# Patient Record
Sex: FEMALE | Race: WHITE | NOT HISPANIC OR LATINO | Employment: OTHER | URBAN - METROPOLITAN AREA
[De-identification: names, ages, dates, MRNs, and addresses within clinical notes are randomized per-mention and may not be internally consistent; named-entity substitution may affect disease eponyms.]

---

## 2017-05-16 ENCOUNTER — RADIANT APPOINTMENT (OUTPATIENT)
Dept: GENERAL RADIOLOGY | Facility: CLINIC | Age: 62
End: 2017-05-16
Attending: NURSE PRACTITIONER
Payer: COMMERCIAL

## 2017-05-16 ENCOUNTER — OFFICE VISIT (OUTPATIENT)
Dept: FAMILY MEDICINE | Facility: CLINIC | Age: 62
End: 2017-05-16
Payer: COMMERCIAL

## 2017-05-16 VITALS
HEART RATE: 101 BPM | SYSTOLIC BLOOD PRESSURE: 101 MMHG | TEMPERATURE: 96.7 F | OXYGEN SATURATION: 87 % | WEIGHT: 103 LBS | DIASTOLIC BLOOD PRESSURE: 70 MMHG | HEIGHT: 64 IN | BODY MASS INDEX: 17.58 KG/M2

## 2017-05-16 DIAGNOSIS — J44.1 COPD EXACERBATION (H): Primary | ICD-10-CM

## 2017-05-16 DIAGNOSIS — Z12.31 ENCOUNTER FOR SCREENING MAMMOGRAM FOR BREAST CANCER: ICD-10-CM

## 2017-05-16 DIAGNOSIS — J44.1 COPD EXACERBATION (H): ICD-10-CM

## 2017-05-16 DIAGNOSIS — J43.9 PULMONARY EMPHYSEMA, UNSPECIFIED EMPHYSEMA TYPE (H): ICD-10-CM

## 2017-05-16 PROCEDURE — 99204 OFFICE O/P NEW MOD 45 MIN: CPT | Performed by: NURSE PRACTITIONER

## 2017-05-16 PROCEDURE — 71020 XR CHEST 2 VW: CPT

## 2017-05-16 PROCEDURE — 94640 AIRWAY INHALATION TREATMENT: CPT | Mod: 25 | Performed by: NURSE PRACTITIONER

## 2017-05-16 RX ORDER — ALBUTEROL SULFATE 90 UG/1
2 AEROSOL, METERED RESPIRATORY (INHALATION) EVERY 6 HOURS PRN
Qty: 1 INHALER | Refills: 0 | Status: CANCELLED | OUTPATIENT
Start: 2017-05-16

## 2017-05-16 NOTE — NURSING NOTE
"Chief Complaint   Patient presents with     Chronic Cough       Initial /70 (BP Location: Left arm, Cuff Size: Adult Regular)  Pulse 98  Temp 96.7  F (35.9  C) (Tympanic)  Ht 5' 4\" (1.626 m)  Wt 103 lb (46.7 kg)  SpO2 96%  BMI 17.68 kg/m2 Estimated body mass index is 17.68 kg/(m^2) as calculated from the following:    Height as of this encounter: 5' 4\" (1.626 m).    Weight as of this encounter: 103 lb (46.7 kg).  Medication Reconciliation: complete     Meryl Tee MA    "

## 2017-05-16 NOTE — PATIENT INSTRUCTIONS
Diagnosing COPD  Past medical history, physical exam, and diagnostic tests are all used to diagnose COPD.  Medical history  Your health care provider learns about your medical history by asking questions. Topics include:    History of present illness. Tell your health care provider about your symptoms and how long you have had them.    Past medical and surgical history. Share other health problems and surgery you have had.    Family history. Report serious health problems in close family members, especially any lung problems.    Social and environmental history. The most important factor in COPD is whether you smoke or have smoked in the past. Exposure to second hand smoke, harmful chemicals, or air pollution should also be noted.    Functional assessment. Report whether breathing interferes with your daily activities.  Physical exam    Your provide will examine you. He or she will check your heart and lungs with a stethoscope. The focus will be on your airways including your nose and throat.   Diagnostic tests    Pulmonary function tests measure the flow of air into and out of your lungs, and how much air your lungs can hold. The most common pulmonary function test is spirometry. This measures how fast and how much air you can blow out (exhale). Spirometry results are important in the diagnosis of COPD.    Pulse oximetry shows how much oxygen is in your blood (oxygen saturation). This may be done at rest, as well as during and after exercise.    Arterial blood gas tests measure levels of oxygen and carbon dioxide in your blood.    Chest X-rays show the size and shape of your lungs. They can also show certain problems in the lungs.    CT (computed tomography) scans provide detailed images of the lungs.    1655-5421 [employer]. [address] [place] [address], [place] [address]. All rights reserved. This information is not intended as a substitute for professional medical care. Always follow your healthcare  professional's instructions.        COPD Flare    You have had a flare-up of your COPD.  COPD, or chronic obstructive pulmonary disease, is a common lung disease. It causes your airways to become irritated and narrower. This makes it harder for you to breathe. Emphysema and chronic bronchitis are both types of COPD. This is a chronic condition, which means you always have it. Sometimes it gets worse. When this happens, it is called a flare-up.  Symptoms of COPD  People with COPD may have symptoms most of the time. In a flare-up, your symptoms get worse. These symptoms may mean you are having a flare-up:    Shortness of breath, shallow or rapid breathing, or wheezing that gets worse    Lung infection    Cough that gets worse    More mucus, thicker mucus or mucus of a different color    Tiredness, decreased energy, or trouble doing your usual activities    Fever    Chest tightness    Your symptoms don t get better even when you use your usual medicines, inhalers, and nebulizer    Trouble talking    You feel confused  Causes of flare-ups  Unfortunately, a flare-up can happen even though you did everything right, and you followed your doctor s instructions. Some causes of flare-ups are:    Smoking or secondhand smoke    Colds, the flu, or respiratory infections    Air pollution    Sudden change in the weather    Dust, irritating chemicals, or strong fumes    Not taking your medicines as prescribed  Home care  Here are some things you can do at home to treat a flare-up:    Try not to panic. This makes it harder to breathe, and keeps you from doing the right things.    Don t smoke or be around others who are smoking.    Try to drink more fluids than usual during a flare-up, unless your doctor has told you not to because of heart and kidney problems. More fluids can help loosen the mucus.    Use your inhalers and nebulizer, if you have one, as you have been told to.    If you were given antibiotics, take them until they  are used up or your doctor tells you to stop. It s important to finish the antibiotics, even though you feel better. This will make sure the infection has cleared.    If you were given prednisone or another steroid, finish it even if you feel better.  Preventing a flare-up  Even though flare-ups happen, the best way to treat one is to prevent it before it starts. Here are some pointers:    Don t smoke or be around others who are smoking.    Take your medicines as you have been told.    Talk with your doctor about getting a flu shot every year. Also find out if you need a pneumonia shot.    If there is a weather advisory warning to stay indoors, try to stay inside when possible.    Try to eat healthy and get plenty of sleep.    Try to avoid things that usually set you off, like dust, chemical fumes, hairsprays, or strong perfumes.  Follow-up care  Follow up with your healthcare provider.  If a culture was done, you will be told if your treatment needs to be changed. You can call as directed for the results.  If X-rays were done, and a radiologist had not seen them while you were there, they will be reviewed. You will be told if there is a change in the reading, especially if it affects your treatment.  Call 911  Call 911 if any of these occur:    You have trouble breathing    You feel confused or it s difficult to wake you up    You faint or lose consciousness    You have a rapid heart rate    You have new pain in your chest, arm, shoulder, neck or upper back  When to seek medical advice  Call your healthcare provider right away if any of these occur:    Wheezing or shortness of breath gets worse    You need to use your inhalers more often than usual without relief    Fever of 100.4 F (38 C) or higher, or as directed    Coughing up lots of dark-colored or bloody sputum (mucus)    Chest pain with each breath    You do not start to get better within 24 hours    Swelling or your ankles gets worse    Dizziness or  weakness       8821-5803 [employer]. [address] [place] [address], [place] [address]. All rights reserved. This information is not intended as a substitute for professional medical care. Always follow your healthcare professional's instructions.        Discharge Instructions: COPD  You have been diagnosed with chronic obstructive pulmonary disease (COPD). This is a name given to a group of diseases that limit the flow of air in and out of your lungs. This makes it harder to breathe. With COPD, you are also more likely to get lung infections. COPD includes chronic bronchitis and emphysema. COPD is most often caused by heavy, long-term cigarette smoking.  Home care  Quit smoking    If you smoke, quit. It is the best thing you can do for your COPD and your overall health.    Join a stop-smoking program. There are even telephone, text message, and Internet programs to help you quit.    Ask your health care provider about medications or other methods to help you quit.    Ask family members to quit smoking as well.    Don't allow people to smoke in your home, in your car, or when they are around you.  Protect yourself from infection    Wash your hands often. Do your best to keep your hands away from your face. Most germs are spread from your hands to your mouth.    Get a flu shot every year. Also ask your provider about pneumonia vaccines.    Avoid crowds. It's especially important to do this in the winter when more people have colds and flu.    To stay healthy, get enough sleep, exercise regularly, and eat a balanced diet. You should:    Get about 8 hours of sleep every night.    Try to exercise for at least 30 minutes on most days.    Have healthy foods including fruits and vegetables, 100% whole grains, lean meats and fish, and low-fat dairy products. Try to stay away from foods high in fats and sugar.  Take your medications  Take your medications exactly as directed. Don't skip doses.  Manage your  stress  Stress can make COPD worse. Use this stress management technique:    Find a quiet place and sit or lie in a comfortable position.    Close your eyes and perform breathing exercises for several minutes. Ask your provider about the best way to breathe.  Pulmonary rehabilitation    Pulmonary rehab can help you feel better. These programs include exercise, breathing techniques, information about COPD, counseling, and help for smokers.    Ask your provider or your local hospital about programs in your area.  When to call your health care provider  Call your provider immediately if you have any of the following:    Shortness of breath, wheezing, or coughing    Increased mucus    Yellow, green, bloody, or smelly mucus    Fever or chills    Tightness in your chest that does not go away with rest or medication    An irregular heartbeat or a feeling that your heart is beating very fast    Swollen ankles     0660-1194 [employer]. [address] [place] [address]. All rights reserved. This information is not intended as a substitute for professional medical care. Always follow your healthcare professional's instructions.        Medications for Chronic Obstructive Pulmonary Disease  Some medications for COPD help control or prevent symptoms. They are called maintenance medications. Take these medications every day or as instructed by your health care provider. Some are rescue medications. Take these only when you have symptoms like increased shortness of breath or chest tightness. Take this medication sheet with you to your next office visit and ask your provider or nurse to help you complete it.  Bronchodilators  What they do: Relax the muscles around airways. This allows you to breathe more easily.     Short-acting beta-2 agonists. These start working shortly after you use them. They are rescue medications.  My medications: __________________________________________  When to  take: __________________________________________     Long-acting beta-2 agonists. These work more slowly than the fast-acting type. But the effects last longer. They are maintenance medications.  My medications: __________________________________________  When to take: __________________________________________     Anticholinergics. Rescue: These may be used along with a short-acting beta-2 agonist to help keep airways open.  My medications: __________________________________________  When to take: __________________________________________     Anticholinergics. Maintenance: There are long-acting anticholinergenics.  My medications: __________________________________________  When to take: __________________________________________     Methylxanthines. These are maintenance medications and have long-lasting effects. They may be useful if symptoms occur during sleep.  My medications: __________________________________________  When to take: __________________________________________     Corticosteroids  What they do: Reduce inflammation, swelling, and mucus production. This allows you to breathe more easily.     Inhaled corticosteroids. These medications are taken with an inhaler or nebulizer. They are maintenance medications.  My medications: __________________________________________  When to take: __________________________________________     Oral corticosteroids. These medications are taken by mouth. They may be used when symptoms worsen.  My medications: __________________________________________  When to take: __________________________________________     Phosphodiesterase Type 4 (PDE4) Inhibitors  What they do: Reduce the risk of flare-ups in patients with severe COPD.  My medications: __________________________________________  When to take: __________________________________________     Combination Medications  What they do: Combine the effects of different types of medications. For example, a combination  medication may relax the muscles around the airways and lessen the swelling or inflammation in the airway.  My medications: __________________________________________  When to take: __________________________________________     Other Medications  Other medication for COPD.  My medication: __________________________________________  What it does: __________________________________________  When to take: __________________________________________  Herbal products and supplements  There are products for COPD that are available without a subscription. For example, herbs, extracts, or supplements. Be sure to talk with your health care provider before taking any of these products. They can interact with medications you re taking.     8679-8579 [employer]. [address] [place] [address]. All rights reserved. This information is not intended as a substitute for professional medical care. Always follow your healthcare professional's instructions.        What is COPD?  COPD stands for chronic obstructive pulmonary disease. It means the airways in your lungs are blocked (obstructed). Because of this, it is hard to breathe. You may have trouble with daily activities because of shortness of breath. Over time the shortness of breath usually worsens making it more and more difficult to take care of yourself and take part in activities. Chronic bronchitis and emphysema are two common types of COPD.  What happens in chronic bronchitis?    The cells in the airways make more mucus than normal. The mucus builds up, narrowing the airways. This means less air travels into and out of the lungs. The lining of the airways may also become inflamed (swollen) and causes the airways to narrow even more.        What happens in emphysema?    The small airways are damaged and lose their stretchiness. The airways collapse when you exhale, causing air to get trapped in the air sacs. This means that less oxygen enters the  blood vessels and less oxygen is delivered to all of the cells of your body. This makes it hard to breathe.     Damage to cilia    Cilia are small hairs that line and protect the airways. Smoking damages the cilia. Damaged cilia can t sweep mucus and particles away. Some of the cilia are destroyed. This damage worsens COPD.  How did I get COPD?  Most people get COPD from smoking. Cigarette smoke damages lungs, which can develop into COPD over many years.  How COPD affects you  COPD makes you work harder to breathe. Air may get trapped in the lungs, which prevents your lungs from filling completely when you inhale (breathe in). It s harder to take deep breaths. Over time, your lungs may become enlarged. This makes it more difficult for the lungs to expand fully in the chest. These problems cause you to have shortness of breath (also called dyspnea). Wheezing (hoarse, whistling breathing), chronic cough, and fatigue (feeling tired and worn out) are also common.    1632-4192 [employer]. [address] [place] [address], [place] [address]. All rights reserved. This information is not intended as a substitute for professional medical care. Always follow your healthcare professional's instructions.        Discharge Instructions: Coughing Techniques     Airway clearance techniques help to remove mucus from the airways. Clearing the airways helps to improve breathing and lessen the chance of infection. One method is controlled coughing. Be sure to also use any medications before or after clearing your airways, as instructed by your health care provider. For example, some people use inhaled bronchodilators before clearing their airways.   Note: Be sure to keep a box of tissues beside you. Wash your hands when you are done.   Controlled coughing    Sit on a chair with both feet on the floor.    Take a slow, deep breath through your nose and hold for 2 counts.    Lean forward slightly.    Cough twice -- 2 short  coughs.    Relax for a few seconds.  Repeat the steps as needed.   The  corrales  technique    Sit on a chair with both feet on the floor.    Take a slow, deep breath through your nose and hold for 2 counts.    To exhale, open your mouth and make a  corrales  sound in your throat. (This is the same way you might breathe to clean a pair of eyeglasses.)    Corrales 2 to 3 times as you exhale.    Relax for a few seconds.  Repeat the steps as needed.         Follow-up care  Make a follow-up appointment as directed by our staff.  When to seek medical care  Call your doctor immediately if you have any of the following:    Shortness of breath, wheezing, or coughing    Increased mucus    Yellow, green, bloody, or smelly mucus    Fever or chills    Tightness in your chest that does not go away with rest or medication    An irregular heartbeat     2712-4837 [employer]. [address] [place] [address], [place] [address]. All rights reserved. This information is not intended as a substitute for professional medical care. Always follow your healthcare professional's instructions.

## 2017-05-16 NOTE — MR AVS SNAPSHOT
After Visit Summary   5/16/2017    Tammie Zeng    MRN: [id number]           Patient Information     Date Of Birth          1955        Visit Information        Provider Department      5/16/2017 11:00 AM Teresita Shook APRN [facility]        Today's Diagnoses     COPD exacerbation (H)    -  1    Pulmonary emphysema, unspecified emphysema type (H)          Care Instructions      Diagnosing COPD  Past medical history, physical exam, and diagnostic tests are all used to diagnose COPD.  Medical history  Your health care provider learns about your medical history by asking questions. Topics include:    History of present illness. Tell your health care provider about your symptoms and how long you have had them.    Past medical and surgical history. Share other health problems and surgery you have had.    Family history. Report serious health problems in close family members, especially any lung problems.    Social and environmental history. The most important factor in COPD is whether you smoke or have smoked in the past. Exposure to second hand smoke, harmful chemicals, or air pollution should also be noted.    Functional assessment. Report whether breathing interferes with your daily activities.  Physical exam    Your provide will examine you. He or she will check your heart and lungs with a stethoscope. The focus will be on your airways including your nose and throat.   Diagnostic tests    Pulmonary function tests measure the flow of air into and out of your lungs, and how much air your lungs can hold. The most common pulmonary function test is spirometry. This measures how fast and how much air you can blow out (exhale). Spirometry results are important in the diagnosis of COPD.    Pulse oximetry shows how much oxygen is in your blood (oxygen saturation). This may be done at rest, as well as during and after exercise.    Arterial blood gas tests measure levels of oxygen  and carbon dioxide in your blood.    Chest X-rays show the size and shape of your lungs. They can also show certain problems in the lungs.    CT (computed tomography) scans provide detailed images of the lungs.    0052-1258 [employer]. [address] [place] [address], [place] [address]. All rights reserved. This information is not intended as a substitute for professional medical care. Always follow your healthcare professional's instructions.        COPD Flare    You have had a flare-up of your COPD.  COPD, or chronic obstructive pulmonary disease, is a common lung disease. It causes your airways to become irritated and narrower. This makes it harder for you to breathe. Emphysema and chronic bronchitis are both types of COPD. This is a chronic condition, which means you always have it. Sometimes it gets worse. When this happens, it is called a flare-up.  Symptoms of COPD  People with COPD may have symptoms most of the time. In a flare-up, your symptoms get worse. These symptoms may mean you are having a flare-up:    Shortness of breath, shallow or rapid breathing, or wheezing that gets worse    Lung infection    Cough that gets worse    More mucus, thicker mucus or mucus of a different color    Tiredness, decreased energy, or trouble doing your usual activities    Fever    Chest tightness    Your symptoms don t get better even when you use your usual medicines, inhalers, and nebulizer    Trouble talking    You feel confused  Causes of flare-ups  Unfortunately, a flare-up can happen even though you did everything right, and you followed your doctor s instructions. Some causes of flare-ups are:    Smoking or secondhand smoke    Colds, the flu, or respiratory infections    Air pollution    Sudden change in the weather    Dust, irritating chemicals, or strong fumes    Not taking your medicines as prescribed  Home care  Here are some things you can do at home to treat a flare-up:    Try not to panic. This makes it  harder to breathe, and keeps you from doing the right things.    Don t smoke or be around others who are smoking.    Try to drink more fluids than usual during a flare-up, unless your doctor has told you not to because of heart and kidney problems. More fluids can help loosen the mucus.    Use your inhalers and nebulizer, if you have one, as you have been told to.    If you were given antibiotics, take them until they are used up or your doctor tells you to stop. It s important to finish the antibiotics, even though you feel better. This will make sure the infection has cleared.    If you were given prednisone or another steroid, finish it even if you feel better.  Preventing a flare-up  Even though flare-ups happen, the best way to treat one is to prevent it before it starts. Here are some pointers:    Don t smoke or be around others who are smoking.    Take your medicines as you have been told.    Talk with your doctor about getting a flu shot every year. Also find out if you need a pneumonia shot.    If there is a weather advisory warning to stay indoors, try to stay inside when possible.    Try to eat healthy and get plenty of sleep.    Try to avoid things that usually set you off, like dust, chemical fumes, hairsprays, or strong perfumes.  Follow-up care  Follow up with your healthcare provider.  If a culture was done, you will be told if your treatment needs to be changed. You can call as directed for the results.  If X-rays were done, and a radiologist had not seen them while you were there, they will be reviewed. You will be told if there is a change in the reading, especially if it affects your treatment.  Call 911  Call 911 if any of these occur:    You have trouble breathing    You feel confused or it s difficult to wake you up    You faint or lose consciousness    You have a rapid heart rate    You have new pain in your chest, arm, shoulder, neck or upper back  When to seek medical advice  Call your  healthcare provider right away if any of these occur:    Wheezing or shortness of breath gets worse    You need to use your inhalers more often than usual without relief    Fever of 100.4 F (38 C) or higher, or as directed    Coughing up lots of dark-colored or bloody sputum (mucus)    Chest pain with each breath    You do not start to get better within 24 hours    Swelling or your ankles gets worse    Dizziness or weakness       3593-1235 [employer]. [address] [place] [address], [place] [address]. All rights reserved. This information is not intended as a substitute for professional medical care. Always follow your healthcare professional's instructions.        Discharge Instructions: COPD  You have been diagnosed with chronic obstructive pulmonary disease (COPD). This is a name given to a group of diseases that limit the flow of air in and out of your lungs. This makes it harder to breathe. With COPD, you are also more likely to get lung infections. COPD includes chronic bronchitis and emphysema. COPD is most often caused by heavy, long-term cigarette smoking.  Home care  Quit smoking    If you smoke, quit. It is the best thing you can do for your COPD and your overall health.    Join a stop-smoking program. There are even telephone, text message, and Internet programs to help you quit.    Ask your health care provider about medications or other methods to help you quit.    Ask family members to quit smoking as well.    Don't allow people to smoke in your home, in your car, or when they are around you.  Protect yourself from infection    Wash your hands often. Do your best to keep your hands away from your face. Most germs are spread from your hands to your mouth.    Get a flu shot every year. Also ask your provider about pneumonia vaccines.    Avoid crowds. It's especially important to do this in the winter when more people have colds and flu.    To stay healthy, get enough sleep, exercise regularly,  and eat a balanced diet. You should:    Get about 8 hours of sleep every night.    Try to exercise for at least 30 minutes on most days.    Have healthy foods including fruits and vegetables, 100% whole grains, lean meats and fish, and low-fat dairy products. Try to stay away from foods high in fats and sugar.  Take your medications  Take your medications exactly as directed. Don't skip doses.  Manage your stress  Stress can make COPD worse. Use this stress management technique:    Find a quiet place and sit or lie in a comfortable position.    Close your eyes and perform breathing exercises for several minutes. Ask your provider about the best way to breathe.  Pulmonary rehabilitation    Pulmonary rehab can help you feel better. These programs include exercise, breathing techniques, information about COPD, counseling, and help for smokers.    Ask your provider or your local hospital about programs in your area.  When to call your health care provider  Call your provider immediately if you have any of the following:    Shortness of breath, wheezing, or coughing    Increased mucus    Yellow, green, bloody, or smelly mucus    Fever or chills    Tightness in your chest that does not go away with rest or medication    An irregular heartbeat or a feeling that your heart is beating very fast    Swollen ankles     1113-5322 [employer]. [address] [place] [address], [place] [address]. All rights reserved. This information is not intended as a substitute for professional medical care. Always follow your healthcare professional's instructions.        Medications for Chronic Obstructive Pulmonary Disease  Some medications for COPD help control or prevent symptoms. They are called maintenance medications. Take these medications every day or as instructed by your health care provider. Some are rescue medications. Take these only when you have symptoms like increased shortness of breath or chest tightness. Take this  medication sheet with you to your next office visit and ask your provider or nurse to help you complete it.  Bronchodilators  What they do: Relax the muscles around airways. This allows you to breathe more easily.     Short-acting beta-2 agonists. These start working shortly after you use them. They are rescue medications.  My medications: __________________________________________  When to take: __________________________________________     Long-acting beta-2 agonists. These work more slowly than the fast-acting type. But the effects last longer. They are maintenance medications.  My medications: __________________________________________  When to take: __________________________________________     Anticholinergics. Rescue: These may be used along with a short-acting beta-2 agonist to help keep airways open.  My medications: __________________________________________  When to take: __________________________________________     Anticholinergics. Maintenance: There are long-acting anticholinergenics.  My medications: __________________________________________  When to take: __________________________________________     Methylxanthines. These are maintenance medications and have long-lasting effects. They may be useful if symptoms occur during sleep.  My medications: __________________________________________  When to take: __________________________________________     Corticosteroids  What they do: Reduce inflammation, swelling, and mucus production. This allows you to breathe more easily.     Inhaled corticosteroids. These medications are taken with an inhaler or nebulizer. They are maintenance medications.  My medications: __________________________________________  When to take: __________________________________________     Oral corticosteroids. These medications are taken by mouth. They may be used when symptoms worsen.  My medications: __________________________________________  When to  take: __________________________________________     Phosphodiesterase Type 4 (PDE4) Inhibitors  What they do: Reduce the risk of flare-ups in patients with severe COPD.  My medications: __________________________________________  When to take: __________________________________________     Combination Medications  What they do: Combine the effects of different types of medications. For example, a combination medication may relax the muscles around the airways and lessen the swelling or inflammation in the airway.  My medications: __________________________________________  When to take: __________________________________________     Other Medications  Other medication for COPD.  My medication: __________________________________________  What it does: __________________________________________  When to take: __________________________________________  Herbal products and supplements  There are products for COPD that are available without a subscription. For example, herbs, extracts, or supplements. Be sure to talk with your health care provider before taking any of these products. They can interact with medications you re taking.     9965-5906 [employer]. [address] [place] [address], [place] [address]. All rights reserved. This information is not intended as a substitute for professional medical care. Always follow your healthcare professional's instructions.        What is COPD?  COPD stands for chronic obstructive pulmonary disease. It means the airways in your lungs are blocked (obstructed). Because of this, it is hard to breathe. You may have trouble with daily activities because of shortness of breath. Over time the shortness of breath usually worsens making it more and more difficult to take care of yourself and take part in activities. Chronic bronchitis and emphysema are two common types of COPD.  What happens in chronic bronchitis?    The cells in the airways make more mucus than normal. The mucus  builds up, narrowing the airways. This means less air travels into and out of the lungs. The lining of the airways may also become inflamed (swollen) and causes the airways to narrow even more.        What happens in emphysema?    The small airways are damaged and lose their stretchiness. The airways collapse when you exhale, causing air to get trapped in the air sacs. This means that less oxygen enters the blood vessels and less oxygen is delivered to all of the cells of your body. This makes it hard to breathe.     Damage to cilia    Cilia are small hairs that line and protect the airways. Smoking damages the cilia. Damaged cilia can t sweep mucus and particles away. Some of the cilia are destroyed. This damage worsens COPD.  How did I get COPD?  Most people get COPD from smoking. Cigarette smoke damages lungs, which can develop into COPD over many years.  How COPD affects you  COPD makes you work harder to breathe. Air may get trapped in the lungs, which prevents your lungs from filling completely when you inhale (breathe in). It s harder to take deep breaths. Over time, your lungs may become enlarged. This makes it more difficult for the lungs to expand fully in the chest. These problems cause you to have shortness of breath (also called dyspnea). Wheezing (hoarse, whistling breathing), chronic cough, and fatigue (feeling tired and worn out) are also common.    8770-0899 [employer]. [address] [place] [address], [place] [address]. All rights reserved. This information is not intended as a substitute for professional medical care. Always follow your healthcare professional's instructions.        Discharge Instructions: Coughing Techniques     Airway clearance techniques help to remove mucus from the airways. Clearing the airways helps to improve breathing and lessen the chance of infection. One method is controlled coughing. Be sure to also use any medications before or after clearing your airways, as  instructed by your health care provider. For example, some people use inhaled bronchodilators before clearing their airways.   Note: Be sure to keep a box of tissues beside you. Wash your hands when you are done.   Controlled coughing    Sit on a chair with both feet on the floor.    Take a slow, deep breath through your nose and hold for 2 counts.    Lean forward slightly.    Cough twice -- 2 short coughs.    Relax for a few seconds.  Repeat the steps as needed.   The  corrales  technique    Sit on a chair with both feet on the floor.    Take a slow, deep breath through your nose and hold for 2 counts.    To exhale, open your mouth and make a  corrales  sound in your throat. (This is the same way you might breathe to clean a pair of eyeglasses.)    Corrales 2 to 3 times as you exhale.    Relax for a few seconds.  Repeat the steps as needed.         Follow-up care  Make a follow-up appointment as directed by our staff.  When to seek medical care  Call your doctor immediately if you have any of the following:    Shortness of breath, wheezing, or coughing    Increased mucus    Yellow, green, bloody, or smelly mucus    Fever or chills    Tightness in your chest that does not go away with rest or medication    An irregular heartbeat     7663-1112 [employer]. [address] [place] [address], [place] [address]. All rights reserved. This information is not intended as a substitute for professional medical care. Always follow your healthcare professional's instructions.              Follow-ups after your visit        Additional Services     PULMONARY MEDICINE REFERRAL       Your provider has referred you to: [facility]: [facility]  Katty [phone]   [email or web address]    Please be aware that coverage of these services is subject to the terms and limitations of your health insurance plan.  Call member services at your health plan with any benefit or coverage questions.      Please bring the following with you to your  "appointment:    (1) Any X-Rays, CTs or MRIs which have been performed.  Contact the facility where they were done to arrange for  prior to your scheduled appointment.    (2) List of current medications   (3) This referral request   (4) Any documents/labs given to you for this referral                  Future tests that were ordered for you today     Open Future Orders        Priority Expected Expires Ordered    *MA Screening Digital Bilateral Routine  2018            Who to contact     If you have questions or need follow up information about today's clinic visit or your schedule please contact [facility] directly at [phone].  Normal or non-critical lab and imaging results will be communicated to you by MyChart, letter or phone within 4 business days after the clinic has received the results. If you do not hear from us within 7 days, please contact the clinic through MyChart or phone. If you have a critical or abnormal lab result, we will notify you by phone as soon as possible.  Submit refill requests through [employer] or call your pharmacy and they will forward the refill request to us. Please allow 3 business days for your refill to be completed.          Additional Information About Your Visit        [employer] Information     [employer] lets you send messages to your doctor, view your test results, renew your prescriptions, schedule appointments and more. To sign up, go to [email or web address] . Click on \"Log in\" on the left side of the screen, which will take you to the Welcome page. Then click on \"Sign up Now\" on the right side of the page.     You will be asked to enter the access code listed below, as well as some personal information. Please follow the directions to create your username and password.     Your access code is: [email or web address]  Expires: 2017 12:34 PM     Your access code will  in 90 days. If you need help or a new code, please call your [place] " "clinic or [phone].        Care EveryWhere ID     This is your Care EveryWhere ID. This could be used by other organizations to access your [facility] medical records  [id number]        Your Vitals Were     Pulse Temperature Height Pulse Oximetry BMI (Body Mass Index)       101 96.7  F (35.9  C) (Tympanic) 5' 4\" (1.626 m) 87% 17.68 kg/m2        Blood Pressure from Last 3 Encounters:   05/16/17 101/70   10/19/16 118/74   01/06/11 116/70    Weight from Last 3 Encounters:   05/16/17 103 lb (46.7 kg)   10/19/16 106 lb (48.1 kg)   01/06/11 125 lb (56.7 kg)              We Performed the Following     INHALATION/NEBULIZER TREATMENT, INITIAL     PULMONARY MEDICINE REFERRAL          Today's Medication Changes          These changes are accurate as of: 5/16/17 12:34 PM.  If you have any questions, ask your nurse or doctor.               Start taking these medicines.        Dose/Directions    Ipratropium-Albuterol  MCG/ACT inhaler   Commonly known as:  COMBIVENT RESPIMAT   Used for:  COPD exacerbation (H)   Started by:  Teresita Shook APRN CNP        Dose:  1 puff   Inhale 1 puff into the lungs 4 times daily Not to exceed 6 doses per day.   Quantity:  1 Inhaler   Refills:  1         Stop taking these medicines if you haven't already. Please contact your care team if you have questions.     azithromycin 250 MG tablet   Commonly known as:  ZITHROMAX   Stopped by:  Teresita Shook APRN CNP           methylPREDNISolone 4 MG tablet   Commonly known as:  MEDROL DOSEPAK   Stopped by:  Teresita Shook APRN CNP                Where to get your medicines      These medications were sent to [facility] [address] [place] [address] & [address], [place] [address]     Phone:  [phone]     Ipratropium-Albuterol  MCG/ACT inhaler                Primary Care Provider Office Phone # Fax #    Angely Rivas [phone]       [facility]" [facility] [address] [facility]  [place] [address]        Thank you!     Thank you for choosing [facility]  for your care. Our goal is always to provide you with excellent care. Hearing back from our patients is one way we can continue to improve our services. Please take a few minutes to complete the written survey that you may receive in the mail after your visit with us. Thank you!             Your Updated Medication List - Protect others around you: Learn how to safely use, store and throw away your medicines at www.disposemymeds.org.          This list is accurate as of: 5/16/17 12:34 PM.  Always use your most recent med list.                   Brand Name Dispense Instructions for use    albuterol 108 (90 BASE) MCG/ACT Inhaler    PROAIR HFA/PROVENTIL HFA/VENTOLIN HFA    1 Inhaler    Inhale 2 puffs into the lungs every 6 hours as needed for shortness of breath / dyspnea or wheezing       CHAYA ASPIRIN 325 MG tablet   Generic drug:  aspirin      Take 1-2 tablets by mouth daily as needed.       Ipratropium-Albuterol  MCG/ACT inhaler    COMBIVENT RESPIMAT    1 Inhaler    Inhale 1 puff into the lungs 4 times daily Not to exceed 6 doses per day.

## 2017-05-16 NOTE — NURSING NOTE
The following nebulizer treatment was given:     MEDICATION: Duoneb  : [employer]  LOT #: [id number]  EXPIRATION DATE:  01/31/19  NDC # 3093-5433-16    Meryl Tee MA

## 2017-05-16 NOTE — PROGRESS NOTES
SUBJECTIVE:                                                    Tammie Zeng is a 61 year old female who presents to clinic today for the following health issues:      RESPIRATORY SYMPTOMS      Duration: ongoing for a long time ; great difficulty breathing, SOB going from the car to the building.  preveious cxray in 10/2016 demonstrates hyperinflated lungs. She is a long term smoker stating she smokes 1/2 PPD.      Description  rhinorrhea, cough and wheezing    Severity: the runny nose is mild, the SOB is severe    Accompanying signs and symptoms:     History (predisposing factors):  tobacco abuse    Precipitating or alleviating factors: None    Therapies tried and outcome:  aspirin     Reports also right axillary pain extending into right lateral breast.  Can not recall last mammogram     Problem list and histories reviewed & adjusted, as indicated.  Additional history: as documented    Patient Active Problem List   Diagnosis     TOBACCO ABUSE-CONTINUOUS     Generalized anxiety disorder     CARDIOVASCULAR SCREENING; LDL GOAL LESS THAN 130     Moderate major depression (H)     Past Surgical History:   Procedure Laterality Date     CHOLECYSTECTOMY, LAPOROSCOPIC  1980's    done at [facility]     TUBAL LIGATION  1998       Social History   Substance Use Topics     Smoking status: Current Every Day Smoker     Packs/day: 1.00     Years: 25.00     Types: Cigarettes     Smokeless tobacco: Never Used      Comment: states is cutting back - smokes 1/2 ppd (01/03/11)     Alcohol use 0.0 oz/week     0 Standard drinks or equivalent per week      Comment: 3 mixed drinks per week     Family History   Problem Relation Age of Onset     Asthma Daughter      DIABETES Paternal Grandmother      CANCER Maternal Aunt      skin     Allergies Daughter      Depression Mother      Eye Disorder Mother      cataracts and glacoma     GASTROINTESTINAL DISEASE Maternal Grandmother      gallbladder     GASTROINTESTINAL DISEASE Daughter   "    ulcerative cholitis     OSTEOPOROSIS Mother      Respiratory Mother      asthmatic bronchitis         Current Outpatient Prescriptions   Medication Sig Dispense Refill     Ipratropium-Albuterol (COMBIVENT RESPIMAT)  MCG/ACT inhaler Inhale 1 puff into the lungs 4 times daily Not to exceed 6 doses per day. 1 Inhaler 1     aspirin (CHAYA ASPIRIN) 325 MG tablet Take 1-2 tablets by mouth daily as needed.       albuterol (PROAIR HFA, PROVENTIL HFA, VENTOLIN HFA) 108 (90 BASE) MCG/ACT inhaler Inhale 2 puffs into the lungs every 6 hours as needed for shortness of breath / dyspnea or wheezing (Patient not taking: Reported on 5/16/2017) 1 Inhaler 0     Allergies   Allergen Reactions     Penicillins      convulsions       Reviewed and updated as needed this visit by clinical staff       Reviewed and updated as needed this visit by Provider         ROS:  Constitutional, HEENT, cardiovascular, pulmonary, gi and gu systems are negative, except as otherwise noted.    OBJECTIVE:                                                    /70 (BP Location: Left arm, Cuff Size: Adult Regular)  Pulse 101  Temp 96.7  F (35.9  C) (Tympanic)  Ht 5' 4\" (1.626 m)  Wt 103 lb (46.7 kg)  SpO2 (!) 87%  BMI 17.68 kg/m2  Body mass index is 17.68 kg/(m^2). persistent weight loss  GENERAL: healthy, alert and dyspneic with non productive wet sounding cough   EYES: Eyes grossly normal to inspection, PERRL and conjunctivae and sclerae normal  HENT: ear canals and TM's normal, nose and mouth without ulcers or lesions  NECK: no adenopathy, no asymmetry, masses, or scars and thyroid normal to palpation  RESP: lungs clear to auscultation but diminished breath sounds- given duoneb in clinic with little improvement;  desats to 87% with ambulation   CV: tachy regular rate and rhythm, normal S1 S2, no S3 or S4, no murmur, click or rub, no peripheral edema   MS: experiences pulling sensation in the right axilla and lateral breast with external " posterior stretching of her right arm  Diagnostic Test Results:  Chest xray hyperinflated lungs; no acute process     ASSESSMENT/PLAN:                                                        ICD-10-CM    1. COPD exacerbation (H) J44.1 *MA Screening Digital Bilateral     XR Chest 2 Views     Ipratropium-Albuterol (COMBIVENT RESPIMAT)  MCG/ACT inhaler     INHALATION/NEBULIZER TREATMENT, INITIAL   2. Pulmonary emphysema, unspecified emphysema type (H) J43.9 PULMONARY MEDICINE REFERRAL     CANCELED: Pulmonary General Adult IP Consult   she is referred to pulmonology  Will follow up with Dr Newby to establish  I have encouraged her to begin pulmonary rehab and a smoking cessation program which will hopefully be presented by the pulmonology office; if not by her PCP when she establishes.  She will schedule a mammogram     Patient Instructions       Diagnosing COPD  Past medical history, physical exam, and diagnostic tests are all used to diagnose COPD.  Medical history  Your health care provider learns about your medical history by asking questions. Topics include:    History of present illness. Tell your health care provider about your symptoms and how long you have had them.    Past medical and surgical history. Share other health problems and surgery you have had.    Family history. Report serious health problems in close family members, especially any lung problems.    Social and environmental history. The most important factor in COPD is whether you smoke or have smoked in the past. Exposure to second hand smoke, harmful chemicals, or air pollution should also be noted.    Functional assessment. Report whether breathing interferes with your daily activities.  Physical exam    Your provide will examine you. He or she will check your heart and lungs with a stethoscope. The focus will be on your airways including your nose and throat.   Diagnostic tests    Pulmonary function tests measure the flow of air into and  out of your lungs, and how much air your lungs can hold. The most common pulmonary function test is spirometry. This measures how fast and how much air you can blow out (exhale). Spirometry results are important in the diagnosis of COPD.    Pulse oximetry shows how much oxygen is in your blood (oxygen saturation). This may be done at rest, as well as during and after exercise.    Arterial blood gas tests measure levels of oxygen and carbon dioxide in your blood.    Chest X-rays show the size and shape of your lungs. They can also show certain problems in the lungs.    CT (computed tomography) scans provide detailed images of the lungs.    2562-4443 [employer]. [address] [place] [address]. All rights reserved. This information is not intended as a substitute for professional medical care. Always follow your healthcare professional's instructions.        COPD Flare    You have had a flare-up of your COPD.  COPD, or chronic obstructive pulmonary disease, is a common lung disease. It causes your airways to become irritated and narrower. This makes it harder for you to breathe. Emphysema and chronic bronchitis are both types of COPD. This is a chronic condition, which means you always have it. Sometimes it gets worse. When this happens, it is called a flare-up.  Symptoms of COPD  People with COPD may have symptoms most of the time. In a flare-up, your symptoms get worse. These symptoms may mean you are having a flare-up:    Shortness of breath, shallow or rapid breathing, or wheezing that gets worse    Lung infection    Cough that gets worse    More mucus, thicker mucus or mucus of a different color    Tiredness, decreased energy, or trouble doing your usual activities    Fever    Chest tightness    Your symptoms don t get better even when you use your usual medicines, inhalers, and nebulizer    Trouble talking    You feel confused  Causes of flare-ups  Unfortunately, a flare-up can happen  even though you did everything right, and you followed your doctor s instructions. Some causes of flare-ups are:    Smoking or secondhand smoke    Colds, the flu, or respiratory infections    Air pollution    Sudden change in the weather    Dust, irritating chemicals, or strong fumes    Not taking your medicines as prescribed  Home care  Here are some things you can do at home to treat a flare-up:    Try not to panic. This makes it harder to breathe, and keeps you from doing the right things.    Don t smoke or be around others who are smoking.    Try to drink more fluids than usual during a flare-up, unless your doctor has told you not to because of heart and kidney problems. More fluids can help loosen the mucus.    Use your inhalers and nebulizer, if you have one, as you have been told to.    If you were given antibiotics, take them until they are used up or your doctor tells you to stop. It s important to finish the antibiotics, even though you feel better. This will make sure the infection has cleared.    If you were given prednisone or another steroid, finish it even if you feel better.  Preventing a flare-up  Even though flare-ups happen, the best way to treat one is to prevent it before it starts. Here are some pointers:    Don t smoke or be around others who are smoking.    Take your medicines as you have been told.    Talk with your doctor about getting a flu shot every year. Also find out if you need a pneumonia shot.    If there is a weather advisory warning to stay indoors, try to stay inside when possible.    Try to eat healthy and get plenty of sleep.    Try to avoid things that usually set you off, like dust, chemical fumes, hairsprays, or strong perfumes.  Follow-up care  Follow up with your healthcare provider.  If a culture was done, you will be told if your treatment needs to be changed. You can call as directed for the results.  If X-rays were done, and a radiologist had not seen them while you  were there, they will be reviewed. You will be told if there is a change in the reading, especially if it affects your treatment.  Call 911  Call 911 if any of these occur:    You have trouble breathing    You feel confused or it s difficult to wake you up    You faint or lose consciousness    You have a rapid heart rate    You have new pain in your chest, arm, shoulder, neck or upper back  When to seek medical advice  Call your healthcare provider right away if any of these occur:    Wheezing or shortness of breath gets worse    You need to use your inhalers more often than usual without relief    Fever of 100.4 F (38 C) or higher, or as directed    Coughing up lots of dark-colored or bloody sputum (mucus)    Chest pain with each breath    You do not start to get better within 24 hours    Swelling or your ankles gets worse    Dizziness or weakness       0311-0431 [employer]. [address] [place], [place] [id number]. All rights reserved. This information is not intended as a substitute for professional medical care. Always follow your healthcare professional's instructions.        Discharge Instructions: COPD  You have been diagnosed with chronic obstructive pulmonary disease (COPD). This is a name given to a group of diseases that limit the flow of air in and out of your lungs. This makes it harder to breathe. With COPD, you are also more likely to get lung infections. COPD includes chronic bronchitis and emphysema. COPD is most often caused by heavy, long-term cigarette smoking.  Home care  Quit smoking    If you smoke, quit. It is the best thing you can do for your COPD and your overall health.    Join a stop-smoking program. There are even telephone, text message, and Internet programs to help you quit.    Ask your health care provider about medications or other methods to help you quit.    Ask family members to quit smoking as well.    Don't allow people to smoke in your home, in your car, or  when they are around you.  Protect yourself from infection    Wash your hands often. Do your best to keep your hands away from your face. Most germs are spread from your hands to your mouth.    Get a flu shot every year. Also ask your provider about pneumonia vaccines.    Avoid crowds. It's especially important to do this in the winter when more people have colds and flu.    To stay healthy, get enough sleep, exercise regularly, and eat a balanced diet. You should:    Get about 8 hours of sleep every night.    Try to exercise for at least 30 minutes on most days.    Have healthy foods including fruits and vegetables, 100% whole grains, lean meats and fish, and low-fat dairy products. Try to stay away from foods high in fats and sugar.  Take your medications  Take your medications exactly as directed. Don't skip doses.  Manage your stress  Stress can make COPD worse. Use this stress management technique:    Find a quiet place and sit or lie in a comfortable position.    Close your eyes and perform breathing exercises for several minutes. Ask your provider about the best way to breathe.  Pulmonary rehabilitation    Pulmonary rehab can help you feel better. These programs include exercise, breathing techniques, information about COPD, counseling, and help for smokers.    Ask your provider or your local hospital about programs in your area.  When to call your health care provider  Call your provider immediately if you have any of the following:    Shortness of breath, wheezing, or coughing    Increased mucus    Yellow, green, bloody, or smelly mucus    Fever or chills    Tightness in your chest that does not go away with rest or medication    An irregular heartbeat or a feeling that your heart is beating very fast    Swollen ankles     4036-4134 [employer]. [address] [place] [address], [place] [address]. All rights reserved. This information is not intended as a substitute for professional medical care. Always  follow your healthcare professional's instructions.              8997-2088 The [employer]. [address] [place] [address], [place] [address]. All rights reserved. This information is not intended as a substitute for professional medical care. Always follow your healthcare professional's instructions.        What is COPD?  COPD stands for chronic obstructive pulmonary disease. It means the airways in your lungs are blocked (obstructed). Because of this, it is hard to breathe. You may have trouble with daily activities because of shortness of breath. Over time the shortness of breath usually worsens making it more and more difficult to take care of yourself and take part in activities. Chronic bronchitis and emphysema are two common types of COPD.  What happens in chronic bronchitis?    The cells in the airways make more mucus than normal. The mucus builds up, narrowing the airways. This means less air travels into and out of the lungs. The lining of the airways may also become inflamed (swollen) and causes the airways to narrow even more.        What happens in emphysema?    The small airways are damaged and lose their stretchiness. The airways collapse when you exhale, causing air to get trapped in the air sacs. This means that less oxygen enters the blood vessels and less oxygen is delivered to all of the cells of your body. This makes it hard to breathe.     Damage to cilia    Cilia are small hairs that line and protect the airways. Smoking damages the cilia. Damaged cilia can t sweep mucus and particles away. Some of the cilia are destroyed. This damage worsens COPD.  How did I get COPD?  Most people get COPD from smoking. Cigarette smoke damages lungs, which can develop into COPD over many years.  How COPD affects you  COPD makes you work harder to breathe. Air may get trapped in the lungs, which prevents your lungs from filling completely when you inhale (breathe in). It s harder to take deep breaths. Over  time, your lungs may become enlarged. This makes it more difficult for the lungs to expand fully in the chest. These problems cause you to have shortness of breath (also called dyspnea). Wheezing (hoarse, whistling breathing), chronic cough, and fatigue (feeling tired and worn out) are also common.    0303-8788 [employer]. [address] [place] [address], [place] [address]. All rights reserved. This information is not intended as a substitute for professional medical care. Always follow your healthcare professional's instructions.        Discharge Instructions: Coughing Techniques     Airway clearance techniques help to remove mucus from the airways. Clearing the airways helps to improve breathing and lessen the chance of infection. One method is controlled coughing. Be sure to also use any medications before or after clearing your airways, as instructed by your health care provider. For example, some people use inhaled bronchodilators before clearing their airways.   Note: Be sure to keep a box of tissues beside you. Wash your hands when you are done.   Controlled coughing    Sit on a chair with both feet on the floor.    Take a slow, deep breath through your nose and hold for 2 counts.    Lean forward slightly.    Cough twice -- 2 short coughs.    Relax for a few seconds.  Repeat the steps as needed.   The  corrales  technique    Sit on a chair with both feet on the floor.    Take a slow, deep breath through your nose and hold for 2 counts.    To exhale, open your mouth and make a  corrales  sound in your throat. (This is the same way you might breathe to clean a pair of eyeglasses.)    Corrales 2 to 3 times as you exhale.    Relax for a few seconds.  Repeat the steps as needed.         Follow-up care  Make a follow-up appointment as directed by our staff.  When to seek medical care  Call your doctor immediately if you have any of the following:    Shortness of breath, wheezing, or coughing    Increased mucus    Yellow,  green, bloody, or smelly mucus    Fever or chills    Tightness in your chest that does not go away with rest or medication    An irregular heartbeat     7979-8382 The [employer]. [address] [place] [address], [place] [address]. All rights reserved. This information is not intended as a substitute for professional medical care. Always follow your healthcare professional's instructions.    Ms Zeng will establishwith Dr. Newby as PCP         DULCE Avilez [facility]

## 2017-05-16 NOTE — LETTER
[facility]  [address] [facility]  [address]  [place]  [address]  Tel: [phone]    May 16, 2017    Tammie OTILIA Karri  [address]  [facility] [address]        Dear Ms. Zeng,    COPD is chronic obstructive pulmonary disease, which is another name for emphysema.    If you have any further questions or problems, please contact our office.      Sincerely,    Teresita Shook NP/[place]    Results for orders placed or performed in visit on 05/16/17   XR Chest 2 Views    Narrative    XR CHEST 2 VW   5/16/2017 12:15 PM     HISTORY: Chronic obstructive pulmonary disease with (acute)  exacerbation    COMPARISON: 10/19/2016      Impression    IMPRESSION: COPD changes. No definite acute disease.    CHANELLE BORJA MD           Enclosure: Lab Results

## 2017-06-06 ENCOUNTER — HOSPITAL ENCOUNTER (OUTPATIENT)
Dept: MAMMOGRAPHY | Facility: CLINIC | Age: 62
Discharge: HOME | End: 2017-06-06
Attending: NURSE PRACTITIONER | Admitting: NURSE PRACTITIONER
Payer: COMMERCIAL

## 2017-06-06 DIAGNOSIS — Z12.31 ENCOUNTER FOR SCREENING MAMMOGRAM FOR BREAST CANCER: ICD-10-CM

## 2017-06-06 PROCEDURE — G0202 SCR MAMMO BI INCL CAD: HCPCS

## 2017-06-13 ENCOUNTER — OFFICE VISIT (OUTPATIENT)
Dept: FAMILY MEDICINE | Facility: CLINIC | Age: 62
End: 2017-06-13
Payer: COMMERCIAL

## 2017-06-13 VITALS
DIASTOLIC BLOOD PRESSURE: 68 MMHG | OXYGEN SATURATION: 95 % | HEIGHT: 64 IN | TEMPERATURE: 97.6 F | BODY MASS INDEX: 17.87 KG/M2 | HEART RATE: 82 BPM | SYSTOLIC BLOOD PRESSURE: 102 MMHG | WEIGHT: 104.7 LBS

## 2017-06-13 DIAGNOSIS — J44.9 CHRONIC OBSTRUCTIVE PULMONARY DISEASE, UNSPECIFIED COPD TYPE (H): Primary | ICD-10-CM

## 2017-06-13 DIAGNOSIS — G89.29 CHRONIC RIGHT SHOULDER PAIN: ICD-10-CM

## 2017-06-13 DIAGNOSIS — Z72.0 TOBACCO ABUSE: ICD-10-CM

## 2017-06-13 DIAGNOSIS — M25.511 CHRONIC RIGHT SHOULDER PAIN: ICD-10-CM

## 2017-06-13 PROCEDURE — 99214 OFFICE O/P EST MOD 30 MIN: CPT | Performed by: INTERNAL MEDICINE

## 2017-06-13 RX ORDER — ALBUTEROL SULFATE 90 UG/1
2 AEROSOL, METERED RESPIRATORY (INHALATION) EVERY 6 HOURS PRN
Qty: 1 INHALER | Refills: 1 | Status: SHIPPED | OUTPATIENT
Start: 2017-06-13 | End: 2018-11-20

## 2017-06-13 RX ORDER — VARENICLINE TARTRATE 1 MG/1
1 TABLET, FILM COATED ORAL 2 TIMES DAILY
Qty: 60 TABLET | Refills: 1 | Status: SHIPPED | OUTPATIENT
Start: 2017-06-13 | End: 2017-08-12

## 2017-06-13 ASSESSMENT — ENCOUNTER SYMPTOMS
SENSORY CHANGE: 0
NECK PAIN: 0
FOCAL WEAKNESS: 0
COUGH: 1
DEPRESSION: 0
WHEEZING: 1
SHORTNESS OF BREATH: 1

## 2017-06-13 NOTE — PATIENT INSTRUCTIONS
Take Chantix as directed.  Call doctor if you develop any side effects from the medication/s.    Complete 3 months of Chantix even if you quit smoking earlier.    Avoid any lifting, pushing, pulling with right arm until right shoulder pain heals.  If condition persists, consider physical therapy.    Follow up in 6 weeks.

## 2017-06-13 NOTE — NURSING NOTE
"Chief Complaint   Patient presents with     Establish Care     Shoulder Pain     COPD       Initial /68 (BP Location: Left arm, Patient Position: Chair, Cuff Size: Adult Small)  Pulse 82  Temp 97.6  F (36.4  C) (Oral)  Ht 5' 4\" (1.626 m)  Wt 104 lb 11.2 oz (47.5 kg)  SpO2 95%  Breastfeeding? No  BMI 17.97 kg/m2 Estimated body mass index is 17.97 kg/(m^2) as calculated from the following:    Height as of this encounter: 5' 4\" (1.626 m).    Weight as of this encounter: 104 lb 11.2 oz (47.5 kg).  Medication Reconciliation: complete.  Magnolia Wilson CMA    "

## 2017-06-13 NOTE — MR AVS SNAPSHOT
"              After Visit Summary   6/13/2017    Tammei Zeng    MRN: [id number]           Patient Information     Date Of Birth          1955        Visit Information        Provider Department      6/13/2017 9:00 AM Geo Newby MD [facility]        Today's Diagnoses     Chronic obstructive pulmonary disease, unspecified COPD type (H)    -  1      Care Instructions    Take Chantix as directed.  Call doctor if you develop any side effects from the medication/s.    Complete 3 months of Chantix even if you quit smoking earlier.    Avoid any lifting, pushing, pulling with right arm until right shoulder pain heals.  If condition persists, consider physical therapy.    Follow up in 6 weeks.          Follow-ups after your visit        Who to contact     If you have questions or need follow up information about today's clinic visit or your schedule please contact [facility] directly at [phone].  Normal or non-critical lab and imaging results will be communicated to you by MyChart, letter or phone within 4 business days after the clinic has received the results. If you do not hear from us within 7 days, please contact the clinic through [employer] or phone. If you have a critical or abnormal lab result, we will notify you by phone as soon as possible.  Submit refill requests through [employer] or call your pharmacy and they will forward the refill request to us. Please allow 3 business days for your refill to be completed.          Additional Information About Your Visit        MyChart Information     [employer] lets you send messages to your doctor, view your test results, renew your prescriptions, schedule appointments and more. To sign up, go to [email or web address] . Click on \"Log in\" on the left side of the screen, which will take you to the Welcome page. Then click on \"Sign up Now\" on the right side of the page.     You will be asked to enter the access code " "listed below, as well as some personal information. Please follow the directions to create your username and password.     Your access code is: [email or web address]  Expires: 2017 12:34 PM     Your access code will  in 90 days. If you need help or a new code, please call your [facility] or [phone].        Care EveryWhere ID     This is your Care EveryWhere ID. This could be used by other organizations to access your [facility] medical records  [id number]        Your Vitals Were     Pulse Temperature Height Pulse Oximetry Breastfeeding? BMI (Body Mass Index)    82 97.6  F (36.4  C) (Oral) 5' 4\" (1.626 m) 95% No 17.97 kg/m2       Blood Pressure from Last 3 Encounters:   17 102/68   17 101/70   10/19/16 118/74    Weight from Last 3 Encounters:   17 104 lb 11.2 oz (47.5 kg)   17 103 lb (46.7 kg)   10/19/16 106 lb (48.1 kg)              Today, you had the following     No orders found for display         Today's Medication Changes          These changes are accurate as of: 17  9:34 AM.  If you have any questions, ask your nurse or doctor.               Start taking these medicines.        Dose/Directions    * varenicline 0.5 MG X 11 & 1 MG X 42 tablet   Commonly known as:  CHANTIX STARTING MONTH DIVYA   Used for:  Chronic obstructive pulmonary disease, unspecified COPD type (H)   Started by:  Geo Newby MD        Take 0.5 mg tab daily for 3 days, then 0.5 mg tab twice daily for 4 days, then 1 mg twice daily.   Quantity:  53 tablet   Refills:  0       * varenicline 1 MG tablet   Commonly known as:  CHANTIX CONTINUING MONTH DIVYA   Used for:  Chronic obstructive pulmonary disease, unspecified COPD type (H)   Started by:  Geo Newby MD        Dose:  1 mg   Take 1 tablet (1 mg) by mouth 2 times daily   Quantity:  60 tablet   Refills:  1       * Notice:  This list has 2 medication(s) that are the same as other medications prescribed for you. " Read the directions carefully, and ask your doctor or other care provider to review them with you.         Where to get your medicines      These medications were sent to [facility] [id number] [address] & [address], [place] [address]     Phone:  [phone]     albuterol 108 (90 BASE) MCG/ACT Inhaler    varenicline 0.5 MG X 11 & 1 MG X 42 tablet    varenicline 1 MG tablet                Primary Care Provider Office Phone # Fax #    Angely Rivas [phone]       [facility] [address] [facility] [address]        Thank you!     Thank you for choosing [facility]  for your care. Our goal is always to provide you with excellent care. Hearing back from our patients is one way we can continue to improve our services. Please take a few minutes to complete the written survey that you may receive in the mail after your visit with us. Thank you!             Your Updated Medication List - Protect others around you: Learn how to safely use, store and throw away your medicines at www.disposemymeds.org.          This list is accurate as of: 6/13/17  9:34 AM.  Always use your most recent med list.                   Brand Name Dispense Instructions for use    albuterol 108 (90 BASE) MCG/ACT Inhaler    PROAIR HFA/PROVENTIL HFA/VENTOLIN HFA    1 Inhaler    Inhale 2 puffs into the lungs every 6 hours as needed for shortness of breath / dyspnea or wheezing       CHAYA ASPIRIN 325 MG tablet   Generic drug:  aspirin      Take 1-2 tablets by mouth daily as needed.       Ipratropium-Albuterol  MCG/ACT inhaler    COMBIVENT RESPIMAT    1 Inhaler    Inhale 1 puff into the lungs 4 times daily Not to exceed 6 doses per day.       * varenicline 0.5 MG X 11 & 1 MG X 42 tablet    CHANTIX STARTING MONTH DIVYA    53 tablet    Take 0.5 mg tab daily for 3 days, then 0.5 mg tab twice daily for 4 days, then 1 mg twice daily.       *  varenicline 1 MG tablet    CHANTIX CONTINUING MONTH DIVYA    60 tablet    Take 1 tablet (1 mg) by mouth 2 times daily       * Notice:  This list has 2 medication(s) that are the same as other medications prescribed for you. Read the directions carefully, and ask your doctor or other care provider to review them with you.

## 2017-06-13 NOTE — PROGRESS NOTES
"HPI Comments:   Patient was previously seen at our clinic on 5/16/2017 due to COPD exacerbation. Chest x-ray showed COPD changes but no other acute findings. Patient was prescribed with ipratropium/albuterol inhaler which helps but it is cost-prohibitive at $300 out-of-pocket cost per inhaler-- she is asking for an alternative inhaler. She has a visit with the pulmonologist next month.      She continues to smoke cigarettes despite her COPD diagnosis.  Smokes a pack a day.    Patient also complains of right shoulder pain radiating down the right upper extremity for the past 2 months.  No neck pain. No focal weakness/numbness. Does not recall any traumatic incident that could've led to the pain.      Past Medical History:   Diagnosis Date     Chronic obstructive pulmonary disease, unspecified COPD type (H) 6/13/2017     Depressive disorder, not elsewhere classified May 2006    following sudden death of her mother     Generalized anxiety disorder      Herpes simplex without mention of complication     MOUTH     Nephrolithiasis      TOBACCO ABUSE-CONTINUOUS        Review of Systems   Respiratory: Positive for cough (occasional), shortness of breath and wheezing.    Cardiovascular: Negative for chest pain.   Musculoskeletal: Positive for joint pain (see HPI). Negative for neck pain.   Neurological: Negative for sensory change and focal weakness.   Psychiatric/Behavioral: Negative for depression.       /68 (BP Location: Left arm, Patient Position: Chair, Cuff Size: Adult Small)  Pulse 82  Temp 97.6  F (36.4  C) (Oral)  Ht 5' 4\" (1.626 m)  Wt 104 lb 11.2 oz (47.5 kg)  SpO2 95%  Breastfeeding? No  BMI 17.97 kg/m2      Physical Exam   Constitutional: She is oriented to person, place, and time. No distress.   Neck: Normal range of motion. Neck supple.   Pulmonary/Chest: Effort normal. No respiratory distress.   Musculoskeletal: Normal range of motion. She exhibits tenderness (right shoulder at superior aspect of " the trapezius).   Neurological: She is alert and oriented to person, place, and time. GCS score is 15.   Intact motor and sensory function of the right upper extremity   Psychiatric: Mood and affect normal.   Vitals reviewed.        ICD-10-CM    1. Chronic obstructive pulmonary disease, unspecified COPD type (H) J44.9  has follow-up with pulmonologist    2. Chronic right shoulder pain M25.511  see patient instructions below    G89.29    3. Tobacco abuse Z72.0 varenicline (CHANTIX STARTING MONTH PAK) 0.5 MG X 11 & 1 MG X 42 tablet     varenicline (CHANTIX CONTINUING MONTH PAK) 1 MG tablet     albuterol (PROAIR HFA/PROVENTIL HFA/VENTOLIN HFA) 108 (90 BASE) MCG/ACT Inhaler         Patient Instructions   Take Chantix as directed.  Call doctor if you develop any side effects from the medication/s.    Complete 3 months of Chantix even if you quit smoking earlier.    Avoid any lifting, pushing, pulling with right arm until right shoulder pain heals.  If condition persists, consider physical therapy.    Follow up in 6 weeks.

## 2017-07-01 ENCOUNTER — HEALTH MAINTENANCE LETTER (OUTPATIENT)
Age: 62
End: 2017-07-01

## 2017-07-17 ENCOUNTER — HOSPITAL ENCOUNTER (OUTPATIENT)
Dept: CT IMAGING | Facility: CLINIC | Age: 62
Discharge: HOME | End: 2017-07-17
Attending: INTERNAL MEDICINE | Admitting: INTERNAL MEDICINE
Payer: COMMERCIAL

## 2017-07-17 DIAGNOSIS — R06.00 DYSPNEA: ICD-10-CM

## 2017-07-17 PROCEDURE — 71250 CT THORAX DX C-: CPT

## 2017-08-30 ENCOUNTER — TRANSFERRED RECORDS (OUTPATIENT)
Dept: HEALTH INFORMATION MANAGEMENT | Facility: CLINIC | Age: 62
End: 2017-08-30

## 2017-12-20 ENCOUNTER — TRANSFERRED RECORDS (OUTPATIENT)
Dept: HEALTH INFORMATION MANAGEMENT | Facility: CLINIC | Age: 62
End: 2017-12-20

## 2018-11-19 ENCOUNTER — TELEPHONE (OUTPATIENT)
Dept: FAMILY MEDICINE | Facility: CLINIC | Age: 63
End: 2018-11-19

## 2018-11-19 NOTE — TELEPHONE ENCOUNTER
Pt reports sore throat, Mild SOB with coughing, phlegm  Would like OV.  Scheduled with Davidson tomorrow.    Chris MARSH RN

## 2018-11-20 ENCOUNTER — OFFICE VISIT (OUTPATIENT)
Dept: FAMILY MEDICINE | Facility: CLINIC | Age: 63
End: 2018-11-20
Payer: COMMERCIAL

## 2018-11-20 VITALS
SYSTOLIC BLOOD PRESSURE: 132 MMHG | DIASTOLIC BLOOD PRESSURE: 69 MMHG | WEIGHT: 108 LBS | HEIGHT: 64 IN | HEART RATE: 112 BPM | TEMPERATURE: 98.9 F | BODY MASS INDEX: 18.44 KG/M2 | OXYGEN SATURATION: 95 %

## 2018-11-20 DIAGNOSIS — H61.22 IMPACTED CERUMEN OF LEFT EAR: ICD-10-CM

## 2018-11-20 DIAGNOSIS — Z72.0 TOBACCO ABUSE: ICD-10-CM

## 2018-11-20 DIAGNOSIS — J06.9 VIRAL URI: ICD-10-CM

## 2018-11-20 DIAGNOSIS — R05.9 COUGH: Primary | ICD-10-CM

## 2018-11-20 PROCEDURE — 99213 OFFICE O/P EST LOW 20 MIN: CPT | Mod: 25 | Performed by: NURSE PRACTITIONER

## 2018-11-20 PROCEDURE — 69209 REMOVE IMPACTED EAR WAX UNI: CPT | Mod: LT | Performed by: NURSE PRACTITIONER

## 2018-11-20 RX ORDER — ALBUTEROL SULFATE 90 UG/1
2 AEROSOL, METERED RESPIRATORY (INHALATION) EVERY 4 HOURS PRN
Qty: 1 INHALER | Refills: 1 | Status: SHIPPED | OUTPATIENT
Start: 2018-11-20 | End: 2023-01-19

## 2018-11-20 RX ORDER — DOXYCYCLINE 100 MG/1
100 CAPSULE ORAL 2 TIMES DAILY
Qty: 14 CAPSULE | Refills: 0 | Status: SHIPPED | OUTPATIENT
Start: 2018-11-20 | End: 2018-11-27

## 2018-11-20 NOTE — PROGRESS NOTES
HPI    SUBJECTIVE:   Tammie Zeng is a 63 year old female who presents to clinic today for the following health issues:      RESPIRATORY SYMPTOMS      Duration: 5 days    Description  nasal congestion, rhinorrhea, cough, chills, headache, fatigue/malaise and out of breathe    Severity: moderate    Accompanying signs and symptoms: None    History (predisposing factors):  none    Precipitating or alleviating factors: SMoker    Therapies tried and outcome:  rest and fluids Mucinex, dayquil, nyquil      7 days ago felt a taste in the back of the throat  6 days ago started coughing  Now has a bad runny nose that is clear   Cough is productive. Usually green   No fevers   No myalgia     Past Medical History:   Diagnosis Date     Chronic obstructive pulmonary disease, unspecified COPD type (H) 6/13/2017     Depressive disorder, not elsewhere classified May 2006    following sudden death of her mother     Generalized anxiety disorder      Herpes simplex without mention of complication     MOUTH     Nephrolithiasis      TOBACCO ABUSE-CONTINUOUS      Family History   Problem Relation Age of Onset     Asthma Daughter      Diabetes Paternal Grandmother      Cancer Maternal Aunt      skin     Allergies Daughter      Depression Mother      Eye Disorder Mother      cataracts and glacoma     GASTROINTESTINAL DISEASE Maternal Grandmother      gallbladder     GASTROINTESTINAL DISEASE Daughter      ulcerative cholitis     Osteoporosis Mother      Respiratory Mother      asthmatic bronchitis     Past Surgical History:   Procedure Laterality Date     CHOLECYSTECTOMY, LAPOROSCOPIC  1980's    done at [facility]     TUBAL LIGATION  1998     Social History   Substance Use Topics     Smoking status: Current Every Day Smoker     Packs/day: 1.00     Years: 25.00     Types: Cigarettes     Smokeless tobacco: Never Used      Comment: states is cutting back - smokes 1/2 ppd (01/03/11)     Alcohol use 0.0 oz/week     0 Standard drinks or  "equivalent per week      Comment: 3 mixed drinks per week     Current Outpatient Prescriptions   Medication Sig Dispense Refill     albuterol (PROAIR HFA/PROVENTIL HFA/VENTOLIN HFA) 108 (90 BASE) MCG/ACT Inhaler Inhale 2 puffs into the lungs every 6 hours as needed for shortness of breath / dyspnea or wheezing 1 Inhaler 1     aspirin (CHAYA ASPIRIN) 325 MG tablet Take 1-2 tablets by mouth daily as needed.       Ipratropium-Albuterol (COMBIVENT RESPIMAT)  MCG/ACT inhaler Inhale 1 puff into the lungs 4 times daily Not to exceed 6 doses per day. 1 Inhaler 1     Allergies   Allergen Reactions     Penicillins      convulsions       Reviewed and updated as needed this visit by clinical staff and provider       ROS  Detailed as above     /69 (BP Location: Right arm, Patient Position: Chair, Cuff Size: Adult Regular)  Pulse 112  Temp 98.9  F (37.2  C) (Tympanic)  Ht 5' 4\" (1.626 m)  Wt 108 lb (49 kg)  SpO2 95%  BMI 18.54 kg/m2      Physical Exam   Constitutional: She is well-developed, well-nourished, and in no distress.   HENT:   Head: Normocephalic.   Right Ear: Tympanic membrane, external ear and ear canal normal.   Left Ear: External ear normal.   Mouth/Throat: Oropharynx is clear and moist. No oropharyngeal exudate.   Left cerumen impaction   Eyes: Conjunctivae are normal.   Neck: Normal range of motion.   Cardiovascular: Normal rate, regular rhythm and normal heart sounds.    No murmur heard.  Pulmonary/Chest: Effort normal and breath sounds normal. No respiratory distress.   Moist cough   Lymphadenopathy:     She has no cervical adenopathy.   Neurological: She is alert.   Skin: Skin is warm and dry.   Psychiatric: Mood and affect normal.   Vitals reviewed.      Assessment and Plan:       ICD-10-CM    1. Cough R05 doxycycline monohydrate (MONDOXYNE NL) 100 MG capsule   2. Viral URI J06.9    3. Tobacco abuse Z72.0 albuterol (PROAIR HFA/PROVENTIL HFA/VENTOLIN HFA) 108 (90 Base) MCG/ACT inhaler   4. " Impacted cerumen of left ear H61.22 HC REMOVAL IMPACTED CERUMEN IRRIGATION/LVG UNILAT       Suspect viral etiology. Recommend Mucinex DM, albuterol, saline nasal spray. Sent with antibiotic to start if symptoms worsen. Recommend she stop smoking but she has no desire to quit. F/u with worsened symptoms     CMA completed cerumen removal by lavage      DULCE Kang, CNP  [facility]

## 2018-11-20 NOTE — NURSING NOTE
"Chief Complaint   Patient presents with     Cough        Initial /69 (BP Location: Right arm, Patient Position: Chair, Cuff Size: Adult Regular)  Pulse 112  Temp 98.9  F (37.2  C) (Tympanic)  Ht 5' 4\" (1.626 m)  Wt 108 lb (49 kg)  SpO2 95%  BMI 18.54 kg/m2 Estimated body mass index is 18.54 kg/(m^2) as calculated from the following:    Height as of this encounter: 5' 4\" (1.626 m).    Weight as of this encounter: 108 lb (49 kg)..    BP completed using cuff size: regular  MEDICATIONS REVIEWED  SOCIAL AND FAMILY HX REVIEWED  Kia Jluian CMA  "

## 2018-11-20 NOTE — NURSING NOTE
Left ear washed plain warm water using elephant ear.  Occlusion cleared.  Small red area noted.  No complaints from pt.   Nini DE LOS SANTOS MA

## 2018-11-20 NOTE — PATIENT INSTRUCTIONS
Only start the antibiotic if you get a fever, or you get a lot worse. Wait until at least next Monday before starting.     Take Mucinex DM. Follow the instructions on the box. guaifenesin     Use the inhaler every 4 hours     Drink lots of fluids     Try a saline nasal spray every 4 hours as desired     Cut back on smoking

## 2018-11-20 NOTE — MR AVS SNAPSHOT
"              After Visit Summary   11/20/2018    Tammie Zeng    MRN: [id number]           Patient Information     Date Of Birth          1955        Visit Information        Provider Department      11/20/2018 8:30 AM Davidson Weinberg APRN CNP [facility]        Today's Diagnoses     Cough    -  1    Tobacco abuse        Viral URI          Care Instructions    Only start the antibiotic if you get a fever, or you get a lot worse. Wait until at least next Monday before starting.     Take Mucinex DM. Follow the instructions on the box.     Use the inhaler every 4 hours     Drink lots of fluids     Try a saline nasal spray every 4 hours as desired     Cut back on smoking             Follow-ups after your visit        Who to contact     If you have questions or need follow up information about today's clinic visit or your schedule please contact [facility] directly at [phone].  Normal or non-critical lab and imaging results will be communicated to you by MyChart, letter or phone within 4 business days after the clinic has received the results. If you do not hear from us within 7 days, please contact the clinic through MyChart or phone. If you have a critical or abnormal lab result, we will notify you by phone as soon as possible.  Submit refill requests through [employer] or call your pharmacy and they will forward the refill request to us. Please allow 3 business days for your refill to be completed.          Additional Information About Your Visit        Care EveryWhere ID     This is your Care EveryWhere ID. This could be used by other organizations to access your [facility] medical records  [id number]        Your Vitals Were     Pulse Temperature Height Pulse Oximetry BMI (Body Mass Index)       112 98.9  F (37.2  C) (Tympanic) 5' 4\" (1.626 m) 95% 18.54 kg/m2        Blood Pressure from Last 3 Encounters:   11/20/18 132/69   06/13/17 102/68   05/16/17 101/70    Weight from " Last 3 Encounters:   11/20/18 108 lb (49 kg)   06/13/17 104 lb 11.2 oz (47.5 kg)   05/16/17 103 lb (46.7 kg)              Today, you had the following     No orders found for display         Today's Medication Changes          These changes are accurate as of 11/20/18  8:38 AM.  If you have any questions, ask your nurse or doctor.               Start taking these medicines.        Dose/Directions    doxycycline monohydrate 100 MG capsule   Commonly known as:  MONDOXYNE NL   Used for:  Cough   Started by:  Davidson Weinberg APRN CNP        Dose:  100 mg   Take 1 capsule (100 mg) by mouth 2 times daily for 7 days   Quantity:  14 capsule   Refills:  0         These medicines have changed or have updated prescriptions.        Dose/Directions    albuterol 108 (90 Base) MCG/ACT inhaler   Commonly known as:  PROAIR HFA/PROVENTIL HFA/VENTOLIN HFA   This may have changed:  when to take this   Used for:  Tobacco abuse   Changed by:  Davidson Weinberg APRN CNP        Dose:  2 puff   Inhale 2 puffs into the lungs every 4 hours as needed for shortness of breath / dyspnea or wheezing   Quantity:  1 Inhaler   Refills:  1            Where to get your medicines      These medications were sent to [facility] [address] [place] [address] & [address], [place] [address]     Phone:  [phone]     albuterol 108 (90 Base) MCG/ACT inhaler         Some of these will need a paper prescription and others can be bought over the counter.  Ask your nurse if you have questions.     Bring a paper prescription for each of these medications     doxycycline monohydrate 100 MG capsule                Primary Care Provider Office Phone # Fax #    Angely Rivas [phone]       [facility] [address] [place] [address]        Equal Access to Services     HALIMA SUTHERLAND AH: nikita Patel, austin chavarria  kristin leighpiper menon'aan ah. So [facility] [phone].    ATENCIÓN: Si habla marina, tiene a gates disposición servicios gratuitos de asistencia lingüística. Angel al [phone].    We comply with applicable federal civil rights laws and [place] laws. We do not discriminate on the basis of race, color, national origin, age, disability, sex, sexual orientation, or gender identity.            Thank you!     Thank you for choosing [facility]  for your care. Our goal is always to provide you with excellent care. Hearing back from our patients is one way we can continue to improve our services. Please take a few minutes to complete the written survey that you may receive in the mail after your visit with us. Thank you!             Your Updated Medication List - Protect others around you: Learn how to safely use, store and throw away your medicines at www.disposemymeds.org.          This list is accurate as of 11/20/18  8:38 AM.  Always use your most recent med list.                   Brand Name Dispense Instructions for use Diagnosis    albuterol 108 (90 Base) MCG/ACT inhaler    PROAIR HFA/PROVENTIL HFA/VENTOLIN HFA    1 Inhaler    Inhale 2 puffs into the lungs every 4 hours as needed for shortness of breath / dyspnea or wheezing    Tobacco abuse       CHAYA ASPIRIN 325 MG tablet   Generic drug:  aspirin      Take 1-2 tablets by mouth daily as needed.        doxycycline monohydrate 100 MG capsule    MONDOXYNE NL    14 capsule    Take 1 capsule (100 mg) by mouth 2 times daily for 7 days    Cough       Ipratropium-Albuterol  MCG/ACT inhaler    COMBIVENT RESPIMAT    1 Inhaler    Inhale 1 puff into the lungs 4 times daily Not to exceed 6 doses per day.    COPD exacerbation (H)

## 2021-02-10 ENCOUNTER — TRANSFERRED RECORDS (OUTPATIENT)
Dept: HEALTH INFORMATION MANAGEMENT | Facility: CLINIC | Age: 66
End: 2021-02-10

## 2021-06-08 ENCOUNTER — TRANSFERRED RECORDS (OUTPATIENT)
Dept: HEALTH INFORMATION MANAGEMENT | Facility: CLINIC | Age: 66
End: 2021-06-08

## 2021-06-14 ENCOUNTER — ANCILLARY PROCEDURE (OUTPATIENT)
Dept: GENERAL RADIOLOGY | Facility: CLINIC | Age: 66
End: 2021-06-14
Attending: FAMILY MEDICINE
Payer: MEDICARE

## 2021-06-14 ENCOUNTER — OFFICE VISIT (OUTPATIENT)
Dept: URGENT CARE | Facility: URGENT CARE | Age: 66
End: 2021-06-14
Payer: MEDICARE

## 2021-06-14 VITALS
DIASTOLIC BLOOD PRESSURE: 82 MMHG | OXYGEN SATURATION: 97 % | TEMPERATURE: 97.6 F | SYSTOLIC BLOOD PRESSURE: 124 MMHG | RESPIRATION RATE: 16 BRPM | HEART RATE: 87 BPM

## 2021-06-14 DIAGNOSIS — S69.91XA WRIST INJURY, RIGHT, INITIAL ENCOUNTER: Primary | ICD-10-CM

## 2021-06-14 PROCEDURE — 99213 OFFICE O/P EST LOW 20 MIN: CPT | Performed by: FAMILY MEDICINE

## 2021-06-14 PROCEDURE — 73110 X-RAY EXAM OF WRIST: CPT | Mod: RT | Performed by: RADIOLOGY

## 2021-06-14 NOTE — PROGRESS NOTES
SUBJECTIVE:  Chief Complaint   Patient presents with     Wrist Injury     Pt states she hurt her R wrist a month ago and it is not getting any better    .nelson presents with a chief complaint of right wrist.  The injury occurred 1 month ago.   The injury happened while while walking.   How: fall  immediate pain  The patient complained of moderate pain and has had decreased ROM.    Pain exacerbated by movement    He treated it initially with no therapy.   This is the first time this type of injury has occurred to this patient.     Past Medical History:   Diagnosis Date     Chronic obstructive pulmonary disease, unspecified COPD type (H) 6/13/2017     Depressive disorder, not elsewhere classified May 2006    following sudden death of her mother     Generalized anxiety disorder      Herpes simplex without mention of complication     MOUTH     Nephrolithiasis      TOBACCO ABUSE-CONTINUOUS      Allergies   Allergen Reactions     Pcn [Penicillins]      convulsions     Social History     Tobacco Use     Smoking status: Current Every Day Smoker     Packs/day: 1.00     Years: 25.00     Pack years: 25.00     Types: Cigarettes     Smokeless tobacco: Never Used     Tobacco comment: states is cutting back - smokes 1/2 ppd (01/03/11)   Substance Use Topics     Alcohol use: Yes     Alcohol/week: 0.0 standard drinks     Comment: 3 mixed drinks per week       ROSINTEGUMENTARY/SKIN: NEGATIVE for open wound/bleeding and NEGATIVE for bruising    EXAM: /82   Pulse 87   Temp 97.6  F (36.4  C) (Tympanic)   Resp 16   SpO2 97%   Breastfeeding No Gen: healthy,alert,no distress  Extremity: wrist has pain with palpation and rom.   There is not compromise to the distal circulation.  Pulses are +2 and CRT is brisk.GENERAL APPEARANCE: healthy, alert and no distress  EXTREMITIES: peripheral pulses normal  SKIN: no suspicious lesions or rashes  NEURO: Normal strength and tone, sensory exam grossly normal, mentation intact and speech  normal    Xray without acute findings, no fx  read by Lane Carballo D.O.      ICD-10-CM    1. Wrist injury, right, initial encounter  S69.91XA XR Wrist Right G/E 3 Views     Wrist/Arm/Hand Supplies Order for DME - ONLY FOR DME     Orthopedic & Spine  Referral     RICE

## 2021-06-17 ENCOUNTER — OFFICE VISIT (OUTPATIENT)
Dept: ORTHOPEDICS | Facility: CLINIC | Age: 66
End: 2021-06-17
Attending: FAMILY MEDICINE
Payer: MEDICARE

## 2021-06-17 VITALS
HEIGHT: 63 IN | BODY MASS INDEX: 19.91 KG/M2 | SYSTOLIC BLOOD PRESSURE: 110 MMHG | DIASTOLIC BLOOD PRESSURE: 70 MMHG | WEIGHT: 112.4 LBS

## 2021-06-17 DIAGNOSIS — S52.531A CLOSED COLLES' FRACTURE OF RIGHT RADIUS, INITIAL ENCOUNTER: Primary | ICD-10-CM

## 2021-06-17 PROCEDURE — 99203 OFFICE O/P NEW LOW 30 MIN: CPT | Performed by: ORTHOPAEDIC SURGERY

## 2021-06-17 ASSESSMENT — MIFFLIN-ST. JEOR: SCORE: 1028.73

## 2021-06-17 NOTE — PATIENT INSTRUCTIONS
Continue the Futuro splint for another 2 to 3 weeks  Avoid lifting heavy objects  Gentle range of motion throughout the day  Follow-up as needed

## 2021-06-17 NOTE — PROGRESS NOTES
HISTORY OF PRESENT ILLNESS:    Tammie Zeng is a 65 year old female who is seen in consultation at the request of Dr. Carballo for right wrist pain after fall down the stairs.  Injury occurred on 4/27/21, nearly 7 month ago. Patient is rgiht hand dominant    Present symptoms: Right wrist pain. Pain located on the ulnar aspect of the wrist. Increased pain with use of extremity. She reports pain with twisting and torquing at the wrist. She reports swelling and redness to the wrist.  Patient notes discomfort with wrist braces at times.  She reports numbness of the little, ring and long finger. She reports that paresthesias have been on-going since in the injury in April.  Patient reports previous mild case of carpal tunnel, but no intevention was needed.  Current pain level: 4/10, Worst pain level: 8/10   Treatments tried to this point: splint, icing, Aspirn  Orthopedic PMH: none     Past Medical History:   Diagnosis Date     Chronic obstructive pulmonary disease, unspecified COPD type (H) 6/13/2017     Depressive disorder, not elsewhere classified May 2006    following sudden death of her mother     Generalized anxiety disorder      Herpes simplex without mention of complication     MOUTH     Nephrolithiasis      TOBACCO ABUSE-CONTINUOUS        Past Surgical History:   Procedure Laterality Date     CHOLECYSTECTOMY, LAPOROSCOPIC  1980's    done at [facility]     TUBAL LIGATION  1998       Family History   Problem Relation Age of Onset     Asthma Daughter      Diabetes Paternal Grandmother      Cancer Maternal Aunt         skin     Allergies Daughter      Depression Mother      Eye Disorder Mother         cataracts and glacoma     Gastrointestinal Disease Maternal Grandmother         gallbladder     Gastrointestinal Disease Daughter         ulcerative cholitis     Osteoporosis Mother      Respiratory Mother         asthmatic bronchitis       Social History     Socioeconomic History     Marital status: Single      Spouse name: Not on file     Number of children: Not on file     Years of education: Not on file     Highest education level: Not on file   Occupational History     Not on file   Social Needs     Financial resource strain: Not on file     Food insecurity     Worry: Not on file     Inability: Not on file     Transportation needs     Medical: Not on file     Non-medical: Not on file   Tobacco Use     Smoking status: Current Every Day Smoker     Packs/day: 1.00     Years: 25.00     Pack years: 25.00     Types: Cigarettes     Smokeless tobacco: Never Used     Tobacco comment: \[facility]\"" is cutting back - smokes 1/2 ppd (11)   Substance and Sexual Activity     Alcohol use: Yes     Alcohol/week: 0.0 standard drinks     Comment: 3 mixed drinks per week     Drug use: No     Sexual activity: Yes     Partners: Male     Birth control/protection: Surgical     Comment: tubal - S.O. - Jos   Lifestyle     Physical activity     Days per week: Not on file     Minutes per session: Not on file     Stress: Not on file   Relationships     Social connections     Talks on phone: Not on file     Gets together: Not on file     Attends Anglican service: Not on file     Active member of club or organization: Not on file     Attends meetings of clubs or organizations: Not on file     Relationship status: Not on file     Intimate partner violence     Fear of current or ex partner: Not on file     Emotionally abused: Not on file     Physically abused: Not on file     Forced sexual activity: Not on file   Other Topics Concern      Service No     Blood Transfusions No     Caffeine Concern No     Occupational Exposure No     Hobby Hazards No     Sleep Concern Yes     Comment: secondary to grief - mom  suddenly at the end of April     Stress Concern Yes     Weight Concern Yes     Special Diet Yes     Comment: trys to eat healthy     Back Care No     Exercise No     Bike Helmet No     Seat Belt Yes     Self-Exams No   Social History  "Narrative     Not on file       Current Outpatient Medications   Medication Sig Dispense Refill     albuterol (PROAIR HFA/PROVENTIL HFA/VENTOLIN HFA) 108 (90 Base) MCG/ACT inhaler Inhale 2 puffs into the lungs every 4 hours as needed for shortness of breath / dyspnea or wheezing 1 Inhaler 1     aspirin (CHAYA ASPIRIN) 325 MG tablet Take 1-2 tablets by mouth daily as needed.       Ipratropium-Albuterol (COMBIVENT RESPIMAT)  MCG/ACT inhaler Inhale 1 puff into the lungs 4 times daily Not to exceed 6 doses per day. 1 Inhaler 1       Allergies   Allergen Reactions     Pcn [Penicillins]      convulsions       REVIEW OF SYSTEMS:  CONSTITUTIONAL:  NEGATIVE for fever, chills, change in weight  INTEGUMENTARY/SKIN:  NEGATIVE for worrisome rashes, moles or lesions  EYES:  NEGATIVE for vision changes or irritation  ENT/MOUTH:  NEGATIVE for ear, mouth and throat problems  RESP: SOB, COPD  BREAST:  NEGATIVE for masses, tenderness or discharge  CV:  NEGATIVE for chest pain, palpitations or peripheral edema  GI:  NEGATIVE for nausea, abdominal pain, heartburn, or change in bowel habits  :  Negative   MUSCULOSKELETAL:  See HPI above  NEURO:  NEGATIVE for weakness, dizziness or paresthesias  ENDOCRINE:  NEGATIVE for temperature intolerance, skin/hair changes  HEME/ALLERGY/IMMUNE:  NEGATIVE for bleeding problems  PSYCHIATRIC:  Anxiety      PHYSICAL EXAM:  /70 (BP Location: Right arm, Patient Position: Chair)   Ht 1.608 m (5' 3.3\")   Wt 51 kg (112 lb 6.4 oz)   BMI 19.72 kg/m    Body mass index is 19.72 kg/m .   GENERAL APPEARANCE: healthy, alert and no distress   HEENT: No apparent thyroid megaly. Clear sclera with normal ocular movement  RESPIRATORY: No labored breathing  SKIN: no suspicious lesions or rashes  NEURO: Normal strength and tone, mentation intact and speech normal  VASCULAR: Good pulses, and capillary refill   LYMPH: no lymphadenopathy   PSYCH:  mentation appears normal and affect " normal/bright    MUSCULOSKELETAL:  Not in acute distress  Normal gait  Minimally enlarged right wrist compared to left otherwise symmetrical upper extremities  Full range of motion of the elbows  Full range of motion of the wrist in all directions with a slight pain related to pronation in particular  No tenderness at the distal radius fracture site  Mild tenderness along the extensor carpi ulnaris  Flexor carpi ulnaris is not tender, right  No noticeable swelling along the ulnar-sided tendons noted  Finger range of motion is full  Circulation is intact  Sensation is intact   strength is grossly intact     ASSESSMENT:  Distal radius fracture, approximately 7 weeks old  Mild extensor carpi ulnaris tendinitis  COPD    PLAN:  The x-ray images of June 14, 2021 were visualized.  Findings were thoroughly explained.  In essence, the fracture of distal radius with a combination has healed in a very reasonable position.  There does appear to be an intra-articular component noted on the lateral view but the step-off at the intra-articular fracture site is only slight to mild.  Her pain that she is complaining of appears to be not coming from the fracture itself but from the wrist tendinitis.    She has a Futuro type of wrist splint that she found to be helpful.  Continuation of the splint for another 2 to 3 weeks would be reasonable.  Avoid heavy lifting  Follow-up as needed.    Imaging Interpretation:     Recent Results (from the past 744 hour(s))   XR Wrist Right G/E 3 Views    Narrative    RIGHT WRIST THREE OR MORE VIEWS   6/14/2021 10:58 AM     HISTORY:  Wrist injury, right, initial encounter.      Impression    IMPRESSION: Transverse fracture of the distal radius with some healing  response. Minimal displacement.     MD Miguel HART MD  Department of Orthopedic Surgery        Disclaimer: This note consists of symbols derived from keyboarding, dictation and/or voice recognition software. As a result,  there may be errors in the script that have gone undetected. Please consider this when interpreting information found in this chart.

## 2021-06-17 NOTE — LETTER
6/17/2021         RE: Tammie Zeng  [address]  [facility] [address]        Dear Colleague,    Thank you for referring your patient, Tammie Zeng, to the [facility] [place]. Please see a copy of my visit note below.    HISTORY OF PRESENT ILLNESS:    Tammie Zeng is a 65 year old female who is seen in consultation at the request of Dr. Carballo for right wrist pain after fall down the stairs.  Injury occurred on 4/27/21, nearly 7 month ago. Patient is rgiht hand dominant    Present symptoms: Right wrist pain. Pain located on the ulnar aspect of the wrist. Increased pain with use of extremity. She reports pain with twisting and torquing at the wrist. She reports swelling and redness to the wrist.  Patient notes discomfort with wrist braces at times.  She reports numbness of the little, ring and long finger. She reports that paresthesias have been on-going since in the injury in April.  Patient reports previous mild case of carpal tunnel, but no intevention was needed.  Current pain level: 4/10, Worst pain level: 8/10   Treatments tried to this point: splint, icing, Aspirn  Orthopedic PMH: none     Past Medical History:   Diagnosis Date     Chronic obstructive pulmonary disease, unspecified COPD type (H) 6/13/2017     Depressive disorder, not elsewhere classified May 2006    following sudden death of her mother     Generalized anxiety disorder      Herpes simplex without mention of complication     MOUTH     Nephrolithiasis      TOBACCO ABUSE-CONTINUOUS        Past Surgical History:   Procedure Laterality Date     CHOLECYSTECTOMY, LAPOROSCOPIC  1980's    done at [facility]     TUBAL LIGATION  1998       Family History   Problem Relation Age of Onset     Asthma Daughter      Diabetes Paternal Grandmother      Cancer Maternal Aunt         skin     Allergies Daughter      Depression Mother      Eye Disorder Mother         cataracts and glacoma     Gastrointestinal  Disease Maternal Grandmother         gallbladder     Gastrointestinal Disease Daughter         ulcerative cholitis     Osteoporosis Mother      Respiratory Mother         asthmatic bronchitis       Social History     Socioeconomic History     Marital status: Single     Spouse name: Not on file     Number of children: Not on file     Years of education: Not on file     Highest education level: Not on file   Occupational History     Not on file   Social Needs     Financial resource strain: Not on file     Food insecurity     Worry: Not on file     Inability: Not on file     Transportation needs     Medical: Not on file     Non-medical: Not on file   Tobacco Use     Smoking status: Current Every Day Smoker     Packs/day: 1.00     Years: 25.00     Pack years: 25.00     Types: Cigarettes     Smokeless tobacco: Never Used     Tobacco comment: states is cutting back - smokes 1/2 ppd (01/03/11)   Substance and Sexual Activity     Alcohol use: Yes     Alcohol/week: 0.0 standard drinks     Comment: 3 mixed drinks per week     Drug use: No     Sexual activity: Yes     Partners: Male     Birth control/protection: Surgical     Comment: tubal - S.O. - Jos   Lifestyle     Physical activity     Days per week: Not on file     Minutes per session: Not on file     Stress: Not on file   Relationships     Social connections     Talks on phone: Not on file     Gets together: Not on file     Attends Shinto service: Not on file     Active member of club or organization: Not on file     Attends meetings of clubs or organizations: Not on file     Relationship status: Not on file     Intimate partner violence     Fear of current or ex partner: Not on file     Emotionally abused: Not on file     Physically abused: Not on file     Forced sexual activity: Not on file   Other Topics Concern      Service No     Blood Transfusions No     Caffeine Concern No     Occupational Exposure No     Hobby Hazards No     Sleep Concern Yes      "Comment: secondary to grief - mom  suddenly at the end of April     Stress Concern Yes     Weight Concern Yes     Special Diet Yes     Comment: trys to eat healthy     Back Care No     Exercise No     Bike Helmet No     Seat Belt Yes     Self-Exams No   Social History Narrative     Not on file       Current Outpatient Medications   Medication Sig Dispense Refill     albuterol (PROAIR HFA/PROVENTIL HFA/VENTOLIN HFA) 108 (90 Base) MCG/ACT inhaler Inhale 2 puffs into the lungs every 4 hours as needed for shortness of breath / dyspnea or wheezing 1 Inhaler 1     aspirin (CHAYA ASPIRIN) 325 MG tablet Take 1-2 tablets by mouth daily as needed.       Ipratropium-Albuterol (COMBIVENT RESPIMAT)  MCG/ACT inhaler Inhale 1 puff into the lungs 4 times daily Not to exceed 6 doses per day. 1 Inhaler 1       Allergies   Allergen Reactions     Pcn [Penicillins]      convulsions       REVIEW OF SYSTEMS:  CONSTITUTIONAL:  NEGATIVE for fever, chills, change in weight  INTEGUMENTARY/SKIN:  NEGATIVE for worrisome rashes, moles or lesions  EYES:  NEGATIVE for vision changes or irritation  ENT/MOUTH:  NEGATIVE for ear, mouth and throat problems  RESP: SOB, COPD  BREAST:  NEGATIVE for masses, tenderness or discharge  CV:  NEGATIVE for chest pain, palpitations or peripheral edema  GI:  NEGATIVE for nausea, abdominal pain, heartburn, or change in bowel habits  :  Negative   MUSCULOSKELETAL:  See HPI above  NEURO:  NEGATIVE for weakness, dizziness or paresthesias  ENDOCRINE:  NEGATIVE for temperature intolerance, skin/hair changes  HEME/ALLERGY/IMMUNE:  NEGATIVE for bleeding problems  PSYCHIATRIC:  Anxiety      PHYSICAL EXAM:  /70 (BP Location: Right arm, Patient Position: Chair)   Ht 1.608 m (5' 3.3\")   Wt 51 kg (112 lb 6.4 oz)   BMI 19.72 kg/m    Body mass index is 19.72 kg/m .   GENERAL APPEARANCE: healthy, alert and no distress   HEENT: No apparent thyroid megaly. Clear sclera with normal ocular movement  RESPIRATORY: " No labored breathing  SKIN: no suspicious lesions or rashes  NEURO: Normal strength and tone, mentation intact and speech normal  VASCULAR: Good pulses, and capillary refill   LYMPH: no lymphadenopathy   PSYCH:  mentation appears normal and affect normal/bright    MUSCULOSKELETAL:  Not in acute distress  Normal gait  Minimally enlarged right wrist compared to left otherwise symmetrical upper extremities  Full range of motion of the elbows  Full range of motion of the wrist in all directions with a slight pain related to pronation in particular  No tenderness at the distal radius fracture site  Mild tenderness along the extensor carpi ulnaris  Flexor carpi ulnaris is not tender, right  No noticeable swelling along the ulnar-sided tendons noted  Finger range of motion is full  Circulation is intact  Sensation is intact   strength is grossly intact     ASSESSMENT:  Distal radius fracture, approximately 7 weeks old  Mild extensor carpi ulnaris tendinitis  COPD    PLAN:  The x-ray images of June 14, 2021 were visualized.  Findings were thoroughly explained.  In essence, the fracture of distal radius with a combination has healed in a very reasonable position.  There does appear to be an intra-articular component noted on the lateral view but the step-off at the intra-articular fracture site is only slight to mild.  Her pain that she is complaining of appears to be not coming from the fracture itself but from the wrist tendinitis.    She has a Futuro type of wrist splint that she found to be helpful.  Continuation of the splint for another 2 to 3 weeks would be reasonable.  Avoid heavy lifting  Follow-up as needed.    Imaging Interpretation:     Recent Results (from the past 744 hour(s))   XR Wrist Right G/E 3 Views    Narrative    RIGHT WRIST THREE OR MORE VIEWS   6/14/2021 10:58 AM     HISTORY:  Wrist injury, right, initial encounter.      Impression    IMPRESSION: Transverse fracture of the distal radius with some  healing  response. Minimal displacement.     MD Miguel HART MD  Department of Orthopedic Surgery        Disclaimer: This note consists of symbols derived from keyboarding, dictation and/or voice recognition software. As a result, there may be errors in the script that have gone undetected. Please consider this when interpreting information found in this chart.        Again, thank you for allowing me to participate in the care of your patient.        Sincerely,        Miguel Montano MD

## 2022-04-28 ENCOUNTER — TELEPHONE (OUTPATIENT)
Dept: FAMILY MEDICINE | Facility: CLINIC | Age: 67
End: 2022-04-28

## 2022-04-28 ENCOUNTER — OFFICE VISIT (OUTPATIENT)
Dept: FAMILY MEDICINE | Facility: CLINIC | Age: 67
End: 2022-04-28
Payer: MEDICARE

## 2022-04-28 VITALS
TEMPERATURE: 97 F | OXYGEN SATURATION: 96 % | BODY MASS INDEX: 19.12 KG/M2 | HEIGHT: 64 IN | SYSTOLIC BLOOD PRESSURE: 132 MMHG | DIASTOLIC BLOOD PRESSURE: 88 MMHG | RESPIRATION RATE: 16 BRPM | WEIGHT: 112 LBS | HEART RATE: 78 BPM

## 2022-04-28 DIAGNOSIS — Z23 HIGH PRIORITY FOR 2019-NCOV VACCINE: Primary | ICD-10-CM

## 2022-04-28 DIAGNOSIS — R05.9 COUGH: ICD-10-CM

## 2022-04-28 DIAGNOSIS — J44.9 CHRONIC OBSTRUCTIVE PULMONARY DISEASE, UNSPECIFIED COPD TYPE (H): ICD-10-CM

## 2022-04-28 LAB — SARS-COV-2 RNA RESP QL NAA+PROBE: NEGATIVE

## 2022-04-28 PROCEDURE — 99213 OFFICE O/P EST LOW 20 MIN: CPT | Mod: CS | Performed by: INTERNAL MEDICINE

## 2022-04-28 PROCEDURE — U0003 INFECTIOUS AGENT DETECTION BY NUCLEIC ACID (DNA OR RNA); SEVERE ACUTE RESPIRATORY SYNDROME CORONAVIRUS 2 (SARS-COV-2) (CORONAVIRUS DISEASE [COVID-19]), AMPLIFIED PROBE TECHNIQUE, MAKING USE OF HIGH THROUGHPUT TECHNOLOGIES AS DESCRIBED BY CMS-2020-01-R: HCPCS | Performed by: INTERNAL MEDICINE

## 2022-04-28 PROCEDURE — U0005 INFEC AGEN DETEC AMPLI PROBE: HCPCS | Performed by: INTERNAL MEDICINE

## 2022-04-28 PROCEDURE — 0064A COVID-19,PF,MODERNA (18+ YRS BOOSTER .25ML): CPT | Performed by: INTERNAL MEDICINE

## 2022-04-28 PROCEDURE — 91306 COVID-19,PF,MODERNA (18+ YRS BOOSTER .25ML): CPT | Performed by: INTERNAL MEDICINE

## 2022-04-28 RX ORDER — ALBUTEROL SULFATE 90 UG/1
2 AEROSOL, METERED RESPIRATORY (INHALATION) EVERY 4 HOURS PRN
Qty: 18 G | Refills: 1 | Status: SHIPPED | OUTPATIENT
Start: 2022-04-28 | End: 2022-05-27

## 2022-04-28 RX ORDER — PREDNISONE 20 MG/1
20 TABLET ORAL DAILY
Qty: 5 TABLET | Refills: 0 | Status: SHIPPED | OUTPATIENT
Start: 2022-04-28 | End: 2023-01-19

## 2022-04-28 NOTE — PATIENT INSTRUCTIONS
Continue to use the trilogy.  You can use albuterol as needed if you get shortness of breath.  Take the prednisone once daily for 5 days.  You should be significantly better over the next 3 days and if not or you get worse call us.    You need to quit smoking

## 2022-04-28 NOTE — NURSING NOTE
Prior to immunization administration, verified patients identity using patient s name and date of birth. Please see Immunization Activity for additional information.     Screening Questionnaire for Adult Immunization    Are you sick today?   No   Do you have allergies to medications, food, a vaccine component or latex?   No   Have you ever had a serious reaction after receiving a vaccination?   No   Do you have a long-term health problem with heart, lung, kidney, or metabolic disease (e.g., diabetes), asthma, a blood disorder, no spleen, complement component deficiency, a cochlear implant, or a spinal fluid leak?  Are you on long-term aspirin therapy?   No   Do you have cancer, leukemia, HIV/AIDS, or any other immune system problem?   No   Do you have a parent, brother, or sister with an immune system problem?   No   In the past 3 months, have you taken medications that affect  your immune system, such as prednisone, other steroids, or anticancer drugs; drugs for the treatment of rheumatoid arthritis, Crohn s disease, or psoriasis; or have you had radiation treatments?   No   Have you had a seizure, or a brain or other nervous system problem?   No   During the past year, have you received a transfusion of blood or blood    products, or been given immune (gamma) globulin or antiviral drug?   No   For women: Are you pregnant or is there a chance you could become       pregnant during the next month?   No   Have you received any vaccinations in the past 4 weeks?   No     Immunization questionnaire answers were all negative.        Per orders of Dr. Shabazz, injection of mODERNA given by Lissette Dietrich CMA. Patient instructed to remain in clinic for 15 minutes afterwards, and to report any adverse reaction to me immediately.       Screening performed by Lissette Dietrich CMA on 4/28/2022 at 11:20 AM.

## 2022-05-26 DIAGNOSIS — J44.9 CHRONIC OBSTRUCTIVE PULMONARY DISEASE, UNSPECIFIED COPD TYPE (H): ICD-10-CM

## 2022-05-27 RX ORDER — ALBUTEROL SULFATE 90 UG/1
AEROSOL, METERED RESPIRATORY (INHALATION)
Qty: 18 G | Refills: 1 | Status: SHIPPED | OUTPATIENT
Start: 2022-05-27 | End: 2022-06-28

## 2022-05-27 NOTE — TELEPHONE ENCOUNTER
Prescription approved per [facility] Refill Protocol.  Geetha WILSON, Triage RN  [facility]

## 2022-07-29 DIAGNOSIS — J44.9 CHRONIC OBSTRUCTIVE PULMONARY DISEASE, UNSPECIFIED COPD TYPE (H): ICD-10-CM

## 2022-08-01 RX ORDER — ALBUTEROL SULFATE 90 UG/1
AEROSOL, METERED RESPIRATORY (INHALATION)
Qty: 18 G | Refills: 1 | Status: SHIPPED | OUTPATIENT
Start: 2022-08-01 | End: 2022-08-30

## 2022-08-01 NOTE — TELEPHONE ENCOUNTER
Prescription approved per [facility] Refill Protocol.  Last OV: 4/28/22-Return in about 3 months (around 7/28/2022) for Routine preventive, Follow up with someone, in person, with me      Next OV: none- routing to  to assist with scheduling.     Argenis Maddox RN  [facility]

## 2022-08-28 DIAGNOSIS — J44.9 CHRONIC OBSTRUCTIVE PULMONARY DISEASE, UNSPECIFIED COPD TYPE (H): ICD-10-CM

## 2022-08-30 RX ORDER — ALBUTEROL SULFATE 90 UG/1
AEROSOL, METERED RESPIRATORY (INHALATION)
Qty: 18 G | Refills: 1 | Status: SHIPPED | OUTPATIENT
Start: 2022-08-30 | End: 2022-10-07

## 2022-08-30 NOTE — TELEPHONE ENCOUNTER
Has appointment scheduled with PCP.1/19/23.    Refilled per [facility] protocol.  iNcole Xie RN

## 2022-09-07 DIAGNOSIS — J44.9 CHRONIC OBSTRUCTIVE PULMONARY DISEASE, UNSPECIFIED COPD TYPE (H): ICD-10-CM

## 2022-09-07 NOTE — TELEPHONE ENCOUNTER
Routing refill request to provider for review/approval because:  Drug not on the [facility] refill protocol   Carisa KANG RN

## 2022-10-05 DIAGNOSIS — J44.9 CHRONIC OBSTRUCTIVE PULMONARY DISEASE, UNSPECIFIED COPD TYPE (H): ICD-10-CM

## 2022-10-07 RX ORDER — ALBUTEROL SULFATE 90 UG/1
AEROSOL, METERED RESPIRATORY (INHALATION)
Qty: 18 G | Refills: 1 | Status: SHIPPED | OUTPATIENT
Start: 2022-10-07 | End: 2022-11-10

## 2022-10-07 NOTE — TELEPHONE ENCOUNTER
Prescription approved per [facility] Refill Protocol.  Janet Lauren RN  [facility] Triage Nurse

## 2022-10-08 DIAGNOSIS — J44.9 CHRONIC OBSTRUCTIVE PULMONARY DISEASE, UNSPECIFIED COPD TYPE (H): ICD-10-CM

## 2022-10-10 NOTE — TELEPHONE ENCOUNTER
Routing refill request to provider for review/approval because:  Drug not on the [facility] refill protocol   Ashley GALLEGOS RN  [facility]

## 2022-11-09 DIAGNOSIS — J44.9 CHRONIC OBSTRUCTIVE PULMONARY DISEASE, UNSPECIFIED COPD TYPE (H): ICD-10-CM

## 2022-11-10 RX ORDER — ALBUTEROL SULFATE 90 UG/1
AEROSOL, METERED RESPIRATORY (INHALATION)
Qty: 18 G | Refills: 0 | Status: SHIPPED | OUTPATIENT
Start: 2022-11-10 | End: 2022-11-29

## 2022-11-10 NOTE — TELEPHONE ENCOUNTER
Prescription approved per [facility] Refill Protocol.      Future Appointments 11/10/2022 - 5/9/2023      Date Visit Type Length Department Provider     1/19/2023 11:30 AM WELCOME TO MEDICARE 30 min  FAMILY PRAC/Mikel Posey MD    Location Instructions:     [facility] is in [address] of the [facility] at [address] This is just south of [facility] and the [facility] exit off of [address] Free parking is available; access the lot by turning east from [facility] onto [address]. Through the main entrance, the clinic is directly to the left.

## 2022-11-26 DIAGNOSIS — J44.9 CHRONIC OBSTRUCTIVE PULMONARY DISEASE, UNSPECIFIED COPD TYPE (H): ICD-10-CM

## 2022-11-29 RX ORDER — ALBUTEROL SULFATE 90 UG/1
AEROSOL, METERED RESPIRATORY (INHALATION)
Qty: 18 G | Refills: 0 | Status: SHIPPED | OUTPATIENT
Start: 2022-11-29 | End: 2022-12-14

## 2022-12-14 DIAGNOSIS — J44.9 CHRONIC OBSTRUCTIVE PULMONARY DISEASE, UNSPECIFIED COPD TYPE (H): ICD-10-CM

## 2022-12-14 RX ORDER — ALBUTEROL SULFATE 90 UG/1
AEROSOL, METERED RESPIRATORY (INHALATION)
Qty: 18 G | Refills: 0 | Status: SHIPPED | OUTPATIENT
Start: 2022-12-14 | End: 2023-01-02

## 2023-01-01 DIAGNOSIS — J44.9 CHRONIC OBSTRUCTIVE PULMONARY DISEASE, UNSPECIFIED COPD TYPE (H): ICD-10-CM

## 2023-01-02 RX ORDER — ALBUTEROL SULFATE 90 UG/1
AEROSOL, METERED RESPIRATORY (INHALATION)
Qty: 18 G | Refills: 0 | Status: SHIPPED | OUTPATIENT
Start: 2023-01-02 | End: 2023-01-19

## 2023-01-02 NOTE — TELEPHONE ENCOUNTER
Prescription approved per [facility] Refill Protocol.  Mare Sabillon RN  [facility]

## 2023-01-19 ENCOUNTER — OFFICE VISIT (OUTPATIENT)
Dept: FAMILY MEDICINE | Facility: CLINIC | Age: 68
End: 2023-01-19
Payer: COMMERCIAL

## 2023-01-19 VITALS
BODY MASS INDEX: 19.46 KG/M2 | TEMPERATURE: 97.7 F | DIASTOLIC BLOOD PRESSURE: 85 MMHG | SYSTOLIC BLOOD PRESSURE: 132 MMHG | HEIGHT: 64 IN | OXYGEN SATURATION: 96 % | HEART RATE: 99 BPM | WEIGHT: 114 LBS | RESPIRATION RATE: 16 BRPM

## 2023-01-19 DIAGNOSIS — Z00.00 MEDICARE ANNUAL WELLNESS VISIT, SUBSEQUENT: ICD-10-CM

## 2023-01-19 DIAGNOSIS — F41.1 GENERALIZED ANXIETY DISORDER: ICD-10-CM

## 2023-01-19 DIAGNOSIS — J44.9 CHRONIC OBSTRUCTIVE PULMONARY DISEASE, UNSPECIFIED COPD TYPE (H): ICD-10-CM

## 2023-01-19 DIAGNOSIS — R25.1 TREMOR: ICD-10-CM

## 2023-01-19 DIAGNOSIS — Z12.11 SPECIAL SCREENING FOR MALIGNANT NEOPLASMS, COLON: ICD-10-CM

## 2023-01-19 DIAGNOSIS — Z23 NEED FOR VACCINATION: Primary | ICD-10-CM

## 2023-01-19 LAB
ALBUMIN SERPL BCG-MCNC: 4.4 G/DL (ref 3.5–5.2)
ALP SERPL-CCNC: 86 U/L (ref 35–104)
ALT SERPL W P-5'-P-CCNC: 26 U/L (ref 10–35)
ANION GAP SERPL CALCULATED.3IONS-SCNC: 19 MMOL/L (ref 7–15)
AST SERPL W P-5'-P-CCNC: 43 U/L (ref 10–35)
BILIRUB SERPL-MCNC: 0.2 MG/DL
BUN SERPL-MCNC: 10 MG/DL (ref 8–23)
CALCIUM SERPL-MCNC: 9.8 MG/DL (ref 8.8–10.2)
CHLORIDE SERPL-SCNC: 101 MMOL/L (ref 98–107)
CHOLEST SERPL-MCNC: 226 MG/DL
CREAT SERPL-MCNC: 0.72 MG/DL (ref 0.51–0.95)
DEPRECATED HCO3 PLAS-SCNC: 18 MMOL/L (ref 22–29)
ERYTHROCYTE [DISTWIDTH] IN BLOOD BY AUTOMATED COUNT: 12.8 % (ref 10–15)
GFR SERPL CREATININE-BSD FRML MDRD: >90 ML/MIN/1.73M2
GLUCOSE SERPL-MCNC: 110 MG/DL (ref 70–99)
HCT VFR BLD AUTO: 47.2 % (ref 35–47)
HDLC SERPL-MCNC: 110 MG/DL
HGB BLD-MCNC: 15.3 G/DL (ref 11.7–15.7)
LDLC SERPL CALC-MCNC: 104 MG/DL
MCH RBC QN AUTO: 32.1 PG (ref 26.5–33)
MCHC RBC AUTO-ENTMCNC: 32.4 G/DL (ref 31.5–36.5)
MCV RBC AUTO: 99 FL (ref 78–100)
NONHDLC SERPL-MCNC: 116 MG/DL
PLATELET # BLD AUTO: 211 10E3/UL (ref 150–450)
POTASSIUM SERPL-SCNC: 4.5 MMOL/L (ref 3.4–5.3)
PROT SERPL-MCNC: 7.3 G/DL (ref 6.4–8.3)
RBC # BLD AUTO: 4.77 10E6/UL (ref 3.8–5.2)
SODIUM SERPL-SCNC: 138 MMOL/L (ref 136–145)
TRIGL SERPL-MCNC: 61 MG/DL
TSH SERPL DL<=0.005 MIU/L-ACNC: 2.59 UIU/ML (ref 0.3–4.2)
WBC # BLD AUTO: 6.4 10E3/UL (ref 4–11)

## 2023-01-19 PROCEDURE — G0009 ADMIN PNEUMOCOCCAL VACCINE: HCPCS | Performed by: INTERNAL MEDICINE

## 2023-01-19 PROCEDURE — G0439 PPPS, SUBSEQ VISIT: HCPCS | Performed by: INTERNAL MEDICINE

## 2023-01-19 PROCEDURE — 85027 COMPLETE CBC AUTOMATED: CPT | Performed by: INTERNAL MEDICINE

## 2023-01-19 PROCEDURE — 80061 LIPID PANEL: CPT | Performed by: INTERNAL MEDICINE

## 2023-01-19 PROCEDURE — 90677 PCV20 VACCINE IM: CPT | Performed by: INTERNAL MEDICINE

## 2023-01-19 PROCEDURE — 80053 COMPREHEN METABOLIC PANEL: CPT | Performed by: INTERNAL MEDICINE

## 2023-01-19 PROCEDURE — 84443 ASSAY THYROID STIM HORMONE: CPT | Performed by: INTERNAL MEDICINE

## 2023-01-19 PROCEDURE — 36415 COLL VENOUS BLD VENIPUNCTURE: CPT | Performed by: INTERNAL MEDICINE

## 2023-01-19 PROCEDURE — 99213 OFFICE O/P EST LOW 20 MIN: CPT | Mod: 25 | Performed by: INTERNAL MEDICINE

## 2023-01-19 RX ORDER — ALBUTEROL SULFATE 90 UG/1
2 AEROSOL, METERED RESPIRATORY (INHALATION) EVERY 4 HOURS PRN
Qty: 18 G | Refills: 3 | Status: ON HOLD | OUTPATIENT
Start: 2023-01-19 | End: 2023-07-28

## 2023-01-19 ASSESSMENT — PAIN SCALES - GENERAL: PAINLEVEL: NO PAIN (0)

## 2023-01-19 ASSESSMENT — ACTIVITIES OF DAILY LIVING (ADL): CURRENT_FUNCTION: NO ASSISTANCE NEEDED

## 2023-01-19 NOTE — PATIENT INSTRUCTIONS
I would recommend getting the new shingles shot called shingrix, but I would do it at your pharmacy as they can check with the insurance company to see if it is paid for.    I would go to the [facility] to get your mammogram.    I will have the colon test mailed to you.    Mikel Navarro M.D.

## 2023-01-19 NOTE — PROGRESS NOTES
SUBJECTIVE:   Tammie is a 67 year old who presents for Preventive Visit.    Overall she is doing well but she still smokes and we discussed the need to stop.  Her COPD has been stable.  She uses Trelegy daily and albuterol approximately 10 times a week.  She does note a tremor of her left upper extremity for months.  No other tremors.  She otherwise feels fine.    She does not exercise regularly.  She is due for a colon exam, mammogram, and bone density test.  Her anxiety is controlled.               Past Medical History:      Past Medical History:   Diagnosis Date     Chronic obstructive pulmonary disease, unspecified COPD type (H) 06/13/2017    has seen [facility], fev1 under 1.0     Depressive disorder, not elsewhere classified 05/2006    following sudden death of her mother     Generalized anxiety disorder      Herpes simplex without mention of complication     MOUTH     Nephrolithiasis      TOBACCO ABUSE-CONTINUOUS              Past Surgical History:      Past Surgical History:   Procedure Laterality Date     CHOLECYSTECTOMY, LAPOROSCOPIC  1980's    done at [facility]     TUBAL LIGATION  1998             Social History:     Social History     Socioeconomic History     Marital status: Single     Spouse name: Not on file     Number of children: 1     Years of education: Not on file     Highest education level: Not on file   Occupational History     Occupation: retired, machinest for [employer]   Tobacco Use     Smoking status: Every Day     Packs/day: 0.50     Years: 25.00     Pack years: 12.50     Types: Cigarettes     Smokeless tobacco: Never     Tobacco comments:     states is cutting back - smokes 1/2 ppd (01/03/11)   Substance and Sexual Activity     Alcohol use: Yes     Alcohol/week: 0.0 standard drinks     Comment: 3 mixed drinks per week     Drug use: No     Sexual activity: Yes     Partners: Male     Birth control/protection: Surgical     Comment: tubal - S.ORoberto - Jos   Other Topics Concern      " Service No     Blood Transfusions No     Caffeine Concern No     Occupational Exposure No     Hobby Hazards No     Sleep Concern Yes     Comment: secondary to grief - mom  suddenly at the end of April     Stress Concern Yes     Weight Concern Yes     Special Diet Yes     Comment: trys to eat healthy     Back Care No     Exercise No     Bike Helmet No     Seat Belt Yes     Self-Exams No   Social History Narrative     Not on file     Social Determinants of Health     Financial Resource Strain: Not on file   Food Insecurity: Not on file   Transportation Needs: Not on file   Physical Activity: Not on file   Stress: Not on file   Social Connections: Not on file   Intimate Partner Violence: Not on file   Housing Stability: Not on file             Family History:   reviewed         Allergies:     Allergies   Allergen Reactions     Pcn [Penicillins]      convulsions             Medications:     Current Outpatient Medications   Medication Sig Dispense Refill     albuterol (PROAIR HFA/PROVENTIL HFA/VENTOLIN HFA) 108 (90 Base) MCG/ACT inhaler Inhale 2 puffs into the lungs every 4 hours as needed for wheezing or shortness of breath 18 g 3     Fluticasone-Umeclidin-Vilant (TRELEGY ELLIPTA) 100-62.5-25 MCG/ACT oral inhaler Inhale 1 puff into the lungs daily 3 each 3     aspirin (CHAYA ASPIRIN) 325 MG tablet Take 1-2 tablets by mouth daily as needed.                 Review of Systems:   The 10 point Review of Systems is negative other than noted in the HPI           Physical Exam:   Blood pressure 132/85, pulse 99, temperature 97.7  F (36.5  C), temperature source Temporal, resp. rate 16, height 1.613 m (5' 3.5\"), weight 51.7 kg (114 lb), SpO2 96 %, not currently breastfeeding.    Exam:  Constitutional: healthy appearing, alert and in no distress  Heent: Normocephalic. Head without obvious masses or lesions. PERRLDC, EOMI. Mouth exam within normal limits: tongue, mucous membranes, posterior pharynx all normal, no " "lesions or abnormalities seen.  Tm's and canals within normal limits bilaterally. Neck supple, no nuchal rigidity or masses. No supraclavicular, or cervical adenopathy. Thyroid symmetric, no masses.  Cardiovascular: Regular rate and rhythm, no murmer, rub or gallops.  JVP not elevated, no edema.  Carotids within normal limits bilaterally, no bruits.  Respiratory: Normal respiratory effort.  Lungs clear, decreased flow, no wheezing or crackles.  Breasts: Normal bilaterally.  No masses or lesions.  Nipples within normal limits.  No axillary lesions or nodes.  My M.A. Was present during this part of the examination.  Gastrointestinal: Normal active bowel sounds.   Soft, not tender, no masses, guarding or rebound.  No hepatosplenomegaly.   Musculoskeletal: extremities normal, no gross deformities noted.  Skin: no suspicious lesions or rashes   Neurologic: Mental status within normal limits.  Speech fluent.  No gross motor abnormalities and gait intact.  She does have a tremor of the left upper extremity with using the left arm.  Psychiatric: mentation appears normal and affect normal.         Data:   Labs sent        Assessment:   1. Normal complete physical exam  2. Copd, stable  3. Smoker discussed with patient need to quit  4. Anxiety, controlled  5. Probable essential tremor  6. hcm         Plan:   cologuard  Mammogram  dexa  prevnar 20  Letter with labs  Consider neuro eval  Discontinue cig      Mikel Navarro M.D.              Are you in the first 12 months of your Medicare coverage?      Healthy Habits:    In general, how would you rate your overall health?  Good    Frequency of exercise:  None    Duration of exercise:  Less than 15 minutes    Do you usually eat at least 4 servings of fruit and vegetables a day, include whole grains    & fiber and avoid regularly eating high fat or \"junk\" foods?  No    Taking medications regularly:  Yes    Barriers to taking medications:  None    Medication side effects:  None    " Ability to successfully perform activities of daily living:  No assistance needed    Home Safety:  No safety concerns identified    Hearing Impairment:  No hearing concerns    In the past 6 months, have you been bothered by leaking of urine?  No    In general, how would you rate your overall mental or emotional health?  Very good      PHQ-2 Total Score:    Additional concerns today:  No      Have you ever done Advance Care Planning? (For example, a Health Directive, POLST, or a discussion with a medical provider or your loved ones about your wishes): No, advance care planning information given to patient to review.  Patient plans to discuss their wishes with loved ones or provider.         Fall risk       Cognitive Screening   1) Repeat 3 items (Leader, Season, Table)    2) Clock draw: NORMAL  3) 3 item recall: Recalls 3 objects  Results: 3 items recalled: COGNITIVE IMPAIRMENT LESS LIKELY    Mini-CogTM Copyright S Rashi. Licensed by the author for use in [facility]; reprinted with permission ([email or web address]). All rights reserved.      Do you have sleep apnea, excessive snoring or daytime drowsiness?: no    Reviewed and updated as needed this visit by clinical staff                  Reviewed and updated as needed this visit by Provider                 Social History     Tobacco Use     Smoking status: Every Day     Packs/day: 0.50     Years: 25.00     Pack years: 12.50     Types: Cigarettes     Smokeless tobacco: Never     Tobacco comments:     states is cutting back - smokes 1/2 ppd (01/03/11)   Substance Use Topics     Alcohol use: Yes     Alcohol/week: 0.0 standard drinks     Comment: 3 mixed drinks per week         No flowsheet data found.            Current providers sharing in care for this patient include:   Patient Care Team:  Mikel Navarro MD as PCP - General (Internal Medicine)  Mikel Navarro MD as Assigned PCP    The following health maintenance items are reviewed in Epic and  "correct as of today:  Health Maintenance   Topic Date Due     DEXA  Never done     ANNUAL REVIEW OF HM ORDERS  Never done     ADVANCE CARE PLANNING  Never done     COPD ACTION PLAN  Never done     Pneumococcal Vaccine: 65+ Years (1 - PCV) Never done     HEPATITIS C SCREENING  Never done     ZOSTER IMMUNIZATION (1 of 2) Never done     LIPID  05/08/2011     COLORECTAL CANCER SCREENING  01/19/2012     NICOTINE/TOBACCO CESSATION COUNSELING Q 1 YR  10/19/2017     LUNG CANCER SCREENING  07/17/2018     MAMMO SCREENING  06/06/2019     MEDICARE ANNUAL WELLNESS VISIT  11/01/2020     PHQ-2 (once per calendar year)  01/01/2023     FALL RISK ASSESSMENT  04/28/2023     DTAP/TDAP/TD IMMUNIZATION (3 - Td or Tdap) 10/29/2031     SPIROMETRY  Completed     INFLUENZA VACCINE  Completed     COVID-19 Vaccine  Completed     IPV IMMUNIZATION  Aged Out     MENINGITIS IMMUNIZATION  Aged Out         Any new diagnosis of family breast, ovarian, or bowel cancer?     FHS-7: No flowsheet data found.      Pertinent mammograms are reviewed under the imaging tab.    Review of Systems      OBJECTIVE:   There were no vitals taken for this visit. Estimated body mass index is 19.53 kg/m  as calculated from the following:    Height as of 4/28/22: 1.613 m (5' 3.5\").    Weight as of 4/28/22: 50.8 kg (112 lb).  Physical Exam          ASSESSMENT / PLAN:             COUNSELING:  Reviewed preventive health counseling, as reflected in patient instructions       Regular exercise       Healthy diet/nutrition        She reports that she has been smoking cigarettes. She has a 12.50 pack-year smoking history. She has never used smokeless tobacco.  Nicotine/Tobacco Cessation Plan:   Information offered: Patient not interested at this time      Appropriate preventive services were discussed with this patient, including applicable screening as appropriate for cardiovascular disease, diabetes, osteopenia/osteoporosis, and glaucoma.  As appropriate for age/gender, " discussed screening for colorectal cancer, prostate cancer, breast cancer, and cervical cancer. Checklist reviewing preventive services available has been given to the patient.    Reviewed patients plan of care and provided an AVS. The Basic Care Plan (routine screening as documented in Health Maintenance) for Tammie meets the Care Plan requirement. This Care Plan has been established and reviewed with the Patient.          Mikel Navarro MD  [facility]    Identified Health Risks:

## 2023-01-19 NOTE — LETTER
February 8, 2023      Tammie Zeng  [address]  [place] [address]        Dear ,    We are writing to inform you of your test results.    Your cologuard test is negative, good news.       Resulted Orders   CBC with platelets   Result Value Ref Range    WBC Count 6.4 4.0 - 11.0 10e3/uL    RBC Count 4.77 3.80 - 5.20 10e6/uL    Hemoglobin 15.3 11.7 - 15.7 g/dL    Hematocrit 47.2 (H) 35.0 - 47.0 %    MCV 99 78 - 100 fL    MCH 32.1 26.5 - 33.0 pg    MCHC 32.4 31.5 - 36.5 g/dL    RDW 12.8 10.0 - 15.0 %    Platelet Count 211 150 - 450 10e3/uL   Comprehensive metabolic panel   Result Value Ref Range    Sodium 138 136 - 145 mmol/L    Potassium 4.5 3.4 - 5.3 mmol/L    Chloride 101 98 - 107 mmol/L    Carbon Dioxide (CO2) 18 (L) 22 - 29 mmol/L    Anion Gap 19 (H) 7 - 15 mmol/L    Urea Nitrogen 10.0 8.0 - 23.0 mg/dL    Creatinine 0.72 0.51 - 0.95 mg/dL    Calcium 9.8 8.8 - 10.2 mg/dL    Glucose 110 (H) 70 - 99 mg/dL    Alkaline Phosphatase 86 35 - 104 U/L    AST 43 (H) 10 - 35 U/L    ALT 26 10 - 35 U/L    Protein Total 7.3 6.4 - 8.3 g/dL    Albumin 4.4 3.5 - 5.2 g/dL    Bilirubin Total 0.2 <=1.2 mg/dL    GFR Estimate >90 >60 mL/min/1.73m2      Comment:      Effective December 21, 2021 eGFRcr in adults is calculated using the 2021 CKD-EPI creatinine equation which includes age and gender (Elaina et al., NEJM, DOI: 10.1056/XTKBrp2820966)   Lipid panel reflex to direct LDL Fasting   Result Value Ref Range    Cholesterol 226 (H) <200 mg/dL    Triglycerides 61 <150 mg/dL    Direct Measure  >=50 mg/dL    LDL Cholesterol Calculated 104 (H) <=100 mg/dL    Non HDL Cholesterol 116 <130 mg/dL    Narrative    Cholesterol  Desirable:  <200 mg/dL    Triglycerides  Normal:  Less than 150 mg/dL  Borderline High:  150-199 mg/dL  High:  200-499 mg/dL  Very High:  Greater than or equal to 500 mg/dL    Direct Measure HDL  Female:  Greater than or equal to 50 mg/dL   Male:  Greater than or equal to 40 mg/dL    LDL  Cholesterol  Desirable:  <100mg/dL  Above Desirable:  100-129 mg/dL   Borderline High:  130-159 mg/dL   High:  160-189 mg/dL   Very High:  >= 190 mg/dL    Non HDL Cholesterol  Desirable:  130 mg/dL  Above Desirable:  130-159 mg/dL  Borderline High:  160-189 mg/dL  High:  190-219 mg/dL  Very High:  Greater than or equal to 220 mg/dL   TSH with free T4 reflex   Result Value Ref Range    TSH 2.59 0.30 - 4.20 uIU/mL   COLOGUARD(EXACT SCIENCES)   Result Value Ref Range    COLOGUARD-ABSTRACT Negative Negative      Comment:        NEGATIVE TEST RESULT. A negative Cologuard result indicates a low likelihood that a colorectal cancer (CRC) or advanced adenoma (adenomatous polyps with more advanced pre-malignant features)  is present. The chance that a person with a negative Cologuard test has a colorectal cancer is less than 1 in 1500 (negative predictive value >99.9%) or has an  advanced adenoma is less than  5.3% (negative predictive value 94.7%). These data are based on a prospective cross-sectional study of 10,000 individuals at average risk for colorectal cancer who were screened with both Cologuard and colonoscopy. (Joy PETERSON. et al, N Engl J Med 2014;370(14):4839-9883) The normal value (reference range) for this assay is negative.    COLOGUARD RE-SCREENING RECOMMENDATION: Periodic colorectal cancer screening is an important part of preventive healthcare for asymptomatic individuals at average risk for colorectal cancer.  Following a negative Cologuard result, the American Cancer Society and U.S.  Multi-Society Task Force screening guidelines recommend a Cologuard re-screening interval of 3 years.   References: American Cancer Society Guideline for Colorectal Cancer Screening: https://www.cancer.org/cancer/colon-rectal-cancer/xqakouiiz-jvcleiobl-vsykici/acs-recommendations.html.; Lance JANE, Irlanda KRUGER, Jorge SMITH, Colorectal Cancer Screening: Recommendations for Physicians and Patients from the U.S. Multi-Society Task  Force on Colorectal Cancer Screening , Am J Gastroenterology 2017; 112:9488-7390.    TEST DESCRIPTION: Composite algorithmic analysis of stool DNA-biomarkers with hemoglobin immunoassay.   Quantitative values of individual biomarkers are not reportable and are not associated with individual biomarker result reference ranges. Cologuard is intended for colorectal cancer screening of adults of either sex, 45 years or older, who are at average-risk for colorectal cancer (CRC). Cologuard has been approved for use by the U.S. FDA. The performance of Cologuard was  established in a cross sectional study of average-risk adults aged 50-84. Cologuard performance in patients ages 45 to 49 years was estimated by sub-group analysis of near-age groups. Colonoscopies performed for a positive result may find as the most clinically significant lesion: colorectal cancer [4.0%], advanced adenoma (including sessile serrated polyps greater than or equal to 1cm diameter) [20%] or non- advanced adenoma [31%]; or no colorectal neoplasia [45%]. These estimates are derived from a prospective cross-sectional screening study of 10,000 individuals at average risk for colorectal cancer who were screened with both Cologuard and colonoscopy. (Joy Hull al, N Engl J Med 2014;370(14):4136-5943.) Cologuard may produce a false negative or false positive result (no colorectal cancer or precancerous polyp present at colonoscopy follow up). A negative Cologuard test result does not guarantee the absence of CRC or advanced adenoma (pre-cancer). The current Cologuard  screening interval is every 3 years. (American Cancer Society and U.S. Multi-Society Task Force). Cologuard performance data in a 10,000 patient pivotal study using colonoscopy as the reference method can be accessed at the following location: [email or web address].[email or web address]/results. Additional description of the Cologuard test process, warnings and precautions can be found at [email or web address].          If you have any questions or concerns, please call the clinic at the number listed above.       Sincerely,      Mikel Navarro MD

## 2023-01-19 NOTE — LETTER
[facility]  [address] [place] [address]. [facility]  [address]  [place]  [address]  Tel: [phone]    January 20, 2023    Tammie Zeng  [address] [place] [address]        Dear Ms. Zeng,    It was nice seeing you for your physical.  You should be able to view your test results.     Your CBC or complete blood count is normal with no signs of anemia or blood disorders.     Your chemistry panel shows a slightly elevated blood sugar.  I do not know if you were fasting but either way its not terribly high.  Please try to exercise and eat a healthy diet for this.  Your blood salts, kidney tests, and proteins are normal.       The carbon dioxide and anion gap I suspect are lab variance and not a problem.  One of your liver tests is just slightly high.  Again I am not worried about this but we will keep an eye on it.     Your total cholesterol is 226.  Your good cholesterol or a HDL is fabulous at 110 and your LDL or bad cholesterol is just fine at 104.  Overall these tests look super.     I am happy to bring you this overall excellent report.  To be safe I would ask that we repeat a lab draw in about 6 weeks regarding the abnormal ones.  I am not worried but lets be prudent.  You do not need to fast or see me for this.  Please just call the office to schedule it.     Mikel Navarro M.D./[facility]           Enclosure: Lab Results  Results for orders placed or performed in visit on 01/19/23   CBC with platelets     Status: Abnormal   Result Value Ref Range    WBC Count 6.4 4.0 - 11.0 10e3/uL    RBC Count 4.77 3.80 - 5.20 10e6/uL    Hemoglobin 15.3 11.7 - 15.7 g/dL    Hematocrit 47.2 (H) 35.0 - 47.0 %    MCV 99 78 - 100 fL    MCH 32.1 26.5 - 33.0 pg    MCHC 32.4 31.5 - 36.5 g/dL    RDW 12.8 10.0 - 15.0 %    Platelet Count 211 150 - 450 10e3/uL   Comprehensive metabolic panel     Status: Abnormal   Result Value Ref Range    Sodium 138 136 - 145 mmol/L    Potassium 4.5 3.4 - 5.3 mmol/L    Chloride 101 98 - 107  mmol/L    Carbon Dioxide (CO2) 18 (L) 22 - 29 mmol/L    Anion Gap 19 (H) 7 - 15 mmol/L    Urea Nitrogen 10.0 8.0 - 23.0 mg/dL    Creatinine 0.72 0.51 - 0.95 mg/dL    Calcium 9.8 8.8 - 10.2 mg/dL    Glucose 110 (H) 70 - 99 mg/dL    Alkaline Phosphatase 86 35 - 104 U/L    AST 43 (H) 10 - 35 U/L    ALT 26 10 - 35 U/L    Protein Total 7.3 6.4 - 8.3 g/dL    Albumin 4.4 3.5 - 5.2 g/dL    Bilirubin Total 0.2 <=1.2 mg/dL    GFR Estimate >90 >60 mL/min/1.73m2   Lipid panel reflex to direct LDL Fasting     Status: Abnormal   Result Value Ref Range    Cholesterol 226 (H) <200 mg/dL    Triglycerides 61 <150 mg/dL    Direct Measure  >=50 mg/dL    LDL Cholesterol Calculated 104 (H) <=100 mg/dL    Non HDL Cholesterol 116 <130 mg/dL    Narrative    Cholesterol  Desirable:  <200 mg/dL    Triglycerides  Normal:  Less than 150 mg/dL  Borderline High:  150-199 mg/dL  High:  200-499 mg/dL  Very High:  Greater than or equal to 500 mg/dL    Direct Measure HDL  Female:  Greater than or equal to 50 mg/dL   Male:  Greater than or equal to 40 mg/dL    LDL Cholesterol  Desirable:  <100mg/dL  Above Desirable:  100-129 mg/dL   Borderline High:  130-159 mg/dL   High:  160-189 mg/dL   Very High:  >= 190 mg/dL    Non HDL Cholesterol  Desirable:  130 mg/dL  Above Desirable:  130-159 mg/dL  Borderline High:  160-189 mg/dL  High:  190-219 mg/dL  Very High:  Greater than or equal to 220 mg/dL   TSH with free T4 reflex     Status: Normal   Result Value Ref Range    TSH 2.59 0.30 - 4.20 uIU/mL

## 2023-01-20 NOTE — RESULT ENCOUNTER NOTE
It was nice seeing you for your physical.  You should be able to view your test results.    Your CBC or complete blood count is normal with no signs of anemia or blood disorders.    Your chemistry panel shows a slightly elevated blood sugar.  I do not know if you were fasting but either way its not terribly high.  Please try to exercise and eat a healthy diet for this.  Your blood salts, kidney tests, and proteins are normal.      The carbon dioxide and anion gap I suspect are lab variance and not a problem.  One of your liver tests is just slightly high.  Again I am not worried about this but we will keep an eye on it.    Your total cholesterol is 226.  Your good cholesterol or a HDL is fabulous at 110 and your LDL or bad cholesterol is just fine at 104.  Overall these tests look super.    I am happy to bring you this overall excellent report.  To be safe I would ask that we repeat a lab draw in about 6 weeks regarding the abnormal ones.  I am not worried but lets be prudent.  You do not need to fast or see me for this.  Please just call the office to schedule it.    Mikel Navarro M.D.

## 2023-01-23 DIAGNOSIS — J44.9 CHRONIC OBSTRUCTIVE PULMONARY DISEASE, UNSPECIFIED COPD TYPE (H): ICD-10-CM

## 2023-01-23 RX ORDER — ALBUTEROL SULFATE 90 UG/1
AEROSOL, METERED RESPIRATORY (INHALATION)
Qty: 18 G | Refills: 3 | OUTPATIENT
Start: 2023-01-23

## 2023-02-07 LAB — NONINV COLON CA DNA+OCC BLD SCRN STL QL: NEGATIVE

## 2023-03-15 ENCOUNTER — LAB (OUTPATIENT)
Dept: LAB | Facility: CLINIC | Age: 68
End: 2023-03-15
Payer: COMMERCIAL

## 2023-03-15 DIAGNOSIS — R74.01 HIGH SERUM ASPARTATE AMINOTRANSFERASE (AST) LEVEL: ICD-10-CM

## 2023-03-15 DIAGNOSIS — E87.29 HIGH ANION GAP METABOLIC ACIDOSIS: ICD-10-CM

## 2023-03-15 LAB
ALBUMIN SERPL BCG-MCNC: 4.4 G/DL (ref 3.5–5.2)
ALP SERPL-CCNC: 110 U/L (ref 35–104)
ALT SERPL W P-5'-P-CCNC: 38 U/L (ref 10–35)
ANION GAP SERPL CALCULATED.3IONS-SCNC: 17 MMOL/L (ref 7–15)
AST SERPL W P-5'-P-CCNC: 68 U/L (ref 10–35)
BILIRUB SERPL-MCNC: 0.3 MG/DL
BUN SERPL-MCNC: 7.7 MG/DL (ref 8–23)
CALCIUM SERPL-MCNC: 9.9 MG/DL (ref 8.8–10.2)
CHLORIDE SERPL-SCNC: 99 MMOL/L (ref 98–107)
CREAT SERPL-MCNC: 0.59 MG/DL (ref 0.51–0.95)
DEPRECATED HCO3 PLAS-SCNC: 24 MMOL/L (ref 22–29)
GFR SERPL CREATININE-BSD FRML MDRD: >90 ML/MIN/1.73M2
GLUCOSE SERPL-MCNC: 109 MG/DL (ref 70–99)
POTASSIUM SERPL-SCNC: 3.9 MMOL/L (ref 3.4–5.3)
PROT SERPL-MCNC: 7.6 G/DL (ref 6.4–8.3)
SODIUM SERPL-SCNC: 140 MMOL/L (ref 136–145)

## 2023-03-15 PROCEDURE — 80053 COMPREHEN METABOLIC PANEL: CPT

## 2023-03-15 PROCEDURE — 36415 COLL VENOUS BLD VENIPUNCTURE: CPT

## 2023-03-15 NOTE — LETTER
March 20, 2023      Tammie Zeng  [address]  [facility] [address]        Dear ,    We are writing to inform you of your test results.    I am sorry for the delay in getting back to you.  As you can see a couple of the labs are still slightly abnormal.  I am not worried about the anion gap or the low urea nitrogen.  The sugar remains just barely above normal.     The liver tests are also slightly high.  Again I am not concerned but I do think it would be prudent to evaluate this further and I would like to do an ultrasound of your liver just to be safe.  If that is okay I will go ahead and order it and get back to you with the results once I have them.     Please let me know if you have questions.    Resulted Orders   Comprehensive metabolic panel   Result Value Ref Range    Sodium 140 136 - 145 mmol/L    Potassium 3.9 3.4 - 5.3 mmol/L    Chloride 99 98 - 107 mmol/L    Carbon Dioxide (CO2) 24 22 - 29 mmol/L    Anion Gap 17 (H) 7 - 15 mmol/L    Urea Nitrogen 7.7 (L) 8.0 - 23.0 mg/dL    Creatinine 0.59 0.51 - 0.95 mg/dL    Calcium 9.9 8.8 - 10.2 mg/dL    Glucose 109 (H) 70 - 99 mg/dL    Alkaline Phosphatase 110 (H) 35 - 104 U/L    AST 68 (H) 10 - 35 U/L    ALT 38 (H) 10 - 35 U/L    Protein Total 7.6 6.4 - 8.3 g/dL    Albumin 4.4 3.5 - 5.2 g/dL    Bilirubin Total 0.3 <=1.2 mg/dL    GFR Estimate >90 >60 mL/min/1.73m2      Comment:      eGFR calculated using 2021 CKD-EPI equation.       If you have any questions or concerns, please call the clinic at the number listed above.       Sincerely,      Mikel Navarro MD

## 2023-03-20 ENCOUNTER — TELEPHONE (OUTPATIENT)
Dept: FAMILY MEDICINE | Facility: CLINIC | Age: 68
End: 2023-03-20
Payer: COMMERCIAL

## 2023-03-20 DIAGNOSIS — R79.89 ELEVATED LFTS: Primary | ICD-10-CM

## 2023-03-20 NOTE — RESULT ENCOUNTER NOTE
I am sorry for the delay in getting back to you.  As you can see a couple of the labs are still slightly abnormal.  I am not worried about the anion gap or the low urea nitrogen.  The sugar remains just barely above normal.    The liver tests are also slightly high.  Again I am not concerned but I do think it would be prudent to evaluate this further and I would like to do an ultrasound of your liver just to be safe.  If that is okay I will go ahead and order it and get back to you with the results once I have them.    Please let me know if you have questions.

## 2023-03-20 NOTE — TELEPHONE ENCOUNTER
Please call the patient.  Please see my result note from today.  Please let me know if it is okay to order the liver ultrasound.    Thank you.    Mikel Navarro M.D.

## 2023-03-21 NOTE — TELEPHONE ENCOUNTER
TO PCP:     Pt called back - reviewed labs below     Pt was not fasting for labs - states she had a mocha cappuccino prior to lab draw so sugar may be high from that     Patient is okay with doing US, asking for call back with scheduling number after order is placed. Ok to leave a detailed message if no answer    Geetha WILSON, Triage RN  [facility]

## 2023-03-21 NOTE — TELEPHONE ENCOUNTER
Patient Contact    Attempt # 1    Was call answered?  No.  Left message on voicemail with information to call back.    Geetha WILSON, Triage RN  [facility]

## 2023-04-03 ENCOUNTER — HOSPITAL ENCOUNTER (OUTPATIENT)
Dept: MAMMOGRAPHY | Facility: CLINIC | Age: 68
Discharge: HOME | End: 2023-04-03
Attending: INTERNAL MEDICINE
Payer: COMMERCIAL

## 2023-04-03 ENCOUNTER — ANCILLARY PROCEDURE (OUTPATIENT)
Dept: ULTRASOUND IMAGING | Facility: CLINIC | Age: 68
End: 2023-04-03
Attending: INTERNAL MEDICINE
Payer: COMMERCIAL

## 2023-04-03 DIAGNOSIS — R79.89 ELEVATED LFTS: ICD-10-CM

## 2023-04-03 DIAGNOSIS — Z12.31 VISIT FOR SCREENING MAMMOGRAM: ICD-10-CM

## 2023-04-03 PROCEDURE — 76705 ECHO EXAM OF ABDOMEN: CPT

## 2023-04-03 PROCEDURE — 77067 SCR MAMMO BI INCL CAD: CPT

## 2023-04-07 ENCOUNTER — TELEPHONE (OUTPATIENT)
Dept: NURSING | Facility: CLINIC | Age: 68
End: 2023-04-07
Payer: COMMERCIAL

## 2023-07-24 ENCOUNTER — HOSPITAL ENCOUNTER (INPATIENT)
Facility: CLINIC | Age: 68
LOS: 4 days | Discharge: HOME | DRG: 190 | End: 2023-07-28
Attending: EMERGENCY MEDICINE | Admitting: INTERNAL MEDICINE
Payer: COMMERCIAL

## 2023-07-24 ENCOUNTER — APPOINTMENT (OUTPATIENT)
Dept: GENERAL RADIOLOGY | Facility: CLINIC | Age: 68
DRG: 190 | End: 2023-07-24
Attending: EMERGENCY MEDICINE
Payer: COMMERCIAL

## 2023-07-24 DIAGNOSIS — J44.1 COPD EXACERBATION (H): ICD-10-CM

## 2023-07-24 DIAGNOSIS — Z72.0 TOBACCO ABUSE: Primary | ICD-10-CM

## 2023-07-24 DIAGNOSIS — J96.01 ACUTE RESPIRATORY FAILURE WITH HYPOXIA (H): ICD-10-CM

## 2023-07-24 DIAGNOSIS — J44.9 CHRONIC OBSTRUCTIVE PULMONARY DISEASE, UNSPECIFIED COPD TYPE (H): ICD-10-CM

## 2023-07-24 LAB
ALBUMIN SERPL BCG-MCNC: 4.1 G/DL (ref 3.5–5.2)
ALP SERPL-CCNC: 100 U/L (ref 35–104)
ALT SERPL W P-5'-P-CCNC: 14 U/L (ref 0–50)
ANION GAP SERPL CALCULATED.3IONS-SCNC: 15 MMOL/L (ref 7–15)
AST SERPL W P-5'-P-CCNC: 22 U/L (ref 0–45)
ATRIAL RATE - MUSE: 101 BPM
BASE EXCESS BLDV CALC-SCNC: 1.3 MMOL/L (ref -7.7–1.9)
BILIRUB SERPL-MCNC: 0.4 MG/DL
BUN SERPL-MCNC: 7.1 MG/DL (ref 8–23)
CALCIUM SERPL-MCNC: 10.1 MG/DL (ref 8.8–10.2)
CHLORIDE SERPL-SCNC: 95 MMOL/L (ref 98–107)
CREAT SERPL-MCNC: 0.54 MG/DL (ref 0.51–0.95)
D DIMER PPP FEU-MCNC: 0.5 UG/ML FEU (ref 0–0.5)
DEPRECATED HCO3 PLAS-SCNC: 26 MMOL/L (ref 22–29)
DIASTOLIC BLOOD PRESSURE - MUSE: NORMAL MMHG
ERYTHROCYTE [DISTWIDTH] IN BLOOD BY AUTOMATED COUNT: 14 % (ref 10–15)
GFR SERPL CREATININE-BSD FRML MDRD: >90 ML/MIN/1.73M2
GLUCOSE SERPL-MCNC: 123 MG/DL (ref 70–99)
HCO3 BLDV-SCNC: 28 MMOL/L (ref 21–28)
HCO3 BLDV-SCNC: 28 MMOL/L (ref 21–28)
HCT VFR BLD AUTO: 46.3 % (ref 35–47)
HGB BLD-MCNC: 15.2 G/DL (ref 11.7–15.7)
HOLD SPECIMEN: NORMAL
HOLD SPECIMEN: NORMAL
INTERPRETATION ECG - MUSE: NORMAL
LACTATE BLD-SCNC: 1.6 MMOL/L
MCH RBC QN AUTO: 31.2 PG (ref 26.5–33)
MCHC RBC AUTO-ENTMCNC: 32.8 G/DL (ref 31.5–36.5)
MCV RBC AUTO: 95 FL (ref 78–100)
NT-PROBNP SERPL-MCNC: 237 PG/ML (ref 0–900)
O2/TOTAL GAS SETTING VFR VENT: 1 %
OXYHGB MFR BLDV: 83 % (ref 70–75)
P AXIS - MUSE: 84 DEGREES
PCO2 BLDV: 46 MM HG (ref 40–50)
PCO2 BLDV: 49 MM HG (ref 40–50)
PH BLDV: 7.36 [PH] (ref 7.32–7.43)
PH BLDV: 7.39 [PH] (ref 7.32–7.43)
PLATELET # BLD AUTO: 377 10E3/UL (ref 150–450)
PO2 BLDV: 33 MM HG (ref 25–47)
PO2 BLDV: 55 MM HG (ref 25–47)
POTASSIUM SERPL-SCNC: 4.2 MMOL/L (ref 3.4–5.3)
PR INTERVAL - MUSE: 132 MS
PROT SERPL-MCNC: 7.9 G/DL (ref 6.4–8.3)
QRS DURATION - MUSE: 80 MS
QT - MUSE: 342 MS
QTC - MUSE: 443 MS
R AXIS - MUSE: 66 DEGREES
RBC # BLD AUTO: 4.87 10E6/UL (ref 3.8–5.2)
SAO2 % BLDV: 63 % (ref 94–100)
SARS-COV-2 RNA RESP QL NAA+PROBE: NEGATIVE
SODIUM SERPL-SCNC: 136 MMOL/L (ref 136–145)
SYSTOLIC BLOOD PRESSURE - MUSE: NORMAL MMHG
T AXIS - MUSE: 79 DEGREES
TROPONIN T SERPL HS-MCNC: 10 NG/L
VENTRICULAR RATE- MUSE: 101 BPM
WBC # BLD AUTO: 10.8 10E3/UL (ref 4–11)

## 2023-07-24 PROCEDURE — 99222 1ST HOSP IP/OBS MODERATE 55: CPT | Performed by: PHYSICIAN ASSISTANT

## 2023-07-24 PROCEDURE — 83880 ASSAY OF NATRIURETIC PEPTIDE: CPT | Performed by: EMERGENCY MEDICINE

## 2023-07-24 PROCEDURE — 87635 SARS-COV-2 COVID-19 AMP PRB: CPT | Performed by: EMERGENCY MEDICINE

## 2023-07-24 PROCEDURE — 80053 COMPREHEN METABOLIC PANEL: CPT | Performed by: EMERGENCY MEDICINE

## 2023-07-24 PROCEDURE — 85379 FIBRIN DEGRADATION QUANT: CPT | Performed by: EMERGENCY MEDICINE

## 2023-07-24 PROCEDURE — 250N000013 HC RX MED GY IP 250 OP 250 PS 637: Performed by: PHYSICIAN ASSISTANT

## 2023-07-24 PROCEDURE — C9803 HOPD COVID-19 SPEC COLLECT: HCPCS

## 2023-07-24 PROCEDURE — 82435 ASSAY OF BLOOD CHLORIDE: CPT | Performed by: EMERGENCY MEDICINE

## 2023-07-24 PROCEDURE — 94640 AIRWAY INHALATION TREATMENT: CPT

## 2023-07-24 PROCEDURE — 71046 X-RAY EXAM CHEST 2 VIEWS: CPT

## 2023-07-24 PROCEDURE — 250N000009 HC RX 250: Performed by: EMERGENCY MEDICINE

## 2023-07-24 PROCEDURE — 36415 COLL VENOUS BLD VENIPUNCTURE: CPT | Performed by: EMERGENCY MEDICINE

## 2023-07-24 PROCEDURE — 250N000013 HC RX MED GY IP 250 OP 250 PS 637: Performed by: INTERNAL MEDICINE

## 2023-07-24 PROCEDURE — 96374 THER/PROPH/DIAG INJ IV PUSH: CPT

## 2023-07-24 PROCEDURE — 250N000009 HC RX 250: Performed by: PHYSICIAN ASSISTANT

## 2023-07-24 PROCEDURE — 250N000011 HC RX IP 250 OP 636: Mod: JZ | Performed by: EMERGENCY MEDICINE

## 2023-07-24 PROCEDURE — 82805 BLOOD GASES W/O2 SATURATION: CPT | Performed by: EMERGENCY MEDICINE

## 2023-07-24 PROCEDURE — 99285 EMERGENCY DEPT VISIT HI MDM: CPT | Mod: 25

## 2023-07-24 PROCEDURE — 85027 COMPLETE CBC AUTOMATED: CPT | Performed by: EMERGENCY MEDICINE

## 2023-07-24 PROCEDURE — 250N000013 HC RX MED GY IP 250 OP 250 PS 637: Performed by: EMERGENCY MEDICINE

## 2023-07-24 PROCEDURE — 83605 ASSAY OF LACTIC ACID: CPT

## 2023-07-24 PROCEDURE — 85007 BL SMEAR W/DIFF WBC COUNT: CPT | Performed by: EMERGENCY MEDICINE

## 2023-07-24 PROCEDURE — 93005 ELECTROCARDIOGRAM TRACING: CPT

## 2023-07-24 PROCEDURE — 120N000001 HC R&B MED SURG/OB

## 2023-07-24 PROCEDURE — 82803 BLOOD GASES ANY COMBINATION: CPT

## 2023-07-24 PROCEDURE — 84484 ASSAY OF TROPONIN QUANT: CPT | Performed by: EMERGENCY MEDICINE

## 2023-07-24 RX ORDER — AMOXICILLIN 250 MG
1 CAPSULE ORAL 2 TIMES DAILY PRN
Status: DISCONTINUED | OUTPATIENT
Start: 2023-07-24 | End: 2023-07-28 | Stop reason: HOSPADM

## 2023-07-24 RX ORDER — GUAIFENESIN 600 MG/1
600 TABLET, EXTENDED RELEASE ORAL 2 TIMES DAILY
Status: DISCONTINUED | OUTPATIENT
Start: 2023-07-24 | End: 2023-07-24

## 2023-07-24 RX ORDER — ACETAMINOPHEN 325 MG/1
650 TABLET ORAL EVERY 6 HOURS PRN
Status: DISCONTINUED | OUTPATIENT
Start: 2023-07-24 | End: 2023-07-28 | Stop reason: HOSPADM

## 2023-07-24 RX ORDER — METHYLPREDNISOLONE SODIUM SUCCINATE 40 MG/ML
40 INJECTION, POWDER, LYOPHILIZED, FOR SOLUTION INTRAMUSCULAR; INTRAVENOUS EVERY 12 HOURS
Status: DISCONTINUED | OUTPATIENT
Start: 2023-07-25 | End: 2023-07-28 | Stop reason: HOSPADM

## 2023-07-24 RX ORDER — IPRATROPIUM BROMIDE AND ALBUTEROL SULFATE 2.5; .5 MG/3ML; MG/3ML
3 SOLUTION RESPIRATORY (INHALATION) ONCE
Status: COMPLETED | OUTPATIENT
Start: 2023-07-24 | End: 2023-07-24

## 2023-07-24 RX ORDER — ONDANSETRON 2 MG/ML
4 INJECTION INTRAMUSCULAR; INTRAVENOUS EVERY 6 HOURS PRN
Status: DISCONTINUED | OUTPATIENT
Start: 2023-07-24 | End: 2023-07-28 | Stop reason: HOSPADM

## 2023-07-24 RX ORDER — ALBUTEROL SULFATE 0.83 MG/ML
2.5 SOLUTION RESPIRATORY (INHALATION)
Status: DISCONTINUED | OUTPATIENT
Start: 2023-07-24 | End: 2023-07-28 | Stop reason: HOSPADM

## 2023-07-24 RX ORDER — METHYLPREDNISOLONE SODIUM SUCCINATE 125 MG/2ML
125 INJECTION, POWDER, LYOPHILIZED, FOR SOLUTION INTRAMUSCULAR; INTRAVENOUS ONCE
Status: COMPLETED | OUTPATIENT
Start: 2023-07-24 | End: 2023-07-24

## 2023-07-24 RX ORDER — ACETAMINOPHEN 325 MG/1
650 TABLET ORAL ONCE
Status: COMPLETED | OUTPATIENT
Start: 2023-07-24 | End: 2023-07-24

## 2023-07-24 RX ORDER — DOXYCYCLINE 100 MG/1
100 CAPSULE ORAL EVERY 12 HOURS SCHEDULED
Status: DISCONTINUED | OUTPATIENT
Start: 2023-07-24 | End: 2023-07-28 | Stop reason: HOSPADM

## 2023-07-24 RX ORDER — ACETAMINOPHEN 650 MG/1
650 SUPPOSITORY RECTAL EVERY 6 HOURS PRN
Status: DISCONTINUED | OUTPATIENT
Start: 2023-07-24 | End: 2023-07-28 | Stop reason: HOSPADM

## 2023-07-24 RX ORDER — NICOTINE 21 MG/24HR
1 PATCH, TRANSDERMAL 24 HOURS TRANSDERMAL DAILY PRN
Status: DISCONTINUED | OUTPATIENT
Start: 2023-07-24 | End: 2023-07-28 | Stop reason: HOSPADM

## 2023-07-24 RX ORDER — DOXYCYCLINE 100 MG/1
100 CAPSULE ORAL ONCE
Status: COMPLETED | OUTPATIENT
Start: 2023-07-24 | End: 2023-07-24

## 2023-07-24 RX ORDER — IPRATROPIUM BROMIDE AND ALBUTEROL SULFATE 2.5; .5 MG/3ML; MG/3ML
3 SOLUTION RESPIRATORY (INHALATION)
Status: DISCONTINUED | OUTPATIENT
Start: 2023-07-24 | End: 2023-07-28 | Stop reason: HOSPADM

## 2023-07-24 RX ORDER — AMOXICILLIN 250 MG
2 CAPSULE ORAL 2 TIMES DAILY PRN
Status: DISCONTINUED | OUTPATIENT
Start: 2023-07-24 | End: 2023-07-28 | Stop reason: HOSPADM

## 2023-07-24 RX ORDER — ALBUTEROL SULFATE 90 UG/1
2 AEROSOL, METERED RESPIRATORY (INHALATION) EVERY 6 HOURS PRN
Status: DISCONTINUED | OUTPATIENT
Start: 2023-07-24 | End: 2023-07-25

## 2023-07-24 RX ORDER — GUAIFENESIN 600 MG/1
600 TABLET, EXTENDED RELEASE ORAL 2 TIMES DAILY
Status: DISCONTINUED | OUTPATIENT
Start: 2023-07-24 | End: 2023-07-28 | Stop reason: HOSPADM

## 2023-07-24 RX ORDER — LIDOCAINE 40 MG/G
CREAM TOPICAL
Status: DISCONTINUED | OUTPATIENT
Start: 2023-07-24 | End: 2023-07-28 | Stop reason: HOSPADM

## 2023-07-24 RX ORDER — ONDANSETRON 4 MG/1
4 TABLET, ORALLY DISINTEGRATING ORAL EVERY 6 HOURS PRN
Status: DISCONTINUED | OUTPATIENT
Start: 2023-07-24 | End: 2023-07-28 | Stop reason: HOSPADM

## 2023-07-24 RX ADMIN — GUAIFENESIN 600 MG: 600 TABLET, EXTENDED RELEASE ORAL at 18:30

## 2023-07-24 RX ADMIN — IPRATROPIUM BROMIDE AND ALBUTEROL SULFATE 3 ML: .5; 3 SOLUTION RESPIRATORY (INHALATION) at 14:45

## 2023-07-24 RX ADMIN — DOXYCYCLINE HYCLATE 100 MG: 100 CAPSULE ORAL at 14:56

## 2023-07-24 RX ADMIN — METHYLPREDNISOLONE SODIUM SUCCINATE 125 MG: 125 INJECTION, POWDER, FOR SOLUTION INTRAMUSCULAR; INTRAVENOUS at 14:44

## 2023-07-24 RX ADMIN — ALBUTEROL SULFATE 2.5 MG: 2.5 SOLUTION RESPIRATORY (INHALATION) at 20:52

## 2023-07-24 RX ADMIN — DOXYCYCLINE HYCLATE 100 MG: 100 CAPSULE ORAL at 22:03

## 2023-07-24 RX ADMIN — NICOTINE 1 PATCH: 14 PATCH, EXTENDED RELEASE TRANSDERMAL at 21:04

## 2023-07-24 RX ADMIN — IPRATROPIUM BROMIDE AND ALBUTEROL SULFATE 3 ML: .5; 3 SOLUTION RESPIRATORY (INHALATION) at 12:27

## 2023-07-24 RX ADMIN — ALBUTEROL SULFATE 2.5 MG: 2.5 SOLUTION RESPIRATORY (INHALATION) at 18:30

## 2023-07-24 RX ADMIN — ACETAMINOPHEN 650 MG: 325 TABLET, FILM COATED ORAL at 14:56

## 2023-07-24 ASSESSMENT — ACTIVITIES OF DAILY LIVING (ADL)
ADLS_ACUITY_SCORE: 35
DIFFICULTY_EATING/SWALLOWING: NO
TRANSFERRING: 0-->INDEPENDENT
WALKING_OR_CLIMBING_STAIRS: OTHER (SEE COMMENTS)
WEAR_GLASSES_OR_BLIND: YES
CHANGE_IN_FUNCTIONAL_STATUS_SINCE_ONSET_OF_CURRENT_ILLNESS/INJURY: NO
DRESSING/BATHING_DIFFICULTY: NO
ADLS_ACUITY_SCORE: 35
ADLS_ACUITY_SCORE: 20
DOING_ERRANDS_INDEPENDENTLY_DIFFICULTY: NO
TRANSFERRING: 0-->INDEPENDENT
FALL_HISTORY_WITHIN_LAST_SIX_MONTHS: NO
ADLS_ACUITY_SCORE: 35
ADLS_ACUITY_SCORE: 20
CONCENTRATING,_REMEMBERING_OR_MAKING_DECISIONS_DIFFICULTY: NO
TOILETING_ISSUES: NO
DIFFICULTY_COMMUNICATING: NO
WALKING_OR_CLIMBING_STAIRS_DIFFICULTY: YES
ADLS_ACUITY_SCORE: 20

## 2023-07-24 NOTE — ED TRIAGE NOTES
Patient comes in with complaints of SOB. States Wednesday came down with nasal and chest congestion and a cough. States having a hard time breathing.    Patient has a history of COPD. Saturday had cold sweats, collapsed to the ground. Was incontinent of stool at that time.

## 2023-07-24 NOTE — H&P
[facility]    History and Physical - Hospitalist Service       Date of Admission:  7/24/2023    Assessment & Plan      Tammie Zeng is a 67 year old female with a past medical history significant for COPD, hx tobacco use, hepatic steatosis, MDD/RODNEY, admitted on 7/24/2023 after presenting with shortness of breath.     Acute hypoxic respiratory failure secondary to COPD exacerbation  Tobacco use   Pt presents with 5 days increasing dyspnea with productive cough. Hx COPD followed by [facility] with ongoing tobacco use. Denies fevers.   WBC 10.8, LA 1.6, d dimer 0.5, trop 10. COVID negative.   CXR shows hyperinflation and upper lobe predominant oligemia compatible with emphysema without focal airspace disease.   Hypoxic to 83% on RA in the ED, improved with 2L supplemental oxygen. HR mildly tachy 90-100s.   PTA: Trelegy daily, prn albuterol  Received doxycycline, duoneb x2, 125mg methylprednisolone in the ED  --admit to inpatient  --wean oxygen as able  --continue doxycycline   --continue methylprednisolone 40mg bid for tomorrow, then re assess  --duonebs q4 while awake, albuterol prn.   --flutter valve, IS. Will ask [facility] to follow  --continuous pulse oximetry   --tobacco cessation strongly encouraged, nicotine patch ordered  --ok with BiPAP if needed    Alcohol use  Reports daily alcohol consumption 3 drinks, vodka, daily. Last drink 7/21. Denies hx withdrawal   --monitor clinically     MDD/RODNEY  Not currently on medications     Hepatic steatosis   LFT this admit WNL  --follow up outpatient   --continue to work on lifestyle changes      Diet:  regular diet   DVT Prophylaxis: Pneumatic Compression Devices  Royal Catheter: Not present  Lines: None     Cardiac Monitoring: None  Code Status:   FULL CODE- initially stated DNI/DNR but after discussion wants to talk this over with her daughter first     Clinically Significant Risk Factors Present on Admission                # Drug Induced Platelet  Defect: home medication list includes an antiplatelet medication                 Disposition Plan    2+ midnights      The patient's care was discussed with Dr. Abdi who agrees with the above plan     June Hurtado PA-C  Hospitalist Service  [facility]  Securely message with Lisa (more info)  Text page via [facility] Paging/Directory     ______________________________________________________________________    Chief Complaint   Dyspnea, cough     History is obtained from the patient    History of Present Illness   Tammie Zeng is a 67 year old female with a past medical history significant for COPD, hx tobacco use, hepatic steatosis, MDD/RODNEY, admitted on 7/24/2023 after presenting with shortness of breath.  Patient reports that approximately week prior to admission she developed URI symptoms with nasal congestion, fatigue, cough.  Throughout the week she has become increasingly short of breath and wheezy and cannot tolerate much activity at all at home.  Tried her albuterol inhalers and mucinex without much improvement.  She reports over the weekend she nearly collapsed.  She has been outside occasionally with the poor air quality and wonders if this is part of the problem.  She also continues to smoke. She denies any chest pain, leg swelling, fevers, myalgias, chills though has felt like she has broken out in a sweat when she is extremely short of breath.  She is presently evaluated in her room in the emergency department.  Thus far she has received IV steroids as well as 2 nebulizer treatments and reports minimal improvement.  She continues to feel quite short of breath.      Past Medical History    Past Medical History:   Diagnosis Date    Chronic obstructive pulmonary disease, unspecified COPD type (H) 06/13/2017    has seen [facility], fev1 under 1.0    Depressive disorder, not elsewhere classified 05/2006    following sudden death of her mother    Elevated LFTs 2023     hepatic steatosis 4/23     Generalized anxiety disorder     Hepatic steatosis 04/2023    on us done for elev lfts    Herpes simplex without mention of complication     MOUTH    Nephrolithiasis     TOBACCO ABUSE-CONTINUOUS        Past Surgical History   Past Surgical History:   Procedure Laterality Date    CHOLECYSTECTOMY, LAPOROSCOPIC  1980's    done at [facility]    TUBAL LIGATION  1998       Prior to Admission Medications   Prior to Admission Medications   Prescriptions Last Dose Informant Patient Reported? Taking?   Fluticasone-Umeclidin-Vilant (TRELEGY ELLIPTA) 100-62.5-25 MCG/ACT oral inhaler   No No   Sig: Inhale 1 puff into the lungs daily   albuterol (PROAIR HFA/PROVENTIL HFA/VENTOLIN HFA) 108 (90 Base) MCG/ACT inhaler   No No   Sig: Inhale 2 puffs into the lungs every 4 hours as needed for wheezing or shortness of breath   aspirin (CHAYA ASPIRIN) 325 MG tablet   Yes No   Sig: Take 1-2 tablets by mouth daily as needed.      Facility-Administered Medications: None        Social History   I have reviewed this patient's social history and updated it with pertinent information if needed.  Social History     Tobacco Use    Smoking status: Every Day     Packs/day: 0.50     Years: 25.00     Pack years: 12.50     Types: Cigarettes    Smokeless tobacco: Never    Tobacco comments:     states is cutting back - smokes 1/2 ppd (01/03/11)   Substance Use Topics    Alcohol use: Yes     Alcohol/week: 0.0 standard drinks of alcohol     Comment: 3 mixed drinks per week    Drug use: No     3 drinks vodka per night     Physical Exam   Temp: 97.6  F (36.4  C) Temp src: Temporal BP: (!) 140/87 Pulse: 99   Resp: 24 SpO2: 90 % O2 Device: None (Room air) Oxygen Delivery: 2 LPM  Vitals:    07/24/23 1137   Weight: 47.6 kg (105 lb)     Vital Signs with Ranges  Temp:  [97.6  F (36.4  C)] 97.6  F (36.4  C)  Pulse:  [] 99  Resp:  [24] 24  BP: (123-140)/(77-99) 140/87  SpO2:  [83 %-100 %] 90 %  No intake/output data  recorded.    Constitutional: Alert and oriented, sitting up in bed. Appears comfortable though obvious conversational dyspnea   ENT:  moist mucous membranes  Eyes:  sclera anicteric, EOMI  Respiratory: Lungs with decreased breath sounds throughout, occasional expiratory wheeze. Mild accessory muscle use. Conversational dyspnea. Occasional congested cough   Cardiovascular: Regular rhythm with mild tachycardia   GI: active bowel sounds, abdomen soft, non-tender  Extremities:  no LE edema   Neuro:  speech is clear. Face symmetric. Follows commands       Medical Decision Making             Data     I have personally reviewed the following data over the past 24 hrs:    10.8  \   15.2   / 377     136 95 (L) 7.1 (L) /  123 (H)   4.2 26 0.54 \     ALT: 14 AST: 22 AP: 100 TBILI: 0.4   ALB: 4.1 TOT PROTEIN: 7.9 LIPASE: N/A     Trop: 10 BNP: 237     Procal: N/A CRP: N/A Lactic Acid: 1.6       INR:  N/A PTT:  N/A   D-dimer:  0.50 Fibrinogen:  N/A       Imaging results reviewed over the past 24 hrs:   Recent Results (from the past 24 hour(s))   Chest XR,  PA & LAT    Narrative    XR CHEST 2 VIEWS   7/24/2023 12:39 PM     HISTORY: Shortness of breath    COMPARISON: Chest CT 7/17/2017, radiograph 5/16/2017      Impression    IMPRESSION: Stable cardiomediastinal silhouette. Hyperinflation and  upper lobe predominant oligemia, compatible with emphysema. No focal  airspace consolidation, pleural effusion or pneumothorax. No acute  bony abnormality.    CHARLETTE VALLADARES MD         SYSTEM ID:  [id number]

## 2023-07-24 NOTE — ED NOTES
"Tele-PIT/Intake Evaluation      Video-Visit Details    Type of service:  Video Visit    Video Start Time (time video started): 11:40 AM  Video End Time (time video stopped): 11:43 AM   Originating Location (pt. Location):  [facility]  Distant Location (provider location):  same  Mode of Communication:  Video Conference via [employer]  Patient verbally consented to [employer] televisit.    History:  Pt reports CC dyspnea/shallow breathing. Reports hx of COPD. Reports shakiness. Reports inhalers aren't helping. Denies fevers/chills. Reports dry cough. Denies leg pain or swelling except for after she eats seafood.     Exam:    Patient Vitals for the past 24 hrs:   BP Temp Temp src Pulse Resp SpO2 Height Weight   07/24/23 1137 123/77 97.6  F (36.4  C) Temporal 101 24 93 % 1.6 m (5' 3\") 47.6 kg (105 lb)       Head:  The scalp, face, and head appear normal  CV:  Mild tachycardia  Resp:  Mild respiratory distress with mild tachypnea.  No audible wheezing through video.   Musc:  Normal muscular tone  Neuro:  Speech is normal and fluent. Face is symmetric. Moving all extremities well.             Appropriate interventions for symptom management were initiated if applicable.  Appropriate diagnostic tests were initiated if indicated.    Important information for subsequent clinician:  Imaging dependent on dimer.       I briefly evaluated the patient and developed an initial plan of care. I discussed this plan and explained that this brief interaction does not constitute a full evaluation. Patient/family understands that they should wait to be fully evaluated and discuss any test results with another clinician prior to leaving the hospital.       David Snowden MD  07/24/23 1144    "

## 2023-07-24 NOTE — PROGRESS NOTES
RECEIVING UNIT ED HANDOFF REVIEW    ED Nurse Handoff Report was reviewed by: Jadyn Pizarro RN on July 24, 2023 at 5:28 PM

## 2023-07-24 NOTE — PHARMACY-ADMISSION MEDICATION HISTORY
Pharmacy Intern Admission Medication History    Admission medication history is complete. The information provided in this note is only as accurate as the sources available at the time of the update.    Medication reconciliation/reorder completed by provider prior to medication history? Yes    Information Source(s): Patient and CareEverywhere/SureScripts via in-person    Pertinent Information: None    Changes made to PTA medication list:  Added: None  Deleted: None  Changed: None    Medication Affordability:  Not including over the counter (OTC) medications, was there a time in the past 3 months when you did not take your medications as prescribed because of cost?: Yes - patient states that her insurance is going to stop covering her albuterol inhaler and is currently on her last inhaler.     Allergies reviewed with patient and updates made in EHR: yes    Medication History Completed By: Luna Marrero 7/24/2023 4:08 PM    Prior to Admission medications    Medication Sig Last Dose Taking? Auth Provider Long Term End Date   albuterol (PROAIR HFA/PROVENTIL HFA/VENTOLIN HFA) 108 (90 Base) MCG/ACT inhaler Inhale 2 puffs into the lungs every 4 hours as needed for wheezing or shortness of breath 7/23/2023 at AM Yes Mikel Navarro MD Yes    aspirin (CHAYA ASPIRIN) 325 MG tablet Take 1-2 tablets by mouth daily as needed. 7/24/2023 at AM Yes Reported, Patient     Fluticasone-Umeclidin-Vilant (TRELEGY ELLIPTA) 100-62.5-25 MCG/ACT oral inhaler Inhale 1 puff into the lungs daily 7/24/2023 at AM Yes Mikel Navarro MD

## 2023-07-24 NOTE — ED NOTES
Pt arrives per w/c to triage with family member. Reports shortness of breath, alert and oriented, speaking in full sentences.

## 2023-07-24 NOTE — ED NOTES
Pt ambulated from bed to door (approx 15 ft), O2 saturations dropped to 87% with increased work of breathing.

## 2023-07-24 NOTE — ED NOTES
[facility]  ED Nurse Handoff Report    ED Chief complaint: Shortness of Breath      ED Diagnosis:   Final diagnoses:   None       Code Status: Full Code    Allergies:   Allergies   Allergen Reactions     Pcn [Penicillins]      convulsions       Patient Story: pt with hx of copd, no O2 at home, increase sob with cough for the past week. Neg covid. Alert and oriented. sats 83 on RA with any exertion. 87-88 RA at rest. On 2l/nc, sats mid 90s. Very sob with any exertion. Loose productive cough.   Focused Assessment:  See chart    Treatments and/or interventions provided: see chart  Patient's response to treatments and/or interventions: see chart    To be done/followed up on inpatient unit:  see chart    Does this patient have any cognitive concerns?: none    Activity level - Baseline/Home:  Independent  Activity Level - Current:   Stand with Assist    Patient's Preferred language: English   Needed?: No    Isolation: None  Infection: Not Applicable  Patient tested for COVID 19 prior to admission: YES  Bariatric?: No    Vital Signs:   Vitals:    07/24/23 1342 07/24/23 1357 07/24/23 1418 07/24/23 1440   BP:    (!) 140/87   Pulse:    99   Resp:       Temp:       TempSrc:       SpO2: 97% 96% (!) 83% 90%   Weight:       Height:           Cardiac Rhythm:     Was the PSS-3 completed:   Yes  What interventions are required if any?               Family Comments: daughter here  OBS brochure/video discussed/provided to patient/family: N/A              Name of person given brochure if not patient: na              Relationship to patient: na    For the majority of the shift this patient's behavior was Green.   Behavioral interventions performed were none.    ED NURSE PHONE NUMBER: [phone]

## 2023-07-24 NOTE — ED PROVIDER NOTES
History     Chief Complaint:  Shortness of Breath       HPI   Tammie Zeng is a 67 year old female who presents with shortness of breath.  Patient notes that symptoms began 5 days ago.  She has had increased productive cough with green thick sputum production.  Denies history of fever.  History of COPD and notes compliance with previously prescribed medications.  Continues to smoke half pack cigarettes per day.  Endorses significant increased exercise intolerance but denies chest pain, abdominal pain, or lower extremity edema.  She has been taking 650 mg aspirin daily for pain.      Independent Historian:   None - Patient Only    Review of External Notes:   N/A    Medications:    albuterol (PROAIR HFA/PROVENTIL HFA/VENTOLIN HFA) 108 (90 Base) MCG/ACT inhaler  aspirin (CHAYA ASPIRIN) 325 MG tablet  Fluticasone-Umeclidin-Vilant (TRELEGY ELLIPTA) 100-62.5-25 MCG/ACT oral inhaler        Past Medical History:    Past Medical History:   Diagnosis Date     Chronic obstructive pulmonary disease, unspecified COPD type (H) 06/13/2017     Depressive disorder, not elsewhere classified 05/2006     Elevated LFTs 2023     Generalized anxiety disorder      Hepatic steatosis 04/2023     Herpes simplex without mention of complication      Nephrolithiasis      TOBACCO ABUSE-CONTINUOUS        Past Surgical History:    Past Surgical History:   Procedure Laterality Date     CHOLECYSTECTOMY, LAPOROSCOPIC  1980's    done at [facility]     TUBAL LIGATION  1998        Physical Exam     Patient Vitals for the past 24 hrs:   BP Temp Temp src Pulse Resp SpO2 Height Weight   07/24/23 1440 (!) 140/87 -- -- 99 -- 90 % -- --   07/24/23 1418 -- -- -- -- -- (!) 83 % -- --   07/24/23 1357 -- -- -- -- -- 96 % -- --   07/24/23 1342 -- -- -- -- -- 97 % -- --   07/24/23 1331 -- -- -- 73 -- (!) 89 % -- --   07/24/23 1327 (!) 134/99 -- -- -- -- 91 % -- --   07/24/23 1312 -- -- -- -- -- 91 % -- --   07/24/23 1257 -- -- -- -- -- 94 % -- --  "  07/24/23 1254 -- -- -- -- -- 94 % -- --   07/24/23 1244 -- -- -- -- -- 95 % -- --   07/24/23 1242 -- -- -- -- -- 95 % -- --   07/24/23 1234 -- -- -- -- -- 100 % -- --   07/24/23 1137 123/77 97.6  F (36.4  C) Temporal 101 24 93 % 1.6 m (5' 3\") 47.6 kg (105 lb)        Physical Exam  General: Alert and cooperative with exam. Patient in mild to moderate distress. Normal mentation.  Thin/frail appearance  Head:  Scalp is NC/AT  Eyes:  No scleral icterus, PERRL  ENT:  The external nose and ears are normal. The oropharynx is normal and without erythema; mucus membranes are somewhat dry. Uvula midline, no evidence of deep space infection.  Neck:  Normal range of motion without rigidity.  CV:  Regular rate and rhythm  Resp:  Diminished breath sounds bilaterally with diffuse wheezing.  Pursed lip breathing.    Mildly labored breathing with some accessory muscle use  GI:  Abdomen is soft, no distension, no tenderness. No peritoneal signs  MS:  No lower extremity edema   Skin:  Warm and dry, No rash or lesions noted.  Neuro: Oriented x 3. No gross motor deficits.        Emergency Department Course   ECG  ECG results from 07/24/23   EKG 12 lead     Value    Systolic Blood Pressure     Diastolic Blood Pressure     Ventricular Rate 101    Atrial Rate 101    NE Interval 132    QRS Duration 80        QTc 443    P Axis 84    R AXIS 66    T Axis 79    Interpretation ECG      Sinus tachycardia  Right atrial enlargement  Anterior infarct , age undetermined  Abnormal ECG         Imaging:  Chest XR,  PA & LAT   Final Result   IMPRESSION: Stable cardiomediastinal silhouette. Hyperinflation and   upper lobe predominant oligemia, compatible with emphysema. No focal   airspace consolidation, pleural effusion or pneumothorax. No acute   bony abnormality.      CHARLETTE VALLADARES MD            SYSTEM ID:  [id number]         Report per radiology    Laboratory:  Labs Ordered and Resulted from Time of ED Arrival to Time of ED Departure "   COMPREHENSIVE METABOLIC PANEL - Abnormal       Result Value    Sodium 136      Potassium 4.2      Chloride 95 (*)     Carbon Dioxide (CO2) 26      Anion Gap 15      Urea Nitrogen 7.1 (*)     Creatinine 0.54      Calcium 10.1      Glucose 123 (*)     Alkaline Phosphatase 100      AST 22      ALT 14      Protein Total 7.9      Albumin 4.1      Bilirubin Total 0.4      GFR Estimate >90     ISTAT GASES LACTATE VENOUS POCT - Abnormal    Lactic Acid POCT 1.6      Bicarbonate Venous POCT 28      O2 Sat, Venous POCT 63 (*)     pCO2 Venous POCT 46      pH Venous POCT 7.39      pO2 Venous POCT 33     DIFFERENTIAL - Abnormal    % Neutrophils 53      % Lymphocytes 32      % Monocytes 12      % Eosinophils 0      % Basophils 2      % Promyelocytes 1      NRBCs per 100 WBC 1 (*)     Absolute Neutrophils 5.7      Absolute Lymphocytes 3.5      Absolute Monocytes 1.3      Absolute Eosinophils 0.0      Absolute Basophils 0.2      Absolute Promyelocytes 0.1 (*)     Absolute NRBCs 0.1 (*)     RBC Morphology Confirmed RBC Indices      Platelet Assessment        Value: Automated Count Confirmed. Platelet morphology is normal.    Pathologist Review Comments       D DIMER QUANTITATIVE - Normal    D-Dimer Quantitative 0.50     TROPONIN T, HIGH SENSITIVITY - Normal    Troponin T, High Sensitivity 10     NT PROBNP INPATIENT - Normal    N terminal Pro BNP Inpatient 237     CBC WITH PLATELETS AND DIFFERENTIAL - Normal    WBC Count 10.8      RBC Count 4.87      Hemoglobin 15.2      Hematocrit 46.3      MCV 95      MCH 31.2      MCHC 32.8      RDW 14.0      Platelet Count 377     COVID-19 VIRUS (CORONAVIRUS) BY PCR - Normal    SARS CoV2 PCR Negative     BLOOD GAS VENOUS WITH OXYHEMOGLOBIN          Emergency Department Course & Assessments:    Interventions:  Medications   ipratropium - albuterol 0.5 mg/2.5 mg/3 mL (DUONEB) neb solution 3 mL (3 mLs Nebulization $Given 7/24/23 0994)   ipratropium - albuterol 0.5 mg/2.5 mg/3 mL (DUONEB) neb  solution 3 mL (3 mLs Nebulization $Given 7/24/23 144)   methylPREDNISolone sodium succinate (solu-MEDROL) injection 125 mg (125 mg Intravenous $Given 7/24/23 1444)   doxycycline hyclate (VIBRAMYCIN) capsule 100 mg (100 mg Oral $Given 7/24/23 1456)   acetaminophen (TYLENOL) tablet 650 mg (650 mg Oral $Given 7/24/23 1456)        Independent Interpretation (X-rays, CTs, rhythm strip):  I reviewed the patient's chest x-ray, no evidence of infiltrate, effusion, or pneumothorax    Consultations/Discussion of Management or Tests:  I discussed the patient's care with Dr. Abdi of the hospitalist service who accepted for admission       Social Determinants of Health affecting care:   None    Disposition:  The patient was admitted to the hospital under the care of Dr. Abdi.     Impression & Plan      Medical Decision Making:  Tammie Zeng is a 67 year old female who presents for evaluation of shortness of breath and wheezing.  Signs and symptoms are consistent with COPD exacerbation.  A broad differential was considered including foreign body, reactive airway disease, pneumothorax, cardiac equivalent/ACS, viral induced wheezing, allergic phenomena, pneumonia, etc.  Patient feels somewhat improved after interventions here in ED but continues to have impairment in respiratory function including hypoxia with minimal exertion.  Given this, I will hospitalize for further intervention.  Chest x-ray without definitive evidence of pneumonia however given significant productive cough, elected to place patient on doxycycline for potential atypical pulmonary infection.  Patient continues to smoke; recommended smoking cessation.  EKG without evidence of acute ischemia and troponin is within normal range; low suspicion for ACS.  D-dimer is not significantly elevated when age-adjusted; PE unlikely.  Patient did drop her oxygen saturations into the mid 80s with minimal exertion; and oxygen saturations normalized with 2 L nasal  cannula oxygen.  Stable at time of admission.        Diagnosis:    ICD-10-CM    1. COPD exacerbation (H)  J44.1       2. Acute respiratory failure with hypoxia (H)  J96.01               Tay Vila,   07/24/23 1520

## 2023-07-25 LAB
BASOPHILS # BLD AUTO: 0.1 10E3/UL (ref 0–0.2)
BASOPHILS # BLD MANUAL: 0.2 10E3/UL (ref 0–0.2)
BASOPHILS NFR BLD AUTO: 1 %
BASOPHILS NFR BLD MANUAL: 2 %
EOSINOPHIL # BLD AUTO: 0 10E3/UL (ref 0–0.7)
EOSINOPHIL # BLD MANUAL: 0 10E3/UL (ref 0–0.7)
EOSINOPHIL NFR BLD AUTO: 0 %
EOSINOPHIL NFR BLD MANUAL: 0 %
ERYTHROCYTE [DISTWIDTH] IN BLOOD BY AUTOMATED COUNT: 13.8 % (ref 10–15)
HCT VFR BLD AUTO: 42.6 % (ref 35–47)
HGB BLD-MCNC: 14.3 G/DL (ref 11.7–15.7)
IMM GRANULOCYTES # BLD: 0.2 10E3/UL
IMM GRANULOCYTES NFR BLD: 2 %
LYMPHOCYTES # BLD AUTO: 1.2 10E3/UL (ref 0.8–5.3)
LYMPHOCYTES # BLD MANUAL: 3.5 10E3/UL (ref 0.8–5.3)
LYMPHOCYTES NFR BLD AUTO: 11 %
LYMPHOCYTES NFR BLD MANUAL: 32 %
MCH RBC QN AUTO: 31.8 PG (ref 26.5–33)
MCHC RBC AUTO-ENTMCNC: 33.6 G/DL (ref 31.5–36.5)
MCV RBC AUTO: 95 FL (ref 78–100)
MONOCYTES # BLD AUTO: 0.4 10E3/UL (ref 0–1.3)
MONOCYTES # BLD MANUAL: 1.3 10E3/UL (ref 0–1.3)
MONOCYTES NFR BLD AUTO: 3 %
MONOCYTES NFR BLD MANUAL: 12 %
NEUTROPHILS # BLD AUTO: 9.5 10E3/UL (ref 1.6–8.3)
NEUTROPHILS # BLD MANUAL: 5.7 10E3/UL (ref 1.6–8.3)
NEUTROPHILS NFR BLD AUTO: 83 %
NEUTROPHILS NFR BLD MANUAL: 53 %
NRBC # BLD AUTO: 0 10E3/UL
NRBC # BLD AUTO: 0.1 10E3/UL
NRBC BLD AUTO-RTO: 0 /100
NRBC BLD MANUAL-RTO: 1 %
PATH REV: ABNORMAL
PLAT MORPH BLD: ABNORMAL
PLATELET # BLD AUTO: 371 10E3/UL (ref 150–450)
PROMYELOCYTES # BLD MANUAL: 0.1 10E3/UL
PROMYELOCYTES NFR BLD MANUAL: 1 %
RBC # BLD AUTO: 4.49 10E6/UL (ref 3.8–5.2)
RBC MORPH BLD: ABNORMAL
WBC # BLD AUTO: 11.4 10E3/UL (ref 4–11)

## 2023-07-25 PROCEDURE — 99232 SBSQ HOSP IP/OBS MODERATE 35: CPT | Performed by: INTERNAL MEDICINE

## 2023-07-25 PROCEDURE — 36415 COLL VENOUS BLD VENIPUNCTURE: CPT | Performed by: PHYSICIAN ASSISTANT

## 2023-07-25 PROCEDURE — 94640 AIRWAY INHALATION TREATMENT: CPT

## 2023-07-25 PROCEDURE — 250N000013 HC RX MED GY IP 250 OP 250 PS 637: Performed by: PHYSICIAN ASSISTANT

## 2023-07-25 PROCEDURE — 120N000001 HC R&B MED SURG/OB

## 2023-07-25 PROCEDURE — 250N000013 HC RX MED GY IP 250 OP 250 PS 637: Performed by: INTERNAL MEDICINE

## 2023-07-25 PROCEDURE — 250N000011 HC RX IP 250 OP 636: Performed by: PHYSICIAN ASSISTANT

## 2023-07-25 PROCEDURE — 85025 COMPLETE CBC W/AUTO DIFF WBC: CPT | Performed by: PHYSICIAN ASSISTANT

## 2023-07-25 PROCEDURE — 250N000009 HC RX 250: Performed by: PHYSICIAN ASSISTANT

## 2023-07-25 PROCEDURE — 94640 AIRWAY INHALATION TREATMENT: CPT | Mod: 76

## 2023-07-25 PROCEDURE — 999N000157 HC STATISTIC RCP TIME EA 10 MIN

## 2023-07-25 RX ORDER — ASPIRIN 325 MG
325-650 TABLET ORAL DAILY PRN
Status: DISCONTINUED | OUTPATIENT
Start: 2023-07-25 | End: 2023-07-28 | Stop reason: HOSPADM

## 2023-07-25 RX ORDER — ALBUTEROL SULFATE 90 UG/1
2 AEROSOL, METERED RESPIRATORY (INHALATION) EVERY 4 HOURS PRN
Status: DISCONTINUED | OUTPATIENT
Start: 2023-07-25 | End: 2023-07-25

## 2023-07-25 RX ORDER — MULTIPLE VITAMINS W/ MINERALS TAB 9MG-400MCG
1 TAB ORAL
Status: DISCONTINUED | OUTPATIENT
Start: 2023-07-25 | End: 2023-07-28 | Stop reason: HOSPADM

## 2023-07-25 RX ORDER — FLUTICASONE FUROATE AND VILANTEROL 100; 25 UG/1; UG/1
1 POWDER RESPIRATORY (INHALATION) DAILY
Status: DISCONTINUED | OUTPATIENT
Start: 2023-07-25 | End: 2023-07-28 | Stop reason: HOSPADM

## 2023-07-25 RX ADMIN — GUAIFENESIN 600 MG: 600 TABLET, EXTENDED RELEASE ORAL at 20:59

## 2023-07-25 RX ADMIN — MULTIPLE VITAMINS W/ MINERALS TAB 1 TABLET: TAB at 14:54

## 2023-07-25 RX ADMIN — ALBUTEROL SULFATE 2.5 MG: 2.5 SOLUTION RESPIRATORY (INHALATION) at 01:27

## 2023-07-25 RX ADMIN — DOXYCYCLINE HYCLATE 100 MG: 100 CAPSULE ORAL at 08:09

## 2023-07-25 RX ADMIN — METHYLPREDNISOLONE SODIUM SUCCINATE 40 MG: 40 INJECTION, POWDER, FOR SOLUTION INTRAMUSCULAR; INTRAVENOUS at 17:47

## 2023-07-25 RX ADMIN — FLUTICASONE FUROATE AND VILANTEROL TRIFENATATE 1 PUFF: 100; 25 POWDER RESPIRATORY (INHALATION) at 14:54

## 2023-07-25 RX ADMIN — METHYLPREDNISOLONE SODIUM SUCCINATE 40 MG: 40 INJECTION, POWDER, FOR SOLUTION INTRAMUSCULAR; INTRAVENOUS at 05:00

## 2023-07-25 RX ADMIN — IPRATROPIUM BROMIDE AND ALBUTEROL SULFATE 3 ML: .5; 3 SOLUTION RESPIRATORY (INHALATION) at 07:04

## 2023-07-25 RX ADMIN — GUAIFENESIN 600 MG: 600 TABLET, EXTENDED RELEASE ORAL at 08:09

## 2023-07-25 RX ADMIN — IPRATROPIUM BROMIDE AND ALBUTEROL SULFATE 3 ML: .5; 3 SOLUTION RESPIRATORY (INHALATION) at 16:33

## 2023-07-25 RX ADMIN — UMECLIDINIUM 1 PUFF: 62.5 AEROSOL, POWDER ORAL at 14:54

## 2023-07-25 RX ADMIN — IPRATROPIUM BROMIDE AND ALBUTEROL SULFATE 3 ML: .5; 3 SOLUTION RESPIRATORY (INHALATION) at 11:06

## 2023-07-25 RX ADMIN — DOXYCYCLINE HYCLATE 100 MG: 100 CAPSULE ORAL at 20:59

## 2023-07-25 RX ADMIN — ALBUTEROL SULFATE 2.5 MG: 2.5 SOLUTION RESPIRATORY (INHALATION) at 23:41

## 2023-07-25 RX ADMIN — IPRATROPIUM BROMIDE AND ALBUTEROL SULFATE 3 ML: .5; 3 SOLUTION RESPIRATORY (INHALATION) at 19:56

## 2023-07-25 ASSESSMENT — ACTIVITIES OF DAILY LIVING (ADL)
ADLS_ACUITY_SCORE: 20
ADLS_ACUITY_SCORE: 22
ADLS_ACUITY_SCORE: 20
ADLS_ACUITY_SCORE: 20
ADLS_ACUITY_SCORE: 22
ADLS_ACUITY_SCORE: 20
ADLS_ACUITY_SCORE: 20
ADLS_ACUITY_SCORE: 22
ADLS_ACUITY_SCORE: 20

## 2023-07-25 NOTE — PLAN OF CARE
Goal Outcome Evaluation:       [id number]  VSS on 1.5L ex tachypnea at times. Aox4. Denies nausea, complains of chest tightness at times during SOB episodes. Nebs given during shift. Encouraged pt to ambulate during shift. SBA in room. Regular diet. PIV SL. Nicotine patch in place. Discharge pending.

## 2023-07-25 NOTE — PROGRESS NOTES
[facility]  Hospitalist Progress Note  Mikel Coffman MD  07/25/2023    Assessment & Plan   Tammie Zeng is a 67 year old female with a past medical history significant for COPD, hx tobacco use, hepatic steatosis, MDD/RODNEY, admitted on 7/24/2023 after presenting with shortness of breath.      Acute hypoxic respiratory failure secondary to COPD exacerbation  Tobacco use   Pt presents with 5 days increasing dyspnea with productive cough. Hx COPD followed by [facility] with ongoing tobacco use. Denies fevers.   WBC 10.8, LA 1.6, d dimer 0.5, trop 10. COVID negative.   CXR shows hyperinflation and upper lobe predominant oligemia compatible with emphysema without focal airspace disease.   Hypoxic to 83% on RA in the ED, improved with 2L supplemental oxygen. HR mildly tachy 90-100s.   PTA: Trelegy daily, prn albuterol  Received doxycycline, duoneb x2, 125mg methylprednisolone in the ED  --some improvement, currently on 2L with mod work of breathing while talking  --wean oxygen as able, may need oxygen at discharge  --continue doxycycline   --continue methylprednisolone 40mg bid for tomorrow, then re assess  --duonebs q4 while awake, albuterol prn.   --flutter valve, IS. Will ask [facility] to follow  --continuous pulse oximetry   --tobacco cessation strongly encouraged, nicotine patch ordered  --ok with BiPAP if needed  -- patient previously followed by [facility] Starr, will consult     Alcohol use  Reports daily alcohol consumption 3 drinks, vodka, daily. Last drink 7/21. Denies hx withdrawal   --monitor clinically      MDD/RODNEY  Not currently on medications      Hepatic steatosis   LFT this admit WNL  --follow up outpatient   --continue to work on lifestyle changes      Diet:  regular diet   DVT Prophylaxis: Pneumatic Compression Devices  Royal Catheter: Not present  Lines: None     Cardiac Monitoring: None  Code Status:   FULL CODE- initially stated DNI/DNR but after discussion wants to talk this over  "with her daughter first         Clinically Significant Risk Factors Present on Admission                # Drug Induced Platelet Defect: home medication list includes an antiplatelet medication   ??  ? ?            Disposition Plan  -- anticipate home in 2 days       Interval History   -- chart reviewed  -- currently on 2L oxygen    -Data reviewed today: I reviewed all new labs and imaging over the last 24 hours. I personally reviewed no images or EKG's today.    Physical Exam    , Blood pressure 130/81, pulse 102, temperature 98  F (36.7  C), resp. rate 20, height 1.6 m (5' 3\"), weight 47.6 kg (105 lb), SpO2 92 %, not currently breastfeeding.  Vitals:    07/24/23 1137 07/24/23 1800   Weight: 47.6 kg (105 lb) 47.6 kg (105 lb)     Vital Signs with Ranges  Temp:  [97.9  F (36.6  C)-98  F (36.7  C)] 98  F (36.7  C)  Pulse:  [] 102  Resp:  [20-23] 20  BP: (129-140)/(81-99) 130/81  SpO2:  [83 %-97 %] 92 %  I/O's Last 24 hours  I/O last 3 completed shifts:  In: 123 [P.O.:120; I.V.:3]  Out: -     Constitutional: Awake, alert, cooperative, no apparent distress, cachexia  Respiratory: diminished  Cardiovascular: Regular rate and rhythm, normal S1 and S2   GI: Normal bowel sounds, soft, non-distended, non-tender  Skin/Integumen: No rashes, no cyanosis, no edema  Other:      Medications   All medications were reviewed.      doxycycline hyclate  100 mg Oral Q12H [facility] (08/20)     guaiFENesin  600 mg Oral BID     ipratropium - albuterol 0.5 mg/2.5 mg/3 mL  3 mL Nebulization Q4H [facility]     methylPREDNISolone  40 mg Intravenous Q12H     nicotine   Transdermal Q8H     sodium chloride (PF)  3 mL Intracatheter Q8H        Data   Recent Labs   Lab 07/25/23  0745 07/24/23  1149   WBC 11.4* 10.8   HGB 14.3 15.2   MCV 95 95    377   NA  --  136   POTASSIUM  --  4.2   CHLORIDE  --  95*   CO2  --  26   BUN  --  7.1*   CR  --  0.54   ANIONGAP  --  15   MOLLY  --  10.1   GLC  --  123*   ALBUMIN  --  4.1   PROTTOTAL  --  7.9   BILITOTAL  " --  0.4   ALKPHOS  --  100   ALT  --  14   AST  --  22       No results found for this or any previous visit (from the past 24 hour(s)).    Mikel Coffman MD  Text Page  (7am to 6pm)

## 2023-07-25 NOTE — PLAN OF CARE
Date&time:07/24/23, 8713-7031    Summary:  admitted with SOB    Primary Diagnosis: A/C respiratory failure,  w/ hypoxia, COPD, tobacco use, alcohol use, hepatic steatosis, MDD/RODNEY.    Orientation: A&OX4    Aggression Stop Light: Green    Mobility: SBA-IND    Pain Management: Denies    Diet: regular    Bowel/Bladder: continent    Abnormal Lab/Assessments: chloride-95, PO2 Venous-55    Drain/Device/Wound: R PIV-SL    Consults:RT    D/C Day/Goals/Place: TBD    Received from ED at 1800, admitted with SOB. A&OX4, continent of B&B, on regular diet with fair appetite. On 2L O2 via NC, maintaining >92% saturation. On scheduled and PRN nebulization, received twice after admission. Tachypnea and tachycardia present. Pt own inhaler kept in the cabinet, informed  pharmacist to verify.  R PIV SL. SBA for transfers. No skin issues noted, 2 RN skin assessment completed. Productive, frequent cough present, on oral antibiotic. Nicotine patch on L arm placed in this shift. CBC+platelets -AM draw.

## 2023-07-25 NOTE — CONSULTS
"CLINICAL NUTRITION SERVICES  -  ASSESSMENT NOTE      Recommendations Ordered by Registered Dietitian (RD):   Eat frequent small meals throughout the day   1 Ensure Enlive/day  Ordered multivitamin   Future/Additional Recommendations: Continue to monitor PO    Malnutrition:   % Weight Loss:  Up to 10% in 6 months (moderate malnutrition)  % Intake:  </= 50% for >/= 5 days (severe malnutrition)  Subcutaneous Fat Loss:  None observed  Muscle Loss:  Temporal region mild depletion, Acromion bone region moderate depletion, Scapular bone region mild depletion, Dorsal hand region moderate depletion, and Anterior thigh region mild depletion  Fluid Retention:  None noted    Malnutrition Diagnosis: Severe malnutrition  In Context of:  Acute and chronic illness or injury        REASON FOR ASSESSMENT  Tammie Zeng is a 67 year old female seen by Registered Dietitian for Admission Nutrition Risk Screen for positive    Nutrition Admission Risk Screen Received -   Have you recently lost weight without trying ? - \"yes\" (2-13 lbs)  Have you been eating poorly due to a decreased appetite ?- \"yes\"    - Past medical history significant for COPD, hx tobacco use, hepatic steatosis, MDD/RODNEY,   - Admitted on 7/24/2023 after presenting with shortness of breath   - Patient reports that approximately week prior to admission she developed URI symptoms with nasal congestion, fatigue, cough. Throughout the week she has become increasingly short of breath and wheezy and cannot tolerate much activity at all at home.     NUTRITION HISTORY  Information obtained from patient  - Has been eating <25% of what usually eats for the past week because she felt too weak to eat (since URI symptoms started)  - Usually buys pre-prepared or frozen meals   - Very small BM on 7/24  - Said had diarrhea after taking Mucinex at home      CURRENT NUTRITION ORDERS  Diet Order:     Regular     Current Intake/Tolerance:  Per medical chart:  - 25% intake, fair appetite " "on 7/24  - Small BM 7/25    Per patient:  - At 1/2 of breakfast sandwich and a small amount of fruit on 7/25 - this is more than she was eating at home for the last week  - Felt full and like there was a pressure on her chest after eating      NUTRITION FOCUSED PHYSICAL ASSESSMENT FOR DIAGNOSING MALNUTRITION)  Yes            Observed:    Muscle wasting (refer to documentation in Malnutrition section)    ANTHROPOMETRICS  Height: 5' 3\"  Weight: 105 lbs 0 oz (47.6 kg)  Body mass index is 18.6 kg/m .  Weight Status:  Normal BMI  IBW: 115 lbs (52.2 kg)  % IBW: 91%  Weight History:   Wt Readings from Last 10 Encounters:   07/24/23 47.6 kg (105 lb)   01/19/23 51.7 kg (114 lb)   04/28/22 50.8 kg (112 lb)   06/17/21 51 kg (112 lb 6.4 oz)   11/20/18 49 kg (108 lb)   06/13/17 47.5 kg (104 lb 11.2 oz)   05/16/17 46.7 kg (103 lb)   10/19/16 48.1 kg (106 lb)   01/06/11 56.7 kg (125 lb)   01/03/11 56.9 kg (125 lb 8 oz)     Weights this admission:  Date/Time Weight   07/24/23 1800 47.6 kg (105 lb)   07/24/23 1137 47.6 kg (105 lb)     LABS  7/24: UN 7.1 (L), glucose 123    MEDICATIONS  Doxycycline hyclate, Mucinex, ipratropium, Solumedrol, sodium chloride, proventil  PRN (not given): Zofran, senna      ASSESSED NUTRITION NEEDS PER APPROVED PRACTICE GUIDELINES:    Dosing Weight 47.6 kg  Estimated Energy Needs: 1428 - 1666 kcals (30-35 Kcal/Kg)  Justification: repletion, COPD  Estimated Protein Needs: 57 - 86 grams protein (1.2-1.8 g pro/Kg)  Justification: Repletion, COPD  Estimated Fluid Needs: 1428 - 1666 kcals  mL (30-35 mL/kg)  Justification: maintenance    MALNUTRITION:  % Weight Loss:  Up to 10% in 6 months (moderate malnutrition)  % Weight Loss:  Weight loss does not meet criteria for malnutrition - 8% in 5 months  % Intake:  </= 50% for >/= 5 days (severe malnutrition)  Subcutaneous Fat Loss:  None observed  Muscle Loss:  Temporal region mild depletion, Acromion bone region moderate depletion, Scapular bone region mild " depletion, Dorsal hand region moderate depletion, and Anterior thigh region mild depletion  Fluid Retention:  None noted    Malnutrition Diagnosis: Severe malnutrition  In Context of:  Acute and chronic illness or injury    NUTRITION DIAGNOSIS:  Malnutrition related to inadequate oral intake secondary to weakness from URI symptoms as evidenced by patient reporting </= 50% intake for >/= 5 days and feeling too weak to eat before hospital admission.     NUTRITION INTERVENTIONS  Recommendations / Nutrition Prescription  Eat frequent small meals throughout the day   1 Ensure Enlive/day  Multivitamin daily    Implementation  Nutrition education: Provided education on eating frequent small meals throughout the day to help with appetite and use of oral nutrition supplement to help with nutrition  Composition of Meals and Snacks  Medical Food Supplement - Ensure Enlive 1x daily  Multivitamin ordered    Nutrition Goals  Consume >75% of meals and supplements TID    MONITORING AND EVALUATION:  Progress towards goals will be monitored and evaluated per protocol and Practice Guidelines

## 2023-07-25 NOTE — PLAN OF CARE
A&Ox4, VSS on 1L O2 while at rest, requiring 2+ liters with activity. Did complain of some tightness in her chest with breathing, Neb treatments helpful. SBA, ambulating in room. Not wanting to walk outside of room. Skin appears intact but refused full skin assessment. Tolerating regular diet. Pulmonology consulted for further management. Discharge pending at this time.

## 2023-07-25 NOTE — PLAN OF CARE
[id number]  A/Ox4. O2 2-4L, 2L @ rest, needing 4L up to BR & while communicating. Tachypnea, SOB, frequent cough. R PIV, SL. Denied pain, nausea. SOB managed with PRN neb proventil. Skin mostly intact, declined full skin assessment, CHUCK perineum / coccyx. Small BM this shift. Reg diet, poor appetite. Discharge pending overall improvement.

## 2023-07-26 ENCOUNTER — APPOINTMENT (OUTPATIENT)
Dept: PHYSICAL THERAPY | Facility: CLINIC | Age: 68
DRG: 190 | End: 2023-07-26
Attending: INTERNAL MEDICINE
Payer: COMMERCIAL

## 2023-07-26 PROCEDURE — 250N000013 HC RX MED GY IP 250 OP 250 PS 637: Performed by: PHYSICIAN ASSISTANT

## 2023-07-26 PROCEDURE — 250N000011 HC RX IP 250 OP 636: Performed by: PHYSICIAN ASSISTANT

## 2023-07-26 PROCEDURE — 250N000013 HC RX MED GY IP 250 OP 250 PS 637: Performed by: INTERNAL MEDICINE

## 2023-07-26 PROCEDURE — 97530 THERAPEUTIC ACTIVITIES: CPT | Mod: GP | Performed by: PHYSICAL THERAPIST

## 2023-07-26 PROCEDURE — 250N000009 HC RX 250: Performed by: PHYSICIAN ASSISTANT

## 2023-07-26 PROCEDURE — 999N000157 HC STATISTIC RCP TIME EA 10 MIN

## 2023-07-26 PROCEDURE — 94640 AIRWAY INHALATION TREATMENT: CPT | Mod: 76

## 2023-07-26 PROCEDURE — 120N000001 HC R&B MED SURG/OB

## 2023-07-26 PROCEDURE — 97116 GAIT TRAINING THERAPY: CPT | Mod: GP | Performed by: PHYSICAL THERAPIST

## 2023-07-26 PROCEDURE — 97161 PT EVAL LOW COMPLEX 20 MIN: CPT | Mod: GP | Performed by: PHYSICAL THERAPIST

## 2023-07-26 PROCEDURE — 99232 SBSQ HOSP IP/OBS MODERATE 35: CPT | Performed by: INTERNAL MEDICINE

## 2023-07-26 PROCEDURE — 94640 AIRWAY INHALATION TREATMENT: CPT

## 2023-07-26 RX ADMIN — DOXYCYCLINE HYCLATE 100 MG: 100 CAPSULE ORAL at 08:43

## 2023-07-26 RX ADMIN — IPRATROPIUM BROMIDE AND ALBUTEROL SULFATE 3 ML: .5; 3 SOLUTION RESPIRATORY (INHALATION) at 19:38

## 2023-07-26 RX ADMIN — METHYLPREDNISOLONE SODIUM SUCCINATE 40 MG: 40 INJECTION, POWDER, FOR SOLUTION INTRAMUSCULAR; INTRAVENOUS at 04:36

## 2023-07-26 RX ADMIN — UMECLIDINIUM 1 PUFF: 62.5 AEROSOL, POWDER ORAL at 08:45

## 2023-07-26 RX ADMIN — ALBUTEROL SULFATE 2.5 MG: 2.5 SOLUTION RESPIRATORY (INHALATION) at 10:46

## 2023-07-26 RX ADMIN — FLUTICASONE FUROATE AND VILANTEROL TRIFENATATE 1 PUFF: 100; 25 POWDER RESPIRATORY (INHALATION) at 08:45

## 2023-07-26 RX ADMIN — IPRATROPIUM BROMIDE AND ALBUTEROL SULFATE 3 ML: .5; 3 SOLUTION RESPIRATORY (INHALATION) at 06:32

## 2023-07-26 RX ADMIN — IPRATROPIUM BROMIDE AND ALBUTEROL SULFATE 3 ML: .5; 3 SOLUTION RESPIRATORY (INHALATION) at 15:09

## 2023-07-26 RX ADMIN — GUAIFENESIN 600 MG: 600 TABLET, EXTENDED RELEASE ORAL at 20:01

## 2023-07-26 RX ADMIN — METHYLPREDNISOLONE SODIUM SUCCINATE 40 MG: 40 INJECTION, POWDER, FOR SOLUTION INTRAMUSCULAR; INTRAVENOUS at 16:18

## 2023-07-26 RX ADMIN — DOXYCYCLINE HYCLATE 100 MG: 100 CAPSULE ORAL at 20:01

## 2023-07-26 RX ADMIN — GUAIFENESIN 600 MG: 600 TABLET, EXTENDED RELEASE ORAL at 08:44

## 2023-07-26 RX ADMIN — MULTIPLE VITAMINS W/ MINERALS TAB 1 TABLET: TAB at 11:55

## 2023-07-26 ASSESSMENT — ACTIVITIES OF DAILY LIVING (ADL)
ADLS_ACUITY_SCORE: 22

## 2023-07-26 NOTE — PROGRESS NOTES
[facility]  Hospitalist Progress Note  Mikel Coffman MD  07/26/2023    Assessment & Plan   Tammie Zeng is a 67 year old female with a past medical history significant for COPD, hx tobacco use, hepatic steatosis, MDD/RODNEY, admitted on 7/24/2023 after presenting with shortness of breath.      Acute hypoxic respiratory failure secondary to COPD exacerbation  Tobacco use   Pt presents with 5 days increasing dyspnea with productive cough. Hx COPD followed by [facility] with ongoing tobacco use. Denies fevers.   WBC 10.8, LA 1.6, d dimer 0.5, trop 10. COVID negative.   CXR shows hyperinflation and upper lobe predominant oligemia compatible with emphysema without focal airspace disease.   Hypoxic to 83% on RA in the ED, improved with 2L supplemental oxygen. HR mildly tachy 90-100s.   PTA: Trelegy daily, prn albuterol  Received doxycycline, duoneb x2, 125mg methylprednisolone in the ED  --some improvement, currently on 2L with mod work of breathing while talking  --wean oxygen as able, may need oxygen at discharge  --continue doxycycline   --continue methylprednisolone 40mg bid for tomorrow, then re assess  --duonebs q4 while awake, albuterol prn.   --flutter valve, IS. Will ask [facility] to follow  --tobacco cessation strongly encouraged, nicotine patch ordered  --ok with BiPAP if needed  -- patient previously followed by [facility] Starr, appreciate consult  -- 6 minute walk test for home oxygen qualification  -- prednisone taper at discharge  -- patient ambulated and felt very weak initially but was then able to ambulate a little better with assistance  -- will consult PT/OT for home vs TCU     Hypokalemia  -- secondary from frequent nebs  -- on K replacement protocol    Alcohol use  Reports daily alcohol consumption 3 drinks, vodka, daily. Last drink 7/21. Denies hx withdrawal   --monitor clinically      MDD/RODNEY  Not currently on medications      Hepatic steatosis   LFT this admit WNL  --follow up  "outpatient   --continue to work on lifestyle changes      Diet:  regular diet   DVT Prophylaxis: Pneumatic Compression Devices  Royal Catheter: Not present  Lines: None     Cardiac Monitoring: None  Code Status:   FULL CODE- initially stated DNI/DNR but after discussion wants to talk this over with her daughter first         Clinically Significant Risk Factors Present on Admission                # Drug Induced Platelet Defect: home medication list includes an antiplatelet medication                  Disposition Plan  -- anticipate home in 1-2 days       Interval History   -- chart reviewed  -- currently down to 1L  -- noted hypokalemia    -Data reviewed today: I reviewed all new labs and imaging over the last 24 hours. I personally reviewed no images or EKG's today.    Physical Exam    , Blood pressure 107/82, pulse 98, temperature 98.3  F (36.8  C), temperature source Oral, resp. rate 20, height 1.6 m (5' 3\"), weight 47.6 kg (105 lb), SpO2 94 %, not currently breastfeeding.  Vitals:    07/24/23 1137 07/24/23 1800   Weight: 47.6 kg (105 lb) 47.6 kg (105 lb)     Vital Signs with Ranges  Temp:  [97.1  F (36.2  C)-98.3  F (36.8  C)] 98.3  F (36.8  C)  Pulse:  [] 98  Resp:  [20] 20  BP: (103-133)/(67-82) 107/82  SpO2:  [93 %-99 %] 94 %  I/O's Last 24 hours  No intake/output data recorded.    Constitutional: Awake, alert, cooperative, no apparent distress, cachexia  Respiratory: diminished  Cardiovascular: Regular rate and rhythm, normal S1 and S2   GI: Normal bowel sounds, soft, non-distended, non-tender  Skin/Integumen: No rashes, no cyanosis, no edema  Other:      Medications   All medications were reviewed.     doxycycline hyclate  100 mg Oral Q12H [facility] (08/20)    fluticasone-vilanterol  1 puff Inhalation Daily    And    umeclidinium  1 puff Inhalation Daily    guaiFENesin  600 mg Oral BID    ipratropium - albuterol 0.5 mg/2.5 mg/3 mL  3 mL Nebulization Q4H [facility]    methylPREDNISolone  40 mg Intravenous Q12H    " multivitamin w/minerals  1 tablet Oral Daily with lunch    nicotine   Transdermal Q8H    sodium chloride (PF)  3 mL Intracatheter Q8H        Data   Recent Labs   Lab 07/25/23  0745 07/24/23  1149   WBC 11.4* 10.8   HGB 14.3 15.2   MCV 95 95    377   NA  --  136   POTASSIUM  --  4.2   CHLORIDE  --  95*   CO2  --  26   BUN  --  7.1*   CR  --  0.54   ANIONGAP  --  15   MOLLY  --  10.1   GLC  --  123*   ALBUMIN  --  4.1   PROTTOTAL  --  7.9   BILITOTAL  --  0.4   ALKPHOS  --  100   ALT  --  14   AST  --  22         No results found for this or any previous visit (from the past 24 hour(s)).    Mikel Coffman MD  Text Page  (7am to 6pm)

## 2023-07-26 NOTE — PLAN OF CARE
Goal Outcome Evaluation:       Date&time:07/26/23, [id number]    Summary: admitted with SOB    Primary Diagnosis: A/C respiratory failure, w/ hypoxia, COPD, tobacco use, alcohol use, hepatic steatosis, MDD/RODNEY.    Orientation: A&OX4    Aggression Stop Light: Green    Mobility: SBA-IND    Pain Management: Denies    Diet: regular    Bowel/Bladder: continent    Abnormal Lab/Assessments:N/A    Drain/Device/Wound: R PIV-SL    Consults: RT, PT/OT    Summary:  Tachycardic at rest intermittently. Tachypnea/dyspneic on exertion 1L NC. Noted wheezes anterior, IS encouraged, congested productive cough noted, bowel sounds faint, hypoactive, last BM 7/26/23 per patient, regular diet poor intake. R PIV SL. PRN Albuterol administered. Skin appears mostly intact, refuses full assessment. Plan tomorrow for 6 min walk to determine need for home oxygen. PT/OT consulted. Discharge home pending.

## 2023-07-26 NOTE — PROVIDER NOTIFICATION
MD Notification    Notified Person: MD    Notified Person Name: Dr. Coffman     Notification Date/Time: 1518 7/26/23    Notification Interaction: [email or web address]    Purpose of Notification: Do you want to do the oxygen assessment today or tomorrow?     Orders Received:    Comments:

## 2023-07-26 NOTE — PLAN OF CARE
Physical Therapy Discharge Summary    Reason for therapy discharge:    All goals and outcomes met, no further needs identified.    Progress towards therapy goal(s). See goals on Care Plan in Epic electronic health record for goal details.  Goals met    Therapy recommendation(s):    Recommend pt continue to ambulate w/ nursing staff throughout remainder of hospital. Pt reports she will purchase a FWW outside of [place].

## 2023-07-26 NOTE — CONSULTS
"PULMONOLOGY CONSULTATION  Date of service: 7/26/2023    [facility]  ____________________________________________________    Primary Care Provider:  Mikel Navarro  Admission Date: 7/24/2023  Hospital Attending Physician:  Ian Abdi MD  ________________________________________    CHIEF COMPLAINT: COPD exacerbation    ASSESSMENT / PLAN      Pulmonary diagnoses:  Abnormal Chest Imaging R91.8  COPD w/exacerb J44.1  Cough R05  Emphysema J43.9  Hypoxemia R09.02  Tobacco Dependence F17.200    ASSESSMENT:  68 yo female current smoker with PMH COPD, EtOH abuse, MDD/anxiety, hepatic steatosis who presents with SOB and found to have COPD exacerbation.    Likely COPD exacerbation with ongoing smoking, viral infection, and air quality as triggers. Would treated with prednisone taper and arrange outpatient follow up. As has been discussed with her in clinic, quitting smoking will provide the greatest benefit in terms of symptoms and reduction of exacerbations.    PLAN:     Smoking cessation discussed    Prednisone taper: 40mg x3 days, 30mg x3 days, 20mg x3 days, 10mg x3 days, 5mg x3 days    Duonebs q4 PRN while in house    Continue Trelegy and albuterol on discharge    Pulmonary rehab on discharge    We will call the patient to arrange follow up with [facility]    Will sign off, please page with questions.    Gato Renner MD  Interventional Pulmonology  [facility]        HISTORY OF PRESENT ILLNESS     68 yo female current smoker with PMH COPD, EtOH abuse, MDD/anxiety, hepatic steatosis who presents with SOB and found to have COPD exacerbation.    Per H&P: \" Patient reports that approximately week prior to admission she developed URI symptoms with nasal congestion, fatigue, cough.  Throughout the week she has become increasingly short of breath and wheezy and cannot tolerate much activity at all at home.  Tried her albuterol inhalers and mucinex without much improvement.  She " "reports over the weekend she nearly collapsed.  She has been outside occasionally with the poor air quality and wonders if this is part of the problem.  She also continues to smoke. She denies any chest pain, leg swelling, fevers, myalgias, chills though has felt like she has broken out in a sweat when she is extremely short of breath.  She is presently evaluated in her room in the emergency department.  Thus far she has received IV steroids as well as 2 nebulizer treatments and reports minimal improvement.  She continues to feel quite short of breath.\"    Today notes she feels better from admission.    HOME MEDICATIONS     Medications Prior to Admission   Medication Sig Dispense Refill Last Dose     albuterol (PROAIR HFA/PROVENTIL HFA/VENTOLIN HFA) 108 (90 Base) MCG/ACT inhaler Inhale 2 puffs into the lungs every 4 hours as needed for wheezing or shortness of breath 18 g 3 7/23/2023 at AM     aspirin (CHAYA ASPIRIN) 325 MG tablet Take 1-2 tablets by mouth daily as needed.   7/24/2023 at AM     Fluticasone-Umeclidin-Vilant (TRELEGY ELLIPTA) 100-62.5-25 MCG/ACT oral inhaler Inhale 1 puff into the lungs daily 3 each 3 7/24/2023 at AM       PAST MEDICAL HISTORY      Past Medical History:   Diagnosis Date     Chronic obstructive pulmonary disease, unspecified COPD type (H) 06/13/2017    has seen [facility], fev1 under 1.0     Depressive disorder, not elsewhere classified 05/2006    following sudden death of her mother     Elevated LFTs 2023    hepatic steatosis 4/23      Generalized anxiety disorder      Hepatic steatosis 04/2023    on us done for elev lfts     Herpes simplex without mention of complication     MOUTH     Nephrolithiasis      TOBACCO ABUSE-CONTINUOUS      Past Surgical History:   Procedure Laterality Date     CHOLECYSTECTOMY, LAPOROSCOPIC  1980's    done at [facility]     TUBAL LIGATION  1998       ALLERGIES     Allergies   Allergen Reactions     Pcn [Penicillins]      convulsions       SOCIAL / " SUBSTANCE HISTORY     Social History     Socioeconomic History     Marital status: Single     Spouse name: Not on file     Number of children: 1     Years of education: Not on file     Highest education level: Not on file   Occupational History     Occupation: retired, machinest for [employer]   Tobacco Use     Smoking status: Every Day     Packs/day: 0.50     Years: 25.00     Pack years: 12.50     Types: Cigarettes     Smokeless tobacco: Never     Tobacco comments:     states is cutting back - smokes 1/2 ppd (11)   Substance and Sexual Activity     Alcohol use: Yes     Alcohol/week: 0.0 standard drinks of alcohol     Comment: 3 mixed drinks per week     Drug use: No     Sexual activity: Yes     Partners: Male     Birth control/protection: Surgical     Comment: tubal - S.O. - Jos   Other Topics Concern      Service No     Blood Transfusions No     Caffeine Concern No     Occupational Exposure No     Hobby Hazards No     Sleep Concern Yes     Comment: secondary to grief - mom  suddenly at the end of April     Stress Concern Yes     Weight Concern Yes     Special Diet Yes     Comment: trys to eat healthy     Back Care No     Exercise No     Bike Helmet No     Seat Belt Yes     Self-Exams No   Social History Narrative     Not on file     Social Determinants of Health     Financial Resource Strain: Not on file   Food Insecurity: Not on file   Transportation Needs: Not on file   Physical Activity: Not on file   Stress: Not on file   Social Connections: Not on file   Intimate Partner Violence: Not on file   Housing Stability: Not on file       FAMILY HISTORY     Family History   Problem Relation Age of Onset     Asthma Daughter      Diabetes Paternal Grandmother      Cancer Maternal Aunt         skin     Allergies Daughter      Depression Mother      Eye Disorder Mother         cataracts and glacoma     Gastrointestinal Disease Maternal Grandmother         gallbladder     Gastrointestinal Disease  "Daughter         ulcerative cholitis     Osteoporosis Mother      Respiratory Mother         asthmatic bronchitis       REVIEW OF SYSTEMS   A comprehensive review of systems was negative except for items noted in HPI/Subjective.    PHYSICAL EXAMINATION   Temp (24hrs), Av.8  F (36.6  C), Min:97.1  F (36.2  C), Max:98.2  F (36.8  C)    Vital signs:  Temp: 97.1  F (36.2  C) Temp src: Oral BP: 103/67 Pulse: 98   Resp: 20 SpO2: 99 % O2 Device: Nasal cannula Oxygen Delivery: 1 LPM Height: 160 cm (5' 3\") Weight: 47.6 kg (105 lb)  Estimated body mass index is 18.6 kg/m  as calculated from the following:    Height as of this encounter: 1.6 m (5' 3\").    Weight as of this encounter: 47.6 kg (105 lb).        No intake/output data recorded.    CONSTITUTIONAL/GENERAL APPEARANCE: Alert. No Apparent Distress.  PSYCHIATRIC: Pleasant and appropriate mood and affect. Oriented x 3.  EARS, NOSE,THROAT,MOUTH: External ears and nose overall normal. Normal oral mucosa.   NECK: Neck appearance normal. No neck masses and the thyroid not enlarged.   RESPIRATORY: Non-labored effort. CTAB  CARDIOVASCULAR: S1, S2, regular rate and rhythm. Extremities with no edema.  ABDOMEN: round, soft, non-tender, non-distended, no palpable masses. No presence of hernia.  LYMPHATIC: no cervical or axillary lymphadenopathy.  SKIN: Skin color was normal. No clubbing or cyanosis. No palpable skin abnormalities.    LABORATORY ASSESSMENT    Arterial Blood Gas  Recent Labs   Lab 23  2020   O2PER 1     CBC  Recent Labs   Lab 23  0745 23  1149   WBC 11.4* 10.8   RBC 4.49 4.87   HGB 14.3 15.2   HCT 42.6 46.3   MCV 95 95   MCH 31.8 31.2   MCHC 33.6 32.8   RDW 13.8 14.0    377     BMP  Recent Labs   Lab 23  1149      POTASSIUM 4.2   CHLORIDE 95*   MOLLY 10.1   CO2 26   BUN 7.1*   CR 0.54   *     INRNo lab results found in last 7 days.   BNPNo lab results found in last 7 days.  VENOUS BLOOD GASES  Recent Labs   Lab " 07/24/23 2020 07/24/23  1227   PHV 7.36 7.39   PCO2V 49 46   PO2V 55* 33   HCO3V 28 28   LUCIO 1.3  --        IMAGING      Results for orders placed or performed during the hospital encounter of 07/24/23   Chest XR,  PA & LAT    Impression    IMPRESSION: Stable cardiomediastinal silhouette. Hyperinflation and  upper lobe predominant oligemia, compatible with emphysema. No focal  airspace consolidation, pleural effusion or pneumothorax. No acute  bony abnormality.    CHARLETTE VALLADARES MD         SYSTEM ID:  [id number]       PFT & OTHER TESTING          No data to display

## 2023-07-26 NOTE — PLAN OF CARE
[id number]  A/Ox4. O2 1.5L NC, then weaned to RA, tolerated well till AM, back to 1L. Tachypnea, SOB, frequent cough, int tachycardia on exertion. R PIV, SL. Denied pain, nausea. SOB managed with PRN neb proventil. Skin mostly intact, declined full skin assessment, CHUCK perineum / coccyx. Reported a few small BM this shift. Reg diet, poor appetite. Discharge pending overall improvement.

## 2023-07-26 NOTE — PROVIDER NOTIFICATION
MD Notification    Notified Person: MD    Notified Person Name: Dr. Coffman     Notification Date/Time: 1516 07/26/23    Notification Interaction: [facility] webpage    Purpose of Notification: I was reading your note, pt is not on K protocols. Would you like us to start K replacement?    Orders Received:    Comments:

## 2023-07-26 NOTE — PROGRESS NOTES
"   07/26/23 1500   Appointment Info   Signing Clinician's Name / Credentials (PT) Vamshi Rao DPT       Present no   Living Environment   People in Home alone   Current Living Arrangements house   Home Accessibility stairs to enter home   Number of Stairs, Main Entrance 4   Stair Railings, Main Entrance railings on both sides of stairs   Transportation Anticipated family or friend will provide   Living Environment Comments Pt lives in a house alone. Stairs to enter. Pt reports her daughter will pick her up upon discharge and provide assist as needed.   Self-Care   Usual Activity Tolerance good   Current Activity Tolerance moderate   Regular Exercise No   Equipment Currently Used at Home none   Fall history within last six months no   Activity/Exercise/Self-Care Comment Pt reports being IND at baseline with all ADLs. Pt ambulates w/o an AD at baseline but has a SEC if needed. Pt drives.   General Information   Onset of Illness/Injury or Date of Surgery 07/26/23   Referring Physician Mikel Coffman MD   Patient/Family Therapy Goals Statement (PT) \"To go home\"   Pertinent History of Current Problem (include personal factors and/or comorbidities that impact the POC) Per Chart: Tammie Zeng is a 67 year old female with a past medical history significant for COPD, hx tobacco use, hepatic steatosis, MDD/RODNEY, admitted on 7/24/2023 after presenting with shortness of breath.   Existing Precautions/Restrictions fall   Weight-Bearing Status - LLE full weight-bearing   Weight-Bearing Status - RLE full weight-bearing   General Observations On 1L O2 via NC, does not use supplemental O2 at baseline.   Cognition   Orientation Status (Cognition) oriented x 4   Pain Assessment   Patient Currently in Pain No   Integumentary/Edema   Integumentary/Edema no deficits were identifed   Posture    Posture Forward head position   Range of Motion (ROM)   Range of Motion ROM is WFL   Strength (Manual Muscle " Testing)   Strength (Manual Muscle Testing) Able to perform R SLR;Able to perform L SLR   Bed Mobility   Comment, (Bed Mobility) Did not assess   Transfers   Comment, (Transfers) Sit>stand w/ FWW and SBA   Gait/Stairs (Locomotion)   [place] Level (Gait) supervision   Assistive Device (Gait) walker, front-wheeled   Distance in Feet 5'   Distance in Feet (Gait) 200'   Balance   Balance Comments Good static sitting balance; pt ambulates using a FWW for added stability and support.   Sensory Examination   Sensory Perception patient reports no sensory changes   Clinical Impression   Criteria for Skilled Therapeutic Intervention Yes, treatment indicated   PT Diagnosis (PT) Impaired gait   Influenced by the following impairments Decreased activity tolerance; decreased balance   Functional limitations due to impairments Impaired functional mobility   Clinical Presentation (PT Evaluation Complexity) Stable/Uncomplicated   Clinical Presentation Rationale Clinical judgement   Clinical Decision Making (Complexity) low complexity   Planned Therapy Interventions (PT) balance training;bed mobility training;gait training;patient/family education;stair training;strengthening;transfer training;progressive activity/exercise   Anticipated Equipment Needs at Discharge (PT) walker, rolling   Risk & Benefits of therapy have been explained evaluation/treatment results reviewed;risks/benefits reviewed;care plan/treatment goals reviewed;current/potential barriers reviewed;participants voiced agreement with care plan;participants included;patient   PT Total Evaluation Time   PT Eval, Low Complexity Minutes (69645) 10   Physical Therapy Goals   PT Frequency One time eval and treatment only   PT Predicted Duration/Target Date for Goal Attainment 07/29/23   PT Goals Bed Mobility;Transfers;Stairs;Gait   PT: Bed Mobility Supervision/stand-by assist;Supine to/from sit;Goal Met   PT: Transfers Supervision/stand-by assist;Sit to/from  stand;Assistive device;Goal Met   PT: Gait Supervision/stand-by assist;Assistive device;150 feet;Goal Met   PT: Stairs Supervision/stand-by assist;4 stairs;Rail on both sides;Goal Met   Interventions   Interventions Quick Adds Gait Training;Therapeutic Activity   Therapeutic Activity   Therapeutic Activities: dynamic activities to improve functional performance Minutes (29268) 8   Symptoms Noted During/After Treatment Shortness of breath   Treatment Detail/Skilled Intervention Greeted pt sitting at EOB, agreed to PT. VSS on 1L O2 via NC, desating to ~91% SpO2 with activity. Pt reporting she did not want to attempt activity on RA. Pt performed sit>stand x 5 w/ FWW and SBA, verbal cues for hand placement. After ambulation, pt returned to bed and performed stand>sit w/ FWW and SBA, verbal cues to descend in a slow, controlled motion. PT educated pt on AD use an walking program while in hopsital, pt in agreement. Pt reporting she will purchase a FWW outside of [place]. Pt ended session sitting at EOB, with all needs met and call light within reach.   Gait Training   Gait Training Minutes (34596) 18   Symptoms Noted During/After Treatment (Gait Training) shortness of breath   Treatment Detail/Skilled Intervention Pt ambulated w/ FWW and SBA. Pt ambulated with adequate gait speed, downward gaze, and decreased step length. Verbal cues for upright gaze and posture, and for pacing. Pt preferred to ambulate with a FWW despite encouragement to trial without AD. Pt required a seated rest break during ambulation due to fatigue. Pt negotiated 10 stairs continuously using railing and SBA. PT provided visual demonstration for safe stair negotiation. Pt negotiated with a step-to pattern and was steady. Pt needed a seated rest break after stairs due to SOB. No LOB noted.   PT Discharge Planning   PT Plan DC   PT Discharge Recommendation (DC Rec) home;home with assist   PT Rationale for DC Rec Pt appears at/near baseline for  functional mobility. Pt demonstrates safe and effective techniques with all transfers, ambulation and stairs w/ FWW. Pt sating >92% SpO2 with all activity on 1L O2 via NC. Anticipate pt will be able to safely return home at discharge with assist from daughter. Recommend pt use a FWW at discharge, pt in agreement and reports she will purchase a FWW outside of [place].   PT Brief overview of current status Sit>stand w/ FWW and SBA; gait w/ FWW and SBA; stairs w/ SBA   Total Session Time   Timed Code Treatment Minutes 26   Total Session Time (sum of timed and untimed services) 36

## 2023-07-27 ENCOUNTER — APPOINTMENT (OUTPATIENT)
Dept: OCCUPATIONAL THERAPY | Facility: CLINIC | Age: 68
DRG: 190 | End: 2023-07-27
Attending: INTERNAL MEDICINE
Payer: COMMERCIAL

## 2023-07-27 PROCEDURE — 250N000013 HC RX MED GY IP 250 OP 250 PS 637: Performed by: PHYSICIAN ASSISTANT

## 2023-07-27 PROCEDURE — 250N000011 HC RX IP 250 OP 636: Performed by: PHYSICIAN ASSISTANT

## 2023-07-27 PROCEDURE — 999N000157 HC STATISTIC RCP TIME EA 10 MIN

## 2023-07-27 PROCEDURE — 999N000033 HC STATISTIC CHRONIC PULMONARY DISEASE SPECIALIST

## 2023-07-27 PROCEDURE — 99406 BEHAV CHNG SMOKING 3-10 MIN: CPT

## 2023-07-27 PROCEDURE — 97165 OT EVAL LOW COMPLEX 30 MIN: CPT | Mod: GO

## 2023-07-27 PROCEDURE — 94640 AIRWAY INHALATION TREATMENT: CPT

## 2023-07-27 PROCEDURE — 250N000009 HC RX 250: Performed by: PHYSICIAN ASSISTANT

## 2023-07-27 PROCEDURE — G0463 HOSPITAL OUTPT CLINIC VISIT: HCPCS | Mod: 25

## 2023-07-27 PROCEDURE — 250N000013 HC RX MED GY IP 250 OP 250 PS 637: Performed by: INTERNAL MEDICINE

## 2023-07-27 PROCEDURE — 99232 SBSQ HOSP IP/OBS MODERATE 35: CPT | Performed by: INTERNAL MEDICINE

## 2023-07-27 PROCEDURE — 999N000032 HC STATISTIC CHRONIC DISEASE SPECIALIST RT CONSULT

## 2023-07-27 PROCEDURE — 94640 AIRWAY INHALATION TREATMENT: CPT | Mod: 76

## 2023-07-27 PROCEDURE — 97535 SELF CARE MNGMENT TRAINING: CPT | Mod: GO

## 2023-07-27 PROCEDURE — 120N000001 HC R&B MED SURG/OB

## 2023-07-27 PROCEDURE — 272N000202 HC AEROBIKA WITH MANOMETER

## 2023-07-27 RX ORDER — POLYETHYLENE GLYCOL 3350 17 G
2 POWDER IN PACKET (EA) ORAL
Status: DISCONTINUED | OUTPATIENT
Start: 2023-07-27 | End: 2023-07-28 | Stop reason: HOSPADM

## 2023-07-27 RX ORDER — ALBUTEROL SULFATE 90 UG/1
2 AEROSOL, METERED RESPIRATORY (INHALATION) EVERY 6 HOURS PRN
Status: DISCONTINUED | OUTPATIENT
Start: 2023-07-27 | End: 2023-07-28 | Stop reason: HOSPADM

## 2023-07-27 RX ADMIN — IPRATROPIUM BROMIDE AND ALBUTEROL SULFATE 3 ML: .5; 3 SOLUTION RESPIRATORY (INHALATION) at 15:24

## 2023-07-27 RX ADMIN — METHYLPREDNISOLONE SODIUM SUCCINATE 40 MG: 40 INJECTION, POWDER, FOR SOLUTION INTRAMUSCULAR; INTRAVENOUS at 18:15

## 2023-07-27 RX ADMIN — GUAIFENESIN 600 MG: 600 TABLET, EXTENDED RELEASE ORAL at 09:12

## 2023-07-27 RX ADMIN — ALBUTEROL SULFATE 2 PUFF: 108 INHALANT RESPIRATORY (INHALATION) at 13:20

## 2023-07-27 RX ADMIN — MULTIPLE VITAMINS W/ MINERALS TAB 1 TABLET: TAB at 12:29

## 2023-07-27 RX ADMIN — NICOTINE POLACRILEX 2 MG: 2 LOZENGE ORAL at 15:56

## 2023-07-27 RX ADMIN — GUAIFENESIN 600 MG: 600 TABLET, EXTENDED RELEASE ORAL at 20:12

## 2023-07-27 RX ADMIN — METHYLPREDNISOLONE SODIUM SUCCINATE 40 MG: 40 INJECTION, POWDER, FOR SOLUTION INTRAMUSCULAR; INTRAVENOUS at 06:17

## 2023-07-27 RX ADMIN — DOXYCYCLINE HYCLATE 100 MG: 100 CAPSULE ORAL at 20:12

## 2023-07-27 RX ADMIN — IPRATROPIUM BROMIDE AND ALBUTEROL SULFATE 3 ML: .5; 3 SOLUTION RESPIRATORY (INHALATION) at 20:00

## 2023-07-27 RX ADMIN — DOXYCYCLINE HYCLATE 100 MG: 100 CAPSULE ORAL at 09:12

## 2023-07-27 RX ADMIN — ALBUTEROL SULFATE 2.5 MG: 2.5 SOLUTION RESPIRATORY (INHALATION) at 07:26

## 2023-07-27 RX ADMIN — FLUTICASONE FUROATE AND VILANTEROL TRIFENATATE 1 PUFF: 100; 25 POWDER RESPIRATORY (INHALATION) at 11:18

## 2023-07-27 ASSESSMENT — ACTIVITIES OF DAILY LIVING (ADL)
ADLS_ACUITY_SCORE: 23
ADLS_ACUITY_SCORE: 22
ADLS_ACUITY_SCORE: 23
ADLS_ACUITY_SCORE: 23
ADLS_ACUITY_SCORE: 22
ADLS_ACUITY_SCORE: 23
ADLS_ACUITY_SCORE: 23
ADLS_ACUITY_SCORE: 22
ADLS_ACUITY_SCORE: 23
PREVIOUS_RESPONSIBILITIES: MEAL PREP;HOUSEKEEPING;LAUNDRY;MEDICATION MANAGEMENT;FINANCES;DRIVING
ADLS_ACUITY_SCORE: 23

## 2023-07-27 NOTE — PLAN OF CARE
Pt A/O x4, VSS on 1L via NC. Frequent productive cough noted, dyspneic on exertion. Expiratory wheezes noted with LS. C/o SOB, PRN albuterol given x1 this AM. Pt SBA in room, refuses bed alarm, encouraged to call when ambulating to bathroom. Continent of B/B, no BM overnight, Bowel sounds hypoactive. R PIV SL. Home O2 test to be completed today. Plan to discharge home with assist.

## 2023-07-27 NOTE — CONSULTS
"RT Chronic Pulmonary Disease Specialist Consult   COPD Initial Assessment      2023    Patient: Tammie Zeng      :  1955                    MRN:[id number]      Reason for Consult:  COPD EDUC and treatment optimization     History of Present Illness: Tammie Zeng is a 67 year old female with a past medical history significant for COPD, hx tobacco use, hepatic steatosis, MDD/RODNEY, admitted on 2023 after presenting with shortness of breath     Assessment: Sitting at the edge of bed. Awake, alert, and oriented. Had just concluded a 02 assessment walk test and shower when visited. Remained on RA, Sp02 92-94%. Not in resp distress. BS=diminished. Exhibits strong, congested cough--productive of thick, yellow phlegm. Provided and instructed on flutter valve. After just 1 set of 10 breaths, patient coughed up a lot more thick sputum. Encouraged her to do 3 sets of 10 breaths, at least QID while symptomatic, BID at baseline.    Also of note, writer assisted bedside RN walk patient around the unit in an effort to see if she desaturated. Sp02 never dropped below 90%. Respirations remained stable--not once in resp distress. Taught patient about \"Pursed-lip breathing,\" which significantly improved her breathing pattern.     Of note, patient states her COPD had become symptomatic sometime in 2019. Since being managed by PCP and [facility] w/ Trelegy. No COPD-related hospitalization prior to this one. Has Albu MDI which she'd been \"stretching to last long time as insurance doesn't cover it.\" Doesn't have a neb compressor or nebulized meds. Doesn't have a portable pulse oximeter. Still smoking roughly 1/2PPD.     Action:         Evaluated patients inspiratory flow using In-Check device:     --Low resistance settinLPM, excessive inspiratory flow for her PTA Albuterol (rescue) inhaler, which requires a slow inhalation of 20-60 LPM for optimal drug deposition with 5-10 second breath hold     --Medium " "resistance settinLPM, sufficient inspiratory flow for her PTA Trelegy Inhaler, which requires a fast and deep inhalation of 30-80LPM with 5-10 second breath hold     --Evaluated patients' coordination and technique with inhaler: Patient did not demonstrate adequate technique with any of her PTA inhalers. Educated patient on proper techniques and  inspiratory flows needed for PTA rescue inhaler (now Ventolin) and Trelegy. Provided and instructed patient on proper use of Aerochamber spacer with inhaler; reiterated that spacer should always be used with her rescue inhaler.       --Provided and instructed on flutter valve. Patient able to generate a minimum pressure of [id number] for at least 2 seconds. Patient able to generate strong, congested cough--productive of thick, yellow phlegm. Educated patient to perform 2 to 3 'corrales coughs'  to clear airway after each set. Shared that consistent use of this device will help open smaller airways, improve mucus clearance, decrease cough frequency, and improve exercise tolerance. Encouraged her to do 3 sets of 10 breaths, at least QID while symptomatic, BID once back to baseline.     Recommendations:    **Continue current IP resp med regimen     **Inpatient pulmonary consult for further recommendations and coordination of care    **Given congested, productive cough--added flutter valve, encouraged 3 sets of 10 breaths, at least QID for now    **14mg nicotine patch     **2mg/4mg Nicotine Lozenges for cravings and urges    **Home Oxygen Assessment Testing 24-48 hours prior to discharge to determine O2 needs at rest and with exertion/activity. If the patient requires oxygen at rest, the walk test must be performed using the new baseline O2 to determine if patient needs more oxygen with activity.   In the event patient qualifies for oxygen, at rest or with activity, please use the following verbiage in oxygen order: \"Please provide portable oxygen concentrator AND please test for " oxygen conserving device using ____LPM to maintain sats between ____)    At discharge:     **Resume PTA Trelegy; please add DuoNeb Q6PRN and Ventolin MDI Q6PRN at discharge. Per test claim with discharge pharmacy, her insurance only covers Ventolin HFA--not her PTA Albu HFA.     **Patient needs nebulizer compressor at discharge with prescription for tubing, cups and mask. Per insurance requirements, Physician documentation in F2F notes needs to include dx diagnosis and need for nebulizer and medication frequency.    **Referral to follow-up with OP pulm ([facility])    **Continue flutter valve everyday for lung hygiene--recommend 3 sets of 10 breaths at least QID while symptomatic; BID once back to baseline.     **Referral for outpatient pulmonary Rehab    **Referral for OP sleep consult to assess for sleep disordered breathing, TEMO, nocturnal hypoxia, and hypoventilation    Smoking Hx:  AVG .5PPD  over 49 years; still smoking. Contemplating on quitting, just now quite ready to quit. Contemplating on cutting back. Now using 14mg nicotine patch; not cravings cigs for now. Open to having patches at home but not quite sure if she'll use it.         Pulmonologist/Last office visit: Followed [facility] in the past; thinks the last office visit was years ago     Most recent  PFT/interpretation on: 6/8/2021--FEV1 42%, FVC 66%, FEV1/FVC Ratio 42%--interpreted as severe obstruction     Sleep Studies:  None    Home Oxygen Use:   None       Pulmonary Rehab History:   None     Home respiratory medications include:      **Trelegy Ellipta Daily  **Albu MDI PRN    Will continue to follow and support patient as needed. Will follow up with phone call 48 hours after discharge.     I spent 90 minutes with the patient.    Alex Gibson, RRT, CTTS  Chronic Pulmonary Disease Specialist  Cardiopulmonary Services   Office: [phone]  Pager: [phone]

## 2023-07-27 NOTE — PROGRESS NOTES
Date&time:07/27/23, 8356-1545    Summary: admitted with SOB    Primary Diagnosis: A/C respiratory failure, w/ hypoxia, COPD, tobacco use, alcohol use, hepatic steatosis, MDD/RODNEY.    Orientation: A&OX4    Aggression Stop Light: Green    Mobility: SBA-IND    Pain Management: Denies    Diet: regular    Bowel/Bladder: continent    Abnormal Lab/Assessments:N/A    Drain/Device/Wound: R PIV-SL    Consults: RT, PT/OT    0421-0515  Tachycardic at rest intermittent. Oxygen stopped , VSS RA. Completed 6min walk test @ 92% SPO2 on RA. Noted wheezes RUL/RML anterior, IS encouraged, congested productive cough noted, bowel sounds faint, hypoactive, last BM 7/27/23 per patient, regular diet poor intake. R PIV SL. PRN Albuterol administered. Skin appears mostly intact, refuses full assessment. Plan for tapering off solumedrol tomorrow/prednisone at discharge. PT/OT consulted. Potential discharge home Saturday 7/29/23.

## 2023-07-27 NOTE — PROGRESS NOTES
Patient has been assessed for Home Oxygen needs. Oxygen readings:    *Pulse oximetry (SpO2) = 92% on room air at rest while awake.    *SpO2 improved to 94% on 1liters/minute at rest.    *SpO2 = 92% on room air during activity/with exercise.    *SpO2 improved to 94% on 1liters/minute during activity/with exercise.

## 2023-07-27 NOTE — CONSULTS
Patient has Medicare Advantage through [facility].    Inhalers    Albuterol HFA: Not covered   Proair HFA: Not covered   Ventolin HFA: $15 per inhaler   Xopenex HFA: $18 per inhaler   Generic levalbuterol : Not covered     Nebulizer solutions    Albuterol (25 vials) $4    Ipratropium/albuterol (30 vials): $2    Xopenex: Not covered   Generic levalbuterol 0.63mg (25 vials): $6    Generic levalbuterol 1.25mg (25 vials): $7      Mariangel Droado  Pharmacy Technician/Liaison, Discharge Pharmacy   [phone] (voice or text)  [email or web address]

## 2023-07-27 NOTE — PROGRESS NOTES
07/27/23 0805   Appointment Info   Signing Clinician's Name / Credentials (OT) Vaibhav Jackson OTR/L   Living Environment   People in Home alone   Current Living Arrangements house   Home Accessibility stairs to enter home   Number of Stairs, Main Entrance 4   Stair Railings, Main Entrance railings on both sides of stairs   Transportation Anticipated family or friend will provide   Living Environment Comments Pt reports living in a house alone. Full flight to basement for laundry. Bed/bath on main floor. Tub shower.   Self-Care   Usual Activity Tolerance good   Current Activity Tolerance moderate   Equipment Currently Used at Home none   Fall history within last six months no   Activity/Exercise/Self-Care Comment Pt reports being IND w/ all ADL's at baseline prior to admission.   Instrumental Activities of Daily Living (IADL)   Previous Responsibilities meal prep;housekeeping;laundry;medication management;finances;driving   IADL Comments Pt reports being IND w/ all IADL's at baseline prior to admission. Pt still drives.   General Information   Onset of Illness/Injury or Date of Surgery 07/24/23   Referring Physician Mikel Coffman MD   Patient/Family Therapy Goal Statement (OT) Return to home when stronger.   Additional Occupational Profile Info/Pertinent History of Current Problem Tammie OTILIA Zeng is a 67 year old female with a past medical history significant for COPD, hx tobacco use, hepatic steatosis, MDD/RODNEY, admitted on 7/24/2023 after presenting with shortness of breath.   Existing Precautions/Restrictions fall   Cognitive Status Examination   Orientation Status orientation to person, place and time   Visual Perception   Visual Impairment/Limitations corrective lenses for reading   Sensory   Sensory Quick Adds sensation intact   Pain Assessment   Patient Currently in Pain No   Range of Motion Comprehensive   General Range of Motion no range of motion deficits identified   Strength Comprehensive (MMT)    General Manual Muscle Testing (MMT) Assessment no strength deficits identified   Bed Mobility   Bed Mobility supine-sit;sit-supine   Supine-Sit [place] (Bed Mobility) supervision   Sit-Supine [place] (Bed Mobility) supervision   Transfers   Transfers sit-stand transfer;toilet transfer   Sit-Stand Transfer   Assistive Device (Sit-Stand Transfers) walker, front-wheeled   Sit/Stand Transfer Comments SBA   Toilet Transfer   Type (Toilet Transfer) stand-sit;sit-stand   Assistive Device (Toilet Transfer) walker, front-wheeled   Toilet Transfer Comments SBA   Activities of Daily Living   BADL Assessment/Intervention lower body dressing;grooming;toileting   Lower Body Dressing Assessment/Training   Position (Lower Body Dressing) edge of bed sitting   [place] Level (Lower Body Dressing) modified independence   Grooming Assessment/Training   Position (Grooming) sink side   [place] Level (Grooming) modified independence   Toileting   Position (Toileting) unsupported sitting   [place] Level (Toileting) supervision   Clinical Impression   Criteria for Skilled Therapeutic Interventions Met (OT) Yes, treatment indicated   OT Diagnosis Decreased ADL independence and tolerance   Influenced by the following impairments Decreased ADL independence   OT Problem List-Impairments impacting ADL problems related to;activity tolerance impaired   Assessment of Occupational Performance 1-3 Performance Deficits   Identified Performance Deficits home mgmt,   Planned Therapy Interventions (OT) ADL retraining;IADL retraining;home program guidelines;progressive activity/exercise;risk factor education   Clinical Decision Making Complexity (OT) low complexity   Risk & Benefits of therapy have been explained evaluation/treatment results reviewed;care plan/treatment goals reviewed;risks/benefits reviewed;current/potential barriers reviewed;patient   OT Total Evaluation Time   OT Eval, Low Complexity Minutes (70522) 10   OT  Goals   Therapy Frequency (OT) One time eval and treatment   OT Predicted Duration/Target Date for Goal Attainment 07/27/23   OT Goals Hygiene/Grooming;Lower Body Dressing;Toilet Transfer/Toileting   OT: Hygiene/Grooming modified independent;while standing;Goal Met   OT: Lower Body Dressing Modified independent;including set-up/clothing retrieval;Goal Met   OT: Toilet Transfer/Toileting Supervision/stand-by assist;toilet transfer;cleaning and garment management;Goal Met   Self-Care/Home Management   Self-Care/Home Mgmt/ADL, Compensatory, Meal Prep Minutes (36992) 24   Symptoms Noted During/After Treatment (Meal Preparation/Planning Training) fatigue   Treatment Detail/Skilled Intervention Pt supine in bed upon arrival and agreeable for OT  evaluation. Pt on 2 L of O2 via NC; SpO2 at 94% prior to activity. Supervision sup > sit EOB w/ head of bed elevated. Pt able to scoot self towards edge of bed. Mod IND to don B slippers using cross leg method. Pt completed sit > stand transfer w/ SBA and no AD. Pt ambulated into bathroom w/ SBA and assist for line management. SBA to complete toilet transfer w/ use of grab bar on R side. Following completion, pt stood sink for approx. 10 minutes to complete g/h tasks w/ Mod IND for safety. Pt returned to room and seated EOB; SpO2 at 93% following activity. Pt returned to supine w/ supervision. Provided pt w/ energy conservation handouts for ADL's and house chores with education. Pt appreciative of education. Pt had no additional questions or concerns at this time. Pt remained supine in bed at end of therapy session w/ all needs met.   OT Discharge Planning   OT Plan Discharge OT.   OT Discharge Recommendation (DC Rec) home;home with assist   OT Rationale for DC Rec Pt appears to be functioning near baseline but remains limited to decreased activity tolerance. Pt lives alone but will have assist from daughter upon discharge. Pt requiring increased O2 at this time but remained >90%  following activity. Anticipate pt will be safe to d/c home w/ assist from daughter as needed/   OT Brief overview of current status Supervision bed mobility, Mod IND LB dressing and G/h, Energy conservation handout education   Total Session Time   Timed Code Treatment Minutes 24   Total Session Time (sum of timed and untimed services) 34

## 2023-07-27 NOTE — PROGRESS NOTES
"[facility]  Hospitalist Progress Note  Mikel Coffman MD  07/27/2023    Assessment & Plan   Tammie Zeng is a 67 year old female with a past medical history significant for COPD, hx tobacco use, hepatic steatosis, MDD/RODNEY, admitted on 7/24/2023 after presenting with shortness of breath.      Acute hypoxic respiratory failure secondary to COPD exacerbation  Tobacco use   Pt presents with 5 days increasing dyspnea with productive cough. Hx COPD followed by [facility] with ongoing tobacco use. Denies fevers.   WBC 10.8, LA 1.6, d dimer 0.5, trop 10. COVID negative.   CXR shows hyperinflation and upper lobe predominant oligemia compatible with emphysema without focal airspace disease.   Hypoxic to 83% on RA in the ED, improved with 2L supplemental oxygen. HR mildly tachy 90-100s.   PTA: Trelegy daily, prn albuterol  Received doxycycline, duoneb x2, 125mg methylprednisolone in the ED  --some improvement, currently on 2L with mod work of breathing while talking  --weaned off oxygen    --continue doxycycline   --continue methylprednisolone 40mg bid and start taper tomorrow  --duonebs q4 while awake, albuterol prn.   --flutter valve, IS. Will ask [facility] to follow  --tobacco cessation strongly encouraged, nicotine patch ordered  --ok with BiPAP if needed  -- patient previously followed by RITA Starr, appreciate consult  -- 6 minute walk test for home oxygen qualification showed oxygen at 92% on RA  -- prednisone taper at discharge  -- patient much improved, showered  -- will consult PT/OT and recommending home with assist     Hypokalemia  -- secondary from frequent nebs  -- on K replacement protocol    Alcohol use  Reports daily alcohol consumption 3 drinks, vodka, daily. Last drink 7/21. Denies hx withdrawal   --monitor clinically      MDD/RODNEY  Not currently on medications      Malnutrition  - Severe Protein-Calorie Malnutrition     Signs/Symptoms:  \"Recommendations Ordered by Registered Dietitian " "(RD):   Eat frequent small meals throughout the day   1 Ensure Enlive/day  Ordered multivitamin  Future/Additional Recommendations: Continue to monitor PO   Malnutrition:   % Weight Loss:  Up to 10% in 6 months (moderate malnutrition)  % Intake:  </= 50% for >/= 5 days (severe malnutrition)  Subcutaneous Fat Loss:  None observed  Muscle Loss:  Temporal region mild depletion, Acromion bone region moderate depletion, Scapular bone region mild depletion, Dorsal hand region moderate depletion, and Anterior thigh region mild depletion  Fluid Retention:  None noted  Malnutrition Diagnosis: Severe malnutrition  In Context of:  Acute and chronic illness or injury\" (Iván, Nutrition consult, 7/25/23)      Hepatic steatosis   LFT this admit WNL  --follow up outpatient   --continue to work on lifestyle changes      Diet:  regular diet   DVT Prophylaxis: Pneumatic Compression Devices  Royal Catheter: Not present  Lines: None     Cardiac Monitoring: None  Code Status:   FULL CODE- initially stated DNI/DNR but after discussion wants to talk this over with her daughter first         Clinically Significant Risk Factors Present on Admission                # Drug Induced Platelet Defect: home medication list includes an antiplatelet medication                  Disposition Plan  -- anticipate home on 7/29        Interval History   -- off oxygen  -- feeling much better    -Data reviewed today: I reviewed all new labs and imaging over the last 24 hours. I personally reviewed no images or EKG's today.    Physical Exam    , Blood pressure 108/70, pulse 78, temperature 97.9  F (36.6  C), temperature source Oral, resp. rate 22, height 1.6 m (5' 3\"), weight 47.6 kg (105 lb), SpO2 94 %, not currently breastfeeding.  Vitals:    07/24/23 1137 07/24/23 1800   Weight: 47.6 kg (105 lb) 47.6 kg (105 lb)     Vital Signs with Ranges  Temp:  [97.9  F (36.6  C)-98.5  F (36.9  C)] 97.9  F (36.6  C)  Pulse:  [78-98] 78  Resp:  [20-22] 22  BP: " (108-124)/(70-82) 108/70  SpO2:  [94 %-95 %] 94 %  I/O's Last 24 hours  I/O last 3 completed shifts:  In: 200 [P.O.:200]  Out: -     Constitutional: Awake, alert, cooperative, no apparent distress, cachexia  Respiratory: diminished  Cardiovascular: Regular rate and rhythm, normal S1 and S2   GI: Normal bowel sounds, soft, non-distended, non-tender  Skin/Integumen: No rashes, no cyanosis, no edema  Other:      Medications   All medications were reviewed.      doxycycline hyclate  100 mg Oral Q12H BECKY (08/20)     fluticasone-vilanterol  1 puff Inhalation Daily    And     [Held by provider] umeclidinium  1 puff Inhalation Daily     guaiFENesin  600 mg Oral BID     ipratropium - albuterol 0.5 mg/2.5 mg/3 mL  3 mL Nebulization Q4H WA     methylPREDNISolone  40 mg Intravenous Q12H     multivitamin w/minerals  1 tablet Oral Daily with lunch     nicotine   Transdermal Q8H     sodium chloride (PF)  3 mL Intracatheter Q8H        Data   Recent Labs   Lab 07/25/23  0745 07/24/23  1149   WBC 11.4* 10.8   HGB 14.3 15.2   MCV 95 95    377   NA  --  136   POTASSIUM  --  4.2   CHLORIDE  --  95*   CO2  --  26   BUN  --  7.1*   CR  --  0.54   ANIONGAP  --  15   MOLLY  --  10.1   GLC  --  123*   ALBUMIN  --  4.1   PROTTOTAL  --  7.9   BILITOTAL  --  0.4   ALKPHOS  --  100   ALT  --  14   AST  --  22       No results found for this or any previous visit (from the past 24 hour(s)).    Mikel Coffman MD  Text Page  (7am to 6pm)

## 2023-07-27 NOTE — PLAN OF CARE
Occupational Therapy Discharge Summary    Reason for therapy discharge:    All goals and outcomes met, no further needs identified.    Progress towards therapy goal(s). See goals on Care Plan in [facility] health record for goal details.  Goals met    Therapy recommendation(s):    Pt appears to be functioning near baseline but remains limited to decreased activity tolerance. Pt lives alone but will have assist from daughter upon discharge. Pt requiring increased O2 at this time but remained >90% following activity. Anticipate pt will be safe to d/c home w/ assist from daughter as needed.

## 2023-07-28 VITALS
HEIGHT: 63 IN | BODY MASS INDEX: 18.61 KG/M2 | WEIGHT: 105 LBS | TEMPERATURE: 97.9 F | SYSTOLIC BLOOD PRESSURE: 122 MMHG | HEART RATE: 85 BPM | OXYGEN SATURATION: 95 % | RESPIRATION RATE: 18 BRPM | DIASTOLIC BLOOD PRESSURE: 74 MMHG

## 2023-07-28 PROCEDURE — 250N000009 HC RX 250: Performed by: PHYSICIAN ASSISTANT

## 2023-07-28 PROCEDURE — 94640 AIRWAY INHALATION TREATMENT: CPT | Mod: 76

## 2023-07-28 PROCEDURE — 250N000013 HC RX MED GY IP 250 OP 250 PS 637: Performed by: PHYSICIAN ASSISTANT

## 2023-07-28 PROCEDURE — 94640 AIRWAY INHALATION TREATMENT: CPT

## 2023-07-28 PROCEDURE — 250N000013 HC RX MED GY IP 250 OP 250 PS 637: Performed by: INTERNAL MEDICINE

## 2023-07-28 PROCEDURE — 250N000011 HC RX IP 250 OP 636: Performed by: PHYSICIAN ASSISTANT

## 2023-07-28 PROCEDURE — 999N000157 HC STATISTIC RCP TIME EA 10 MIN

## 2023-07-28 PROCEDURE — 99239 HOSP IP/OBS DSCHRG MGMT >30: CPT | Performed by: INTERNAL MEDICINE

## 2023-07-28 RX ORDER — IPRATROPIUM BROMIDE AND ALBUTEROL SULFATE 2.5; .5 MG/3ML; MG/3ML
3 SOLUTION RESPIRATORY (INHALATION) EVERY 6 HOURS PRN
Qty: 90 ML | Refills: 0 | Status: ON HOLD | OUTPATIENT
Start: 2023-07-28 | End: 2023-08-22

## 2023-07-28 RX ORDER — ALBUTEROL SULFATE 90 UG/1
2 AEROSOL, METERED RESPIRATORY (INHALATION) EVERY 4 HOURS PRN
Qty: 18 G | Refills: 3 | Status: ON HOLD | OUTPATIENT
Start: 2023-07-28 | End: 2024-02-20

## 2023-07-28 RX ORDER — PREDNISONE 10 MG/1
TABLET ORAL
Qty: 30 TABLET | Refills: 0 | Status: SHIPPED | OUTPATIENT
Start: 2023-07-28 | End: 2023-08-10

## 2023-07-28 RX ORDER — NICOTINE 21 MG/24HR
1 PATCH, TRANSDERMAL 24 HOURS TRANSDERMAL DAILY PRN
Qty: 30 PATCH | Refills: 0 | Status: SHIPPED | OUTPATIENT
Start: 2023-07-28 | End: 2023-08-10

## 2023-07-28 RX ORDER — DOXYCYCLINE 100 MG/1
100 CAPSULE ORAL EVERY 12 HOURS
Qty: 6 CAPSULE | Refills: 0 | Status: SHIPPED | OUTPATIENT
Start: 2023-07-28 | End: 2023-07-31

## 2023-07-28 RX ORDER — POLYETHYLENE GLYCOL 3350 17 G
2 POWDER IN PACKET (EA) ORAL
Qty: 48 LOZENGE | Refills: 0 | Status: SHIPPED | OUTPATIENT
Start: 2023-07-28 | End: 2024-02-19

## 2023-07-28 RX ORDER — GUAIFENESIN 600 MG/1
600 TABLET, EXTENDED RELEASE ORAL 2 TIMES DAILY
Qty: 10 TABLET | Refills: 0 | Status: SHIPPED | OUTPATIENT
Start: 2023-07-28 | End: 2023-08-10

## 2023-07-28 RX ADMIN — NICOTINE POLACRILEX 2 MG: 2 LOZENGE ORAL at 12:41

## 2023-07-28 RX ADMIN — MULTIPLE VITAMINS W/ MINERALS TAB 1 TABLET: TAB at 10:01

## 2023-07-28 RX ADMIN — FLUTICASONE FUROATE AND VILANTEROL TRIFENATATE 1 PUFF: 100; 25 POWDER RESPIRATORY (INHALATION) at 09:54

## 2023-07-28 RX ADMIN — GUAIFENESIN 600 MG: 600 TABLET, EXTENDED RELEASE ORAL at 09:53

## 2023-07-28 RX ADMIN — DOXYCYCLINE HYCLATE 100 MG: 100 CAPSULE ORAL at 09:53

## 2023-07-28 RX ADMIN — IPRATROPIUM BROMIDE AND ALBUTEROL SULFATE 3 ML: .5; 3 SOLUTION RESPIRATORY (INHALATION) at 07:45

## 2023-07-28 RX ADMIN — IPRATROPIUM BROMIDE AND ALBUTEROL SULFATE 3 ML: .5; 3 SOLUTION RESPIRATORY (INHALATION) at 11:00

## 2023-07-28 RX ADMIN — METHYLPREDNISOLONE SODIUM SUCCINATE 40 MG: 40 INJECTION, POWDER, FOR SOLUTION INTRAMUSCULAR; INTRAVENOUS at 04:41

## 2023-07-28 ASSESSMENT — ACTIVITIES OF DAILY LIVING (ADL)
ADLS_ACUITY_SCORE: 23

## 2023-07-28 NOTE — DISCHARGE SUMMARY
[facility]  Hospitalist Discharge Summary      Date of Admission:  7/24/2023  Date of Discharge:  7/28/2023  Discharging Provider: Mikel Coffman MD  Discharge Service: Hospitalist Service    Discharge Diagnoses     Acute hypoxic respiratory failure secondary to COPD exacerbation  Tobacco use   Hypokalemia  Alcohol use  MDD/RODNEY  Malnutrition   Hepatic steatosis          Clinically Significant Risk Factors      Unresulted Labs Ordered in the Past 30 Days of this Admission       No orders found from 6/24/2023 to 7/25/2023.        These results will be followed up by PCP    Discharge Disposition   Discharged to home  Condition at discharge: Stable    Hospital Course   Tammie Zeng is a 67 year old female with a past medical history significant for COPD, hx tobacco use, hepatic steatosis, MDD/RODNEY, admitted on 7/24/2023 after presenting with shortness of breath.      Acute hypoxic respiratory failure secondary to COPD exacerbation  Tobacco use   Pt presents with 5 days increasing dyspnea with productive cough. Hx COPD followed by [facility] with ongoing tobacco use. Denies fevers.   WBC 10.8, LA 1.6, d dimer 0.5, trop 10. COVID negative.   CXR shows hyperinflation and upper lobe predominant oligemia compatible with emphysema without focal airspace disease.   Hypoxic to 83% on RA in the ED, improved with 2L supplemental oxygen. HR mildly tachy 90-100s.   PTA: Trelegy daily, prn albuterol  Received doxycycline, duoneb x2, 125mg methylprednisolone in the ED  --some improvement, currently on 2L with mod work of breathing while talking  --weaned off oxygen    --continue doxycycline for 7 days  --continue methylprednisolone 40mg bid to prednisone taper at discharge x 2 weeks  --duonebs q6 hrs prn   --flutter valve, IS. Will ask [facility] to follow  --tobacco cessation strongly encouraged, nicotine patch and lozenges ordered  -- patient previously followed by RITA Starr, appreciate consult  -- 6 minute  "walk test for home oxygen qualification showed oxygen at 92% on RA     Hypokalemia  -- secondary from frequent nebs  -- on K replacement protocol    Alcohol use  Reports daily alcohol consumption 3 drinks, vodka, daily. Last drink 7/21. Denies hx withdrawal   --monitor clinically      MDD/RODNEY  Not currently on medications      Malnutrition  - Severe Protein-Calorie Malnutrition     Signs/Symptoms:  \"Recommendations Ordered by Registered Dietitian (RD):   Eat frequent small meals throughout the day   1 Ensure Enlive/day  Ordered multivitamin  Future/Additional Recommendations: Continue to monitor PO   Malnutrition:   % Weight Loss:  Up to 10% in 6 months (moderate malnutrition)  % Intake:  </= 50% for >/= 5 days (severe malnutrition)  Subcutaneous Fat Loss:  None observed  Muscle Loss:  Temporal region mild depletion, Acromion bone region moderate depletion, Scapular bone region mild depletion, Dorsal hand region moderate depletion, and Anterior thigh region mild depletion  Fluid Retention:  None noted  Malnutrition Diagnosis: Severe malnutrition  In Context of:  Acute and chronic illness or injury\" (Iván, Nutrition consult, 7/25/23)      Hepatic steatosis   LFT this admit WNL  --follow up outpatient   --continue to work on lifestyle changes      Diet:  regular diet      Clinically Significant Risk Factors Present on Admission                # Drug Induced Platelet Defect: home medication list includes an antiplatelet medication                  Disposition Plan  -- anticipate home on 7/28       Consultations This Hospital Stay   PULMONARY IP CONSULT  PHYSICAL THERAPY ADULT IP CONSULT  OCCUPATIONAL THERAPY ADULT IP CONSULT  PHARMACY LIAISON FOR MEDICATION COVERAGE CONSULT  PHARMACY LIAISON FOR MEDICATION COVERAGE CONSULT  IP RESPIRATORY CARE CHRONIC PULMONARY DISEASE SPECIALIST    Code Status   Full Code    Time Spent on this Encounter   Mikel POWELL MD, personally saw the patient today and spent " greater than 30 minutes discharging this patient.       Mikel Coffman MD  [place] [id number] [facility]  [address]  [place] [address]  Phone: [phone]  ______________________________________________________________________           Primary Care Physician   Mikel Navarro    Discharge Orders      Reason for your hospital stay    You were admitted with copd exacerbation     Activity    Your activity upon discharge: activity as tolerated     Follow-up and recommended labs and tests     Follow up with PCP in 1-2 weeks    Follow up with pulmonary outpatient, [facility] will call you with your follow-up appointment, if you don't here from them, please call [phone].    Stop smoking     Miscellaneous DME Order    DME Documentation:   Describe the reason for need to support medical necessity:COPD with mod obstructive airway disease     I, the undersigned, certify that the above prescribed supplies are medically necessary for this patient and is both reasonable and necessary in reference to accepted standards of medical and necessary in reference to accepted standards of medical practice in the treatment of this patient's condition and is not prescribed as a convenience.     Diet    Follow this diet upon discharge: Orders Placed This Encounter      Snacks/Supplements Adult: Ensure Enlive; Between Meals      Combination Diet Regular Diet Adult       Significant Results and Procedures   Results for orders placed or performed during the hospital encounter of 07/24/23   Chest XR,  PA & LAT    Narrative    XR CHEST 2 VIEWS   7/24/2023 12:39 PM     HISTORY: Shortness of breath    COMPARISON: Chest CT 7/17/2017, radiograph 5/16/2017      Impression    IMPRESSION: Stable cardiomediastinal silhouette. Hyperinflation and  upper lobe predominant oligemia, compatible with emphysema. No focal  airspace consolidation, pleural effusion or pneumothorax. No acute  bony abnormality.    CHARLETTE VALLADARES,  MD         SYSTEM ID:  [id number]       Discharge Medications   Current Discharge Medication List        START taking these medications    Details   doxycycline hyclate (VIBRAMYCIN) 100 MG capsule Take 1 capsule (100 mg) by mouth every 12 hours for 3 days  Qty: 6 capsule, Refills: 0    Associated Diagnoses: COPD exacerbation (H)      guaiFENesin (MUCINEX) 600 MG 12 hr tablet Take 1 tablet (600 mg) by mouth 2 times daily  Qty: 10 tablet, Refills: 0    Associated Diagnoses: COPD exacerbation (H)      ipratropium - albuterol 0.5 mg/2.5 mg/3 mL (DUONEB) 0.5-2.5 (3) MG/3ML neb solution Take 1 vial (3 mLs) by nebulization every 6 hours as needed for shortness of breath, wheezing or cough  Qty: 90 mL, Refills: 0    Associated Diagnoses: COPD exacerbation (H)      nicotine (COMMIT) 2 MG lozenge Place 1 lozenge (2 mg) inside cheek every hour as needed for smoking cessation  Qty: 48 lozenge, Refills: 0    Associated Diagnoses: Tobacco abuse      nicotine (NICODERM CQ) 14 MG/24HR 24 hr patch Place 1 patch onto the skin daily as needed for smoking cessation  Qty: 30 patch, Refills: 0    Associated Diagnoses: Tobacco abuse      predniSONE (DELTASONE) 10 MG tablet 4 tabs daily for 3 days, then 3 tabs daily for 3 days, then 2 tabs daily for 3 days, then 1 tab daily for 3 days, then stop  Qty: 30 tablet, Refills: 0    Associated Diagnoses: COPD exacerbation (H)           CONTINUE these medications which have CHANGED    Details   albuterol (PROAIR HFA/PROVENTIL HFA/VENTOLIN HFA) 108 (90 Base) MCG/ACT inhaler Inhale 2 puffs into the lungs every 4 hours as needed for wheezing or shortness of breath  Qty: 18 g, Refills: 3    Comments: Pharmacy may dispense brand covered by insurance (Proair, or proventil or ventolin or generic albuterol inhaler)  Associated Diagnoses: Chronic obstructive pulmonary disease, unspecified COPD type (H)           CONTINUE these medications which have NOT CHANGED    Details   aspirin (CHAYA ASPIRIN) 325 MG  tablet Take 1-2 tablets by mouth daily as needed.      Fluticasone-Umeclidin-Vilant (TRELEGY ELLIPTA) 100-62.5-25 MCG/ACT oral inhaler Inhale 1 puff into the lungs daily  Qty: 3 each, Refills: 3    Associated Diagnoses: Chronic obstructive pulmonary disease, unspecified COPD type (H)           Allergies   Allergies   Allergen Reactions    Pcn [Penicillins]      convulsions

## 2023-07-28 NOTE — PLAN OF CARE
Goal Outcome Evaluation:  7/27/23  2300-0730H  A/Ox4. VSS,RA. SBA/Ind. On reg diet.  R PIV SL. Refused full skin assessments. Noted some scattered bruising on BUE. Denies pain, N/V. With frequent productive cough noted. B/B cont. Poss discharge to home on Saturday with assist.

## 2023-07-28 NOTE — PLAN OF CARE
Goal Outcome Evaluation:    Discharge      Listed belongings gathered and given to patient (including from security/pharmacy). Yes  Care Plan and Patient education resolved: Yes  Prescriptions if needed, hard copies sent with patient : Yes, Filled and sent  Medication Bin checked and emptied on discharge Yes  SW/care coordinator/charge RN aware of discharge: Yes       AVS reviewed with both patient and daughter Sonia, all questions answered. Currently waiting nebulizer machine before heading home.

## 2023-07-28 NOTE — PLAN OF CARE
Nursing [id number] shift note  Patient discharged at 1330 to home  IV was discontinued BY resource nurse. Denied pain at time of discharge. All belongings returned to patient including her home Albuteral inhaler the she brought here.  Discharge instructions and medications reviewed with both patient and daughter by resource nurse.  Both pt and daughter were given a copy of the AVS along with several educations sheets on quitting smoking and COPD. They verbalized understanding and all questions were answered..  At time of discharge, patient condition was stable and left the unit via w/c escorted by NA and transportation via daughter. I was told by SW to arrange obtainment of the home nebulizer equipment for pt to use at home. Writer spoke with Negin from [employer] and arrangement made for daughter/family to swing by their [employer] medical equipment store across the street from Hospital at the [address] [place] [address] location [address]    Pt A&Ox4. VSS-RA, Up SBA in hallways but independent in room and to bathroom. Tolerating  reg diet. Infrequent dry npc. No wheezes Lungs diminished in both bases. Denies SOB at rest. Has some STORY with activity. Continent B&B. Had BM today.

## 2023-07-28 NOTE — PLAN OF CARE
Goal Outcome Evaluation:       0760-4610  Pt A&Ox4. VSS-RA, tachycardic at rest at times. Up SBA/independent. Reg diet. Congested/productive cough. Expiratory wheezing R side. Continent B&B. Iv SL. Discharge anticipated home Saturday.

## 2023-07-28 NOTE — PROGRESS NOTES
Documentation of Face to Face and Certification for Home Health Services    I certify that patient: Tammie Zeng is under my care and that I, or a nurse practitioner or physician's assistant working with me, had a face-to-face encounter that meets the physician face-to-face encounter requirements with this patient on: 7/28/2023.    This encounter with the patient was in whole, or in part, for the following medical condition, which is the primary reason for home health care: COPD.    I certify that, based on my findings, the following services are medically necessary home health services: nebulizer machine and  tubing, cups, mask.    My clinical findings support the need for the above services because: patient has at least moderate obstrucitive COPD disease    Further, I certify that my clinical findings support that this patient is homebound (i.e. absences from home require considerable and taxing effort and are for medical reasons or [facility] services or infrequently or of short duration when for other reasons) because: Leaving home is medically contraindicated for the following reason(s): Dyspnea on exertion that makes it so they cannot leave their home for needed services without clinical deterioration...    Based on the above findings. I certify that this patient is confined to the home and needs intermittent skilled nursing care, physical therapy and/or speech therapy.  The patient is under my care, and I have initiated the establishment of the plan of care.  This patient will be followed by a physician who will periodically review the plan of care.  Physician/Provider to provide follow up care: Mikel Navarro    Attending hospital physician (the Medicare certified [facility] provider): Mikel Coffman MD  Physician Signature: See electronic signature associated with these discharge orders.  Date: 7/28/2023

## 2023-07-30 ENCOUNTER — PATIENT OUTREACH (OUTPATIENT)
Dept: CARE COORDINATION | Facility: CLINIC | Age: 68
End: 2023-07-30
Payer: COMMERCIAL

## 2023-07-30 NOTE — PROGRESS NOTES
Clinic Care Coordination Contact  [facility]/Voicemail       Clinical Data: Care Coordinator Outreach  Outreach attempted x 2.  Left message on patient's voicemail with call back information and requested return call.  Care Coordinator will do no further outreaches at this time.    Mandy Odonnell  [phone]  Care

## 2023-08-02 ENCOUNTER — TELEPHONE (OUTPATIENT)
Dept: RESPIRATORY THERAPY | Facility: CLINIC | Age: 68
End: 2023-08-02
Payer: COMMERCIAL

## 2023-08-02 NOTE — TELEPHONE ENCOUNTER
Post-hosp COPD EDUC Follow-up   2023    Patient: Tammie Zeng      :  1955                    MRN:[id number]        Attempted to reach patient regarding post-hospital COPD EDUC follow-up. No answer, voicemail message left requesting a return call.     Alex Gibson, RRT, CTTS  Chronic Pulmonary Disease Specialist  Cardiopulmonary Services   Office: [phone]  Pager: [phone]

## 2023-08-10 ENCOUNTER — OFFICE VISIT (OUTPATIENT)
Dept: FAMILY MEDICINE | Facility: CLINIC | Age: 68
End: 2023-08-10
Payer: COMMERCIAL

## 2023-08-10 VITALS
HEART RATE: 77 BPM | OXYGEN SATURATION: 95 % | TEMPERATURE: 98.2 F | WEIGHT: 111.1 LBS | HEIGHT: 62 IN | RESPIRATION RATE: 18 BRPM | BODY MASS INDEX: 20.44 KG/M2 | SYSTOLIC BLOOD PRESSURE: 112 MMHG | DIASTOLIC BLOOD PRESSURE: 75 MMHG

## 2023-08-10 DIAGNOSIS — Z78.9 ALCOHOL USE: ICD-10-CM

## 2023-08-10 DIAGNOSIS — Z72.0 TOBACCO USE: ICD-10-CM

## 2023-08-10 DIAGNOSIS — J96.01 ACUTE RESPIRATORY FAILURE WITH HYPOXIA (H): Primary | ICD-10-CM

## 2023-08-10 DIAGNOSIS — J44.1 COPD EXACERBATION (H): ICD-10-CM

## 2023-08-10 PROCEDURE — 99495 TRANSJ CARE MGMT MOD F2F 14D: CPT | Performed by: PHYSICIAN ASSISTANT

## 2023-08-10 ASSESSMENT — PAIN SCALES - GENERAL: PAINLEVEL: NO PAIN (0)

## 2023-08-10 NOTE — PROGRESS NOTES
"  Subjective   Tammie is a 67 year old, presenting for the following health issues:  Hospital F/U    Hospital Course  Tammie Zeng is a 67 year old female with a past medical history significant for COPD, hx tobacco use, hepatic steatosis, MDD/RODNEY, admitted on 7/24/2023 after presenting with shortness of breath.      Acute hypoxic respiratory failure secondary to COPD exacerbation  Tobacco use   Pt presents with 5 days increasing dyspnea with productive cough. Hx COPD followed by [facility] with ongoing tobacco use. Denies fevers.   WBC 10.8, LA 1.6, d dimer 0.5, trop 10. COVID negative.   CXR shows hyperinflation and upper lobe predominant oligemia compatible with emphysema without focal airspace disease.   Hypoxic to 83% on RA in the ED, improved with 2L supplemental oxygen. HR mildly tachy 90-100s.   PTA: Trelegy daily, prn albuterol  Received doxycycline, duoneb x2, 125mg methylprednisolone in the ED  --some improvement, currently on 2L with mod work of breathing while talking  --weaned off oxygen    --continue doxycycline for 7 days  --continue methylprednisolone 40mg bid to prednisone taper at discharge x 2 weeks  --duonebs q6 hrs prn   --flutter valve, IS. Will ask [facility] to follow  --tobacco cessation strongly encouraged, nicotine patch and lozenges ordered  -- patient previously followed by [facility], appreciate consult  -- 6 minute walk test for home oxygen qualification showed oxygen at 92% on RA     Hypokalemia  -- secondary from frequent nebs  -- on K replacement protocol     Alcohol use  Reports daily alcohol consumption 3 drinks, vodka, daily. Last drink 7/21. Denies hx withdrawal   --monitor clinically      MDD/RODNEY  Not currently on medications      Malnutrition  - Severe Protein-Calorie Malnutrition     Signs/Symptoms:  \"Recommendations Ordered by Registered Dietitian (RD):   Eat frequent small meals throughout the day   1 Ensure Enlive/day  Ordered multivitamin  Future/Additional Recommendations: " "Continue to monitor PO   Malnutrition:   % Weight Loss:  Up to 10% in 6 months (moderate malnutrition)  % Intake:  </= 50% for >/= 5 days (severe malnutrition)  Subcutaneous Fat Loss:  None observed  Muscle Loss:  Temporal region mild depletion, Acromion bone region moderate depletion, Scapular bone region mild depletion, Dorsal hand region moderate depletion, and Anterior thigh region mild depletion  Fluid Retention:  None noted  Malnutrition Diagnosis: Severe malnutrition  In Context of:  Acute and chronic illness or injury\" (Iván, Nutrition consult, 7/25/23)      Hepatic steatosis   LFT this admit WNL  --follow up outpatient   --continue to work on lifestyle changes      Diet:  regular diet      Clinically Significant Risk Factors Present on Admission                 # Drug Induced Platelet Defect: home medication list includes an antiplatelet medication                    HPI     She has completed her prednisone taper  She hasn't needed her nebs this week  Feels back to baseline  Has cut down on cigarettes to 1/2 ppd to 3 cigs/day  She does use Trelegy ellipta  She does see a provider at [facility]  Here with dtr [facility] rehab offered, but pt declined  She is checking home oxygen sats and getting 93-95%    She drinks 3 etoh beverages per day/ likes to go [facility] the [facility] Follow-up Visit:    Hospital/Nursing Home/IP Rehab Facility: [facility]  Date of Admission: 7/24/2023  Date of Discharge: 7/28/2023  Reason(s) for Admission: acute respiratory failure with hypoxia    Was your hospitalization related to COVID-19? No   Problems taking medications regularly:  None  Medication changes since discharge: None  Problems adhering to non-medication therapy:    Summary of hospitalization:  [facility] discharge summary reviewed  Diagnostic Tests/Treatments reviewed.  Follow up needed: none  Other Healthcare Providers Involved in Patient s Care:         " None  Update since discharge: improved.         Plan of care communicated with patient and family           Past Medical History:   Diagnosis Date    Chronic obstructive pulmonary disease, unspecified COPD type (H) 06/13/2017    has seen [facility], fev1 under 1.0    Depressive disorder, not elsewhere classified 05/2006    following sudden death of her mother    Elevated LFTs 2023    hepatic steatosis 4/23 us    Generalized anxiety disorder     Hepatic steatosis 04/2023    on us done for elev lfts    Herpes simplex without mention of complication     MOUTH    Nephrolithiasis     TOBACCO ABUSE-CONTINUOUS      Past Surgical History:   Procedure Laterality Date    CHOLECYSTECTOMY, LAPOROSCOPIC  1980's    done at [facility]    TUBAL LIGATION  1998     Social History     Tobacco Use    Smoking status: Every Day     Packs/day: 0.50     Years: 25.00     Pack years: 12.50     Types: Cigarettes    Smokeless tobacco: Never    Tobacco comments:     states is cutting back - smokes 1/2 ppd (01/03/11)   Substance Use Topics    Alcohol use: Yes     Alcohol/week: 0.0 standard drinks of alcohol     Comment: 3 mixed drinks per week     Current Outpatient Medications   Medication Sig Dispense Refill    albuterol (PROAIR HFA/PROVENTIL HFA/VENTOLIN HFA) 108 (90 Base) MCG/ACT inhaler Inhale 2 puffs into the lungs every 4 hours as needed for wheezing or shortness of breath 18 g 3    Fluticasone-Umeclidin-Vilant (TRELEGY ELLIPTA) 100-62.5-25 MCG/ACT oral inhaler Inhale 1 puff into the lungs daily 3 each 3    ipratropium - albuterol 0.5 mg/2.5 mg/3 mL (DUONEB) 0.5-2.5 (3) MG/3ML neb solution Take 1 vial (3 mLs) by nebulization every 6 hours as needed for shortness of breath, wheezing or cough 90 mL 0    nicotine (COMMIT) 2 MG lozenge Place 1 lozenge (2 mg) inside cheek every hour as needed for smoking cessation 48 lozenge 0     Allergies   Allergen Reactions    Pcn [Penicillins]      convulsions     FAMILY HISTORY NOTED AND  "REVIEWED        PHYSICAL EXAM:    /75 (BP Location: Right arm, Patient Position: Sitting, Cuff Size: Adult Regular)   Pulse 77   Temp 98.2  F (36.8  C) (Oral)   Resp 18   Ht 1.58 m (5' 2.21\")   Wt 50.4 kg (111 lb 1.6 oz)   LMP 01/24/2006   SpO2 95%   BMI 20.19 kg/m      Patient appears non toxic  Lungs: breath sounds distant, but CTA bilat  Heart: RRR without m/r/g  Extr: no edema  Psych: approp affect and mood    Assessment and Plan:     (J96.01) Acute respiratory failure with hypoxia (H)  (primary encounter diagnosis)  Comment: pt completed abx and oral steroids and feels back to baseline  Plan: she is using Trelegy. Stopped nebs since feeling better and home oxygen sats 93-95% on RA. She never did hear from [facility] as they indicated in hosp so dtr agrees to call and try to set up appt.    (J44.1) COPD exacerbation (H)  Comment:   Plan: pt has cut down on smoking and cessation is advised    (Z72.0) Tobacco use  Comment:   Plan: she is using nicotine lozenges which is helping her cut down    (Z78.9) Alcohol use  Comment: pt likes to socialize at the [employer] with friends daily and has 3 drinks  Plan: discussed cutting down enid given fatty liver dz or ideally stopping etoh. She doesn't really want to quit as enjoys this.  Dtr is supportive. She will try to cut down. LFTs in the hosp in normal range.      Parris Grimes PA-C          "

## 2023-08-19 ENCOUNTER — NURSE TRIAGE (OUTPATIENT)
Dept: NURSING | Facility: CLINIC | Age: 68
End: 2023-08-19
Payer: COMMERCIAL

## 2023-08-19 ENCOUNTER — HOSPITAL ENCOUNTER (OUTPATIENT)
Facility: CLINIC | Age: 68
Setting detail: OBSERVATION
Discharge: HOME | End: 2023-08-22
Attending: EMERGENCY MEDICINE | Admitting: INTERNAL MEDICINE
Payer: COMMERCIAL

## 2023-08-19 ENCOUNTER — APPOINTMENT (OUTPATIENT)
Dept: GENERAL RADIOLOGY | Facility: CLINIC | Age: 68
End: 2023-08-19
Attending: EMERGENCY MEDICINE
Payer: COMMERCIAL

## 2023-08-19 DIAGNOSIS — J44.1 COPD EXACERBATION (H): Primary | ICD-10-CM

## 2023-08-19 LAB
ALBUMIN SERPL BCG-MCNC: 3.9 G/DL (ref 3.5–5.2)
ALP SERPL-CCNC: 100 U/L (ref 35–104)
ALT SERPL W P-5'-P-CCNC: 17 U/L (ref 0–50)
ANION GAP SERPL CALCULATED.3IONS-SCNC: 13 MMOL/L (ref 7–15)
AST SERPL W P-5'-P-CCNC: 23 U/L (ref 0–45)
ATRIAL RATE - MUSE: 99 BPM
BASOPHILS # BLD AUTO: 0 10E3/UL (ref 0–0.2)
BASOPHILS NFR BLD AUTO: 0 %
BILIRUB SERPL-MCNC: 0.5 MG/DL
BUN SERPL-MCNC: 4.9 MG/DL (ref 8–23)
CALCIUM SERPL-MCNC: 9.4 MG/DL (ref 8.8–10.2)
CHLORIDE SERPL-SCNC: 102 MMOL/L (ref 98–107)
CREAT SERPL-MCNC: 0.59 MG/DL (ref 0.51–0.95)
D DIMER PPP FEU-MCNC: 0.42 UG/ML FEU (ref 0–0.5)
DEPRECATED HCO3 PLAS-SCNC: 21 MMOL/L (ref 22–29)
DIASTOLIC BLOOD PRESSURE - MUSE: NORMAL MMHG
EOSINOPHIL # BLD AUTO: 0.2 10E3/UL (ref 0–0.7)
EOSINOPHIL NFR BLD AUTO: 2 %
ERYTHROCYTE [DISTWIDTH] IN BLOOD BY AUTOMATED COUNT: 14.6 % (ref 10–15)
FLUAV RNA SPEC QL NAA+PROBE: NEGATIVE
FLUBV RNA RESP QL NAA+PROBE: NEGATIVE
GFR SERPL CREATININE-BSD FRML MDRD: >90 ML/MIN/1.73M2
GLUCOSE SERPL-MCNC: 112 MG/DL (ref 70–99)
HCO3 BLDV-SCNC: 25 MMOL/L (ref 21–28)
HCT VFR BLD AUTO: 47.4 % (ref 35–47)
HGB BLD-MCNC: 15.2 G/DL (ref 11.7–15.7)
IMM GRANULOCYTES # BLD: 0 10E3/UL
IMM GRANULOCYTES NFR BLD: 0 %
INTERPRETATION ECG - MUSE: NORMAL
LACTATE BLD-SCNC: 1.1 MMOL/L
LYMPHOCYTES # BLD AUTO: 1.6 10E3/UL (ref 0.8–5.3)
LYMPHOCYTES NFR BLD AUTO: 16 %
MCH RBC QN AUTO: 30.7 PG (ref 26.5–33)
MCHC RBC AUTO-ENTMCNC: 32.1 G/DL (ref 31.5–36.5)
MCV RBC AUTO: 96 FL (ref 78–100)
MONOCYTES # BLD AUTO: 1 10E3/UL (ref 0–1.3)
MONOCYTES NFR BLD AUTO: 10 %
NEUTROPHILS # BLD AUTO: 7.1 10E3/UL (ref 1.6–8.3)
NEUTROPHILS NFR BLD AUTO: 72 %
NRBC # BLD AUTO: 0 10E3/UL
NRBC BLD AUTO-RTO: 0 /100
NT-PROBNP SERPL-MCNC: 122 PG/ML (ref 0–900)
P AXIS - MUSE: 83 DEGREES
PCO2 BLDV: 46 MM HG (ref 40–50)
PH BLDV: 7.34 [PH] (ref 7.32–7.43)
PLATELET # BLD AUTO: 259 10E3/UL (ref 150–450)
PO2 BLDV: 31 MM HG (ref 25–47)
POTASSIUM SERPL-SCNC: 4.3 MMOL/L (ref 3.4–5.3)
PR INTERVAL - MUSE: 136 MS
PROCALCITONIN SERPL IA-MCNC: 0.09 NG/ML
PROT SERPL-MCNC: 7 G/DL (ref 6.4–8.3)
QRS DURATION - MUSE: 76 MS
QT - MUSE: 344 MS
QTC - MUSE: 441 MS
R AXIS - MUSE: 52 DEGREES
RBC # BLD AUTO: 4.95 10E6/UL (ref 3.8–5.2)
RSV RNA SPEC NAA+PROBE: NEGATIVE
SAO2 % BLDV: 55 % (ref 94–100)
SARS-COV-2 RNA RESP QL NAA+PROBE: NEGATIVE
SODIUM SERPL-SCNC: 136 MMOL/L (ref 136–145)
SYSTOLIC BLOOD PRESSURE - MUSE: NORMAL MMHG
T AXIS - MUSE: 75 DEGREES
TROPONIN T SERPL HS-MCNC: 9 NG/L
VENTRICULAR RATE- MUSE: 99 BPM
WBC # BLD AUTO: 9.9 10E3/UL (ref 4–11)

## 2023-08-19 PROCEDURE — 120N000001 HC R&B MED SURG/OB

## 2023-08-19 PROCEDURE — 71046 X-RAY EXAM CHEST 2 VIEWS: CPT

## 2023-08-19 PROCEDURE — 36415 COLL VENOUS BLD VENIPUNCTURE: CPT | Performed by: EMERGENCY MEDICINE

## 2023-08-19 PROCEDURE — 85025 COMPLETE CBC W/AUTO DIFF WBC: CPT | Performed by: EMERGENCY MEDICINE

## 2023-08-19 PROCEDURE — 96374 THER/PROPH/DIAG INJ IV PUSH: CPT

## 2023-08-19 PROCEDURE — 99223 1ST HOSP IP/OBS HIGH 75: CPT | Performed by: INTERNAL MEDICINE

## 2023-08-19 PROCEDURE — 250N000011 HC RX IP 250 OP 636: Mod: JZ | Performed by: EMERGENCY MEDICINE

## 2023-08-19 PROCEDURE — 94640 AIRWAY INHALATION TREATMENT: CPT

## 2023-08-19 PROCEDURE — 85379 FIBRIN DEGRADATION QUANT: CPT | Performed by: EMERGENCY MEDICINE

## 2023-08-19 PROCEDURE — 83605 ASSAY OF LACTIC ACID: CPT

## 2023-08-19 PROCEDURE — 87637 SARSCOV2&INF A&B&RSV AMP PRB: CPT | Performed by: EMERGENCY MEDICINE

## 2023-08-19 PROCEDURE — 250N000013 HC RX MED GY IP 250 OP 250 PS 637: Performed by: INTERNAL MEDICINE

## 2023-08-19 PROCEDURE — 99285 EMERGENCY DEPT VISIT HI MDM: CPT | Mod: 25

## 2023-08-19 PROCEDURE — 84145 PROCALCITONIN (PCT): CPT | Performed by: EMERGENCY MEDICINE

## 2023-08-19 PROCEDURE — 83880 ASSAY OF NATRIURETIC PEPTIDE: CPT | Performed by: EMERGENCY MEDICINE

## 2023-08-19 PROCEDURE — 80053 COMPREHEN METABOLIC PANEL: CPT | Performed by: EMERGENCY MEDICINE

## 2023-08-19 PROCEDURE — 84484 ASSAY OF TROPONIN QUANT: CPT | Performed by: EMERGENCY MEDICINE

## 2023-08-19 PROCEDURE — 250N000009 HC RX 250: Performed by: EMERGENCY MEDICINE

## 2023-08-19 PROCEDURE — 93005 ELECTROCARDIOGRAM TRACING: CPT

## 2023-08-19 PROCEDURE — 250N000009 HC RX 250: Performed by: INTERNAL MEDICINE

## 2023-08-19 RX ORDER — AMOXICILLIN 250 MG
1 CAPSULE ORAL 2 TIMES DAILY PRN
Status: DISCONTINUED | OUTPATIENT
Start: 2023-08-19 | End: 2023-08-22 | Stop reason: HOSPADM

## 2023-08-19 RX ORDER — PROCHLORPERAZINE MALEATE 5 MG
5 TABLET ORAL EVERY 6 HOURS PRN
Status: DISCONTINUED | OUTPATIENT
Start: 2023-08-19 | End: 2023-08-22 | Stop reason: HOSPADM

## 2023-08-19 RX ORDER — LEVOFLOXACIN 250 MG/1
500 TABLET, FILM COATED ORAL DAILY
Status: COMPLETED | OUTPATIENT
Start: 2023-08-19 | End: 2023-08-21

## 2023-08-19 RX ORDER — ONDANSETRON 2 MG/ML
4 INJECTION INTRAMUSCULAR; INTRAVENOUS EVERY 6 HOURS PRN
Status: DISCONTINUED | OUTPATIENT
Start: 2023-08-19 | End: 2023-08-22 | Stop reason: HOSPADM

## 2023-08-19 RX ORDER — ACETAMINOPHEN 650 MG/1
650 SUPPOSITORY RECTAL EVERY 6 HOURS PRN
Status: DISCONTINUED | OUTPATIENT
Start: 2023-08-19 | End: 2023-08-22 | Stop reason: HOSPADM

## 2023-08-19 RX ORDER — IPRATROPIUM BROMIDE AND ALBUTEROL SULFATE 2.5; .5 MG/3ML; MG/3ML
3 SOLUTION RESPIRATORY (INHALATION) ONCE
Status: COMPLETED | OUTPATIENT
Start: 2023-08-19 | End: 2023-08-19

## 2023-08-19 RX ORDER — ACETAMINOPHEN 325 MG/1
650 TABLET ORAL EVERY 6 HOURS PRN
Status: DISCONTINUED | OUTPATIENT
Start: 2023-08-19 | End: 2023-08-22 | Stop reason: HOSPADM

## 2023-08-19 RX ORDER — IPRATROPIUM BROMIDE AND ALBUTEROL SULFATE 2.5; .5 MG/3ML; MG/3ML
3 SOLUTION RESPIRATORY (INHALATION) 4 TIMES DAILY
Status: DISCONTINUED | OUTPATIENT
Start: 2023-08-19 | End: 2023-08-22 | Stop reason: HOSPADM

## 2023-08-19 RX ORDER — ASPIRIN 325 MG
325 TABLET, DELAYED RELEASE (ENTERIC COATED) ORAL EVERY 6 HOURS PRN
COMMUNITY
End: 2024-06-10

## 2023-08-19 RX ORDER — METHYLPREDNISOLONE SODIUM SUCCINATE 125 MG/2ML
125 INJECTION, POWDER, LYOPHILIZED, FOR SOLUTION INTRAMUSCULAR; INTRAVENOUS ONCE
Status: COMPLETED | OUTPATIENT
Start: 2023-08-19 | End: 2023-08-19

## 2023-08-19 RX ORDER — METHYLPREDNISOLONE SODIUM SUCCINATE 125 MG/2ML
40 INJECTION, POWDER, LYOPHILIZED, FOR SOLUTION INTRAMUSCULAR; INTRAVENOUS EVERY 8 HOURS
Status: DISCONTINUED | OUTPATIENT
Start: 2023-08-20 | End: 2023-08-20

## 2023-08-19 RX ORDER — ONDANSETRON 4 MG/1
4 TABLET, ORALLY DISINTEGRATING ORAL EVERY 6 HOURS PRN
Status: DISCONTINUED | OUTPATIENT
Start: 2023-08-19 | End: 2023-08-22 | Stop reason: HOSPADM

## 2023-08-19 RX ORDER — GUAIFENESIN 600 MG/1
1200 TABLET, EXTENDED RELEASE ORAL 2 TIMES DAILY PRN
Status: ON HOLD | COMMUNITY
End: 2024-07-13

## 2023-08-19 RX ORDER — AMOXICILLIN 250 MG
2 CAPSULE ORAL 2 TIMES DAILY PRN
Status: DISCONTINUED | OUTPATIENT
Start: 2023-08-19 | End: 2023-08-22 | Stop reason: HOSPADM

## 2023-08-19 RX ORDER — PROCHLORPERAZINE 25 MG
12.5 SUPPOSITORY, RECTAL RECTAL EVERY 12 HOURS PRN
Status: DISCONTINUED | OUTPATIENT
Start: 2023-08-19 | End: 2023-08-22 | Stop reason: HOSPADM

## 2023-08-19 RX ORDER — POLYETHYLENE GLYCOL 3350 17 G/17G
17 POWDER, FOR SOLUTION ORAL DAILY PRN
Status: DISCONTINUED | OUTPATIENT
Start: 2023-08-19 | End: 2023-08-22 | Stop reason: HOSPADM

## 2023-08-19 RX ORDER — IPRATROPIUM BROMIDE AND ALBUTEROL SULFATE 2.5; .5 MG/3ML; MG/3ML
3 SOLUTION RESPIRATORY (INHALATION) EVERY 4 HOURS PRN
Status: DISCONTINUED | OUTPATIENT
Start: 2023-08-19 | End: 2023-08-22 | Stop reason: HOSPADM

## 2023-08-19 RX ORDER — LIDOCAINE 40 MG/G
CREAM TOPICAL
Status: DISCONTINUED | OUTPATIENT
Start: 2023-08-19 | End: 2023-08-22 | Stop reason: HOSPADM

## 2023-08-19 RX ADMIN — IPRATROPIUM BROMIDE AND ALBUTEROL SULFATE 3 ML: .5; 3 SOLUTION RESPIRATORY (INHALATION) at 11:14

## 2023-08-19 RX ADMIN — IPRATROPIUM BROMIDE AND ALBUTEROL SULFATE 3 ML: .5; 3 SOLUTION RESPIRATORY (INHALATION) at 08:55

## 2023-08-19 RX ADMIN — METHYLPREDNISOLONE SODIUM SUCCINATE 125 MG: 125 INJECTION, POWDER, FOR SOLUTION INTRAMUSCULAR; INTRAVENOUS at 08:48

## 2023-08-19 RX ADMIN — LEVOFLOXACIN 500 MG: 250 TABLET, FILM COATED ORAL at 17:41

## 2023-08-19 RX ADMIN — IPRATROPIUM BROMIDE AND ALBUTEROL SULFATE 3 ML: .5; 3 SOLUTION RESPIRATORY (INHALATION) at 12:36

## 2023-08-19 RX ADMIN — IPRATROPIUM BROMIDE AND ALBUTEROL SULFATE 3 ML: .5; 3 SOLUTION RESPIRATORY (INHALATION) at 19:47

## 2023-08-19 RX ADMIN — METHYLPREDNISOLONE SODIUM SUCCINATE 125 MG: 125 INJECTION, POWDER, FOR SOLUTION INTRAMUSCULAR; INTRAVENOUS at 08:56

## 2023-08-19 ASSESSMENT — ENCOUNTER SYMPTOMS
DYSURIA: 0
SORE THROAT: 1
COUGH: 1
SHORTNESS OF BREATH: 1
HEADACHES: 0
VOMITING: 0
DIZZINESS: 0
NECK PAIN: 0
BACK PAIN: 0
FEVER: 0
HEMATURIA: 0
ABDOMINAL PAIN: 0

## 2023-08-19 ASSESSMENT — ACTIVITIES OF DAILY LIVING (ADL)
ADLS_ACUITY_SCORE: 20
ADLS_ACUITY_SCORE: 35
ADLS_ACUITY_SCORE: 20
ADLS_ACUITY_SCORE: 35

## 2023-08-19 NOTE — ED NOTES
[facility]  ED Nurse Handoff Report    ED Chief complaint: Shortness of Breath      ED Diagnosis:   Final diagnoses:   COPD exacerbation (H)       Code Status: Full Code    Allergies:   Allergies   Allergen Reactions    Pcn [Penicillins]      convulsions       Patient Story: Shortness of breath  Focused Assessment:  Patient here with COPD exacerbation, having difficulty moving air, feeling winded after ambulation. Will be admitted for further evaluation and treatment.      Treatments and/or interventions provided: See MAR  Patient's response to treatments and/or interventions: TBD    To be done/followed up on inpatient unit:  close monitoring    Does this patient have any cognitive concerns?:  na    Activity level - Baseline/Home:  Independent  Activity Level - Current:   Stand with Assist    Patient's Preferred language: English   Needed?: No    Isolation: None  Infection: Not Applicable  Patient tested for COVID 19 prior to admission: YES  Bariatric?: No    Vital Signs:   Vitals:    08/19/23 0926 08/19/23 1000 08/19/23 1109 08/19/23 1118   BP:    99/76   Pulse:  102     Resp:       Temp:       TempSrc:       SpO2: 95% 93% 92% 99%   Weight:           Cardiac Rhythm:Cardiac Rhythm: Sinus tachycardia    Was the PSS-3 completed:   Yes  What interventions are required if any?               Family Comments: daughter at bed side  OBS brochure/video discussed/provided to patient/family: N/A              Name of person given brochure if not patient: na              Relationship to patient: na    For the majority of the shift this patient's behavior was Green.   Behavioral interventions performed were none.    ED NURSE PHONE NUMBER: *[phone]       '

## 2023-08-19 NOTE — TELEPHONE ENCOUNTER
Nurse Triage SBAR    Is this a 2nd Level Triage? YES, LICENSED PRACTITIONER REVIEW IS REQUIRED    Situation: Patient calling with breathing difficulty.    Background: Patient recently released from hospital setting. Patient reporting significant breathing issues/SOB. Also reporting persistent cough, fatigue, congestion. Patient bringing up thick secretions (yellow, green). Patient has hx of COPD. Not yet tested for COVID. Pulse ox 91-92, pulse 110. Patient stated that she had used all of her respiratory meds, but still struggling. Denies CP.    Assessment: Possible COVID. SOB, with multiple URI symptoms.    Protocol Recommended Disposition:   Go to ED Now    Recommendation: Recommendation for patient to be seen in ED now. Patient will arrange for transport.     To ED.    Does the patient meet one of the following criteria for ADS visit consideration? 16+ years old, with an [facility] PCP     TIP  Providers, please consider if this condition is appropriate for management at one of our Acute and Diagnostic Services sites.     If patient is a good candidate, please use dotphrase <dot>triageresponse and select Refer to ADS to document.     Reason for Disposition   [1] MODERATE difficulty breathing (e.g., speaks in phrases, SOB even at rest, pulse 100-120) AND [2] NEW-onset or WORSE than normal    Additional Information   Negative: SEVERE difficulty breathing (e.g., struggling for each breath, speaks in single words)   Negative: [1] Breathing stopped AND [2] hasn't returned   Negative: Choking on something   Negative: Bluish (or gray) lips or face now   Negative: Difficult to awaken or acting confused (e.g., disoriented, slurred speech)   Negative: Passed out (i.e., lost consciousness, collapsed and was not responding)   Negative: Wheezing started suddenly after medicine, an allergic food or bee sting   Negative: Stridor   Negative: Slow, shallow and weak breathing   Negative: Sounds like a life-threatening emergency to the  triager   Negative: Chest pain   Negative: [1] Wheezing (high pitched whistling sound) AND [2] previous asthma attacks or use of asthma medicines   Negative: [1] Difficulty breathing AND [2] only present when coughing   Negative: [1] Difficulty breathing AND [2] only from stuffy or runny nose   Negative: [1] Difficulty breathing AND [2] within 14 days of COVID-19 Exposure    Protocols used: Breathing Difficulty-A-    Lane Pino RN, BSN, MSN  FNA Triage 7:50 AM

## 2023-08-19 NOTE — PLAN OF CARE
Goal Outcome Evaluation:         Pt A&Ox4. Ind in room, standby in halls. Complains of SOB w/ exertion and at rest. Denies pain. Cardiac diet. LEONEL ROJAS.

## 2023-08-19 NOTE — PHARMACY-ADMISSION MEDICATION HISTORY
Pharmacy Intern Admission Medication History    Admission medication history is complete. The information provided in this note is only as accurate as the sources available at the time of the update.    Medication reconciliation/reorder completed by provider prior to medication history? No    Information Source(s): Patient and CareEverywhere/SureScripts via in-person    Pertinent Information:  - DuoNeb and nicotine lozenges were not in SureScripts but Tammie verified she still has them and uses them as needed.    Changes made to PTA medication list:  Added: Mucinex  Deleted: None  Changed: None    Medication Affordability:  Not including over the counter (OTC) medications, was there a time in the past 3 months when you did not take your medications as prescribed because of cost?: Yes    Allergies reviewed with patient and updates made in EHR: yes    Medication History Completed By: Carisa Hodges 8/19/2023 11:57 AM    Prior to Admission medications    Medication Sig Last Dose Taking? Auth Provider Long Term End Date   albuterol (PROAIR HFA/PROVENTIL HFA/VENTOLIN HFA) 108 (90 Base) MCG/ACT inhaler Inhale 2 puffs into the lungs every 4 hours as needed for wheezing or shortness of breath  at PRN Yes Mikel Coffman MD Yes    aspirin (ASA) 325 MG EC tablet Take 325 mg by mouth every 6 hours as needed for moderate pain  at PRN Yes Unknown, Entered By History No    Fluticasone-Umeclidin-Vilant (TRELEGY ELLIPTA) 100-62.5-25 MCG/ACT oral inhaler Inhale 1 puff into the lungs daily 8/19/2023 at AM Yes Mikel Navarro MD     guaiFENesin (MUCINEX) 600 MG 12 hr tablet Take 1,200 mg by mouth 2 times daily as needed for congestion  at PRN Yes Unknown, Entered By History No    ipratropium - albuterol 0.5 mg/2.5 mg/3 mL (DUONEB) 0.5-2.5 (3) MG/3ML neb solution Take 1 vial (3 mLs) by nebulization every 6 hours as needed for shortness of breath, wheezing or cough  at PRN Yes Mikel Coffman MD Yes    nicotine (COMMIT) 2  MG lozenge Place 1 lozenge (2 mg) inside cheek every hour as needed for smoking cessation  at PRN Yes Mikel Coffman MD

## 2023-08-19 NOTE — PROGRESS NOTES
RECEIVING UNIT ED HANDOFF REVIEW    ED Nurse Handoff Report was reviewed by: Alejandra Mcadams RN on August 19, 2023 at 3:18 PM

## 2023-08-19 NOTE — ED TRIAGE NOTES
Pt started with congestion 3 days ago now has cough and is SOB. Pt HAS copd     Triage Assessment       Row Name 08/19/23 0816       Triage Assessment (Adult)    Airway WDL WDL       Respiratory WDL    Respiratory WDL cough    Cough Frequency frequent    Cough Type productive       Skin Circulation/Temperature WDL    Skin Circulation/Temperature WDL WDL       Cardiac WDL    Cardiac WDL rhythm    Cardiac Rhythm ST       Peripheral/Neurovascular WDL    Peripheral Neurovascular WDL WDL       Cognitive/Neuro/Behavioral WDL    Cognitive/Neuro/Behavioral WDL WDL

## 2023-08-19 NOTE — ED PROVIDER NOTES
History     Chief Complaint:  Shortness of Breath       HPI   Tammie Zeng is a 67 year old female with COPD presents to the emergency department for shortness of breath.  Patient states that the symptoms have been ongoing for 3 days.  Patient states that her symptoms feel similar to her prior COPD exacerbation episodes requiring admission.  Patient endorses some yellow sputum production that is occasionally frothy.  Patient denies any associated chest pain.  She does endorse that occasionally coughing causes her to have some nausea, but no vomiting.  Patient is uncertain whether or not she has a fever.  Patient states that she was on a steroid taper and antibiotic from her admission in late July.  Her symptoms this time initially started out as congestion 3 days ago.    Review of Systems   Constitutional:  Negative for fever.   HENT:  Positive for congestion and sore throat.    Respiratory:  Positive for cough and shortness of breath.    Cardiovascular:  Negative for chest pain.   Gastrointestinal:  Negative for abdominal pain and vomiting.   Genitourinary:  Negative for dysuria and hematuria.   Musculoskeletal:  Negative for back pain and neck pain.   Neurological:  Negative for dizziness and headaches.       Independent Historian:   None - Patient Only    Review of External Notes:   7/28/2023 discharge summary      Medications:    albuterol (PROAIR HFA/PROVENTIL HFA/VENTOLIN HFA) 108 (90 Base) MCG/ACT inhaler  aspirin (ASA) 325 MG EC tablet  Fluticasone-Umeclidin-Vilant (TRELEGY ELLIPTA) 100-62.5-25 MCG/ACT oral inhaler  guaiFENesin (MUCINEX) 600 MG 12 hr tablet  ipratropium - albuterol 0.5 mg/2.5 mg/3 mL (DUONEB) 0.5-2.5 (3) MG/3ML neb solution  nicotine (COMMIT) 2 MG lozenge        Past Medical History:    Past Medical History:   Diagnosis Date    Chronic obstructive pulmonary disease, unspecified COPD type (H) 06/13/2017    Depressive disorder, not elsewhere classified 05/2006    Elevated LFTs 2023     Generalized anxiety disorder     Hepatic steatosis 04/2023    Herpes simplex without mention of complication     Nephrolithiasis     TOBACCO ABUSE-CONTINUOUS        Past Surgical History:    Past Surgical History:   Procedure Laterality Date    CHOLECYSTECTOMY, LAPOROSCOPIC  1980's    done at [facility]    TUBAL LIGATION  1998        Physical Exam   Patient Vitals for the past 24 hrs:   BP Temp Temp src Pulse Resp SpO2 Weight   08/19/23 1118 99/76 -- -- -- -- 99 % --   08/19/23 1109 -- -- -- -- -- 92 % --   08/19/23 1000 -- -- -- 102 -- 93 % --   08/19/23 0926 -- -- -- -- -- 95 % --   08/19/23 0925 -- -- -- -- -- 94 % --   08/19/23 0819 (!) 155/135 97.9  F (36.6  C) Oral (!) 135 30 94 % 49 kg (108 lb)        Physical Exam  Constitutional:       Appearance: Normal appearance.   HENT:      Head: Normocephalic and atraumatic.   Eyes:      Extraocular Movements: Extraocular movements intact.      Conjunctiva/sclera: Conjunctivae normal.   Cardiovascular:      Rate and Rhythm: Tachycardic rate and regular rhythm.   Pulmonary:      Effort: Tachypneic. No respiratory distress.      Breath sounds: Diminished lung sounds bilaterally with wheezing in right upper lung.  Abdominal:      General: Abdomen is flat. There is no distension.      Palpations: Abdomen is soft.      Tenderness: There is no abdominal tenderness.   Musculoskeletal:      Cervical back: Normal range of motion. No rigidity.       Right lower leg: No edema.      Left lower leg: No edema.   Skin:     General: Skin is warm and dry.   Neurological:      General: No focal deficit present.      Mental Status: Alert and oriented to person, place, and time.   Psychiatric:         Mood and Affect: Mood normal.         Behavior: Behavior normal.    Emergency Department Course   ECG  See ED course for independent interpretation.     Imaging:  XR Chest 2 Views   Final Result   IMPRESSION: Heart is normal in size. Lungs are hyperinflated compatible with COPD. Lungs  are otherwise clear.         Report per radiology    Laboratory:  Labs Ordered and Resulted from Time of ED Arrival to Time of ED Departure   COMPREHENSIVE METABOLIC PANEL - Abnormal       Result Value    Sodium 136      Potassium 4.3      Chloride 102      Carbon Dioxide (CO2) 21 (*)     Anion Gap 13      Urea Nitrogen 4.9 (*)     Creatinine 0.59      Calcium 9.4      Glucose 112 (*)     Alkaline Phosphatase 100      AST 23      ALT 17      Protein Total 7.0      Albumin 3.9      Bilirubin Total 0.5      GFR Estimate >90     PROCALCITONIN - Abnormal    Procalcitonin 0.09 (*)    CBC WITH PLATELETS AND DIFFERENTIAL - Abnormal    WBC Count 9.9      RBC Count 4.95      Hemoglobin 15.2      Hematocrit 47.4 (*)     MCV 96      MCH 30.7      MCHC 32.1      RDW 14.6      Platelet Count 259      % Neutrophils 72      % Lymphocytes 16      % Monocytes 10      % Eosinophils 2      % Basophils 0      % Immature Granulocytes 0      NRBCs per 100 WBC 0      Absolute Neutrophils 7.1      Absolute Lymphocytes 1.6      Absolute Monocytes 1.0      Absolute Eosinophils 0.2      Absolute Basophils 0.0      Absolute Immature Granulocytes 0.0      Absolute NRBCs 0.0     ISTAT GASES LACTATE VENOUS POCT - Abnormal    Lactic Acid POCT 1.1      Bicarbonate Venous POCT 25      O2 Sat, Venous POCT 55 (*)     pCO2 Venous POCT 46      pH Venous POCT 7.34      pO2 Venous POCT 31     D DIMER QUANTITATIVE - Normal    D-Dimer Quantitative 0.42     TROPONIN T, HIGH SENSITIVITY - Normal    Troponin T, High Sensitivity 9     NT PROBNP INPATIENT - Normal    N terminal Pro BNP Inpatient 122     INFLUENZA A/B, RSV, & SARS-COV2 PCR - Normal    Influenza A PCR Negative      Influenza B PCR Negative      RSV PCR Negative      SARS CoV2 PCR Negative          Emergency Department Course & Assessments:       Interventions:  Medications   ipratropium - albuterol 0.5 mg/2.5 mg/3 mL (DUONEB) neb solution 3 mL (has no administration in time range)    methylPREDNISolone sodium succinate (solu-MEDROL) injection 125 mg (125 mg Intravenous Not Given 8/19/23 0855)   ipratropium - albuterol 0.5 mg/2.5 mg/3 mL (DUONEB) neb solution 3 mL (3 mLs Nebulization $Given 8/19/23 0855)   methylPREDNISolone sodium succinate (solu-MEDROL) injection 125 mg (125 mg Intravenous $Given 8/19/23 0856)   ipratropium - albuterol 0.5 mg/2.5 mg/3 mL (DUONEB) neb solution 3 mL (3 mLs Nebulization $Given 8/19/23 1114)        Independent Interpretation (X-rays, CTs, rhythm strip):  See ED course    Assessments/Consultations/Discussion of Management or Tests:     ED Course as of 08/19/23 1221   Sat Aug 19, 2023   0922 WBC: 9.9   0922 EKG 12-lead, tracing only  EKG sinus rhythm with PACs.  Rate of 63.  Normal FL.  Normal QRS.  Normal QTc.  Nonspecific ST changes in leads II, III, and aVF.  No acute ST elevation or depression as compared with 7/20/2023 EKG.   1003 XR Chest 2 Views  On my independent interpretation of chest x-ray, there is no obvious focal opacity, mediastinal widening, or pneumothorax.   1109 On reevaluation, patient still tachypneic.  Saturating roughly 92 to 93% on room air, but appears labored.  Diminished lung sounds in all lung fields.  Patient wanting to go home on prednisone taper, but given labored respiration, I advised repeat dose of DuoNeb and completion of walking pulse ox for reevaluation.   1136 Notified by ED tech that while patient only desaturated lowest to 90% on room air with walking pulse ox, she became extremely tachypneic to the point that she is unable to speak.  As a result, we will plan for admission for observation and further COPD treatment.   1143 On my reevaluation, patient tachypneic in bed.  Labored respirations.  Increased aeration to right lung, but still diminished lung sounds to the left.  We will give third dose of DuoNeb.  Updated patient on plan for admission for which patient and daughter voiced understanding and agreement with plan.    1155 Discussed patient with hospitalist Dr. Cheema who accepts patient for inpatient medical bed with telemetry.       Social Determinants of Health affecting care:   None    Disposition:  The patient was admitted to the hospital under the care of Dr. Cheema.     Impression & Plan    CMS Diagnoses: None    Medical Decision Makin-year-old female as described above presents to the emergency department with increased shortness of breath x3 days.  History of acute hypoxic respiratory failure secondary to COPD on latest admission and end of July.  Patient states that her symptoms feel similar today as last admission.  Patient hemodynamically stable at time evaluation.  Afebrile.  Saturating 94% on room air.  Tachypneic.  Tachycardic.  Differential diagnosis considered includes, but not limited to, acute COPD exacerbation, CHF exacerbation, pneumonia, pulmonary embolism, and acute viral URI.  Dyspnea work-up ordered.  Cardiac work-up ordered.  I-STAT lactic.  Will draw and hold blood cultures.  Procalcitonin.  D-dimer for screening for pulmonary embolism.  We will trial Solu-Medrol and DuoNeb treatment.  Evaluate for respiratory improvement.  If persistently tachypneic, or hypoxic, or fails walking pulse ox, will mid to hospital for further management.  Discussed care plan with patient who voiced understanding and agreement with plan.  Answered all questions.  Additional work-up and orders as listed in chart.    Please refer to ED course above for details on the patient's treatment course and any changes or updates in care plan beyond my initial evaluation and MDM.      Diagnosis:    ICD-10-CM    1. COPD exacerbation (H)  J44.1            Discharge Medications:  New Prescriptions    No medications on file          JAKOB LEVINE DO  2023   Jakob Levine DO Yeh, Ferris, DO  [phone]

## 2023-08-19 NOTE — H&P
INTERNAL MEDICINE HISTORY AND PHYSICAL  [facility]      Tammie Zeng [MR#: [id number]  : 1955  67 year old female]  Date of Admission:  2023  Primary Care Provider:  Mikel Navarro      Chief Complaint:   Dyspnea.    History of Present Illness:   Ms. Tammie Zeng is a 68 yo female with history including COPD and depression/anxiety; who presents with dyspnea.  Patient started having symptoms of shortness of breath about 3 days ago.  She noted some nasal congestion and sinus drainage.  She had cough with increased yellow sputum production that was occasionally frothy.  No chest pain.  Given her symptoms, she presented to [facility] for further evaluation.    On initial evaluation, patient was afebrile, hypertensive, heart rate in 130s, respirate 30, O2 saturations were 94% on room air. EKG showed sinus rhythm without acute ischemic changes. Laboratory evaluation notable for BMP with bicarbonate 21; CBC unremarkable; LFTs normal; lactic acid normal; procalcitonin 0.09; N-terminal proBNP 122 and troponin 9;  d-dimer negative; COVID-19, influenza and RSV testing were negative.  Chest x-ray showed no acute findings.    Patient was given multiple nebulizers in the ER. She was also given IV methylprednisolone.  Despite these measures, patient continued to be symptomatic with exertion and, as such, request admission was made.    Currently patient is feeling a bit better.  However she still gets winded just talking.  No fevers or chills.      Past Medical History:   1. COPD.  2. Depression/anxiety.  3. Tobacco use. Uses about 3-4 cigarettes/day now.  4. Hepatic steatosis. Noted by US for elevated LFT's.  5. Nephrolithiasis.     Past Medical History:   Diagnosis Date    Chronic obstructive pulmonary disease, unspecified COPD type (H) 2017    has seen [facility], fev1 under 1.0    Depressive disorder, not elsewhere classified 2006    following sudden death  of her mother    Elevated LFTs 2023    hepatic steatosis 4/23     Generalized anxiety disorder     Hepatic steatosis 04/2023    on us done for elev lfts    Herpes simplex without mention of complication     MOUTH    Nephrolithiasis     TOBACCO ABUSE-CONTINUOUS      Above past medical history reviewed and edited and/or added to as necessary.    Past Surgical History:     Past Surgical History:   Procedure Laterality Date    CHOLECYSTECTOMY, LAPOROSCOPIC  1980's    done at [facility]    TUBAL LIGATION  1998        Allergies:     Allergies   Allergen Reactions    Pcn [Penicillins]      convulsions        Medications:     Prior to Admission Medications   Prescriptions Last Dose Informant Patient Reported? Taking?   Fluticasone-Umeclidin-Vilant (TRELEGY ELLIPTA) 100-62.5-25 MCG/ACT oral inhaler 8/19/2023 at AM Self No Yes   Sig: Inhale 1 puff into the lungs daily   albuterol (PROAIR HFA/PROVENTIL HFA/VENTOLIN HFA) 108 (90 Base) MCG/ACT inhaler  at PRN Self No Yes   Sig: Inhale 2 puffs into the lungs every 4 hours as needed for wheezing or shortness of breath   aspirin (ASA) 325 MG EC tablet  at PRN Self Yes Yes   Sig: Take 325 mg by mouth every 6 hours as needed for moderate pain   guaiFENesin (MUCINEX) 600 MG 12 hr tablet  at PRN Self Yes Yes   Sig: Take 1,200 mg by mouth 2 times daily as needed for congestion   ipratropium - albuterol 0.5 mg/2.5 mg/3 mL (DUONEB) 0.5-2.5 (3) MG/3ML neb solution  at PRN Self No Yes   Sig: Take 1 vial (3 mLs) by nebulization every 6 hours as needed for shortness of breath, wheezing or cough   nicotine (COMMIT) 2 MG lozenge  at PRN Self No Yes   Sig: Place 1 lozenge (2 mg) inside cheek every hour as needed for smoking cessation      Facility-Administered Medications: None       Social History:   Single, 1 child. Smokes 1/2 ppd x 25 years; presently smoking about 3-4 cigarettes/day. Retired  for [employer].  Social History     Socioeconomic History    Marital status: Single      Spouse name: Not on file    Number of children: 1    Years of education: Not on file    Highest education level: Not on file   Occupational History    Occupation: retired, machinest for [employer]   Tobacco Use    Smoking status: Every Day     Packs/day: 0.50     Years: 25.00     Pack years: 12.50     Types: Cigarettes    Smokeless tobacco: Never    Tobacco comments:     states is cutting back - smokes 1/2 ppd (11)   Substance and Sexual Activity    Alcohol use: Yes     Alcohol/week: 0.0 standard drinks of alcohol     Comment: 3 mixed drinks per week    Drug use: No    Sexual activity: Yes     Partners: Male     Birth control/protection: Surgical     Comment: tubal - S.O. - Jos   Other Topics Concern     Service No    Blood Transfusions No    Caffeine Concern No    Occupational Exposure No    Hobby Hazards No    Sleep Concern Yes     Comment: secondary to grief - mom  suddenly at the end of April    Stress Concern Yes    Weight Concern Yes    Special Diet Yes     Comment: trys to eat healthy    Back Care No    Exercise No    Bike Helmet No    Seat Belt Yes    Self-Exams No   Social History Narrative    Not on file     Social Determinants of Health     Financial Resource Strain: Not on file   Food Insecurity: Not on file   Transportation Needs: Not on file   Physical Activity: Not on file   Stress: Not on file   Social Connections: Not on file   Intimate Partner Violence: Not on file   Housing Stability: Not on file       Family History:   Reviewed.  Family History   Problem Relation Age of Onset    Asthma Daughter     Diabetes Paternal Grandmother     Cancer Maternal Aunt         skin    Allergies Daughter     Depression Mother     Eye Disorder Mother         cataracts and glacoma    Gastrointestinal Disease Maternal Grandmother         gallbladder    Gastrointestinal Disease Daughter         ulcerative cholitis    Osteoporosis Mother     Respiratory Mother         asthmatic bronchitis        Review of Systems:   As noted in the HPI; otherwise 10 point review of systems was negative.     Physical Exam:   VITALS:  Blood pressure 99/76, pulse 102, temperature 97.9  F (36.6  C), temperature source Oral, resp. rate 30, weight 49 kg (108 lb), last menstrual period 01/24/2006, SpO2 99 %, not currently breastfeeding.  General: Awake, alert, oriented, pleasant.  HEENT:  PERRL, no scleral icterus/conjunctival injection. Oropharynx no erythema or exudate.  Neck: No bruits, JVD or adenopathy.  Heart/Chest: Regular in rhythm without murmurs, rubs or gallops.  Lungs: Diminished bases, end expiratory wheezing diffusely.  No crackles.  Abdomen: Soft, nontender, nondistended, possible sounds. No femoral bruits.  Extremities/Musculoskeletal: No bilateral lower extremity edema.  Skin:    Neurologic:  No gross focal motor/sensory deficits.     Labs, Imaging & Other Data:     Results for orders placed or performed during the hospital encounter of 08/19/23   XR Chest 2 Views     Status: None    Narrative    EXAM: XR CHEST 2 VIEWS  LOCATION: [facility]  DATE: 8/19/2023    INDICATION: dyspnea  COMPARISON: 07/24/2023      Impression    IMPRESSION: Heart is normal in size. Lungs are hyperinflated compatible with COPD. Lungs are otherwise clear.   D dimer quantitative     Status: Normal   Result Value Ref Range    D-Dimer Quantitative 0.42 0.00 - 0.50 ug/mL FEU    Narrative    This D-dimer assay is intended for use in conjunction with a clinical pretest probability assessment model to exclude pulmonary embolism (PE) and deep venous thrombosis (DVT) in outpatients suspected of PE or DVT. The cut-off value is 0.50 ug/mL FEU.   Comprehensive metabolic panel     Status: Abnormal   Result Value Ref Range    Sodium 136 136 - 145 mmol/L    Potassium 4.3 3.4 - 5.3 mmol/L    Chloride 102 98 - 107 mmol/L    Carbon Dioxide (CO2) 21 (L) 22 - 29 mmol/L    Anion Gap 13 7 - 15 mmol/L    Urea Nitrogen 4.9 (L) 8.0  - 23.0 mg/dL    Creatinine 0.59 0.51 - 0.95 mg/dL    Calcium 9.4 8.8 - 10.2 mg/dL    Glucose 112 (H) 70 - 99 mg/dL    Alkaline Phosphatase 100 35 - 104 U/L    AST 23 0 - 45 U/L    ALT 17 0 - 50 U/L    Protein Total 7.0 6.4 - 8.3 g/dL    Albumin 3.9 3.5 - 5.2 g/dL    Bilirubin Total 0.5 <=1.2 mg/dL    GFR Estimate >90 >60 mL/min/1.73m2   Troponin T, High Sensitivity     Status: Normal   Result Value Ref Range    Troponin T, High Sensitivity 9 <=14 ng/L   BNP     Status: Normal   Result Value Ref Range    N terminal Pro BNP Inpatient 122 0 - 900 pg/mL   Procalcitonin     Status: Abnormal   Result Value Ref Range    Procalcitonin 0.09 (H) <0.05 ng/mL   Symptomatic Influenza A/B, RSV, & SARS-CoV2 PCR (COVID-19) Nasopharyngeal     Status: Normal    Specimen: Nasopharyngeal; Swab   Result Value Ref Range    Influenza A PCR Negative Negative    Influenza B PCR Negative Negative    RSV PCR Negative Negative    SARS CoV2 PCR Negative Negative    Narrative    Testing was performed using the Xpert Xpress CoV2/Flu/RSV Assay on the [employer] GeneXpert Instrument. This test should be ordered for the detection of SARS-CoV-2, influenza, and RSV viruses in individuals who meet clinical and/or epidemiological criteria. Test performance is unknown in asymptomatic patients. This test is for in vitro diagnostic use under the FDA EUA for laboratories certified under CLIA to perform high or moderate complexity testing. This test has not been FDA cleared or approved. A negative result does not rule out the presence of PCR inhibitors in the specimen or target RNA in concentration below the limit of detection for the assay. If only one viral target is positive but coinfection with multiple targets is suspected, the sample should be re-tested with another FDA cleared, approved, or authorized test, if coinfection would change clinical management. This test was validated by the [facility]. These laboratories are certified  under the Clinical Laboratory Improvement Amendments of 1988 (CLIA-88) as qualified to perform high complexity laboratory testing.   CBC with platelets and differential     Status: Abnormal   Result Value Ref Range    WBC Count 9.9 4.0 - 11.0 10e3/uL    RBC Count 4.95 3.80 - 5.20 10e6/uL    Hemoglobin 15.2 11.7 - 15.7 g/dL    Hematocrit 47.4 (H) 35.0 - 47.0 %    MCV 96 78 - 100 fL    MCH 30.7 26.5 - 33.0 pg    MCHC 32.1 31.5 - 36.5 g/dL    RDW 14.6 10.0 - 15.0 %    Platelet Count 259 150 - 450 10e3/uL    % Neutrophils 72 %    % Lymphocytes 16 %    % Monocytes 10 %    % Eosinophils 2 %    % Basophils 0 %    % Immature Granulocytes 0 %    NRBCs per 100 WBC 0 <1 /100    Absolute Neutrophils 7.1 1.6 - 8.3 10e3/uL    Absolute Lymphocytes 1.6 0.8 - 5.3 10e3/uL    Absolute Monocytes 1.0 0.0 - 1.3 10e3/uL    Absolute Eosinophils 0.2 0.0 - 0.7 10e3/uL    Absolute Basophils 0.0 0.0 - 0.2 10e3/uL    Absolute Immature Granulocytes 0.0 <=0.4 10e3/uL    Absolute NRBCs 0.0 10e3/uL   iStat Gases (lactate) venous, POCT     Status: Abnormal   Result Value Ref Range    Lactic Acid POCT 1.1 <=2.0 mmol/L    Bicarbonate Venous POCT 25 21 - 28 mmol/L    O2 Sat, Venous POCT 55 (L) 94 - 100 %    pCO2 Venous POCT 46 40 - 50 mm Hg    pH Venous POCT 7.34 7.32 - 7.43    pO2 Venous POCT 31 25 - 47 mm Hg   EKG 12-lead, tracing only     Status: None   Result Value Ref Range    Systolic Blood Pressure  mmHg    Diastolic Blood Pressure  mmHg    Ventricular Rate 99 BPM    Atrial Rate 99 BPM    KY Interval 136 ms    QRS Duration 76 ms     ms    QTc 441 ms    P Axis 83 degrees    R AXIS 52 degrees    T Axis 75 degrees    Interpretation ECG       Sinus rhythm  Normal ECG  When compared with ECG of 24-JUL-2023 11:58,  No significant change was found  Confirmed by GENERATED REPORT, COMPUTER ([id number]),  Ismael Dunlap ([id number]) on 8/19/2023 11:35:31 AM     CBC with platelets differential     Status: Abnormal    Narrative    The following orders  were created for panel order CBC with platelets differential.  Procedure                               Abnormality         Status                     ---------                               -----------         ------                     CBC with platelets and d...[[id number]]  Abnormal            Final result                 Please view results for these tests on the individual orders.     ECG personally reviewed as above.    ASSESSMENT & PLAN:   Ms. Tammie Zeng is a 66 yo female with history including COPD, who presents with dyspnea.      On initial evaluation, patient was afebrile, hypertensive, heart rate in 130s, respirate 30, O2 saturations were 94% on room air. EKG showed sinus rhythm without acute ischemic changes. Laboratory evaluation notable for BMP with bicarbonate 21; CBC unremarkable; LFTs normal; lactic acid normal; procalcitonin 0.09; N-terminal proBNP 122 and troponin 9;  d-dimer negative; COVID-19, influenza and RSV testing were negative.  Chest x-ray showed no acute findings.      Acute COPD exacerbation.  Suspected acute bacterial bronchitis contributing to above.  Tobacco use disorder.  * Initial presentation as above. Patient was given multiple nebulizers in the ER. She was also given IV methylprednisolone.  Despite these measures, patient continued to be symptomatic with exertion and, as such, request admission was made.  - Start levofloxacin 500 mg daily.  - Continue IV methylprednisolone 40 mg IV q8h.  - Continue ipratropium-albuterol nebs QID and PRN.  - Continue fluticasone-umeclidinium-vilanterol.    Tobacco use disorder.  - Continue PRN nicotine lozenges.  - Strongly recommend tobacco cessation.    Prophylaxis.  - PCD's, ambulation.    Dispo.  - Pending above.    CODE STATUS: FULL      Kurt Cheema Jr., MD  [phone] (p)  Text Page  Lisa

## 2023-08-20 LAB
ANION GAP SERPL CALCULATED.3IONS-SCNC: 14 MMOL/L (ref 7–15)
BASOPHILS # BLD AUTO: 0 10E3/UL (ref 0–0.2)
BASOPHILS NFR BLD AUTO: 0 %
BUN SERPL-MCNC: 9.3 MG/DL (ref 8–23)
CALCIUM SERPL-MCNC: 9.8 MG/DL (ref 8.8–10.2)
CHLORIDE SERPL-SCNC: 104 MMOL/L (ref 98–107)
CREAT SERPL-MCNC: 0.59 MG/DL (ref 0.51–0.95)
DEPRECATED HCO3 PLAS-SCNC: 23 MMOL/L (ref 22–29)
EOSINOPHIL # BLD AUTO: 0 10E3/UL (ref 0–0.7)
EOSINOPHIL NFR BLD AUTO: 0 %
ERYTHROCYTE [DISTWIDTH] IN BLOOD BY AUTOMATED COUNT: 14.8 % (ref 10–15)
GFR SERPL CREATININE-BSD FRML MDRD: >90 ML/MIN/1.73M2
GLUCOSE SERPL-MCNC: 82 MG/DL (ref 70–99)
HCT VFR BLD AUTO: 43.7 % (ref 35–47)
HGB BLD-MCNC: 14.2 G/DL (ref 11.7–15.7)
IMM GRANULOCYTES # BLD: 0.1 10E3/UL
IMM GRANULOCYTES NFR BLD: 1 %
LYMPHOCYTES # BLD AUTO: 1.8 10E3/UL (ref 0.8–5.3)
LYMPHOCYTES NFR BLD AUTO: 12 %
MCH RBC QN AUTO: 31 PG (ref 26.5–33)
MCHC RBC AUTO-ENTMCNC: 32.5 G/DL (ref 31.5–36.5)
MCV RBC AUTO: 95 FL (ref 78–100)
MONOCYTES # BLD AUTO: 1.4 10E3/UL (ref 0–1.3)
MONOCYTES NFR BLD AUTO: 9 %
NEUTROPHILS # BLD AUTO: 11.9 10E3/UL (ref 1.6–8.3)
NEUTROPHILS NFR BLD AUTO: 78 %
NRBC # BLD AUTO: 0 10E3/UL
NRBC BLD AUTO-RTO: 0 /100
PLATELET # BLD AUTO: 253 10E3/UL (ref 150–450)
POTASSIUM SERPL-SCNC: 4.4 MMOL/L (ref 3.4–5.3)
RBC # BLD AUTO: 4.58 10E6/UL (ref 3.8–5.2)
SODIUM SERPL-SCNC: 141 MMOL/L (ref 136–145)
WBC # BLD AUTO: 15.2 10E3/UL (ref 4–11)

## 2023-08-20 PROCEDURE — 94640 AIRWAY INHALATION TREATMENT: CPT

## 2023-08-20 PROCEDURE — 250N000013 HC RX MED GY IP 250 OP 250 PS 637: Performed by: INTERNAL MEDICINE

## 2023-08-20 PROCEDURE — 36415 COLL VENOUS BLD VENIPUNCTURE: CPT | Performed by: INTERNAL MEDICINE

## 2023-08-20 PROCEDURE — 85025 COMPLETE CBC W/AUTO DIFF WBC: CPT | Performed by: INTERNAL MEDICINE

## 2023-08-20 PROCEDURE — 96376 TX/PRO/DX INJ SAME DRUG ADON: CPT

## 2023-08-20 PROCEDURE — G0378 HOSPITAL OBSERVATION PER HR: HCPCS

## 2023-08-20 PROCEDURE — 250N000011 HC RX IP 250 OP 636: Mod: JZ | Performed by: INTERNAL MEDICINE

## 2023-08-20 PROCEDURE — 999N000157 HC STATISTIC RCP TIME EA 10 MIN

## 2023-08-20 PROCEDURE — 250N000009 HC RX 250: Performed by: INTERNAL MEDICINE

## 2023-08-20 PROCEDURE — 80048 BASIC METABOLIC PNL TOTAL CA: CPT | Performed by: INTERNAL MEDICINE

## 2023-08-20 PROCEDURE — 99232 SBSQ HOSP IP/OBS MODERATE 35: CPT | Performed by: INTERNAL MEDICINE

## 2023-08-20 RX ORDER — POLYETHYLENE GLYCOL 3350 17 G
2 POWDER IN PACKET (EA) ORAL
Status: DISCONTINUED | OUTPATIENT
Start: 2023-08-20 | End: 2023-08-22 | Stop reason: HOSPADM

## 2023-08-20 RX ORDER — GUAIFENESIN 600 MG/1
1200 TABLET, EXTENDED RELEASE ORAL 2 TIMES DAILY PRN
Status: DISCONTINUED | OUTPATIENT
Start: 2023-08-20 | End: 2023-08-22 | Stop reason: HOSPADM

## 2023-08-20 RX ORDER — ALBUTEROL SULFATE 90 UG/1
2 AEROSOL, METERED RESPIRATORY (INHALATION) EVERY 4 HOURS PRN
Status: DISCONTINUED | OUTPATIENT
Start: 2023-08-20 | End: 2023-08-22 | Stop reason: HOSPADM

## 2023-08-20 RX ORDER — FLUTICASONE FUROATE AND VILANTEROL 100; 25 UG/1; UG/1
1 POWDER RESPIRATORY (INHALATION) DAILY
Status: DISCONTINUED | OUTPATIENT
Start: 2023-08-20 | End: 2023-08-22 | Stop reason: HOSPADM

## 2023-08-20 RX ORDER — METHYLPREDNISOLONE SODIUM SUCCINATE 125 MG/2ML
60 INJECTION, POWDER, LYOPHILIZED, FOR SOLUTION INTRAMUSCULAR; INTRAVENOUS EVERY 8 HOURS
Status: DISCONTINUED | OUTPATIENT
Start: 2023-08-20 | End: 2023-08-22 | Stop reason: HOSPADM

## 2023-08-20 RX ADMIN — LEVOFLOXACIN 500 MG: 250 TABLET, FILM COATED ORAL at 08:10

## 2023-08-20 RX ADMIN — IPRATROPIUM BROMIDE AND ALBUTEROL SULFATE 3 ML: .5; 3 SOLUTION RESPIRATORY (INHALATION) at 20:25

## 2023-08-20 RX ADMIN — UMECLIDINIUM 1 PUFF: 62.5 AEROSOL, POWDER ORAL at 13:13

## 2023-08-20 RX ADMIN — FLUTICASONE FUROATE AND VILANTEROL TRIFENATATE 1 PUFF: 100; 25 POWDER RESPIRATORY (INHALATION) at 13:13

## 2023-08-20 RX ADMIN — METHYLPREDNISOLONE SODIUM SUCCINATE 62.5 MG: 125 INJECTION, POWDER, FOR SOLUTION INTRAMUSCULAR; INTRAVENOUS at 20:18

## 2023-08-20 RX ADMIN — IPRATROPIUM BROMIDE AND ALBUTEROL SULFATE 3 ML: .5; 3 SOLUTION RESPIRATORY (INHALATION) at 11:07

## 2023-08-20 RX ADMIN — IPRATROPIUM BROMIDE AND ALBUTEROL SULFATE 3 ML: .5; 3 SOLUTION RESPIRATORY (INHALATION) at 07:53

## 2023-08-20 RX ADMIN — IPRATROPIUM BROMIDE AND ALBUTEROL SULFATE 3 ML: .5; 3 SOLUTION RESPIRATORY (INHALATION) at 15:32

## 2023-08-20 RX ADMIN — GUAIFENESIN 1200 MG: 600 TABLET, EXTENDED RELEASE ORAL at 22:35

## 2023-08-20 RX ADMIN — METHYLPREDNISOLONE SODIUM SUCCINATE 62.5 MG: 125 INJECTION, POWDER, FOR SOLUTION INTRAMUSCULAR; INTRAVENOUS at 13:13

## 2023-08-20 ASSESSMENT — ACTIVITIES OF DAILY LIVING (ADL)
ADLS_ACUITY_SCORE: 20

## 2023-08-20 NOTE — PLAN OF CARE
Goal Outcome Evaluation:         Pt A&Ox4. Regular diet. Ind in room, standby in [place]. Denies pain. Dyspnea on exertion & at rest. Continue care plan. Discharge pending, expected in 1- 2 days to prior living arrangement.

## 2023-08-20 NOTE — PROGRESS NOTES
[facility]    Internal Medicine Hospitalist Progress Note  08/20/2023  I evaluated patient on the above date.    Kurt Cheema Jr., MD  [phone] (p)  Text Page  Vocera        Assessment & Plan New actions/orders today (08/20/2023) are underlined. All lab results in the assessment and plan were reviewed.    Ms. Tammie Zeng is a 68 yo female with history including COPD, who presented 8/19/2023 with dyspnea.      On initial evaluation, patient was afebrile, hypertensive, heart rate in 130s, respirate 30, O2 saturations were 94% on room air. EKG showed sinus rhythm without acute ischemic changes. Laboratory evaluation notable for BMP with bicarbonate 21; CBC unremarkable; LFTs normal; lactic acid normal; procalcitonin 0.09; N-terminal proBNP 122 and troponin 9;  d-dimer negative; COVID-19, influenza and RSV testing were negative.  Chest x-ray showed no acute findings.      Acute COPD exacerbation.  Suspected acute bacterial bronchitis contributing to above.  Tobacco use disorder.  * Initial presentation as above. Patient was given multiple nebulizers in the ER. She was also given IV methylprednisolone.  Started on IV steroids, nebs and levofloxacin on admit..  * On 8/20, still very dyspneic with little activity. Still wheezing.  - Continue IV methylprednisolone - increase from 40 mg to 60 mg q8h.  - Continue levofloxacin (started 8/19).  - Continue ipratropium-albuterol nebs QID and PRN.  - Continue fluticasone-umeclidinium-vilanterol.    Leukocytosis, suspect due to steroids.  * WBC normal on admit.  * WBC 15.2 on 8/20.  Recent Labs   Lab 08/20/23  1006 08/19/23  0839   WBC 15.2* 9.9   - Continue to monitor CBC - repeat in am.  - Continue to monitor for signs of infection.    Tobacco use disorder.  - Continue PRN nicotine lozenges.  - Strongly recommend tobacco cessation.    Prophylaxis.  - PCD's, ambulation.    Dispo.  - Pending above.    Clinically Significant Risk Factors                                       COVID-19 testing.  COVID-19 PCR Results          4/28/2022    11:22 7/24/2023    12:26 8/19/2023    08:39   COVID-19 PCR Results   SARS CoV2 PCR Negative  Negative  Negative      COVID-19 Antibody Results, Testing for Immunity           No data to display                Diet: Combination Diet Low Saturated Fat Na <2400mg Diet, No Caffeine Diet      Prophylaxis: PCD's, ambulation.   Royal Catheter:   Not present  Lines:   None     Code Status: Full Code    Disposition Plan   Expected discharge: 1-2d recommended to prior living arrangement pending  above .  Entered: Kurt Cheema MD 08/20/2023, 12:25 PM         Interval History   Still quite dyspneic with little activity.  Wants to cough up sputum, but can't.    -Data reviewed today: I reviewed all new labs and imaging over the last 24 hours. I personally reviewed no images or EKG's today.    Physical Exam    ,   Blood pressure 113/83, pulse 89, temperature 98  F (36.7  C), temperature source Oral, resp. rate 18, weight 49 kg (108 lb), last menstrual period 01/24/2006, SpO2 95 %, not currently breastfeeding. O2 Device: None (Room air)    Vitals:    08/19/23 0819   Weight: 49 kg (108 lb)     Vital Signs with Ranges  Temp:  [97.8  F (36.6  C)-98  F (36.7  C)] 98  F (36.7  C)  Pulse:  [] 89  Resp:  [18-19] 18  BP: (101-119)/(65-91) 113/83  SpO2:  [92 %-95 %] 95 %  Patient Vitals for the past 24 hrs:   BP Temp Temp src Pulse Resp SpO2   08/20/23 0820 113/83 98  F (36.7  C) Oral 89 18 95 %   08/20/23 0753 -- -- -- -- -- 95 %   08/19/23 2132 101/65 97.8  F (36.6  C) Oral 98 19 95 %   08/19/23 1544 (!) 119/91 98  F (36.7  C) Oral 93 -- 93 %   08/19/23 1456 111/65 -- -- -- -- 92 %   08/19/23 1409 106/77 -- -- 110 -- 92 %     I/O's Last 24 hours  I/O last 3 completed shifts:  In: 240 [P.O.:240]  Out: -     Constitutional: Awake, alert, oriented.  Respiratory: Diminished. Still with diffuse expiratory wheezes. No crackles.  Cardiovascular:  RRR, no m/r/g.  GI:   Skin/Integumen:   Other:        Data    Labs reviewed.  Recent Labs   Lab 08/20/23  1006 08/19/23  0839   WBC 15.2* 9.9   HGB 14.2 15.2   MCV 95 96    259    136   POTASSIUM 4.4 4.3   CHLORIDE 104 102   CO2 23 21*   BUN 9.3 4.9*   CR 0.59 0.59   ANIONGAP 14 13   MOLLY 9.8 9.4   GLC 82 112*   ALBUMIN  --  3.9   PROTTOTAL  --  7.0   BILITOTAL  --  0.5   ALKPHOS  --  100   ALT  --  17   AST  --  23     Recent Labs   Lab Test 08/19/23  0839 07/24/23  1149   NT-PROBNP, INPATIENT 122 237   TROPONIN T HIGH SENSITIVITY 9 10     Recent Labs   Lab 08/20/23  1006 08/19/23  0839   GLC 82 112*      No lab results found.     No results for input(s): INR, [id number] in the last 168 hours.  Recent Labs   Lab 08/20/23  1006 08/19/23  0849 08/19/23  0839   WBC 15.2*  --  9.9   DD  --   --  0.42   LACT  --  1.1  --    PCAL  --   --  0.09*   [id number]  --   --  Negative       MICRO:  CULTURES (INCLUDING BLOOD AND URINE):  No lab results found in last 7 days.    No results found for this or any previous visit (from the past 24 hour(s)).    Medications   All medications were reviewed.    Infusions:    Scheduled Medications:   fluticasone-vilanterol  1 puff Inhalation Daily    And    umeclidinium  1 puff Inhalation Daily    ipratropium - albuterol 0.5 mg/2.5 mg/3 mL  3 mL Nebulization 4x Daily    levofloxacin  500 mg Oral Daily    methylPREDNISolone sodium succinate  37.5 mg Intravenous Q8H    sodium chloride (PF)  3 mL Intracatheter Q8H     PRN Medications:  acetaminophen **OR** acetaminophen, albuterol, guaiFENesin, ipratropium - albuterol 0.5 mg/2.5 mg/3 mL, lidocaine 4%, lidocaine (buffered or not buffered), melatonin, nicotine, ondansetron **OR** ondansetron, polyethylene glycol, prochlorperazine **OR** prochlorperazine **OR** prochlorperazine, senna-docusate **OR** senna-docusate, sodium chloride (PF)

## 2023-08-20 NOTE — PLAN OF CARE
Goal Outcome Evaluation:    A&Ox4, VSs on room air, up independently in room, pt educated to call if feeling dizzy/lightheaded. Voiding adequately, denies pain. Dyspnea on exertion. Continue with plan of care.

## 2023-08-20 NOTE — UTILIZATION REVIEW
"      Admission Status; Secondary Review Determination         Under the authority of the Utilization Management Committee, the utilization review process indicated a secondary review on the above patient.  The review outcome is based on review of the medical records, discussions with staff, and applying clinical experience noted on the date of the review.          (x) Observation Status Appropriate - This patient does not meet hospital inpatient criteria and is placed in observation status. If this patient's primary payer is Medicare and was admitted as an inpatient, Condition Code 44 should be used and patient status changed to \"observation\".     RATIONALE FOR DETERMINATION         67-year-old woman, smoker, with COPD presents to emergency room with dyspnea, cough and yellow sputum and she was found hypotensive, tachycardic with a heart rate in 130s, tachypneic with respiration rate in the 30s.  POx on room air was 94%.  The chest x-ray was negative for acute findings.  Admission CBC in normal range.  White blood cells elevated today at 15.2, likely secondary to steroids   CMP in normal range.  The patient was diagnosed with acute bronchitis\" COPD and she was started on methylprednisolone, Levaquin p.o. and nebulizer treatment.  The patient maintains  POx of 95% in room air  Kurt Cheema  The severity of illness, intensity of service provided, expected LOS and risk for adverse outcome make the care appropriate for further observation; however, doesn't meet criteria for hospital inpatient admission. Dr. Kurt Cheema  notified of this determination.    This document was produced using voice recognition software.      The information on this document is developed by the utilization review team in order for the business office to ensure compliance.  This only denotes the appropriateness of proper admission status and does not reflect the quality of care rendered.         The definitions of Inpatient Status and " Observation Status used in making the determination above are those provided in the CMS Coverage Manual, Chapter 1 and Chapter 6, section 70.4.      Sincerely,     KIANA ROBERSON MD   Utilization Review  Physician Advisor  [facility]

## 2023-08-21 LAB
BASOPHILS # BLD AUTO: 0 10E3/UL (ref 0–0.2)
BASOPHILS NFR BLD AUTO: 0 %
EOSINOPHIL # BLD AUTO: 0 10E3/UL (ref 0–0.7)
EOSINOPHIL NFR BLD AUTO: 0 %
ERYTHROCYTE [DISTWIDTH] IN BLOOD BY AUTOMATED COUNT: 14.9 % (ref 10–15)
HCT VFR BLD AUTO: 41.2 % (ref 35–47)
HGB BLD-MCNC: 13.4 G/DL (ref 11.7–15.7)
IMM GRANULOCYTES # BLD: 0.1 10E3/UL
IMM GRANULOCYTES NFR BLD: 1 %
LYMPHOCYTES # BLD AUTO: 0.9 10E3/UL (ref 0.8–5.3)
LYMPHOCYTES NFR BLD AUTO: 7 %
MCH RBC QN AUTO: 31.2 PG (ref 26.5–33)
MCHC RBC AUTO-ENTMCNC: 32.5 G/DL (ref 31.5–36.5)
MCV RBC AUTO: 96 FL (ref 78–100)
MONOCYTES # BLD AUTO: 0.2 10E3/UL (ref 0–1.3)
MONOCYTES NFR BLD AUTO: 1 %
NEUTROPHILS # BLD AUTO: 12.8 10E3/UL (ref 1.6–8.3)
NEUTROPHILS NFR BLD AUTO: 91 %
NRBC # BLD AUTO: 0 10E3/UL
NRBC BLD AUTO-RTO: 0 /100
PLATELET # BLD AUTO: 333 10E3/UL (ref 150–450)
RBC # BLD AUTO: 4.3 10E6/UL (ref 3.8–5.2)
WBC # BLD AUTO: 14 10E3/UL (ref 4–11)

## 2023-08-21 PROCEDURE — 250N000011 HC RX IP 250 OP 636: Mod: JZ | Performed by: INTERNAL MEDICINE

## 2023-08-21 PROCEDURE — 99232 SBSQ HOSP IP/OBS MODERATE 35: CPT | Performed by: INTERNAL MEDICINE

## 2023-08-21 PROCEDURE — 85004 AUTOMATED DIFF WBC COUNT: CPT | Performed by: INTERNAL MEDICINE

## 2023-08-21 PROCEDURE — 96376 TX/PRO/DX INJ SAME DRUG ADON: CPT

## 2023-08-21 PROCEDURE — 250N000013 HC RX MED GY IP 250 OP 250 PS 637: Performed by: INTERNAL MEDICINE

## 2023-08-21 PROCEDURE — G0378 HOSPITAL OBSERVATION PER HR: HCPCS

## 2023-08-21 PROCEDURE — 36415 COLL VENOUS BLD VENIPUNCTURE: CPT | Performed by: INTERNAL MEDICINE

## 2023-08-21 PROCEDURE — 250N000009 HC RX 250: Performed by: INTERNAL MEDICINE

## 2023-08-21 PROCEDURE — 999N000157 HC STATISTIC RCP TIME EA 10 MIN

## 2023-08-21 PROCEDURE — 94640 AIRWAY INHALATION TREATMENT: CPT

## 2023-08-21 RX ADMIN — FLUTICASONE FUROATE AND VILANTEROL TRIFENATATE 1 PUFF: 100; 25 POWDER RESPIRATORY (INHALATION) at 08:42

## 2023-08-21 RX ADMIN — UMECLIDINIUM 1 PUFF: 62.5 AEROSOL, POWDER ORAL at 08:42

## 2023-08-21 RX ADMIN — METHYLPREDNISOLONE SODIUM SUCCINATE 62.5 MG: 125 INJECTION, POWDER, FOR SOLUTION INTRAMUSCULAR; INTRAVENOUS at 13:53

## 2023-08-21 RX ADMIN — IPRATROPIUM BROMIDE AND ALBUTEROL SULFATE 3 ML: .5; 3 SOLUTION RESPIRATORY (INHALATION) at 08:01

## 2023-08-21 RX ADMIN — IPRATROPIUM BROMIDE AND ALBUTEROL SULFATE 3 ML: .5; 3 SOLUTION RESPIRATORY (INHALATION) at 11:56

## 2023-08-21 RX ADMIN — IPRATROPIUM BROMIDE AND ALBUTEROL SULFATE 3 ML: .5; 3 SOLUTION RESPIRATORY (INHALATION) at 18:38

## 2023-08-21 RX ADMIN — IPRATROPIUM BROMIDE AND ALBUTEROL SULFATE 3 ML: .5; 3 SOLUTION RESPIRATORY (INHALATION) at 15:57

## 2023-08-21 RX ADMIN — METHYLPREDNISOLONE SODIUM SUCCINATE 62.5 MG: 125 INJECTION, POWDER, FOR SOLUTION INTRAMUSCULAR; INTRAVENOUS at 05:45

## 2023-08-21 RX ADMIN — LEVOFLOXACIN 500 MG: 250 TABLET, FILM COATED ORAL at 08:42

## 2023-08-21 RX ADMIN — METHYLPREDNISOLONE SODIUM SUCCINATE 62.5 MG: 125 INJECTION, POWDER, FOR SOLUTION INTRAMUSCULAR; INTRAVENOUS at 21:06

## 2023-08-21 ASSESSMENT — ACTIVITIES OF DAILY LIVING (ADL)
ADLS_ACUITY_SCORE: 20

## 2023-08-21 NOTE — PROGRESS NOTES
[facility]    Internal Medicine Hospitalist Progress Note  08/21/2023  I evaluated patient on the above date.    Kurt Cheema Jr., MD  [phone] (p)  Text Page  Vocera        Assessment & Plan New actions/orders today (08/21/2023) are underlined. All lab results in the assessment and plan were reviewed.    Ms. Tammie Zeng is a 68 yo female with history including COPD, who presented 8/19/2023 with dyspnea.      On initial evaluation, patient was afebrile, hypertensive, heart rate in 130s, respirate 30, O2 saturations were 94% on room air. EKG showed sinus rhythm without acute ischemic changes. Laboratory evaluation notable for BMP with bicarbonate 21; CBC unremarkable; LFTs normal; lactic acid normal; procalcitonin 0.09; N-terminal proBNP 122 and troponin 9;  d-dimer negative; COVID-19, influenza and RSV testing were negative.  Chest x-ray showed no acute findings.      Acute COPD exacerbation.  Suspected acute bacterial bronchitis contributing to above.  Tobacco use disorder.  * Initial presentation as above. Patient was given multiple nebulizers in the ER. She was also given IV methylprednisolone.  Started on IV steroids, nebs and levofloxacin on admit..  * On 8/20, still very dyspneic with little activity. Still wheezing. IV steroid dose increased.  * On 8/21, improved.  - Continue IV methylprednisolone 60 mg q8h.  - Continue levofloxacin (started 8/19).  - Continue ipratropium-albuterol nebs QID and PRN.  - Continue fluticasone-umeclidinium-vilanterol.    Leukocytosis, suspect due to steroids.  * WBC normal on admit.  * WBC 15.2 on 8/20.  Recent Labs   Lab 08/21/23  0759 08/20/23  1006 08/19/23  0839   WBC 14.0* 15.2* 9.9     - Continue to monitor for signs of infection.    Tobacco use disorder.  - Continue PRN nicotine lozenges.  - Strongly recommend tobacco cessation.    Prophylaxis.  - PCD's, ambulation.    Dispo.  - Pending above.    Clinically Significant Risk Factors                                       COVID-19 testing.  COVID-19 PCR Results          4/28/2022    11:22 7/24/2023    12:26 8/19/2023    08:39   COVID-19 PCR Results   SARS CoV2 PCR Negative  Negative  Negative      COVID-19 Antibody Results, Testing for Immunity           No data to display                Diet: Regular Diet Adult      Prophylaxis: PCD's, ambulation.   Royal Catheter:   Not present  Lines:   None     Code Status: Full Code    Disposition Plan   Expected discharge: Tomorrow 8/22 recommended to prior living arrangement pending  continued improvement .  Entered: Kurt Cheema MD 08/21/2023, 4:14 PM         Interval History   Doing better.  However, does not feel well enough to go home yet.  Trying to ambulate more.    -Data reviewed today: I reviewed all new labs and imaging over the last 24 hours. I personally reviewed no images or EKG's today.    Physical Exam    ,   Blood pressure 120/70, pulse 100, temperature 98.3  F (36.8  C), temperature source Oral, resp. rate 18, weight 49 kg (108 lb), last menstrual period 01/24/2006, SpO2 93 %, not currently breastfeeding. O2 Device: None (Room air)    Vitals:    08/19/23 0819   Weight: 49 kg (108 lb)     Vital Signs with Ranges  Temp:  [98  F (36.7  C)-98.6  F (37  C)] 98.3  F (36.8  C)  Pulse:  [] 100  Resp:  [16-18] 18  BP: ()/(66-76) 120/70  SpO2:  [93 %-96 %] 93 %  Patient Vitals for the past 24 hrs:   BP Temp Temp src Pulse Resp SpO2   08/21/23 1557 -- -- -- -- -- 93 %   08/21/23 1552 120/70 98.3  F (36.8  C) Oral 100 18 93 %   08/21/23 1156 -- -- -- -- -- 93 %   08/21/23 0801 -- -- -- -- -- 94 %   08/21/23 0700 90/66 98  F (36.7  C) Oral 91 16 94 %   08/21/23 0549 -- -- -- -- -- 94 %   08/21/23 0012 119/76 98.6  F (37  C) Oral 80 16 96 %   08/20/23 2020 111/68 98.4  F (36.9  C) Oral 83 18 95 %       I/O's Last 24 hours  I/O last 3 completed shifts:  In: 960 [P.O.:960]  Out: -     Constitutional: Awake, alert, oriented,  pleasant.  Respiratory: Diminished but decreased wheezes with improved air movement. No crackles.  Cardiovascular: RRR, no m/r/g.  GI:   Skin/Integumen:   Other:        Data    Labs reviewed.  Recent Labs   Lab 08/21/23  0759 08/20/23  1006 08/19/23  0839   WBC 14.0* 15.2* 9.9   HGB 13.4 14.2 15.2   MCV 96 95 96    253 259   NA  --  141 136   POTASSIUM  --  4.4 4.3   CHLORIDE  --  104 102   CO2  --  23 21*   BUN  --  9.3 4.9*   CR  --  0.59 0.59   ANIONGAP  --  14 13   MOLLY  --  9.8 9.4   GLC  --  82 112*   ALBUMIN  --   --  3.9   PROTTOTAL  --   --  7.0   BILITOTAL  --   --  0.5   ALKPHOS  --   --  100   ALT  --   --  17   AST  --   --  23       Recent Labs   Lab Test 08/19/23  0839 07/24/23  1149   NT-PROBNP, INPATIENT 122 237   TROPONIN T HIGH SENSITIVITY 9 10       Recent Labs   Lab 08/20/23  1006 08/19/23  0839   GLC 82 112*        No lab results found.     No results for input(s): INR, [id number] in the last 168 hours.  Recent Labs   Lab 08/21/23  0759 08/20/23  1006 08/19/23  0849 08/19/23  0839   WBC 14.0* 15.2*  --  9.9   DD  --   --   --  0.42   LACT  --   --  1.1  --    PCAL  --   --   --  0.09*   [id number]  --   --   --  Negative         MICRO:  CULTURES (INCLUDING BLOOD AND URINE):  No lab results found in last 7 days.    No results found for this or any previous visit (from the past 24 hour(s)).    Medications   All medications were reviewed.    Infusions:    Scheduled Medications:   fluticasone-vilanterol  1 puff Inhalation Daily    And    umeclidinium  1 puff Inhalation Daily    ipratropium - albuterol 0.5 mg/2.5 mg/3 mL  3 mL Nebulization 4x Daily    methylPREDNISolone sodium succinate  62.5 mg Intravenous Q8H    sodium chloride (PF)  3 mL Intracatheter Q8H     PRN Medications:  acetaminophen **OR** acetaminophen, albuterol, guaiFENesin, ipratropium - albuterol 0.5 mg/2.5 mg/3 mL, lidocaine 4%, lidocaine (buffered or not buffered), melatonin, nicotine, ondansetron **OR** ondansetron,  polyethylene glycol, prochlorperazine **OR** prochlorperazine **OR** prochlorperazine, senna-docusate **OR** senna-docusate, sodium chloride (PF)

## 2023-08-21 NOTE — PLAN OF CARE
Goal Outcome Evaluation:       A&OX4. VSS on room air ex. Has dyspnea on exertion. Went on several walks in the halls and tolerated well. Pt reports that SOB is improving but still not good. Sats have been stable. IND in the room, stable with ambulation. Discharge pending.

## 2023-08-21 NOTE — PLAN OF CARE
Goal Outcome Evaluation:      A&Ox4, VSS on room air, O2 sat 93-96% on room air, stand by assist with gait belt and walker, dyspnea on exertion and at rest. Voiding adequately. Denies pain  Mucinex given x1, scheduled Neb treatment. Continue with plan of care.

## 2023-08-22 VITALS
HEART RATE: 84 BPM | RESPIRATION RATE: 16 BRPM | TEMPERATURE: 98.2 F | DIASTOLIC BLOOD PRESSURE: 72 MMHG | SYSTOLIC BLOOD PRESSURE: 107 MMHG | BODY MASS INDEX: 19.62 KG/M2 | WEIGHT: 108 LBS | OXYGEN SATURATION: 96 %

## 2023-08-22 PROCEDURE — G0378 HOSPITAL OBSERVATION PER HR: HCPCS

## 2023-08-22 PROCEDURE — 96376 TX/PRO/DX INJ SAME DRUG ADON: CPT

## 2023-08-22 PROCEDURE — 999N000157 HC STATISTIC RCP TIME EA 10 MIN

## 2023-08-22 PROCEDURE — 94640 AIRWAY INHALATION TREATMENT: CPT

## 2023-08-22 PROCEDURE — 250N000011 HC RX IP 250 OP 636: Mod: JZ | Performed by: INTERNAL MEDICINE

## 2023-08-22 PROCEDURE — 99239 HOSP IP/OBS DSCHRG MGMT >30: CPT | Performed by: INTERNAL MEDICINE

## 2023-08-22 PROCEDURE — 250N000009 HC RX 250: Performed by: INTERNAL MEDICINE

## 2023-08-22 RX ORDER — PREDNISONE 10 MG/1
TABLET ORAL
Qty: 20 TABLET | Refills: 0 | Status: SHIPPED | OUTPATIENT
Start: 2023-08-22 | End: 2024-02-19

## 2023-08-22 RX ORDER — IPRATROPIUM BROMIDE AND ALBUTEROL SULFATE 2.5; .5 MG/3ML; MG/3ML
3 SOLUTION RESPIRATORY (INHALATION) 4 TIMES DAILY
Qty: 120 ML | Refills: 1 | Status: SHIPPED | OUTPATIENT
Start: 2023-08-22 | End: 2023-08-30

## 2023-08-22 RX ADMIN — FLUTICASONE FUROATE AND VILANTEROL TRIFENATATE 1 PUFF: 100; 25 POWDER RESPIRATORY (INHALATION) at 09:11

## 2023-08-22 RX ADMIN — METHYLPREDNISOLONE SODIUM SUCCINATE 62.5 MG: 125 INJECTION, POWDER, FOR SOLUTION INTRAMUSCULAR; INTRAVENOUS at 04:52

## 2023-08-22 RX ADMIN — IPRATROPIUM BROMIDE AND ALBUTEROL SULFATE 3 ML: .5; 3 SOLUTION RESPIRATORY (INHALATION) at 08:26

## 2023-08-22 RX ADMIN — UMECLIDINIUM 1 PUFF: 62.5 AEROSOL, POWDER ORAL at 09:12

## 2023-08-22 ASSESSMENT — ACTIVITIES OF DAILY LIVING (ADL)
ADLS_ACUITY_SCORE: 20

## 2023-08-22 NOTE — PROGRESS NOTES
[facility]    Internal Medicine Hospitalist Progress Note  08/22/2023  I evaluated patient on the above date.    Kurt Cheema Jr., MD  [phone] (p)  Text Page  Vocera        Assessment & Plan New actions/orders today (08/22/2023) are underlined. All lab results in the assessment and plan were reviewed.    D/C home: see discharge summary for diagnoses.      Disposition Plan   Expected discharge: Today 8/22 recommended to prior living arrangement.  Entered: Kurt Cheema MD 08/22/2023, 8:18 AM         Interval History   Continues to improve.  Tolerating more activity.    -Data reviewed today: I reviewed all new labs and imaging over the last 24 hours. I personally reviewed no images or EKG's today.    Physical Exam    ,   Blood pressure 107/72, pulse 84, temperature 98.2  F (36.8  C), temperature source Oral, resp. rate 16, weight 49 kg (108 lb), last menstrual period 01/24/2006, SpO2 94 %, not currently breastfeeding. O2 Device: None (Room air)    Vitals:    08/19/23 0819   Weight: 49 kg (108 lb)     Vital Signs with Ranges  Temp:  [97.5  F (36.4  C)-98.3  F (36.8  C)] 98.2  F (36.8  C)  Pulse:  [] 84  Resp:  [16-18] 16  BP: (100-128)/(64-72) 107/72  SpO2:  [92 %-94 %] 94 %  Patient Vitals for the past 24 hrs:   BP Temp Temp src Pulse Resp SpO2   08/22/23 0748 107/72 98.2  F (36.8  C) Oral 84 16 94 %   08/22/23 0345 100/64 97.9  F (36.6  C) Oral 87 16 92 %   08/22/23 0029 128/68 97.5  F (36.4  C) Oral 94 18 94 %   08/21/23 1557 -- -- -- -- -- 93 %   08/21/23 1552 120/70 98.3  F (36.8  C) Oral 100 18 93 %   08/21/23 1156 -- -- -- -- -- 93 %       I/O's Last 24 hours  I/O last 3 completed shifts:  In: 240 [P.O.:240]  Out: -     Constitutional: Awake, alert, oriented, pleasant.  Respiratory: Diminished, some wheezes (after walking), no crackles.  Cardiovascular: RRR, no m/r/g.  GI:   Skin/Integumen:   Other:        Data    Labs reviewed.  Recent Labs   Lab 08/21/23  3988  08/20/23  1006 08/19/23  0839   WBC 14.0* 15.2* 9.9   HGB 13.4 14.2 15.2   MCV 96 95 96    253 259   NA  --  141 136   POTASSIUM  --  4.4 4.3   CHLORIDE  --  104 102   CO2  --  23 21*   BUN  --  9.3 4.9*   CR  --  0.59 0.59   ANIONGAP  --  14 13   MOLLY  --  9.8 9.4   GLC  --  82 112*   ALBUMIN  --   --  3.9   PROTTOTAL  --   --  7.0   BILITOTAL  --   --  0.5   ALKPHOS  --   --  100   ALT  --   --  17   AST  --   --  23       Recent Labs   Lab Test 08/19/23  0839 07/24/23  1149   NT-PROBNP, INPATIENT 122 237   TROPONIN T HIGH SENSITIVITY 9 10       Recent Labs   Lab 08/20/23  1006 08/19/23  0839   GLC 82 112*        No lab results found.     No results for input(s): INR, [id number] in the last 168 hours.  Recent Labs   Lab 08/21/23  0759 08/20/23  1006 08/19/23  0849 08/19/23  0839   WBC 14.0* 15.2*  --  9.9   DD  --   --   --  0.42   LACT  --   --  1.1  --    PCAL  --   --   --  0.09*   [id number]  --   --   --  Negative         MICRO:  CULTURES (INCLUDING BLOOD AND URINE):  No lab results found in last 7 days.    No results found for this or any previous visit (from the past 24 hour(s)).    Medications   All medications were reviewed.    Infusions:    Scheduled Medications:   fluticasone-vilanterol  1 puff Inhalation Daily    And    umeclidinium  1 puff Inhalation Daily    ipratropium - albuterol 0.5 mg/2.5 mg/3 mL  3 mL Nebulization 4x Daily    methylPREDNISolone sodium succinate  62.5 mg Intravenous Q8H    sodium chloride (PF)  3 mL Intracatheter Q8H     PRN Medications:  acetaminophen **OR** acetaminophen, albuterol, guaiFENesin, ipratropium - albuterol 0.5 mg/2.5 mg/3 mL, lidocaine 4%, lidocaine (buffered or not buffered), melatonin, nicotine, ondansetron **OR** ondansetron, polyethylene glycol, prochlorperazine **OR** prochlorperazine **OR** prochlorperazine, senna-docusate **OR** senna-docusate, sodium chloride (PF)

## 2023-08-22 NOTE — PROGRESS NOTES
0300- 11:25 am    Patient vital signs are at baseline: Yes  Patient able to ambulate as they were prior to admission or with assist devices provided by therapies during their stay:  Yes  Patient MUST void prior to discharge:  Yes  Patient able to tolerate oral intake:  Yes  Pain has adequate pain control using Oral analgesics:  Yes  Does patient have an identified :  Yes  Has goal D/C date and time been discussed with patient:  Yes     Pt discharged home with family. Discharge papers and medications were reviewed with patient.

## 2023-08-22 NOTE — DISCHARGE SUMMARY
[facility]  Discharge Summary        Tammie Zeng MRN# [id number]   YOB: 1955 Age: 67 year old     Date of Admission: 8/19/2023  Date of Discharge: 8/22/2023  Admitting Physician: Kurt Cheema MD  Discharge Physician: Kurt Cheema MD     Primary Provider: Mikel Navarro  Primary Care Physician Phone Number: [phone]         Discharge Diagnoses:   1. Acute COPD exacerbation.  2. Suspected acute bacterial bronchitis contributing to above.  3. Leukocytosis, suspect due to steroids.  4. Tobacco use disorder.       Allergies:         Allergies   Allergen Reactions    Pcn [Penicillins]      convulsions           Discharge Medications:        Current Discharge Medication List        START taking these medications    Details   predniSONE (DELTASONE) 10 MG tablet 4 tabs daily for 2 days, then 3 tabs daily for 2 days, then 2 tabs daily for 2 days, then 1 tab daily for 2 days, then stop  Qty: 20 tablet, Refills: 0    Associated Diagnoses: COPD exacerbation (H)           CONTINUE these medications which have CHANGED    Details   ipratropium - albuterol 0.5 mg/2.5 mg/3 mL (DUONEB) 0.5-2.5 (3) MG/3ML neb solution Take 1 vial (3 mLs) by nebulization 4 times daily ; also every 4h PRN for dyspnea and wheezing; can change back to PRN only once breathing at baseline.  Qty: 120 mL, Refills: 1    Associated Diagnoses: COPD exacerbation (H)           CONTINUE these medications which have NOT CHANGED    Details   albuterol (PROAIR HFA/PROVENTIL HFA/VENTOLIN HFA) 108 (90 Base) MCG/ACT inhaler Inhale 2 puffs into the lungs every 4 hours as needed for wheezing or shortness of breath  Qty: 18 g, Refills: 3    Comments: Pharmacy may dispense brand covered by insurance (Proair, or proventil or ventolin or generic albuterol inhaler)  Associated Diagnoses: Chronic obstructive pulmonary disease, unspecified COPD type (H)      aspirin (ASA) 325 MG EC tablet Take 325 mg by mouth  every 6 hours as needed for moderate pain      Fluticasone-Umeclidin-Vilant (TRELEGY ELLIPTA) 100-62.5-25 MCG/ACT oral inhaler Inhale 1 puff into the lungs daily  Qty: 3 each, Refills: 3    Associated Diagnoses: Chronic obstructive pulmonary disease, unspecified COPD type (H)      guaiFENesin (MUCINEX) 600 MG 12 hr tablet Take 1,200 mg by mouth 2 times daily as needed for congestion      nicotine (COMMIT) 2 MG lozenge Place 1 lozenge (2 mg) inside cheek every hour as needed for smoking cessation  Qty: 48 lozenge, Refills: 0    Associated Diagnoses: Tobacco abuse                 Discharge Instructions and Follow-Up:      Discharge Orders      Follow-up and recommended labs and tests     Follow-up with primary care provider, Mikel Navarro, within 7 days for hospital follow- up.     Activity    Your activity upon discharge: activity as tolerated     Reason for your hospital stay    1. Acute COPD exacerbation.  2. Suspected acute bacterial bronchitis contributing to above.  3. Leukocytosis, suspect due to steroids.  4. Tobacco use disorder.     Diet    Follow this diet upon discharge: Orders Placed This Encounter      Regular Diet Adult             Consultations This Hospital Stay:      None        Admission History:      Please see the H&P by Kurt Cheema MD on 8/19/2023 for complete details. Briefly, Ms. Tammie Zeng is a 66 yo female with history including COPD, who presented 8/19/2023 with dyspnea.     On initial evaluation, patient was afebrile, hypertensive, heart rate in 130s, respirate 30, O2 saturations were 94% on room air. EKG showed sinus rhythm without acute ischemic changes. Laboratory evaluation notable for BMP with bicarbonate 21; CBC unremarkable; LFTs normal; lactic acid normal; procalcitonin 0.09; N-terminal proBNP 122 and troponin 9;  d-dimer negative; COVID-19, influenza and RSV testing were negative.  Chest x-ray showed no acute findings.        Problem Oriented Hospital Course:         Acute COPD exacerbation.  Suspected acute bacterial bronchitis contributing to above.  Tobacco use disorder.  * Initial presentation as above. Patient was given multiple nebulizers in the ER. She was also given IV methylprednisolone.  Started on IV steroids, nebs and levofloxacin on admit..  * On 8/20, still very dyspneic with little activity. Still wheezing. IV steroid dose increased.  * On 8/21, improved. Completed course of levofloxacin.  - Discharge on prednisone taper.  - Continue ipratropium-albuterol nebs QID and PRN.  - Continue fluticasone-vilanterol and umeclidinium.    Leukocytosis, suspect due to steroids.  * WBC normal on admit.  * WBC 15.2 on 8/20.  Recent Labs   Lab 08/21/23  0759 08/20/23  1006 08/19/23  0839   WBC 14.0* 15.2* 9.9   - Continue to monitor for signs of infection.    Tobacco use disorder.  - Continue PRN nicotine lozenges.  - Strongly recommend tobacco cessation.        Pending Results:        Unresulted Labs Ordered in the Past 30 Days of this Admission       No orders found from 7/20/2023 to 8/20/2023.                  Discharge Disposition:      Discharged to home.        Discharge Time:      Approximately 30 minutes.        Condition and Physical on Discharge:    See progress note on the same date as this discharge summary.          Key Imaging Studies, Lab Findings and Procedures/Surgeries:        Results for orders placed or performed during the hospital encounter of 08/19/23   XR Chest 2 Views    Narrative    EXAM: XR CHEST 2 VIEWS  LOCATION: [facility]  DATE: 8/19/2023    INDICATION: dyspnea  COMPARISON: 07/24/2023      Impression    IMPRESSION: Heart is normal in size. Lungs are hyperinflated compatible with COPD. Lungs are otherwise clear.

## 2023-08-22 NOTE — PLAN OF CARE
Goal Outcome Evaluation:             [facility]:  08/21-08/22. [id number]    Patient is alert and oriented  X4. VSS on RA with good sat of 93%. Independent in the room. On regular diet with good appetite. PIV saline locked. Patient denies pain. Dyspnea on exertion at rest.  On scheduled Neb treatment with effective results. Continent of B&B. Possible discharge home today per MD noted.

## 2023-08-24 ENCOUNTER — PATIENT OUTREACH (OUTPATIENT)
Dept: CARE COORDINATION | Facility: CLINIC | Age: 68
End: 2023-08-24
Payer: COMMERCIAL

## 2023-08-24 NOTE — PROGRESS NOTES
Refill Approved    Rx renewed per Medication Renewal Policy. Medication was last renewed on 6/18/19.    Alis Hazel, [facility] Triage/Med Refill 10/28/2019     Requested Prescriptions   Pending Prescriptions Disp Refills     atenolol (TENORMIN) 25 MG tablet [Pharmacy Med Name: Atenolol Oral Tablet 25 MG] 90 tablet 0     Sig: TAKE 1 TABLET (25 MG) BY MOUTH ONCE A DAY       Beta-Blockers Refill Protocol Passed - 10/28/2019  9:55 AM        Passed - PCP or prescribing provider visit in past 12 months or next 3 months     Last office visit with prescriber/PCP: 10/14/2019 Saad Marcelo MD OR same dept: 10/14/2019 Saad Marcelo MD OR same specialty: 10/14/2019 Saad Marcelo MD  Last physical: 10/22/2018 Last MTM visit: Visit date not found   Next visit within 3 mo: Visit date not found  Next physical within 3 mo: Visit date not found  Prescriber OR PCP: Saad Marcelo MD  Last diagnosis associated with med order: 1. HTN (hypertension)  - atenolol (TENORMIN) 25 MG tablet [Pharmacy Med Name: Atenolol Oral Tablet 25 MG]; TAKE 1 TABLET (25 MG) BY MOUTH ONCE A DAY  Dispense: 90 tablet; Refill: 0    If protocol passes may refill for 12 months if within 3 months of last provider visit (or a total of 15 months).             Passed - Blood pressure filed in past 12 months     BP Readings from Last 1 Encounters:   10/14/19 114/78                          [facility] Contact  [facility]/Voicemail     Clinical Data: Post-Discharge Outreach     Outreach attempted x 2.  Left message on patient's voicemail, providing [facility]'s 24/7 scheduling and nurse triage phone number [phone] ([phone]) for questions/concerns and/or to schedule an appt with an [facility] provider, if they do not have a PCP.      Plan:  [facility] will do no further outreaches at this time.       Donita Brooks  Community Health Worker  [facility], [facility]  [phone]      *Connected Care Resource Team does NOT follow patient ongoing. Referrals are identified based on internal discharge reports and the outreach is to ensure patient has an understanding of their discharge instructions.

## 2023-08-29 NOTE — PROGRESS NOTES
Tia Cho is a 67 year old, presenting for the following health issues:  Hospital F/U    Her dyspnea recurred the day after she went out to mow the lawn  The next day she had a lot of congestion   Same as in July  Tomorrow her son in law is going to mow for her.  Today is her last day on the prednisone taper  She is feeling more tired than usual but her breathing has improved about 60-70%  She is not sleeping well  She stopped her Duoneb yesterday  She has been able to cough up some clear sputum  She is compliant with her Trelegy  She is trying to quit smoking and last cigarette was 3 days ago  Using the nicotine lozenges and occas vapes  She has been able cut back on etoh  May have one drink every couple of days  She is scheduled to see pulm Sept 5th at [facility]    [facility]   Hospital course as follows:       Acute COPD exacerbation.  Suspected acute bacterial bronchitis contributing to above.  Tobacco use disorder.  * Initial presentation as above. Patient was given multiple nebulizers in the ER. She was also given IV methylprednisolone.  Started on IV steroids, nebs and levofloxacin on admit..  * On 8/20, still very dyspneic with little activity. Still wheezing. IV steroid dose increased.  * On 8/21, improved. Completed course of levofloxacin.  - Discharge on prednisone taper.  - Continue ipratropium-albuterol nebs QID and PRN.  - Continue fluticasone-vilanterol and umeclidinium.     Leukocytosis, suspect due to steroids.  * WBC normal on admit.  * WBC 15.2 on 8/20.    Hospital Follow-up Visit:    Hospital/Nursing Home/IP Rehab Facility: [facility]  Date of Admission: 08/19/2022  Date of Discharge: 08/22/2023  Reason(s) for Admission:     Acute COPD exacerbation.  Suspected acute bacterial bronchitis contributing to above.  Leukocytosis, suspect due to steroids.  Tobacco use disorder.    Was your hospitalization related to COVID-19? No   Problems taking medications  regularly:  None  Medication changes since discharge: None  Problems adhering to non-medication therapy:  None    Summary of hospitalization:  [facility] discharge summary reviewed  Diagnostic Tests/Treatments reviewed.  Follow up needed: pulm  Other Healthcare Providers Involved in Patient s Care:         Specialist appointment - noted  Update since discharge: improved.         Plan of care communicated with patient and family             Past Medical History:   Diagnosis Date    Chronic obstructive pulmonary disease, unspecified COPD type (H) 06/13/2017    has seen [facility], fev1 under 1.0    Depressive disorder, not elsewhere classified 05/2006    following sudden death of her mother    Elevated LFTs 2023    hepatic steatosis 4/23 us    Generalized anxiety disorder     Hepatic steatosis 04/2023    on us done for elev lfts    Herpes simplex without mention of complication     MOUTH    Nephrolithiasis     TOBACCO ABUSE-CONTINUOUS      Past Surgical History:   Procedure Laterality Date    CHOLECYSTECTOMY, LAPOROSCOPIC  1980's    done at [facility]    TUBAL LIGATION  1998     Social History     Tobacco Use    Smoking status: Every Day     Packs/day: 0.50     Years: 25.00     Pack years: 12.50     Types: Cigarettes    Smokeless tobacco: Never    Tobacco comments:     states is cutting back - smokes 1/2 ppd (01/03/11)   Substance Use Topics    Alcohol use: Yes     Alcohol/week: 0.0 standard drinks of alcohol     Comment: 3 mixed drinks per week     Current Outpatient Medications   Medication Sig Dispense Refill    albuterol (PROAIR HFA/PROVENTIL HFA/VENTOLIN HFA) 108 (90 Base) MCG/ACT inhaler Inhale 2 puffs into the lungs every 4 hours as needed for wheezing or shortness of breath 18 g 3    aspirin (ASA) 325 MG EC tablet Take 325 mg by mouth every 6 hours as needed for moderate pain      Fluticasone-Umeclidin-Vilant (TRELEGY ELLIPTA) 100-62.5-25 MCG/ACT oral inhaler Inhale 1 puff into the lungs daily  "3 each 3    guaiFENesin (MUCINEX) 600 MG 12 hr tablet Take 1,200 mg by mouth 2 times daily as needed for congestion      ipratropium - albuterol 0.5 mg/2.5 mg/3 mL (DUONEB) 0.5-2.5 (3) MG/3ML neb solution Take 1 vial (3 mLs) by nebulization 4 times daily ; also every 4h PRN for dyspnea and wheezing; can change back to PRN only once breathing at baseline. 120 mL 1    nicotine (COMMIT) 2 MG lozenge Place 1 lozenge (2 mg) inside cheek every hour as needed for smoking cessation 48 lozenge 0    predniSONE (DELTASONE) 10 MG tablet 4 tabs daily for 2 days, then 3 tabs daily for 2 days, then 2 tabs daily for 2 days, then 1 tab daily for 2 days, then stop 20 tablet 0     Allergies   Allergen Reactions    Pcn [Penicillins]      convulsions       PHYSICAL EXAM:    /73 (BP Location: Left arm, Patient Position: Sitting, Cuff Size: Adult Regular)   Pulse 78   Temp 96.9  F (36.1  C) (Temporal)   Resp 24   Ht 1.58 m (5' 2.2\")   Wt 50.3 kg (111 lb)   LMP 01/24/2006   SpO2 96%   BMI 20.17 kg/m      Patient appears non toxic  Lungs: distant with coarse wheeze on forced expiration that clears with cough  Heart: RRR  Extr: no edema    Assessment and Plan:     (J44.1) COPD exacerbation (H)  (primary encounter diagnosis)  Comment: her last dose of prednisone is today.  She stopped her Duonebs, but recd she restart as still has wheezing.  Gave her a back up rx for prednisone in case sxs worsen over the w/e. She does have appt with [facility] next week. She is working hard to quit smoking.    Plan: ipratropium - albuterol 0.5 mg/2.5 mg/3 mL         (DUONEB) 0.5-2.5 (3) MG/3ML neb solution,         predniSONE (DELTASONE) 20 MG tablet        Bid \"just in case\" provided    (Z72.0) Tobacco use  Comment:   Plan: she is trying to quit and using nicotine lozenges and Loon vape    (Z78.9) Alcohol use  Comment:   Plan: she has been able to cut down to a drink every few days      Parris rGimes PA-C      "

## 2023-08-30 ENCOUNTER — OFFICE VISIT (OUTPATIENT)
Dept: FAMILY MEDICINE | Facility: CLINIC | Age: 68
End: 2023-08-30
Payer: COMMERCIAL

## 2023-08-30 VITALS
HEART RATE: 78 BPM | TEMPERATURE: 96.9 F | HEIGHT: 62 IN | RESPIRATION RATE: 24 BRPM | WEIGHT: 111 LBS | DIASTOLIC BLOOD PRESSURE: 73 MMHG | BODY MASS INDEX: 20.43 KG/M2 | SYSTOLIC BLOOD PRESSURE: 112 MMHG | OXYGEN SATURATION: 96 %

## 2023-08-30 DIAGNOSIS — Z78.9 ALCOHOL USE: ICD-10-CM

## 2023-08-30 DIAGNOSIS — Z72.0 TOBACCO USE: ICD-10-CM

## 2023-08-30 DIAGNOSIS — J44.1 COPD EXACERBATION (H): Primary | ICD-10-CM

## 2023-08-30 PROCEDURE — 99214 OFFICE O/P EST MOD 30 MIN: CPT | Performed by: PHYSICIAN ASSISTANT

## 2023-08-30 RX ORDER — IPRATROPIUM BROMIDE AND ALBUTEROL SULFATE 2.5; .5 MG/3ML; MG/3ML
3 SOLUTION RESPIRATORY (INHALATION) 4 TIMES DAILY
Qty: 120 ML | Refills: 1 | Status: SHIPPED | OUTPATIENT
Start: 2023-08-30 | End: 2023-11-22

## 2023-08-30 RX ORDER — PREDNISONE 20 MG/1
20 TABLET ORAL 2 TIMES DAILY
Qty: 14 TABLET | Refills: 0 | Status: SHIPPED | OUTPATIENT
Start: 2023-08-30 | End: 2024-02-19

## 2023-08-30 ASSESSMENT — PAIN SCALES - GENERAL: PAINLEVEL: NO PAIN (0)

## 2023-09-05 ENCOUNTER — TRANSFERRED RECORDS (OUTPATIENT)
Dept: HEALTH INFORMATION MANAGEMENT | Facility: CLINIC | Age: 68
End: 2023-09-05
Payer: COMMERCIAL

## 2023-11-22 DIAGNOSIS — J44.1 COPD EXACERBATION (H): ICD-10-CM

## 2023-11-22 RX ORDER — IPRATROPIUM BROMIDE AND ALBUTEROL SULFATE 2.5; .5 MG/3ML; MG/3ML
SOLUTION RESPIRATORY (INHALATION)
Qty: 120 ML | Refills: 1 | Status: SHIPPED | OUTPATIENT
Start: 2023-11-22 | End: 2023-12-13

## 2023-11-22 NOTE — TELEPHONE ENCOUNTER
Prescription approved per [facility] Refill Protocol.  Jackelyn Miller, RN  [facility] Triage Nurse

## 2023-12-13 DIAGNOSIS — J44.1 COPD EXACERBATION (H): ICD-10-CM

## 2023-12-13 RX ORDER — IPRATROPIUM BROMIDE AND ALBUTEROL SULFATE 2.5; .5 MG/3ML; MG/3ML
SOLUTION RESPIRATORY (INHALATION)
Qty: 90 ML | Refills: 0 | Status: SHIPPED | OUTPATIENT
Start: 2023-12-13 | End: 2023-12-20

## 2023-12-19 ENCOUNTER — MEDICAL CORRESPONDENCE (OUTPATIENT)
Dept: SCHEDULING | Facility: CLINIC | Age: 68
End: 2023-12-19
Payer: COMMERCIAL

## 2023-12-20 DIAGNOSIS — J44.1 COPD EXACERBATION (H): ICD-10-CM

## 2023-12-20 RX ORDER — IPRATROPIUM BROMIDE AND ALBUTEROL SULFATE 2.5; .5 MG/3ML; MG/3ML
SOLUTION RESPIRATORY (INHALATION)
Qty: 90 ML | Refills: 0 | Status: SHIPPED | OUTPATIENT
Start: 2023-12-20 | End: 2024-02-15

## 2023-12-20 NOTE — TELEPHONE ENCOUNTER
Medication filled 1 time as pt is due for a follow-up in clinic. Pharmacy has been notified to inform patient to call clinic and schedule appointment.    Prescription approved per [facility] Refill Protocol.  Jackelyn Miller RN  [facility] Triage Nurse

## 2023-12-26 ENCOUNTER — TRANSCRIBE ORDERS (OUTPATIENT)
Dept: OTHER | Age: 68
End: 2023-12-26

## 2023-12-26 DIAGNOSIS — J44.9 COPD, SEVERE (H): Primary | ICD-10-CM

## 2024-01-01 ENCOUNTER — HOSPITAL ENCOUNTER (INPATIENT)
Facility: CLINIC | Age: 69
LOS: 3 days | End: 2024-12-02
Attending: EMERGENCY MEDICINE | Admitting: INTERNAL MEDICINE
Payer: COMMERCIAL

## 2024-01-01 ENCOUNTER — APPOINTMENT (OUTPATIENT)
Dept: GENERAL RADIOLOGY | Facility: CLINIC | Age: 69
End: 2024-01-01
Attending: EMERGENCY MEDICINE
Payer: COMMERCIAL

## 2024-01-01 ENCOUNTER — APPOINTMENT (OUTPATIENT)
Dept: CT IMAGING | Facility: CLINIC | Age: 69
End: 2024-01-01
Attending: HOSPITALIST
Payer: COMMERCIAL

## 2024-01-01 VITALS
OXYGEN SATURATION: 97 % | RESPIRATION RATE: 12 BRPM | DIASTOLIC BLOOD PRESSURE: 67 MMHG | TEMPERATURE: 97.8 F | HEART RATE: 100 BPM | SYSTOLIC BLOOD PRESSURE: 102 MMHG

## 2024-01-01 DIAGNOSIS — J44.1 COPD EXACERBATION (H): ICD-10-CM

## 2024-01-01 LAB
ALBUMIN SERPL BCG-MCNC: 4.1 G/DL (ref 3.5–5.2)
ALP SERPL-CCNC: 84 U/L (ref 40–150)
ALT SERPL W P-5'-P-CCNC: 9 U/L (ref 0–50)
ANION GAP SERPL CALCULATED.3IONS-SCNC: 14 MMOL/L (ref 7–15)
AST SERPL W P-5'-P-CCNC: 22 U/L (ref 0–45)
ATRIAL RATE - MUSE: 118 BPM
B-OH-BUTYR SERPL-SCNC: 0.49 MMOL/L
BASE EXCESS BLDV CALC-SCNC: -0.4 MMOL/L (ref -3–3)
BASE EXCESS BLDV CALC-SCNC: -2.8 MMOL/L (ref -3–3)
BASE EXCESS BLDV CALC-SCNC: -3.2 MMOL/L (ref -3–3)
BASE EXCESS BLDV CALC-SCNC: 0 MMOL/L (ref -3–3)
BASOPHILS # BLD AUTO: 0.1 10E3/UL (ref 0–0.2)
BASOPHILS NFR BLD AUTO: 1 %
BILIRUB SERPL-MCNC: 0.3 MG/DL
BUN SERPL-MCNC: 6.4 MG/DL (ref 8–23)
CALCIUM SERPL-MCNC: 9.1 MG/DL (ref 8.8–10.4)
CHLORIDE SERPL-SCNC: 98 MMOL/L (ref 98–107)
CREAT SERPL-MCNC: 0.64 MG/DL (ref 0.51–0.95)
DIASTOLIC BLOOD PRESSURE - MUSE: NORMAL MMHG
EGFRCR SERPLBLD CKD-EPI 2021: >90 ML/MIN/1.73M2
EOSINOPHIL # BLD AUTO: 0 10E3/UL (ref 0–0.7)
EOSINOPHIL NFR BLD AUTO: 0 %
ERYTHROCYTE [DISTWIDTH] IN BLOOD BY AUTOMATED COUNT: 15.1 % (ref 10–15)
ETHANOL SERPL-MCNC: <0.01 G/DL
FLUAV RNA SPEC QL NAA+PROBE: NEGATIVE
FLUBV RNA RESP QL NAA+PROBE: NEGATIVE
GLUCOSE SERPL-MCNC: 117 MG/DL (ref 70–99)
HCO3 BLDV-SCNC: 25 MMOL/L (ref 21–28)
HCO3 BLDV-SCNC: 27 MMOL/L (ref 21–28)
HCO3 BLDV-SCNC: 28 MMOL/L (ref 21–28)
HCO3 BLDV-SCNC: 28 MMOL/L (ref 21–28)
HCO3 SERPL-SCNC: 24 MMOL/L (ref 22–29)
HCT VFR BLD AUTO: 36.2 % (ref 35–47)
HGB BLD-MCNC: 11 G/DL (ref 11.7–15.7)
HOLD SPECIMEN: NORMAL
IMM GRANULOCYTES # BLD: 0 10E3/UL
IMM GRANULOCYTES NFR BLD: 0 %
INTERPRETATION ECG - MUSE: NORMAL
LACTATE BLD-SCNC: 1.5 MMOL/L
LYMPHOCYTES # BLD AUTO: 1.8 10E3/UL (ref 0.8–5.3)
LYMPHOCYTES NFR BLD AUTO: 18 %
MAGNESIUM SERPL-MCNC: 2.2 MG/DL (ref 1.7–2.3)
MCH RBC QN AUTO: 25.2 PG (ref 26.5–33)
MCHC RBC AUTO-ENTMCNC: 30.4 G/DL (ref 31.5–36.5)
MCV RBC AUTO: 83 FL (ref 78–100)
MONOCYTES # BLD AUTO: 1.3 10E3/UL (ref 0–1.3)
MONOCYTES NFR BLD AUTO: 12 %
NEUTROPHILS # BLD AUTO: 7 10E3/UL (ref 1.6–8.3)
NEUTROPHILS NFR BLD AUTO: 69 %
NRBC # BLD AUTO: 0 10E3/UL
NRBC BLD AUTO-RTO: 0 /100
NT-PROBNP SERPL-MCNC: 266 PG/ML (ref 0–900)
O2/TOTAL GAS SETTING VFR VENT: 25 %
O2/TOTAL GAS SETTING VFR VENT: 35 %
O2/TOTAL GAS SETTING VFR VENT: 35 %
OXYHGB MFR BLDV: 66 % (ref 70–75)
OXYHGB MFR BLDV: 83 % (ref 70–75)
OXYHGB MFR BLDV: 98 % (ref 70–75)
P AXIS - MUSE: 85 DEGREES
PCO2 BLDV: 52 MM HG (ref 40–50)
PCO2 BLDV: 62 MM HG (ref 40–50)
PCO2 BLDV: 64 MM HG (ref 40–50)
PCO2 BLDV: 90 MM HG (ref 40–50)
PH BLDV: 7.11 [PH] (ref 7.32–7.43)
PH BLDV: 7.21 [PH] (ref 7.32–7.43)
PH BLDV: 7.26 [PH] (ref 7.32–7.43)
PH BLDV: 7.32 [PH] (ref 7.32–7.43)
PLATELET # BLD AUTO: 340 10E3/UL (ref 150–450)
PO2 BLDV: 184 MM HG (ref 25–47)
PO2 BLDV: 23 MM HG (ref 25–47)
PO2 BLDV: 41 MM HG (ref 25–47)
PO2 BLDV: 59 MM HG (ref 25–47)
POTASSIUM SERPL-SCNC: 4.4 MMOL/L (ref 3.4–5.3)
PR INTERVAL - MUSE: 158 MS
PROCALCITONIN SERPL IA-MCNC: 0.06 NG/ML
PROT SERPL-MCNC: 7.1 G/DL (ref 6.4–8.3)
QRS DURATION - MUSE: 78 MS
QT - MUSE: 320 MS
QTC - MUSE: 448 MS
R AXIS - MUSE: 58 DEGREES
RBC # BLD AUTO: 4.37 10E6/UL (ref 3.8–5.2)
RSV RNA SPEC NAA+PROBE: NEGATIVE
SAO2 % BLDV: 35 % (ref 70–75)
SAO2 % BLDV: 67.6 % (ref 70–75)
SAO2 % BLDV: 84.1 % (ref 70–75)
SAO2 % BLDV: 99.3 % (ref 70–75)
SARS-COV-2 RNA RESP QL NAA+PROBE: NEGATIVE
SODIUM SERPL-SCNC: 136 MMOL/L (ref 135–145)
SYSTOLIC BLOOD PRESSURE - MUSE: NORMAL MMHG
T AXIS - MUSE: 74 DEGREES
TROPONIN T SERPL HS-MCNC: 10 NG/L
TROPONIN T SERPL HS-MCNC: 12 NG/L
TSH SERPL DL<=0.005 MIU/L-ACNC: 1.41 UIU/ML (ref 0.3–4.2)
VENTRICULAR RATE- MUSE: 118 BPM
WBC # BLD AUTO: 10.2 10E3/UL (ref 4–11)

## 2024-01-01 PROCEDURE — 85025 COMPLETE CBC W/AUTO DIFF WBC: CPT | Performed by: EMERGENCY MEDICINE

## 2024-01-01 PROCEDURE — 36415 COLL VENOUS BLD VENIPUNCTURE: CPT | Performed by: NURSE PRACTITIONER

## 2024-01-01 PROCEDURE — 999N000157 HC STATISTIC RCP TIME EA 10 MIN

## 2024-01-01 PROCEDURE — 99233 SBSQ HOSP IP/OBS HIGH 50: CPT | Performed by: HOSPITALIST

## 2024-01-01 PROCEDURE — 99238 HOSP IP/OBS DSCHRG MGMT 30/<: CPT | Performed by: NURSE PRACTITIONER

## 2024-01-01 PROCEDURE — 80053 COMPREHEN METABOLIC PANEL: CPT | Performed by: EMERGENCY MEDICINE

## 2024-01-01 PROCEDURE — 94660 CPAP INITIATION&MGMT: CPT

## 2024-01-01 PROCEDURE — 87637 SARSCOV2&INF A&B&RSV AMP PRB: CPT | Performed by: EMERGENCY MEDICINE

## 2024-01-01 PROCEDURE — 82077 ASSAY SPEC XCP UR&BREATH IA: CPT | Performed by: EMERGENCY MEDICINE

## 2024-01-01 PROCEDURE — 120N000001 HC R&B MED SURG/OB

## 2024-01-01 PROCEDURE — 93005 ELECTROCARDIOGRAM TRACING: CPT

## 2024-01-01 PROCEDURE — 36415 COLL VENOUS BLD VENIPUNCTURE: CPT | Performed by: EMERGENCY MEDICINE

## 2024-01-01 PROCEDURE — 250N000013 HC RX MED GY IP 250 OP 250 PS 637: Performed by: HOSPITALIST

## 2024-01-01 PROCEDURE — 258N000003 HC RX IP 258 OP 636: Performed by: EMERGENCY MEDICINE

## 2024-01-01 PROCEDURE — 87040 BLOOD CULTURE FOR BACTERIA: CPT | Performed by: EMERGENCY MEDICINE

## 2024-01-01 PROCEDURE — 250N000009 HC RX 250: Performed by: NURSE PRACTITIONER

## 2024-01-01 PROCEDURE — 94640 AIRWAY INHALATION TREATMENT: CPT | Mod: 76

## 2024-01-01 PROCEDURE — 120N000013 HC R&B IMCU

## 2024-01-01 PROCEDURE — 250N000009 HC RX 250: Performed by: HOSPITALIST

## 2024-01-01 PROCEDURE — 71275 CT ANGIOGRAPHY CHEST: CPT

## 2024-01-01 PROCEDURE — 99223 1ST HOSP IP/OBS HIGH 75: CPT | Mod: AI | Performed by: INTERNAL MEDICINE

## 2024-01-01 PROCEDURE — 99285 EMERGENCY DEPT VISIT HI MDM: CPT

## 2024-01-01 PROCEDURE — 250N000013 HC RX MED GY IP 250 OP 250 PS 637: Performed by: NURSE PRACTITIONER

## 2024-01-01 PROCEDURE — 82805 BLOOD GASES W/O2 SATURATION: CPT | Performed by: NURSE PRACTITIONER

## 2024-01-01 PROCEDURE — 99207 PR APP CREDIT; MD BILLING SHARED VISIT: CPT | Performed by: NURSE PRACTITIONER

## 2024-01-01 PROCEDURE — 36415 COLL VENOUS BLD VENIPUNCTURE: CPT | Performed by: HOSPITALIST

## 2024-01-01 PROCEDURE — 96365 THER/PROPH/DIAG IV INF INIT: CPT

## 2024-01-01 PROCEDURE — 250N000011 HC RX IP 250 OP 636: Performed by: EMERGENCY MEDICINE

## 2024-01-01 PROCEDURE — 71045 X-RAY EXAM CHEST 1 VIEW: CPT

## 2024-01-01 PROCEDURE — 250N000011 HC RX IP 250 OP 636: Performed by: HOSPITALIST

## 2024-01-01 PROCEDURE — 250N000011 HC RX IP 250 OP 636: Mod: JZ | Performed by: NURSE PRACTITIONER

## 2024-01-01 PROCEDURE — 83880 ASSAY OF NATRIURETIC PEPTIDE: CPT | Performed by: EMERGENCY MEDICINE

## 2024-01-01 PROCEDURE — 250N000011 HC RX IP 250 OP 636: Performed by: NURSE PRACTITIONER

## 2024-01-01 PROCEDURE — 84484 ASSAY OF TROPONIN QUANT: CPT | Performed by: EMERGENCY MEDICINE

## 2024-01-01 PROCEDURE — 84145 PROCALCITONIN (PCT): CPT | Performed by: HOSPITALIST

## 2024-01-01 PROCEDURE — 82010 KETONE BODYS QUAN: CPT | Performed by: EMERGENCY MEDICINE

## 2024-01-01 PROCEDURE — 250N000009 HC RX 250: Performed by: EMERGENCY MEDICINE

## 2024-01-01 PROCEDURE — 258N000003 HC RX IP 258 OP 636: Performed by: NURSE PRACTITIONER

## 2024-01-01 PROCEDURE — 250N000009 HC RX 250: Performed by: INTERNAL MEDICINE

## 2024-01-01 PROCEDURE — 82803 BLOOD GASES ANY COMBINATION: CPT

## 2024-01-01 PROCEDURE — 82805 BLOOD GASES W/O2 SATURATION: CPT | Performed by: HOSPITALIST

## 2024-01-01 PROCEDURE — 99291 CRITICAL CARE FIRST HOUR: CPT | Performed by: NURSE PRACTITIONER

## 2024-01-01 PROCEDURE — 83735 ASSAY OF MAGNESIUM: CPT | Performed by: NURSE PRACTITIONER

## 2024-01-01 PROCEDURE — 84443 ASSAY THYROID STIM HORMONE: CPT | Performed by: HOSPITALIST

## 2024-01-01 PROCEDURE — 94640 AIRWAY INHALATION TREATMENT: CPT

## 2024-01-01 RX ORDER — LIDOCAINE 40 MG/G
CREAM TOPICAL
Status: DISCONTINUED | OUTPATIENT
Start: 2024-01-01 | End: 2024-01-01 | Stop reason: HOSPADM

## 2024-01-01 RX ORDER — IPRATROPIUM BROMIDE AND ALBUTEROL SULFATE 2.5; .5 MG/3ML; MG/3ML
3 SOLUTION RESPIRATORY (INHALATION)
Status: DISCONTINUED | OUTPATIENT
Start: 2024-01-01 | End: 2024-01-01 | Stop reason: ALTCHOICE

## 2024-01-01 RX ORDER — MORPHINE SULFATE 2 MG/ML
1 INJECTION, SOLUTION INTRAMUSCULAR; INTRAVENOUS ONCE
Status: COMPLETED | OUTPATIENT
Start: 2024-01-01 | End: 2024-01-01

## 2024-01-01 RX ORDER — LORAZEPAM 2 MG/ML
0.5 INJECTION INTRAMUSCULAR ONCE
Status: COMPLETED | OUTPATIENT
Start: 2024-01-01 | End: 2024-01-01

## 2024-01-01 RX ORDER — MORPHINE SULFATE 20 MG/ML
5 SOLUTION ORAL
Status: DISCONTINUED | OUTPATIENT
Start: 2024-01-01 | End: 2024-01-01 | Stop reason: HOSPADM

## 2024-01-01 RX ORDER — BISACODYL 10 MG
10 SUPPOSITORY, RECTAL RECTAL
Status: DISCONTINUED | OUTPATIENT
Start: 2024-12-03 | End: 2024-01-01 | Stop reason: HOSPADM

## 2024-01-01 RX ORDER — ATROPINE SULFATE 10 MG/ML
2 SOLUTION/ DROPS OPHTHALMIC EVERY 4 HOURS PRN
Status: DISCONTINUED | OUTPATIENT
Start: 2024-01-01 | End: 2024-01-01 | Stop reason: HOSPADM

## 2024-01-01 RX ORDER — LEVALBUTEROL INHALATION SOLUTION 1.25 MG/3ML
1.25 SOLUTION RESPIRATORY (INHALATION)
Status: DISCONTINUED | OUTPATIENT
Start: 2024-01-01 | End: 2024-01-01 | Stop reason: HOSPADM

## 2024-01-01 RX ORDER — ONDANSETRON 2 MG/ML
4 INJECTION INTRAMUSCULAR; INTRAVENOUS EVERY 6 HOURS PRN
Status: DISCONTINUED | OUTPATIENT
Start: 2024-01-01 | End: 2024-01-01 | Stop reason: HOSPADM

## 2024-01-01 RX ORDER — AZITHROMYCIN 250 MG/1
250 TABLET, FILM COATED ORAL DAILY
Status: DISCONTINUED | OUTPATIENT
Start: 2024-01-01 | End: 2024-01-01

## 2024-01-01 RX ORDER — MORPHINE SULFATE 2 MG/ML
2 INJECTION, SOLUTION INTRAMUSCULAR; INTRAVENOUS
Status: DISCONTINUED | OUTPATIENT
Start: 2024-01-01 | End: 2024-01-01 | Stop reason: HOSPADM

## 2024-01-01 RX ORDER — MORPHINE SULFATE 20 MG/ML
10 SOLUTION ORAL
Status: DISCONTINUED | OUTPATIENT
Start: 2024-01-01 | End: 2024-01-01 | Stop reason: HOSPADM

## 2024-01-01 RX ORDER — ONDANSETRON 4 MG/1
4 TABLET, ORALLY DISINTEGRATING ORAL EVERY 6 HOURS PRN
Status: DISCONTINUED | OUTPATIENT
Start: 2024-01-01 | End: 2024-01-01 | Stop reason: HOSPADM

## 2024-01-01 RX ORDER — IOPAMIDOL 755 MG/ML
70 INJECTION, SOLUTION INTRAVASCULAR ONCE
Status: DISCONTINUED | OUTPATIENT
Start: 2024-01-01 | End: 2024-01-01

## 2024-01-01 RX ORDER — LORAZEPAM 2 MG/ML
1 INJECTION INTRAMUSCULAR
Status: DISCONTINUED | OUTPATIENT
Start: 2024-01-01 | End: 2024-01-01

## 2024-01-01 RX ORDER — LIDOCAINE 40 MG/G
CREAM TOPICAL
Status: DISCONTINUED | OUTPATIENT
Start: 2024-01-01 | End: 2024-01-01

## 2024-01-01 RX ORDER — LORAZEPAM 2 MG/ML
1 INJECTION INTRAMUSCULAR ONCE
Status: COMPLETED | OUTPATIENT
Start: 2024-01-01 | End: 2024-01-01

## 2024-01-01 RX ORDER — PROCHLORPERAZINE MALEATE 5 MG/1
5 TABLET ORAL EVERY 6 HOURS PRN
Status: DISCONTINUED | OUTPATIENT
Start: 2024-01-01 | End: 2024-01-01 | Stop reason: HOSPADM

## 2024-01-01 RX ORDER — GUAIFENESIN 600 MG/1
1200 TABLET, EXTENDED RELEASE ORAL 2 TIMES DAILY PRN
COMMUNITY

## 2024-01-01 RX ORDER — ACETAMINOPHEN 650 MG/1
650 SUPPOSITORY RECTAL EVERY 4 HOURS PRN
Status: DISCONTINUED | OUTPATIENT
Start: 2024-01-01 | End: 2024-01-01 | Stop reason: HOSPADM

## 2024-01-01 RX ORDER — MORPHINE SULFATE 2 MG/ML
1 INJECTION, SOLUTION INTRAMUSCULAR; INTRAVENOUS
Status: DISCONTINUED | OUTPATIENT
Start: 2024-01-01 | End: 2024-01-01 | Stop reason: HOSPADM

## 2024-01-01 RX ORDER — LEVALBUTEROL INHALATION SOLUTION 1.25 MG/3ML
2.5 SOLUTION RESPIRATORY (INHALATION) ONCE
Status: COMPLETED | OUTPATIENT
Start: 2024-01-01 | End: 2024-01-01

## 2024-01-01 RX ORDER — IOPAMIDOL 755 MG/ML
55 INJECTION, SOLUTION INTRAVASCULAR ONCE
Status: COMPLETED | OUTPATIENT
Start: 2024-01-01 | End: 2024-01-01

## 2024-01-01 RX ORDER — LEVALBUTEROL INHALATION SOLUTION 1.25 MG/3ML
1.25 SOLUTION RESPIRATORY (INHALATION) EVERY 4 HOURS PRN
Status: DISCONTINUED | OUTPATIENT
Start: 2024-01-01 | End: 2024-01-01

## 2024-01-01 RX ORDER — POLYETHYLENE GLYCOL 3350 17 G/17G
17 POWDER, FOR SOLUTION ORAL 2 TIMES DAILY PRN
Status: DISCONTINUED | OUTPATIENT
Start: 2024-01-01 | End: 2024-01-01 | Stop reason: HOSPADM

## 2024-01-01 RX ORDER — MULTIPLE VITAMINS W/ MINERALS TAB 9MG-400MCG
1 TAB ORAL DAILY
Status: DISCONTINUED | OUTPATIENT
Start: 2024-01-01 | End: 2024-01-01

## 2024-01-01 RX ORDER — NALOXONE HYDROCHLORIDE 0.4 MG/ML
0.1 INJECTION, SOLUTION INTRAMUSCULAR; INTRAVENOUS; SUBCUTANEOUS
Status: DISCONTINUED | OUTPATIENT
Start: 2024-01-01 | End: 2024-01-01 | Stop reason: HOSPADM

## 2024-01-01 RX ORDER — THIAMINE HYDROCHLORIDE 100 MG/ML
100 INJECTION, SOLUTION INTRAMUSCULAR; INTRAVENOUS DAILY
Status: DISCONTINUED | OUTPATIENT
Start: 2024-01-01 | End: 2024-01-01

## 2024-01-01 RX ORDER — NICOTINE 21 MG/24HR
1 PATCH, TRANSDERMAL 24 HOURS TRANSDERMAL DAILY
Status: DISCONTINUED | OUTPATIENT
Start: 2024-01-01 | End: 2024-01-01

## 2024-01-01 RX ORDER — PREDNISONE 20 MG/1
40 TABLET ORAL DAILY
Status: DISCONTINUED | OUTPATIENT
Start: 2024-01-01 | End: 2024-01-01

## 2024-01-01 RX ORDER — NALOXONE HYDROCHLORIDE 0.4 MG/ML
0.4 INJECTION, SOLUTION INTRAMUSCULAR; INTRAVENOUS; SUBCUTANEOUS
Status: DISCONTINUED | OUTPATIENT
Start: 2024-01-01 | End: 2024-01-01

## 2024-01-01 RX ORDER — AMOXICILLIN 250 MG
2 CAPSULE ORAL 2 TIMES DAILY PRN
Status: DISCONTINUED | OUTPATIENT
Start: 2024-01-01 | End: 2024-01-01 | Stop reason: HOSPADM

## 2024-01-01 RX ORDER — CALCIUM CARBONATE 500 MG/1
1000 TABLET, CHEWABLE ORAL 4 TIMES DAILY PRN
Status: DISCONTINUED | OUTPATIENT
Start: 2024-01-01 | End: 2024-01-01 | Stop reason: HOSPADM

## 2024-01-01 RX ORDER — LORAZEPAM 2 MG/ML
0.5 INJECTION INTRAMUSCULAR EVERY 6 HOURS PRN
Status: DISCONTINUED | OUTPATIENT
Start: 2024-01-01 | End: 2024-01-01

## 2024-01-01 RX ORDER — GLYCOPYRROLATE 0.2 MG/ML
0.2 INJECTION, SOLUTION INTRAMUSCULAR; INTRAVENOUS EVERY 4 HOURS PRN
Status: DISCONTINUED | OUTPATIENT
Start: 2024-01-01 | End: 2024-01-01 | Stop reason: HOSPADM

## 2024-01-01 RX ORDER — METHYLPREDNISOLONE SODIUM SUCCINATE 125 MG/2ML
40 INJECTION INTRAMUSCULAR; INTRAVENOUS EVERY 8 HOURS
Status: CANCELLED | OUTPATIENT
Start: 2024-01-01 | End: 2024-01-01

## 2024-01-01 RX ORDER — AZITHROMYCIN 500 MG/1
500 INJECTION, POWDER, LYOPHILIZED, FOR SOLUTION INTRAVENOUS ONCE
Status: COMPLETED | OUTPATIENT
Start: 2024-01-01 | End: 2024-01-01

## 2024-01-01 RX ORDER — MORPHINE SULFATE 2 MG/ML
1 INJECTION, SOLUTION INTRAMUSCULAR; INTRAVENOUS EVERY 4 HOURS PRN
Status: DISCONTINUED | OUTPATIENT
Start: 2024-01-01 | End: 2024-01-01

## 2024-01-01 RX ORDER — LORAZEPAM 1 MG/1
1 TABLET ORAL
Status: DISCONTINUED | OUTPATIENT
Start: 2024-01-01 | End: 2024-01-01

## 2024-01-01 RX ORDER — LORAZEPAM 0.5 MG/1
0.25 TABLET ORAL EVERY 8 HOURS PRN
Status: DISCONTINUED | OUTPATIENT
Start: 2024-01-01 | End: 2024-01-01

## 2024-01-01 RX ORDER — LORAZEPAM 1 MG/1
1 TABLET ORAL
Status: DISCONTINUED | OUTPATIENT
Start: 2024-01-01 | End: 2024-01-01 | Stop reason: HOSPADM

## 2024-01-01 RX ORDER — METHYLPREDNISOLONE SODIUM SUCCINATE 125 MG/2ML
60 INJECTION INTRAMUSCULAR; INTRAVENOUS EVERY 12 HOURS
Status: DISCONTINUED | OUTPATIENT
Start: 2024-01-01 | End: 2024-01-01

## 2024-01-01 RX ORDER — IPRATROPIUM BROMIDE AND ALBUTEROL SULFATE 2.5; .5 MG/3ML; MG/3ML
3 SOLUTION RESPIRATORY (INHALATION) EVERY 6 HOURS
Status: DISCONTINUED | OUTPATIENT
Start: 2024-01-01 | End: 2024-01-01

## 2024-01-01 RX ORDER — ENOXAPARIN SODIUM 100 MG/ML
40 INJECTION SUBCUTANEOUS EVERY 24 HOURS
Status: DISCONTINUED | OUTPATIENT
Start: 2024-01-01 | End: 2024-01-01

## 2024-01-01 RX ORDER — CARBOXYMETHYLCELLULOSE SODIUM 5 MG/ML
1 SOLUTION/ DROPS OPHTHALMIC
Status: DISCONTINUED | OUTPATIENT
Start: 2024-01-01 | End: 2024-01-01 | Stop reason: HOSPADM

## 2024-01-01 RX ORDER — ACETYLCYSTEINE 200 MG/ML
4 SOLUTION ORAL; RESPIRATORY (INHALATION) EVERY 4 HOURS
Status: DISCONTINUED | OUTPATIENT
Start: 2024-01-01 | End: 2024-01-01

## 2024-01-01 RX ORDER — ACETAMINOPHEN 325 MG/1
650 TABLET ORAL EVERY 4 HOURS PRN
Status: DISCONTINUED | OUTPATIENT
Start: 2024-01-01 | End: 2024-01-01 | Stop reason: HOSPADM

## 2024-01-01 RX ORDER — LORAZEPAM 2 MG/ML
0.5 INJECTION INTRAMUSCULAR EVERY 4 HOURS PRN
Status: DISCONTINUED | OUTPATIENT
Start: 2024-01-01 | End: 2024-01-01

## 2024-01-01 RX ORDER — NALOXONE HYDROCHLORIDE 0.4 MG/ML
0.2 INJECTION, SOLUTION INTRAMUSCULAR; INTRAVENOUS; SUBCUTANEOUS
Status: DISCONTINUED | OUTPATIENT
Start: 2024-01-01 | End: 2024-01-01

## 2024-01-01 RX ORDER — SENNOSIDES 8.6 MG
1 TABLET ORAL 2 TIMES DAILY PRN
Status: DISCONTINUED | OUTPATIENT
Start: 2024-01-01 | End: 2024-01-01 | Stop reason: HOSPADM

## 2024-01-01 RX ORDER — AZITHROMYCIN 500 MG/1
500 INJECTION, POWDER, LYOPHILIZED, FOR SOLUTION INTRAVENOUS EVERY 24 HOURS
Status: DISCONTINUED | OUTPATIENT
Start: 2024-01-01 | End: 2024-01-01

## 2024-01-01 RX ORDER — AMOXICILLIN 250 MG
1 CAPSULE ORAL 2 TIMES DAILY PRN
Status: DISCONTINUED | OUTPATIENT
Start: 2024-01-01 | End: 2024-01-01 | Stop reason: HOSPADM

## 2024-01-01 RX ORDER — MORPHINE SULFATE 2 MG/ML
2 INJECTION, SOLUTION INTRAMUSCULAR; INTRAVENOUS ONCE
Status: COMPLETED | OUTPATIENT
Start: 2024-01-01 | End: 2024-01-01

## 2024-01-01 RX ORDER — PREDNISONE 20 MG/1
40 TABLET ORAL
Status: CANCELLED | OUTPATIENT
Start: 2024-01-01

## 2024-01-01 RX ORDER — CARBOXYMETHYLCELLULOSE SODIUM 5 MG/ML
1-2 SOLUTION/ DROPS OPHTHALMIC
Status: DISCONTINUED | OUTPATIENT
Start: 2024-01-01 | End: 2024-01-01

## 2024-01-01 RX ORDER — FOLIC ACID 5 MG/ML
1 INJECTION, SOLUTION INTRAMUSCULAR; INTRAVENOUS; SUBCUTANEOUS DAILY
Status: DISCONTINUED | OUTPATIENT
Start: 2024-01-01 | End: 2024-01-01

## 2024-01-01 RX ORDER — LORAZEPAM 2 MG/ML
1 INJECTION INTRAMUSCULAR
Status: DISCONTINUED | OUTPATIENT
Start: 2024-01-01 | End: 2024-01-01 | Stop reason: HOSPADM

## 2024-01-01 RX ORDER — LORAZEPAM 0.5 MG/1
0.25 TABLET ORAL EVERY 6 HOURS PRN
Status: DISCONTINUED | OUTPATIENT
Start: 2024-01-01 | End: 2024-01-01

## 2024-01-01 RX ORDER — LEVALBUTEROL INHALATION SOLUTION 1.25 MG/3ML
1.25 SOLUTION RESPIRATORY (INHALATION)
Status: DISCONTINUED | OUTPATIENT
Start: 2024-01-01 | End: 2024-01-01

## 2024-01-01 RX ORDER — CARBOXYMETHYLCELLULOSE SODIUM 5 MG/ML
1 SOLUTION/ DROPS OPHTHALMIC
Status: DISCONTINUED | OUTPATIENT
Start: 2024-01-01 | End: 2024-01-01

## 2024-01-01 RX ORDER — MINERAL OIL/HYDROPHIL PETROLAT
OINTMENT (GRAM) TOPICAL
Status: DISCONTINUED | OUTPATIENT
Start: 2024-01-01 | End: 2024-01-01 | Stop reason: HOSPADM

## 2024-01-01 RX ORDER — DEXTROSE MONOHYDRATE AND SODIUM CHLORIDE 5; .9 G/100ML; G/100ML
INJECTION, SOLUTION INTRAVENOUS CONTINUOUS
Status: DISCONTINUED | OUTPATIENT
Start: 2024-01-01 | End: 2024-01-01

## 2024-01-01 RX ORDER — NALOXONE HYDROCHLORIDE 0.4 MG/ML
0.2 INJECTION, SOLUTION INTRAMUSCULAR; INTRAVENOUS; SUBCUTANEOUS
Status: DISCONTINUED | OUTPATIENT
Start: 2024-01-01 | End: 2024-01-01 | Stop reason: HOSPADM

## 2024-01-01 RX ADMIN — LORAZEPAM 1 MG: 2 INJECTION INTRAMUSCULAR; INTRAVENOUS at 17:20

## 2024-01-01 RX ADMIN — LORAZEPAM 1 MG: 1 TABLET ORAL at 23:46

## 2024-01-01 RX ADMIN — LORAZEPAM 0.5 MG: 2 INJECTION INTRAMUSCULAR; INTRAVENOUS at 22:19

## 2024-01-01 RX ADMIN — MORPHINE SULFATE 2 MG: 2 INJECTION, SOLUTION INTRAMUSCULAR; INTRAVENOUS at 23:47

## 2024-01-01 RX ADMIN — SODIUM CHLORIDE 82 ML: 9 INJECTION, SOLUTION INTRAVENOUS at 11:54

## 2024-01-01 RX ADMIN — LEVALBUTEROL HYDROCHLORIDE 1.25 MG: 1.25 SOLUTION RESPIRATORY (INHALATION) at 22:40

## 2024-01-01 RX ADMIN — MORPHINE SULFATE 10 MG: 100 SOLUTION ORAL at 01:37

## 2024-01-01 RX ADMIN — MORPHINE SULFATE 2 MG: 2 INJECTION, SOLUTION INTRAMUSCULAR; INTRAVENOUS at 03:55

## 2024-01-01 RX ADMIN — LORAZEPAM 0.5 MG: 2 INJECTION INTRAMUSCULAR; INTRAVENOUS at 16:53

## 2024-01-01 RX ADMIN — MORPHINE SULFATE 1 MG: 2 INJECTION, SOLUTION INTRAMUSCULAR; INTRAVENOUS at 22:32

## 2024-01-01 RX ADMIN — MORPHINE SULFATE 10 MG: 100 SOLUTION ORAL at 05:41

## 2024-01-01 RX ADMIN — LORAZEPAM 0.5 MG: 2 INJECTION INTRAMUSCULAR; INTRAVENOUS at 17:47

## 2024-01-01 RX ADMIN — IOPAMIDOL 55 ML: 755 INJECTION, SOLUTION INTRAVENOUS at 11:54

## 2024-01-01 RX ADMIN — LORAZEPAM 1 MG: 2 INJECTION INTRAMUSCULAR; INTRAVENOUS at 12:05

## 2024-01-01 RX ADMIN — LORAZEPAM 0.5 MG: 2 INJECTION INTRAMUSCULAR; INTRAVENOUS at 10:25

## 2024-01-01 RX ADMIN — IPRATROPIUM BROMIDE AND ALBUTEROL SULFATE 3 ML: .5; 3 SOLUTION RESPIRATORY (INHALATION) at 08:25

## 2024-01-01 RX ADMIN — LORAZEPAM 1 MG: 1 TABLET ORAL at 01:37

## 2024-01-01 RX ADMIN — MORPHINE SULFATE 10 MG: 100 SOLUTION ORAL at 17:26

## 2024-01-01 RX ADMIN — MORPHINE SULFATE 10 MG: 100 SOLUTION ORAL at 20:57

## 2024-01-01 RX ADMIN — LORAZEPAM 1 MG: 2 INJECTION INTRAMUSCULAR; INTRAVENOUS at 02:11

## 2024-01-01 RX ADMIN — ENOXAPARIN SODIUM 40 MG: 40 INJECTION SUBCUTANEOUS at 16:53

## 2024-01-01 RX ADMIN — MORPHINE SULFATE 2 MG: 2 INJECTION, SOLUTION INTRAMUSCULAR; INTRAVENOUS at 00:23

## 2024-01-01 RX ADMIN — MORPHINE SULFATE 2 MG: 2 INJECTION, SOLUTION INTRAMUSCULAR; INTRAVENOUS at 03:15

## 2024-01-01 RX ADMIN — ACETYLCYSTEINE 4 ML: 200 SOLUTION ORAL; RESPIRATORY (INHALATION) at 18:15

## 2024-01-01 RX ADMIN — MORPHINE SULFATE 10 MG: 100 SOLUTION ORAL at 10:24

## 2024-01-01 RX ADMIN — ATROPINE SULFATE 2 DROP: 10 SOLUTION/ DROPS OPHTHALMIC at 12:05

## 2024-01-01 RX ADMIN — LORAZEPAM 1 MG: 1 TABLET ORAL at 21:46

## 2024-01-01 RX ADMIN — MORPHINE SULFATE 10 MG: 100 SOLUTION ORAL at 13:19

## 2024-01-01 RX ADMIN — MORPHINE SULFATE 10 MG: 100 SOLUTION ORAL at 16:24

## 2024-01-01 RX ADMIN — FOLIC ACID 1 MG: 5 INJECTION, SOLUTION INTRAMUSCULAR; INTRAVENOUS; SUBCUTANEOUS at 17:14

## 2024-01-01 RX ADMIN — MORPHINE SULFATE 2 MG: 2 INJECTION, SOLUTION INTRAMUSCULAR; INTRAVENOUS at 01:06

## 2024-01-01 RX ADMIN — MORPHINE SULFATE 2 MG: 2 INJECTION, SOLUTION INTRAMUSCULAR; INTRAVENOUS at 02:57

## 2024-01-01 RX ADMIN — MORPHINE SULFATE 2 MG: 2 INJECTION, SOLUTION INTRAMUSCULAR; INTRAVENOUS at 23:31

## 2024-01-01 RX ADMIN — LEVALBUTEROL HYDROCHLORIDE 1.25 MG: 1.25 SOLUTION RESPIRATORY (INHALATION) at 18:39

## 2024-01-01 RX ADMIN — MORPHINE SULFATE 2 MG: 2 INJECTION, SOLUTION INTRAMUSCULAR; INTRAVENOUS at 02:42

## 2024-01-01 RX ADMIN — MORPHINE SULFATE 2 MG: 2 INJECTION, SOLUTION INTRAMUSCULAR; INTRAVENOUS at 06:28

## 2024-01-01 RX ADMIN — MORPHINE SULFATE 10 MG: 100 SOLUTION ORAL at 14:59

## 2024-01-01 RX ADMIN — ACETYLCYSTEINE 4 ML: 200 SOLUTION ORAL; RESPIRATORY (INHALATION) at 22:40

## 2024-01-01 RX ADMIN — ACETYLCYSTEINE 4 ML: 200 SOLUTION ORAL; RESPIRATORY (INHALATION) at 14:53

## 2024-01-01 RX ADMIN — MORPHINE SULFATE 10 MG: 100 SOLUTION ORAL at 18:56

## 2024-01-01 RX ADMIN — LORAZEPAM 1 MG: 1 TABLET ORAL at 03:40

## 2024-01-01 RX ADMIN — MORPHINE SULFATE 1 MG: 2 INJECTION, SOLUTION INTRAMUSCULAR; INTRAVENOUS at 18:19

## 2024-01-01 RX ADMIN — LORAZEPAM 1 MG: 2 INJECTION INTRAMUSCULAR; INTRAVENOUS at 14:43

## 2024-01-01 RX ADMIN — LORAZEPAM 1 MG: 2 INJECTION INTRAMUSCULAR; INTRAVENOUS at 23:05

## 2024-01-01 RX ADMIN — ATROPINE SULFATE 2 DROP: 10 SOLUTION/ DROPS OPHTHALMIC at 16:18

## 2024-01-01 RX ADMIN — METHYLPREDNISOLONE SODIUM SUCCINATE 62.5 MG: 125 INJECTION, POWDER, FOR SOLUTION INTRAMUSCULAR; INTRAVENOUS at 09:16

## 2024-01-01 RX ADMIN — MORPHINE SULFATE 10 MG: 100 SOLUTION ORAL at 01:36

## 2024-01-01 RX ADMIN — NICOTINE 1 PATCH: 21 PATCH, EXTENDED RELEASE TRANSDERMAL at 14:32

## 2024-01-01 RX ADMIN — LEVALBUTEROL HYDROCHLORIDE 2.5 MG: 1.25 SOLUTION RESPIRATORY (INHALATION) at 22:29

## 2024-01-01 RX ADMIN — AZITHROMYCIN MONOHYDRATE 500 MG: 500 INJECTION, POWDER, LYOPHILIZED, FOR SOLUTION INTRAVENOUS at 22:24

## 2024-01-01 RX ADMIN — AZITHROMYCIN MONOHYDRATE 500 MG: 500 INJECTION, POWDER, LYOPHILIZED, FOR SOLUTION INTRAVENOUS at 21:55

## 2024-01-01 RX ADMIN — LEVALBUTEROL HYDROCHLORIDE 1.25 MG: 1.25 SOLUTION RESPIRATORY (INHALATION) at 14:53

## 2024-01-01 RX ADMIN — MAGNESIUM SULFATE HEPTAHYDRATE 1 G: 500 INJECTION, SOLUTION INTRAMUSCULAR; INTRAVENOUS at 20:25

## 2024-01-01 RX ADMIN — MORPHINE SULFATE 2 MG: 2 INJECTION, SOLUTION INTRAMUSCULAR; INTRAVENOUS at 23:05

## 2024-01-01 RX ADMIN — LORAZEPAM 1 MG: 2 INJECTION INTRAMUSCULAR; INTRAVENOUS at 19:23

## 2024-01-01 RX ADMIN — IPRATROPIUM BROMIDE AND ALBUTEROL SULFATE 3 ML: .5; 3 SOLUTION RESPIRATORY (INHALATION) at 03:03

## 2024-01-01 RX ADMIN — MORPHINE SULFATE 10 MG: 100 SOLUTION ORAL at 03:40

## 2024-01-01 RX ADMIN — SODIUM CHLORIDE 1000 ML: 9 INJECTION, SOLUTION INTRAVENOUS at 21:56

## 2024-01-01 RX ADMIN — METHYLPREDNISOLONE SODIUM SUCCINATE 62.5 MG: 125 INJECTION, POWDER, FOR SOLUTION INTRAMUSCULAR; INTRAVENOUS at 22:20

## 2024-01-01 RX ADMIN — LORAZEPAM 1 MG: 2 INJECTION INTRAMUSCULAR; INTRAVENOUS at 10:57

## 2024-01-01 RX ADMIN — LEVALBUTEROL HYDROCHLORIDE 1.25 MG: 1.25 SOLUTION RESPIRATORY (INHALATION) at 18:59

## 2024-01-01 RX ADMIN — ATROPINE SULFATE 2 DROP: 10 SOLUTION/ DROPS OPHTHALMIC at 21:46

## 2024-01-01 RX ADMIN — IPRATROPIUM BROMIDE AND ALBUTEROL SULFATE 3 ML: .5; 3 SOLUTION RESPIRATORY (INHALATION) at 12:06

## 2024-01-01 RX ADMIN — THIAMINE HYDROCHLORIDE 100 MG: 100 INJECTION, SOLUTION INTRAMUSCULAR; INTRAVENOUS at 17:49

## 2024-01-01 RX ADMIN — METHYLPREDNISOLONE SODIUM SUCCINATE 62.5 MG: 125 INJECTION, POWDER, FOR SOLUTION INTRAMUSCULAR; INTRAVENOUS at 10:25

## 2024-01-01 RX ADMIN — IPRATROPIUM BROMIDE 0.5 MG: 0.5 SOLUTION RESPIRATORY (INHALATION) at 18:59

## 2024-01-01 RX ADMIN — LORAZEPAM 1 MG: 2 INJECTION INTRAMUSCULAR; INTRAVENOUS at 18:26

## 2024-01-01 ASSESSMENT — ACTIVITIES OF DAILY LIVING (ADL)
ADLS_ACUITY_SCORE: 39
ADLS_ACUITY_SCORE: 35
WALKING_OR_CLIMBING_STAIRS_DIFFICULTY: NO
ADLS_ACUITY_SCORE: 35
ADLS_ACUITY_SCORE: 35
CHANGE_IN_FUNCTIONAL_STATUS_SINCE_ONSET_OF_CURRENT_ILLNESS/INJURY: NO
ADLS_ACUITY_SCORE: 39
ADLS_ACUITY_SCORE: 35
EQUIPMENT_CURRENTLY_USED_AT_HOME: SHOWER CHAIR
ADLS_ACUITY_SCORE: 39
DRESSING/BATHING_DIFFICULTY: NO
ADLS_ACUITY_SCORE: 39
ADLS_ACUITY_SCORE: 35
ADLS_ACUITY_SCORE: 35
ADLS_ACUITY_SCORE: 39
ADLS_ACUITY_SCORE: 35
ADLS_ACUITY_SCORE: 39
CONCENTRATING,_REMEMBERING_OR_MAKING_DECISIONS_DIFFICULTY: NO
FALL_HISTORY_WITHIN_LAST_SIX_MONTHS: NO
ADLS_ACUITY_SCORE: 39
ADLS_ACUITY_SCORE: 35
ADLS_ACUITY_SCORE: 35
WEAR_GLASSES_OR_BLIND: NO
ADLS_ACUITY_SCORE: 35
ADLS_ACUITY_SCORE: 35
ADLS_ACUITY_SCORE: 39
ADLS_ACUITY_SCORE: 35
ADLS_ACUITY_SCORE: 35
TOILETING_ISSUES: NO
HEARING_DIFFICULTY_OR_DEAF: NO
ADLS_ACUITY_SCORE: 39
ADLS_ACUITY_SCORE: 35
ADLS_ACUITY_SCORE: 39
ADLS_ACUITY_SCORE: 58
ADLS_ACUITY_SCORE: 35
ADLS_ACUITY_SCORE: 39
DOING_ERRANDS_INDEPENDENTLY_DIFFICULTY: NO
ADLS_ACUITY_SCORE: 35
DIFFICULTY_EATING/SWALLOWING: NO
ADLS_ACUITY_SCORE: 32
ADLS_ACUITY_SCORE: 35
ADLS_ACUITY_SCORE: 39
ADLS_ACUITY_SCORE: 35
DIFFICULTY_COMMUNICATING: NO
ADLS_ACUITY_SCORE: 58
ADLS_ACUITY_SCORE: 35

## 2024-01-04 ENCOUNTER — ANCILLARY PROCEDURE (OUTPATIENT)
Dept: CT IMAGING | Facility: CLINIC | Age: 69
End: 2024-01-04
Attending: INTERNAL MEDICINE
Payer: COMMERCIAL

## 2024-01-04 DIAGNOSIS — Z87.891 FORMER SMOKER: ICD-10-CM

## 2024-01-04 DIAGNOSIS — Z12.2 SCREENING FOR LUNG CANCER: ICD-10-CM

## 2024-01-04 PROCEDURE — 71271 CT THORAX LUNG CANCER SCR C-: CPT

## 2024-02-03 DIAGNOSIS — J44.9 CHRONIC OBSTRUCTIVE PULMONARY DISEASE, UNSPECIFIED COPD TYPE (H): ICD-10-CM

## 2024-02-04 RX ORDER — FLUTICASONE FUROATE, UMECLIDINIUM BROMIDE AND VILANTEROL TRIFENATATE 100; 62.5; 25 UG/1; UG/1; UG/1
1 POWDER RESPIRATORY (INHALATION) DAILY
Qty: 1 EACH | Refills: 0 | Status: SHIPPED | OUTPATIENT
Start: 2024-02-04 | End: 2024-02-15

## 2024-02-15 ENCOUNTER — OFFICE VISIT (OUTPATIENT)
Dept: FAMILY MEDICINE | Facility: CLINIC | Age: 69
End: 2024-02-15
Payer: COMMERCIAL

## 2024-02-15 VITALS
BODY MASS INDEX: 20.24 KG/M2 | DIASTOLIC BLOOD PRESSURE: 79 MMHG | RESPIRATION RATE: 20 BRPM | OXYGEN SATURATION: 95 % | SYSTOLIC BLOOD PRESSURE: 122 MMHG | HEART RATE: 102 BPM | HEIGHT: 62 IN | WEIGHT: 110 LBS | TEMPERATURE: 96.8 F

## 2024-02-15 DIAGNOSIS — D72.829 LEUKOCYTOSIS, UNSPECIFIED TYPE: ICD-10-CM

## 2024-02-15 DIAGNOSIS — Z00.00 ENCOUNTER FOR ANNUAL WELLNESS EXAM IN MEDICARE PATIENT: Primary | ICD-10-CM

## 2024-02-15 DIAGNOSIS — Z13.6 CARDIOVASCULAR SCREENING; LDL GOAL LESS THAN 130: ICD-10-CM

## 2024-02-15 DIAGNOSIS — H61.23 BILATERAL IMPACTED CERUMEN: ICD-10-CM

## 2024-02-15 DIAGNOSIS — J44.9 CHRONIC OBSTRUCTIVE PULMONARY DISEASE, UNSPECIFIED COPD TYPE (H): ICD-10-CM

## 2024-02-15 DIAGNOSIS — Z12.31 VISIT FOR SCREENING MAMMOGRAM: ICD-10-CM

## 2024-02-15 LAB
ALBUMIN SERPL BCG-MCNC: 4.4 G/DL (ref 3.5–5.2)
ALP SERPL-CCNC: 107 U/L (ref 40–150)
ALT SERPL W P-5'-P-CCNC: 10 U/L (ref 0–50)
ANION GAP SERPL CALCULATED.3IONS-SCNC: 15 MMOL/L (ref 7–15)
AST SERPL W P-5'-P-CCNC: 20 U/L (ref 0–45)
BASOPHILS # BLD AUTO: 0 10E3/UL (ref 0–0.2)
BASOPHILS NFR BLD AUTO: 0 %
BILIRUB SERPL-MCNC: 0.6 MG/DL
BUN SERPL-MCNC: 10.1 MG/DL (ref 8–23)
CALCIUM SERPL-MCNC: 9.5 MG/DL (ref 8.8–10.2)
CHLORIDE SERPL-SCNC: 98 MMOL/L (ref 98–107)
CHOLEST SERPL-MCNC: 197 MG/DL
CREAT SERPL-MCNC: 0.56 MG/DL (ref 0.51–0.95)
DEPRECATED HCO3 PLAS-SCNC: 25 MMOL/L (ref 22–29)
EGFRCR SERPLBLD CKD-EPI 2021: >90 ML/MIN/1.73M2
EOSINOPHIL # BLD AUTO: 0 10E3/UL (ref 0–0.7)
EOSINOPHIL NFR BLD AUTO: 0 %
ERYTHROCYTE [DISTWIDTH] IN BLOOD BY AUTOMATED COUNT: 18 % (ref 10–15)
FASTING STATUS PATIENT QL REPORTED: YES
GLUCOSE SERPL-MCNC: 104 MG/DL (ref 70–99)
HCT VFR BLD AUTO: 43.3 % (ref 35–47)
HDLC SERPL-MCNC: 107 MG/DL
HGB BLD-MCNC: 12.9 G/DL (ref 11.7–15.7)
IMM GRANULOCYTES # BLD: 0 10E3/UL
IMM GRANULOCYTES NFR BLD: 0 %
LDLC SERPL CALC-MCNC: 73 MG/DL
LYMPHOCYTES # BLD AUTO: 1.9 10E3/UL (ref 0.8–5.3)
LYMPHOCYTES NFR BLD AUTO: 14 %
MCH RBC QN AUTO: 25.4 PG (ref 26.5–33)
MCHC RBC AUTO-ENTMCNC: 29.8 G/DL (ref 31.5–36.5)
MCV RBC AUTO: 85 FL (ref 78–100)
MONOCYTES # BLD AUTO: 1.6 10E3/UL (ref 0–1.3)
MONOCYTES NFR BLD AUTO: 11 %
NEUTROPHILS # BLD AUTO: 10.2 10E3/UL (ref 1.6–8.3)
NEUTROPHILS NFR BLD AUTO: 74 %
NONHDLC SERPL-MCNC: 90 MG/DL
PLATELET # BLD AUTO: 350 10E3/UL (ref 150–450)
POTASSIUM SERPL-SCNC: 3.9 MMOL/L (ref 3.4–5.3)
PROT SERPL-MCNC: 7.3 G/DL (ref 6.4–8.3)
RBC # BLD AUTO: 5.08 10E6/UL (ref 3.8–5.2)
SODIUM SERPL-SCNC: 138 MMOL/L (ref 135–145)
TRIGL SERPL-MCNC: 86 MG/DL
TSH SERPL DL<=0.005 MIU/L-ACNC: 2.04 UIU/ML (ref 0.3–4.2)
WBC # BLD AUTO: 13.7 10E3/UL (ref 4–11)

## 2024-02-15 PROCEDURE — G0439 PPPS, SUBSEQ VISIT: HCPCS | Performed by: PHYSICIAN ASSISTANT

## 2024-02-15 PROCEDURE — 99213 OFFICE O/P EST LOW 20 MIN: CPT | Mod: 25 | Performed by: PHYSICIAN ASSISTANT

## 2024-02-15 PROCEDURE — 69209 REMOVE IMPACTED EAR WAX UNI: CPT | Mod: 50 | Performed by: PHYSICIAN ASSISTANT

## 2024-02-15 PROCEDURE — 84443 ASSAY THYROID STIM HORMONE: CPT | Performed by: PHYSICIAN ASSISTANT

## 2024-02-15 PROCEDURE — 80053 COMPREHEN METABOLIC PANEL: CPT | Performed by: PHYSICIAN ASSISTANT

## 2024-02-15 PROCEDURE — 36415 COLL VENOUS BLD VENIPUNCTURE: CPT | Performed by: PHYSICIAN ASSISTANT

## 2024-02-15 PROCEDURE — 80061 LIPID PANEL: CPT | Performed by: PHYSICIAN ASSISTANT

## 2024-02-15 PROCEDURE — 85025 COMPLETE CBC W/AUTO DIFF WBC: CPT | Performed by: PHYSICIAN ASSISTANT

## 2024-02-15 RX ORDER — IPRATROPIUM BROMIDE AND ALBUTEROL SULFATE 2.5; .5 MG/3ML; MG/3ML
SOLUTION RESPIRATORY (INHALATION)
Qty: 90 ML | Refills: 1 | Status: ON HOLD | OUTPATIENT
Start: 2024-02-15 | End: 2024-04-20

## 2024-02-15 RX ORDER — FLUTICASONE FUROATE, UMECLIDINIUM BROMIDE AND VILANTEROL TRIFENATATE 100; 62.5; 25 UG/1; UG/1; UG/1
1 POWDER RESPIRATORY (INHALATION) DAILY
Qty: 60 EACH | Refills: 1 | Status: ON HOLD | OUTPATIENT
Start: 2024-02-15 | End: 2024-04-20

## 2024-02-15 SDOH — HEALTH STABILITY: PHYSICAL HEALTH: ON AVERAGE, HOW MANY DAYS PER WEEK DO YOU ENGAGE IN MODERATE TO STRENUOUS EXERCISE (LIKE A BRISK WALK)?: 1 DAY

## 2024-02-15 SDOH — HEALTH STABILITY: PHYSICAL HEALTH: ON AVERAGE, HOW MANY MINUTES DO YOU ENGAGE IN EXERCISE AT THIS LEVEL?: 20 MIN

## 2024-02-15 ASSESSMENT — ASTHMA QUESTIONNAIRES: ACT_TOTALSCORE: 19

## 2024-02-15 ASSESSMENT — PAIN SCALES - GENERAL: PAINLEVEL: NO PAIN (0)

## 2024-02-15 ASSESSMENT — SOCIAL DETERMINANTS OF HEALTH (SDOH): HOW OFTEN DO YOU GET TOGETHER WITH FRIENDS OR RELATIVES?: TWICE A WEEK

## 2024-02-15 NOTE — NURSING NOTE
Patient identified using two patient identifiers.  Ear exam showing wax occlusion completed by provider.  Solution: warm water was placed in the both ear(s) via  irrigation tool: rhino ear . Pt tolerated well.  Ria Figueredo, CMA

## 2024-02-15 NOTE — PROGRESS NOTES
Preventive Care Visit  [facility] KIKE Pruitt PA-C, Physician Assistant - Medical  Feb 15, 2024    Assessment & Plan     Encounter for annual wellness exam in Medicare patient  Annual wellness labs today. Recommended routine dental, eye, and skin screenings.   Healthy diet and exercise.  Smoking cessation.  Declines DEXA.  Encouraged supplementation with vitamin D/calcium.  - REVIEW OF HEALTH MAINTENANCE PROTOCOL ORDERS  - CBC with platelets and differential  - Comprehensive metabolic panel (BMP + Alb, Alk Phos, ALT, AST, Total. Bili, TP)  - TSH with free T4 reflex  - Lipid panel reflex to direct LDL Fasting    Chronic obstructive pulmonary disease, unspecified COPD type (H)  Inhaler and DuoNeb refilled.  Continue follow-up with [place] [facility].  Smoking cessation discussed.  Declines intervention today.  - ipratropium - albuterol 0.5 mg/2.5 mg/3 mL (DUONEB) 0.5-2.5 (3) MG/3ML neb solution  Dispense: 90 mL; Refill: 1  - Fluticasone-Umeclidin-Vilant (TRELEGY ELLIPTA) 100-62.5-25 MCG/ACT oral inhaler  Dispense: 60 each; Refill: 1    CARDIOVASCULAR SCREENING; LDL GOAL LESS THAN 130  - Lipid panel reflex to direct LDL Fasting    Visit for screening mammogram  - *MA Screening Digital Bilateral    Bilateral impacted cerumen  Ear lavage performed bilaterally.  Patient tolerated procedure well.  Follow-up as needed for this concern.  - REMOVE IMPACTED CERUMEN      30 minutes spent by me on the date of the encounter doing chart review, review of test results, interpretation of tests, patient visit, and documentation       Nicotine/Tobacco Cessation  She reports that she has been smoking cigarettes. She has a 12.5 pack-year smoking history. She has never used smokeless tobacco.  Nicotine/Tobacco Cessation Plan  Information offered: Patient not interested at this time      Counseling  Appropriate preventive services were discussed with this patient, including applicable screening as  "appropriate for fall prevention, nutrition, physical activity, Tobacco-use cessation, weight loss and cognition.  Checklist reviewing preventive services available has been given to the patient.  Reviewed patient's diet, addressing concerns and/or questions.   She is at risk for lack of exercise and has been provided with information to increase physical activity for the benefit of her well-being.   She is at risk for psychosocial distress and has been provided with information to reduce risk.     Patient has been advised of split billing requirements and indicates understanding: Yes      Subjective   Tammie is a 68 year old, presenting for the following:  Physical (\"Light buzzy in both ears\"; nosebleeds)    Patient of Dr. Navarro.  Here for annual wellness visit.  Retired  at [employer].   History of COPD.  Sees [place] [facility].  Needs refill on inhaler/neb    -Notes buzzing in ears off-and-on over the past 2 to 3 months.  -Up-to-date on colon cancer screening as well as mammogram.  -Due for bone density which she declines.  -Continues to smoke - declines intervention.             Health Care Directive  Patient does not have a Health Care Directive or Living Will:       2/15/2024   General Health   How would you rate your overall physical health? (!) FAIR   Feel stress (tense, anxious, or unable to sleep) Only a little   (!) STRESS CONCERN      2/15/2024   Nutrition   Diet: Regular (no restrictions)         2/15/2024   Exercise   Days per week of moderate/strenous exercise 1 day   Average minutes spent exercising at this level 20 min   (!) EXERCISE CONCERN      2/15/2024   Social Factors   Frequency of gathering with friends or relatives Twice a week   Worry food won't last until get money to buy more No   Food not last or not have enough money for food? No   Do you have housing?  Yes   Are you worried about losing your housing? No   Lack of transportation? No   Unable to get utilities " (heat,electricity)? No         2/15/2024   Fall Risk   Fallen 2 or more times in the past year? Yes   Trouble with walking or balance? No   Gait Speed Test (Document in seconds) 4.75   Gait Speed Test Interpretation Less than or equal to 5.00 seconds - PASS          2/15/2024   Activities of Daily Living- Home Safety   Needs help with the following daily activites None of the above   Safety concerns in the home None of the above         2/15/2024   Dental   Dentist two times every year? Yes         2/15/2024   Hearing Screening   Hearing concerns? None of the above         2/15/2024   Driving Risk Screening   Patient/family members have concerns about driving No         2/15/2024   General Alertness/Fatigue Screening   Have you been more tired than usual lately? No         2/15/2024   Urinary Incontinence Screening   Bothered by leaking urine in past 6 months No       Today's PHQ-2 Score:       2/15/2024    10:04 AM   PHQ-2 ( 1999 [employer])   Q1: Little interest or pleasure in doing things 0   Q2: Feeling down, depressed or hopeless 0   PHQ-2 Score 0   Q1: Little interest or pleasure in doing things Not at all   Q2: Feeling down, depressed or hopeless Not at all   PHQ-2 Score 0           2/15/2024   Substance Use   Alcohol more than 3/day or more than 7/wk No   Do you have a current opioid prescription? No   How severe/bad is pain from 1 to 10? 0/10 (No Pain)   Do you use any other substances recreationally? (!) ALCOHOL     Social History     Tobacco Use    Smoking status: Every Day     Packs/day: 0.50     Years: 25.00     Additional pack years: 0.00     Total pack years: 12.50     Types: Cigarettes    Smokeless tobacco: Never    Tobacco comments:     [facility] is cutting back - smokes 1/2 ppd (01/03/11)   Vaping Use    Vaping Use: Never used   Substance Use Topics    Alcohol use: Yes     Alcohol/week: 0.0 standard drinks of alcohol     Comment: 3 mixed drinks per week    Drug use: No                    The ASCVD Risk  score (Herlinda JANE, et al., 2019) failed to calculate for the following reasons:    The valid HDL cholesterol range is 20 to 100 mg/dL          Reviewed and updated as needed this visit by Provider   Tobacco  Allergies  Meds   Med Hx  Surg Hx             Past Medical History:   Diagnosis Date    Chronic obstructive pulmonary disease, unspecified COPD type (H) 2017    has seen [place] [facility], fev1 under 1.0    Depressive disorder, not elsewhere classified 2006    following sudden death of her mother    Elevated LFTs     hepatic steatosis      Generalized anxiety disorder     Hepatic steatosis 2023    on us done for elev lfts    Herpes simplex without mention of complication     MOUTH    Nephrolithiasis     TOBACCO ABUSE-CONTINUOUS      Past Surgical History:   Procedure Laterality Date    CHOLECYSTECTOMY, LAPOROSCOPIC      done at [facility]    TUBAL LIGATION       OB History    Para Term  AB Living   4 1 1 0 3 1   SAB IAB Ectopic Multiple Live Births   0 3 0 0 0      # Outcome Date GA Lbr You/2nd Weight Sex Delivery Anes PTL Lv   4 Term            3 IAB            2 IAB            1 IAB              Lab work is in process  BP Readings from Last 3 Encounters:   02/15/24 122/79   23 112/73   23 107/72    Wt Readings from Last 3 Encounters:   02/15/24 49.9 kg (110 lb)   23 50.3 kg (111 lb)   23 49 kg (108 lb)                  Patient Active Problem List   Diagnosis    Generalized anxiety disorder    Chronic obstructive pulmonary disease, unspecified COPD type (H)    Elevated LFTs    Hepatic steatosis    COPD exacerbation (H)    Acute respiratory failure with hypoxia (H)     Past Surgical History:   Procedure Laterality Date    CHOLECYSTECTOMY, LAPOROSCOPIC      done at [facility]    TUBAL [facility]         Social History     Tobacco Use    Smoking status: Every Day     Packs/day: 0.50     Years: 25.00     Additional pack years: 0.00      Total pack years: 12.50     Types: Cigarettes    Smokeless tobacco: Never    Tobacco comments:     states is cutting back - smokes 1/2 ppd (01/03/11)   Substance Use Topics    Alcohol use: Yes     Alcohol/week: 0.0 standard drinks of alcohol     Comment: 3 mixed drinks per week     Family History   Problem Relation Age of Onset    Asthma Daughter     Diabetes Paternal Grandmother     Cancer Maternal Aunt         skin    Allergies Daughter     Depression Mother     Eye Disorder Mother         cataracts and glacoma    Gastrointestinal Disease Maternal Grandmother         gallbladder    Gastrointestinal Disease Daughter         ulcerative cholitis    Osteoporosis Mother     Respiratory Mother         asthmatic bronchitis         Current Outpatient Medications   Medication Sig Dispense Refill    albuterol (PROAIR HFA/PROVENTIL HFA/VENTOLIN HFA) 108 (90 Base) MCG/ACT inhaler Inhale 2 puffs into the lungs every 4 hours as needed for wheezing or shortness of breath 18 g 3    aspirin (ASA) 325 MG EC tablet Take 325 mg by mouth every 6 hours as needed for moderate pain      Fluticasone-Umeclidin-Vilant (TRELEGY ELLIPTA) 100-62.5-25 MCG/ACT oral inhaler Inhale 1 puff into the lungs daily 60 each 1    ipratropium - albuterol 0.5 mg/2.5 mg/3 mL (DUONEB) 0.5-2.5 (3) MG/3ML neb solution NEBULIZE 1 VIAL 4 TIMES DAILY. MAY USE EVERY 4 HOURS AS NEEDED FOR DSYPNEA/WHEEZING THEN TO ONLY ONCE AS NEEDED 90 mL 1    guaiFENesin (MUCINEX) 600 MG 12 hr tablet Take 1,200 mg by mouth 2 times daily as needed for congestion (Patient not taking: Reported on 2/15/2024)      nicotine (COMMIT) 2 MG lozenge Place 1 lozenge (2 mg) inside cheek every hour as needed for smoking cessation (Patient not taking: Reported on 2/15/2024) 48 lozenge 0    predniSONE (DELTASONE) 10 MG tablet 4 tabs daily for 2 days, then 3 tabs daily for 2 days, then 2 tabs daily for 2 days, then 1 tab daily for 2 days, then stop (Patient not taking: Reported on 2/15/2024) 20  "tablet 0    predniSONE (DELTASONE) 20 MG tablet Take 1 tablet (20 mg) by mouth 2 times daily (Patient not taking: Reported on 2/15/2024) 14 tablet 0     Allergies   Allergen Reactions    Pcn [Penicillins]      convulsions     Current providers sharing in care for this patient include:  Patient Care Team:  Mikel Navarro MD as PCP - General (Internal Medicine)  Mikel Navarro MD as Assigned PCP    The following health maintenance items are reviewed in Epic and correct as of today:  Health Maintenance   Topic Date Due    DEXA  Never done    COPD ACTION PLAN  Never done    HEPATITIS C SCREENING  Never done    ZOSTER IMMUNIZATION (1 of 2) Never done    COVID-19 Vaccine (5 - 2023-24 season) 09/01/2023    MEDICARE ANNUAL WELLNESS VISIT  01/19/2024    LUNG CANCER SCREENING  01/04/2025    NICOTINE/TOBACCO CESSATION COUNSELING Q 1 YR  02/15/2025    ANNUAL REVIEW OF HM ORDERS  02/15/2025    FALL RISK ASSESSMENT  02/15/2025    MAMMO SCREENING  04/03/2025    COLORECTAL CANCER SCREENING  01/31/2026    GLUCOSE  08/20/2026    LIPID  01/19/2028    ADVANCE CARE PLANNING  01/19/2028    DTAP/TDAP/TD IMMUNIZATION (3 - Td or Tdap) 10/29/2031    SPIROMETRY  Completed    PHQ-2 (once per calendar year)  Completed    INFLUENZA VACCINE  Completed    Pneumococcal Vaccine: 65+ Years  Completed    RSV VACCINE (Pregnancy & 60+)  Completed    IPV IMMUNIZATION  Aged Out    HPV IMMUNIZATION  Aged Out    MENINGITIS IMMUNIZATION  Aged Out    RSV MONOCLONAL ANTIBODY  Aged Out         Review of Systems  Constitutional, neuro, ENT, endocrine, pulmonary, cardiac, gastrointestinal, genitourinary, musculoskeletal, integument and psychiatric systems are negative, except as otherwise noted.     Objective    Exam  /79 (BP Location: Right arm, Patient Position: Sitting, Cuff Size: Adult Regular)   Pulse 102   Temp 96.8  F (36  C)   Resp 20   Ht 1.58 m (5' 2.2\")   Wt 49.9 kg (110 lb)   LMP 01/24/2006   SpO2 95%   BMI 19.99 kg/m   " "  Estimated body mass index is 19.99 kg/m  as calculated from the following:    Height as of this encounter: 1.58 m (5' 2.2\").    Weight as of this encounter: 49.9 kg (110 lb).    Physical Exam  GENERAL: alert and no distress  EYES: Eyes grossly normal to inspection, PERRL and conjunctivae and sclerae normal  HENT: Bilateral cerumen impaction, L > R but otherwise ear canals and TM's normal, nose and mouth without ulcers or lesions  NECK: no adenopathy, no asymmetry, masses, or scars  RESP: lungs clear to auscultation - no rales, rhonchi or wheezes  CV: regular rate and rhythm, normal S1 S2, no S3 or S4, no murmur, click or rub, no peripheral edema  ABDOMEN: soft, nontender, no hepatosplenomegaly, no masses and bowel sounds normal  MS: no gross musculoskeletal defects noted, no edema  SKIN: no suspicious lesions or rashes  NEURO: Normal strength and tone, mentation intact and speech normal  PSYCH: mentation appears normal, affect normal/bright         2/15/2024   Mini Cog   Clock Draw Score 2 Normal   3 Item Recall 3 objects recalled   Mini Cog Total Score 5         The likelihood of other entities in the differential is insufficient to justify any further testing for them at this time. This was explained to the patient. The patient was advised that persistent or worsening symptoms would require further evaluation. Patient advised to call the office and if unable to reach to go to the emergency room if they develop any new or worsening symptoms. Expressed understanding and agreement with above stated plan.        Signed Electronically by: Johann Pruitt PA-C    "

## 2024-02-19 ENCOUNTER — HOSPITAL ENCOUNTER (OUTPATIENT)
Facility: CLINIC | Age: 69
Setting detail: OBSERVATION
Discharge: HOME | End: 2024-02-20
Attending: PHYSICIAN ASSISTANT | Admitting: STUDENT IN AN ORGANIZED HEALTH CARE EDUCATION/TRAINING PROGRAM
Payer: COMMERCIAL

## 2024-02-19 ENCOUNTER — APPOINTMENT (OUTPATIENT)
Dept: GENERAL RADIOLOGY | Facility: CLINIC | Age: 69
End: 2024-02-19
Attending: EMERGENCY MEDICINE
Payer: COMMERCIAL

## 2024-02-19 ENCOUNTER — NURSE TRIAGE (OUTPATIENT)
Dept: FAMILY MEDICINE | Facility: CLINIC | Age: 69
End: 2024-02-19

## 2024-02-19 DIAGNOSIS — J44.1 COPD EXACERBATION (H): ICD-10-CM

## 2024-02-19 PROBLEM — J44.9 CHRONIC OBSTRUCTIVE PULMONARY DISEASE, UNSPECIFIED COPD TYPE (H): Status: ACTIVE | Noted: 2017-06-13

## 2024-02-19 PROBLEM — K76.0 HEPATIC STEATOSIS: Status: ACTIVE | Noted: 2023-04-01

## 2024-02-19 PROBLEM — J96.01 ACUTE RESPIRATORY FAILURE WITH HYPOXIA (H): Status: ACTIVE | Noted: 2023-07-24

## 2024-02-19 LAB
ALBUMIN SERPL BCG-MCNC: 4.6 G/DL (ref 3.5–5.2)
ALP SERPL-CCNC: 102 U/L (ref 40–150)
ALT SERPL W P-5'-P-CCNC: 13 U/L (ref 0–50)
ANION GAP SERPL CALCULATED.3IONS-SCNC: 14 MMOL/L (ref 7–15)
AST SERPL W P-5'-P-CCNC: 21 U/L (ref 0–45)
ATRIAL RATE - MUSE: 87 BPM
BASOPHILS # BLD AUTO: 0.1 10E3/UL (ref 0–0.2)
BASOPHILS NFR BLD AUTO: 0 %
BILIRUB SERPL-MCNC: 0.4 MG/DL
BUN SERPL-MCNC: 18.4 MG/DL (ref 8–23)
CALCIUM SERPL-MCNC: 10.3 MG/DL (ref 8.8–10.2)
CHLORIDE SERPL-SCNC: 97 MMOL/L (ref 98–107)
CREAT SERPL-MCNC: 0.59 MG/DL (ref 0.51–0.95)
DEPRECATED HCO3 PLAS-SCNC: 28 MMOL/L (ref 22–29)
DIASTOLIC BLOOD PRESSURE - MUSE: NORMAL MMHG
EGFRCR SERPLBLD CKD-EPI 2021: >90 ML/MIN/1.73M2
EOSINOPHIL # BLD AUTO: 0 10E3/UL (ref 0–0.7)
EOSINOPHIL NFR BLD AUTO: 0 %
ERYTHROCYTE [DISTWIDTH] IN BLOOD BY AUTOMATED COUNT: 18.1 % (ref 10–15)
FLUAV RNA SPEC QL NAA+PROBE: NEGATIVE
FLUBV RNA RESP QL NAA+PROBE: NEGATIVE
GLUCOSE SERPL-MCNC: 103 MG/DL (ref 70–99)
HCT VFR BLD AUTO: 42.2 % (ref 35–47)
HGB BLD-MCNC: 13.1 G/DL (ref 11.7–15.7)
HOLD SPECIMEN: NORMAL
IMM GRANULOCYTES # BLD: 0.2 10E3/UL
IMM GRANULOCYTES NFR BLD: 2 %
INTERPRETATION ECG - MUSE: NORMAL
LYMPHOCYTES # BLD AUTO: 3.3 10E3/UL (ref 0.8–5.3)
LYMPHOCYTES NFR BLD AUTO: 27 %
MCH RBC QN AUTO: 26 PG (ref 26.5–33)
MCHC RBC AUTO-ENTMCNC: 31 G/DL (ref 31.5–36.5)
MCV RBC AUTO: 84 FL (ref 78–100)
MONOCYTES # BLD AUTO: 2.1 10E3/UL (ref 0–1.3)
MONOCYTES NFR BLD AUTO: 17 %
NEUTROPHILS # BLD AUTO: 6.7 10E3/UL (ref 1.6–8.3)
NEUTROPHILS NFR BLD AUTO: 54 %
NRBC # BLD AUTO: 0 10E3/UL
NRBC BLD AUTO-RTO: 0 /100
NT-PROBNP SERPL-MCNC: 279 PG/ML (ref 0–900)
P AXIS - MUSE: 85 DEGREES
PLATELET # BLD AUTO: 504 10E3/UL (ref 150–450)
POTASSIUM SERPL-SCNC: 4.3 MMOL/L (ref 3.4–5.3)
PR INTERVAL - MUSE: 140 MS
PROT SERPL-MCNC: 7.9 G/DL (ref 6.4–8.3)
QRS DURATION - MUSE: 78 MS
QT - MUSE: 376 MS
QTC - MUSE: 452 MS
R AXIS - MUSE: 72 DEGREES
RBC # BLD AUTO: 5.03 10E6/UL (ref 3.8–5.2)
RSV RNA SPEC NAA+PROBE: NEGATIVE
SARS-COV-2 RNA RESP QL NAA+PROBE: NEGATIVE
SODIUM SERPL-SCNC: 139 MMOL/L (ref 135–145)
SYSTOLIC BLOOD PRESSURE - MUSE: NORMAL MMHG
T AXIS - MUSE: 78 DEGREES
TROPONIN T SERPL HS-MCNC: 7 NG/L
VENTRICULAR RATE- MUSE: 87 BPM
WBC # BLD AUTO: 12.3 10E3/UL (ref 4–11)

## 2024-02-19 PROCEDURE — 96376 TX/PRO/DX INJ SAME DRUG ADON: CPT

## 2024-02-19 PROCEDURE — 85025 COMPLETE CBC W/AUTO DIFF WBC: CPT | Performed by: PHYSICIAN ASSISTANT

## 2024-02-19 PROCEDURE — G0378 HOSPITAL OBSERVATION PER HR: HCPCS

## 2024-02-19 PROCEDURE — 99223 1ST HOSP IP/OBS HIGH 75: CPT | Mod: AI | Performed by: STUDENT IN AN ORGANIZED HEALTH CARE EDUCATION/TRAINING PROGRAM

## 2024-02-19 PROCEDURE — 36415 COLL VENOUS BLD VENIPUNCTURE: CPT | Performed by: PHYSICIAN ASSISTANT

## 2024-02-19 PROCEDURE — 250N000009 HC RX 250: Performed by: STUDENT IN AN ORGANIZED HEALTH CARE EDUCATION/TRAINING PROGRAM

## 2024-02-19 PROCEDURE — 87637 SARSCOV2&INF A&B&RSV AMP PRB: CPT | Performed by: EMERGENCY MEDICINE

## 2024-02-19 PROCEDURE — 250N000013 HC RX MED GY IP 250 OP 250 PS 637: Performed by: STUDENT IN AN ORGANIZED HEALTH CARE EDUCATION/TRAINING PROGRAM

## 2024-02-19 PROCEDURE — 250N000011 HC RX IP 250 OP 636: Performed by: PHYSICIAN ASSISTANT

## 2024-02-19 PROCEDURE — 250N000009 HC RX 250: Performed by: PHYSICIAN ASSISTANT

## 2024-02-19 PROCEDURE — 80053 COMPREHEN METABOLIC PANEL: CPT | Performed by: PHYSICIAN ASSISTANT

## 2024-02-19 PROCEDURE — 93005 ELECTROCARDIOGRAM TRACING: CPT

## 2024-02-19 PROCEDURE — 94640 AIRWAY INHALATION TREATMENT: CPT

## 2024-02-19 PROCEDURE — 96374 THER/PROPH/DIAG INJ IV PUSH: CPT

## 2024-02-19 PROCEDURE — 250N000009 HC RX 250: Performed by: EMERGENCY MEDICINE

## 2024-02-19 PROCEDURE — 87637 SARSCOV2&INF A&B&RSV AMP PRB: CPT | Performed by: PHYSICIAN ASSISTANT

## 2024-02-19 PROCEDURE — 71046 X-RAY EXAM CHEST 2 VIEWS: CPT

## 2024-02-19 PROCEDURE — 84484 ASSAY OF TROPONIN QUANT: CPT | Performed by: PHYSICIAN ASSISTANT

## 2024-02-19 PROCEDURE — 99285 EMERGENCY DEPT VISIT HI MDM: CPT | Mod: 25

## 2024-02-19 PROCEDURE — 83880 ASSAY OF NATRIURETIC PEPTIDE: CPT | Performed by: PHYSICIAN ASSISTANT

## 2024-02-19 PROCEDURE — 96375 TX/PRO/DX INJ NEW DRUG ADDON: CPT

## 2024-02-19 PROCEDURE — 250N000011 HC RX IP 250 OP 636: Performed by: STUDENT IN AN ORGANIZED HEALTH CARE EDUCATION/TRAINING PROGRAM

## 2024-02-19 RX ORDER — AMOXICILLIN 250 MG
1 CAPSULE ORAL 2 TIMES DAILY PRN
Status: DISCONTINUED | OUTPATIENT
Start: 2024-02-19 | End: 2024-02-20 | Stop reason: HOSPADM

## 2024-02-19 RX ORDER — LIDOCAINE 40 MG/G
CREAM TOPICAL
Status: DISCONTINUED | OUTPATIENT
Start: 2024-02-19 | End: 2024-02-20 | Stop reason: HOSPADM

## 2024-02-19 RX ORDER — AMOXICILLIN 250 MG
2 CAPSULE ORAL 2 TIMES DAILY PRN
Status: DISCONTINUED | OUTPATIENT
Start: 2024-02-19 | End: 2024-02-20 | Stop reason: HOSPADM

## 2024-02-19 RX ORDER — ONDANSETRON 4 MG/1
4 TABLET, ORALLY DISINTEGRATING ORAL EVERY 6 HOURS PRN
Status: DISCONTINUED | OUTPATIENT
Start: 2024-02-19 | End: 2024-02-20 | Stop reason: HOSPADM

## 2024-02-19 RX ORDER — AZITHROMYCIN 500 MG/1
500 INJECTION, POWDER, LYOPHILIZED, FOR SOLUTION INTRAVENOUS EVERY 24 HOURS
Status: DISCONTINUED | OUTPATIENT
Start: 2024-02-19 | End: 2024-02-20 | Stop reason: HOSPADM

## 2024-02-19 RX ORDER — ONDANSETRON 2 MG/ML
4 INJECTION INTRAMUSCULAR; INTRAVENOUS EVERY 6 HOURS PRN
Status: DISCONTINUED | OUTPATIENT
Start: 2024-02-19 | End: 2024-02-20 | Stop reason: HOSPADM

## 2024-02-19 RX ORDER — IPRATROPIUM BROMIDE AND ALBUTEROL SULFATE 2.5; .5 MG/3ML; MG/3ML
3 SOLUTION RESPIRATORY (INHALATION) EVERY 4 HOURS PRN
Status: DISCONTINUED | OUTPATIENT
Start: 2024-02-19 | End: 2024-02-20 | Stop reason: HOSPADM

## 2024-02-19 RX ORDER — GUAIFENESIN/DEXTROMETHORPHAN 100-10MG/5
10 SYRUP ORAL EVERY 4 HOURS PRN
Status: DISCONTINUED | OUTPATIENT
Start: 2024-02-19 | End: 2024-02-20 | Stop reason: HOSPADM

## 2024-02-19 RX ORDER — METHYLPREDNISOLONE SODIUM SUCCINATE 125 MG/2ML
60 INJECTION, POWDER, LYOPHILIZED, FOR SOLUTION INTRAMUSCULAR; INTRAVENOUS 3 TIMES DAILY
Status: DISCONTINUED | OUTPATIENT
Start: 2024-02-19 | End: 2024-02-20 | Stop reason: HOSPADM

## 2024-02-19 RX ORDER — LORAZEPAM 0.5 MG/1
0.5 TABLET ORAL EVERY 6 HOURS PRN
Status: DISCONTINUED | OUTPATIENT
Start: 2024-02-19 | End: 2024-02-20 | Stop reason: HOSPADM

## 2024-02-19 RX ORDER — METHYLPREDNISOLONE SODIUM SUCCINATE 125 MG/2ML
125 INJECTION, POWDER, LYOPHILIZED, FOR SOLUTION INTRAMUSCULAR; INTRAVENOUS ONCE
Status: COMPLETED | OUTPATIENT
Start: 2024-02-19 | End: 2024-02-19

## 2024-02-19 RX ORDER — FLUTICASONE FUROATE AND VILANTEROL 100; 25 UG/1; UG/1
1 POWDER RESPIRATORY (INHALATION) DAILY
Status: DISCONTINUED | OUTPATIENT
Start: 2024-02-20 | End: 2024-02-20 | Stop reason: HOSPADM

## 2024-02-19 RX ORDER — IPRATROPIUM BROMIDE AND ALBUTEROL SULFATE 2.5; .5 MG/3ML; MG/3ML
3 SOLUTION RESPIRATORY (INHALATION) ONCE
Status: COMPLETED | OUTPATIENT
Start: 2024-02-19 | End: 2024-02-19

## 2024-02-19 RX ORDER — CEFTRIAXONE 1 G/1
1 INJECTION, POWDER, FOR SOLUTION INTRAMUSCULAR; INTRAVENOUS EVERY 24 HOURS
Status: DISCONTINUED | OUTPATIENT
Start: 2024-02-19 | End: 2024-02-20 | Stop reason: HOSPADM

## 2024-02-19 RX ORDER — ACETAMINOPHEN 500 MG
1000 TABLET ORAL EVERY 6 HOURS PRN
Status: DISCONTINUED | OUTPATIENT
Start: 2024-02-19 | End: 2024-02-20 | Stop reason: HOSPADM

## 2024-02-19 RX ORDER — LORAZEPAM 0.5 MG/1
0.5 TABLET ORAL EVERY 6 HOURS PRN
Status: DISCONTINUED | OUTPATIENT
Start: 2024-02-19 | End: 2024-02-19

## 2024-02-19 RX ADMIN — CEFTRIAXONE SODIUM 1 G: 1 INJECTION, POWDER, FOR SOLUTION INTRAMUSCULAR; INTRAVENOUS at 22:22

## 2024-02-19 RX ADMIN — LORAZEPAM 0.5 MG: 0.5 TABLET ORAL at 22:20

## 2024-02-19 RX ADMIN — IPRATROPIUM BROMIDE AND ALBUTEROL SULFATE 3 ML: .5; 3 SOLUTION RESPIRATORY (INHALATION) at 16:40

## 2024-02-19 RX ADMIN — METHYLPREDNISOLONE SODIUM SUCCINATE 125 MG: 125 INJECTION, POWDER, FOR SOLUTION INTRAMUSCULAR; INTRAVENOUS at 16:39

## 2024-02-19 RX ADMIN — METHYLPREDNISOLONE SODIUM SUCCINATE 62.5 MG: 125 INJECTION, POWDER, FOR SOLUTION INTRAMUSCULAR; INTRAVENOUS at 22:18

## 2024-02-19 RX ADMIN — IPRATROPIUM BROMIDE AND ALBUTEROL SULFATE 3 ML: .5; 3 SOLUTION RESPIRATORY (INHALATION) at 20:38

## 2024-02-19 RX ADMIN — IPRATROPIUM BROMIDE AND ALBUTEROL SULFATE 3 ML: .5; 3 SOLUTION RESPIRATORY (INHALATION) at 10:47

## 2024-02-19 ASSESSMENT — ACTIVITIES OF DAILY LIVING (ADL)
ADLS_ACUITY_SCORE: 38
ADLS_ACUITY_SCORE: 34
ADLS_ACUITY_SCORE: 38
ADLS_ACUITY_SCORE: 38

## 2024-02-19 NOTE — ED TRIAGE NOTES
Reports increasing SOB since last Tuesday.  Took a dose of prednisone this AM.  States coughing up a green mucous.   Hx COPD. Did not do neb today.      Triage Assessment (Adult)       Row Name 02/19/24 1042          Triage Assessment    Airway WDL WDL        Respiratory WDL    Respiratory WDL X        Skin Circulation/Temperature WDL    Skin Circulation/Temperature WDL WDL        Cardiac WDL    Cardiac WDL WDL        Peripheral/Neurovascular WDL    Peripheral Neurovascular WDL WDL        Cognitive/Neuro/Behavioral WDL    Cognitive/Neuro/Behavioral WDL WDL

## 2024-02-19 NOTE — ED PROVIDER NOTES
History     Chief Complaint:  Shortness of Breath       HPI   Tammie Zeng is a 68 year old female with COPD, tobacco use who presents with increasing shortness of breath for past 5 days. Patient notes increased sputum production with yellow-green color and increased respiratory rate even at rest. She notes she will wake up short of breath and have difficulty walking to bathroom due to shortness of breath. Symptoms similar to prior COPD exacerbations. She usually takes trilegy and nebulizer treatments. She notes she took single dose of old prednisone 10 mg tablet without relief. She is on no oxygen at home and spokes about 0.75 ppd. She denies fevers, chills, congestion or sore throat.      Independent Historian:   None - Patient Only    Review of External Notes:   None       Medications:    albuterol (PROAIR HFA/PROVENTIL HFA/VENTOLIN HFA) 108 (90 Base) MCG/ACT inhaler  aspirin (ASA) 325 MG EC tablet  Fluticasone-Umeclidin-Vilant (TRELEGY ELLIPTA) 100-62.5-25 MCG/ACT oral inhaler  guaiFENesin (MUCINEX) 600 MG 12 hr tablet  ipratropium - albuterol 0.5 mg/2.5 mg/3 mL (DUONEB) 0.5-2.5 (3) MG/3ML neb solution  nicotine (COMMIT) 2 MG lozenge  predniSONE (DELTASONE) 10 MG tablet  predniSONE (DELTASONE) 20 MG tablet        Past Medical History:    Past Medical History:   Diagnosis Date    Chronic obstructive pulmonary disease, unspecified COPD type (H) 06/13/2017    Depressive disorder, not elsewhere classified 05/2006    Elevated LFTs 2023    Generalized anxiety disorder     Hepatic steatosis 04/2023    Herpes simplex without mention of complication     Nephrolithiasis     TOBACCO ABUSE-CONTINUOUS        Past Surgical History:    Past Surgical History:   Procedure Laterality Date    CHOLECYSTECTOMY, LAPOROSCOPIC  1980's    done at [facility]    TUBAL LIGATION  1998        Physical Exam   Patient Vitals for the past 24 hrs:   BP Temp Temp src Pulse Resp SpO2 Height Weight   02/19/24 1617 (!) 130/91 -- -- 99 --  "94 % -- --   02/19/24 1043 -- -- -- 85 -- 91 % -- --   02/19/24 1042 112/76 98.6  F (37  C) Temporal -- 22 -- 1.575 m (5' 2\") 45.4 kg (100 lb)        Physical Exam  Constitutional: Alert, attentive, GCS 15  HENT:    Nose: Nose normal.    Mouth/Throat: Oropharynx is clear, mucous membranes are moist   Eyes: EOM are normal.   CV: regular rate and rhythm; no murmurs, rubs or gallups  Chest: increased respiratory effort with tachypnea. Expiratory wheezing in bilateral lung fields.  GI:  There is no tenderness. No distension. Normal bowel sounds  MSK: Normal range of motion.   Neurological: Alert, attentive  Skin: Skin is warm and dry.      Emergency Department Course     ECG results from 02/19/24   EKG 12 lead     Value    Systolic Blood Pressure     Diastolic Blood Pressure     Ventricular Rate 87    Atrial Rate 87    KS Interval 140    QRS Duration 78        QTc 452    P Axis 85    R AXIS 72    T Axis 78    Interpretation ECG      Sinus rhythm  Normal ECG  When compared with ECG of 19-AUG-2023 09:13,  No significant change was found           Imaging:  XR Chest 2 Views   Final Result   IMPRESSION: Emphysema. No focal consolidation, pleural effusion or   pneumothorax. Cardiomediastinal silhouette is unremarkable.       TRICIA VILLASENOR MD            SYSTEM ID:  [id number]             Laboratory:  Labs Ordered and Resulted from Time of ED Arrival to Time of ED Departure   COMPREHENSIVE METABOLIC PANEL - Abnormal       Result Value    Sodium 139      Potassium 4.3      Carbon Dioxide (CO2) 28      Anion Gap 14      Urea Nitrogen 18.4      Creatinine 0.59      GFR Estimate >90      Calcium 10.3 (*)     Chloride 97 (*)     Glucose 103 (*)     Alkaline Phosphatase 102      AST 21      ALT 13      Protein Total 7.9      Albumin 4.6      Bilirubin Total 0.4     CBC WITH PLATELETS AND DIFFERENTIAL - Abnormal    WBC Count 12.3 (*)     RBC Count 5.03      Hemoglobin 13.1      Hematocrit 42.2      MCV 84      MCH 26.0 " (*)     MCHC 31.0 (*)     RDW 18.1 (*)     Platelet Count 504 (*)     % Neutrophils 54      % Lymphocytes 27      % Monocytes 17      % Eosinophils 0      % Basophils 0      % Immature Granulocytes 2      NRBCs per 100 WBC 0      Absolute Neutrophils 6.7      Absolute Lymphocytes 3.3      Absolute Monocytes 2.1 (*)     Absolute Eosinophils 0.0      Absolute Basophils 0.1      Absolute Immature Granulocytes 0.2      Absolute NRBCs 0.0     INFLUENZA A/B, RSV, & SARS-COV2 PCR - Normal    Influenza A PCR Negative      Influenza B PCR Negative      RSV PCR Negative      SARS CoV2 PCR Negative     NT PROBNP INPATIENT - Normal    N terminal Pro BNP Inpatient 279     TROPONIN T, HIGH SENSITIVITY - Normal    Troponin T, High Sensitivity 7          Emergency Department Course & Assessments:       Interventions:  Medications   ipratropium - albuterol 0.5 mg/2.5 mg/3 mL (DUONEB) neb solution 3 mL (3 mLs Nebulization $Given 2/19/24 1047)   ipratropium - albuterol 0.5 mg/2.5 mg/3 mL (DUONEB) neb solution 3 mL (3 mLs Nebulization $Given 2/19/24 1640)   methylPREDNISolone sodium succinate (solu-MEDROL) injection 125 mg (125 mg Intravenous $Given 2/19/24 1639)        Assessments:  1605  I obtained patient history and performed physical examination as above.      Independent Interpretation (X-rays, CTs, rhythm strip):  Extensive emphysema. No infiltrate or opacity noted.     Consultations/Discussion of Management or Tests:  1800 Dr. Reddy for staffing  1805 Dr. Sorenson for admission.          Social Determinants of Health affecting care:   None    Disposition:  The patient was admitted to the hospital under the care of Dr. Sorenson.     Impression & Plan    CMS Diagnoses: None    Medical Decision Making:  Patient is a nontoxic-appearing 60-year-old female with COPD who presents with increasing sputum production and difficulty breathing over the past 5 days.  Symptoms consistent with prior COPD exacerbations.  She is afebrile, normotensive,  with O2 at 93% at rest however when ambulating. O2 falls to 88% RA. Multiple duo nebs and solumedrol provided. Chest x-ray shows extensive emphysema but otherwise no findings of pneumonia or other acute process.  Labs are significant for mild leukocytosis at 12 however patient notes that she has been taking a daily 10 mg prednisone.  Patient currently on no supplemental oxygen at home.  Oxygen is stable at rest however she develops hypoxia and significant tachypnea with any type of exertion. Considering above, patient is candidate for inpatient admission.  Patient staffed with Dr. Reddy who agrees with plan. Patient graciously accepted for admission under Dr. Sorenson for hospitalist team. She remains hemodynamically stable with oxygen in the low 90s at rest.    Diagnosis:    ICD-10-CM    1. COPD exacerbation (H)  J44.1            Discharge Medications:  New Prescriptions    No medications on file            2/19/2024   Luna Rodriguez PA-C Steinbrueck, Emily, PA-C  02/19/24 3804

## 2024-02-19 NOTE — RESULT ENCOUNTER NOTE
Topher Akins,     -Stable blood counts. No signs of anemia. However white blood cell count remains elevated. Let's repeat this in 3 months. Has been high since you were in the hospital. Ordered for you. Can schedule a lab only appt.     -Your comprehensive metabolic panel which includes tests of liver function (ALT, AST, total bilirubin, alkaline phosphatase), kidney function (creatinine, BUN), electrolytes (sodium, potassium, calcium), and blood sugar (glucose) returned stable for you.    -TSH (thyroid stimulating hormone) level is normal which indicates normal circulating thyroid hormone levels.     -Cholesterol levels are satisfactory    Let me know if you have any questions or concerns,     Johann Pruitt PA-C  [facility]

## 2024-02-19 NOTE — TELEPHONE ENCOUNTER
Pt called [facility], states she is going to ER instead. Will cancel [facility] appt for today.    KEIRA VILLAGRAN RN on 2/19/2024 at 10:08 AM

## 2024-02-19 NOTE — ED NOTES
"[facility]  ED Nurse Handoff Report    ED Chief complaint: Shortness of Breath      ED Diagnosis:   Final diagnoses:   COPD exacerbation (H)       Code Status: not addressed by ED MD.    Allergies:   Allergies   Allergen Reactions    Pcn [Penicillins]      convulsions       Patient Story: Patient comes in with complaints of worsening SOB. States SOB worse with activity. Patient has a history of COPD. Denies home oxygen use.  Focused Assessment:  Patient comes in with daughter. Patient has increased work of breathing after walking in from the car. Patient's oxygen sats drop to 80s with activity. Patient rebounded up to 95% after rest.     Treatments and/or interventions provided: IV solumedrol, Duonebs  Patient's response to treatments and/or interventions: Improved oxygen sats at rest.    To be done/followed up on inpatient unit:  Continue to monitor    Does this patient have any cognitive concerns?:  alert and oriented times four    Activity level - Baseline/Home:  Independent  Activity Level - Current:   Stand with Assist    Patient's Preferred language: English   Needed?: No    Isolation: None  Infection: Not Applicable  Patient tested for COVID 19 prior to admission: NO  Bariatric?: No    Vital Signs:   Vitals:    02/19/24 1042 02/19/24 1043 02/19/24 1617   BP: 112/76  (!) 130/91   Pulse:  85 99   Resp: 22     Temp: 98.6  F (37  C)     TempSrc: Temporal     SpO2:  91% 94%   Weight: 45.4 kg (100 lb)     Height: 1.575 m (5' 2\")         Cardiac Rhythm:     Was the PSS-3 completed:   Yes  What interventions are required if any?               Family Comments: Daughter at bedside  OBS brochure/video discussed/provided to patient/family: No              Name of person given brochure if not patient: n/a              Relationship to patient: n/a    For the majority of the shift this patient's behavior was Green.   Behavioral interventions performed were none  .    ED NURSE PHONE NUMBER: " [phone]

## 2024-02-19 NOTE — TELEPHONE ENCOUNTER
[facility] accepted patient. Patient agreeable to be seen a 10:30 by the [facility].       URBANO Cline  [facility]

## 2024-02-19 NOTE — ED PROVIDER NOTES
ED ATTENDING PHYSICIAN NOTE:   I evaluated this patient in conjunction with Luna Rodriguez PA-C  I have participated in the care of the patient and personally performed key elements of the history, exam, and medical decision making.      HPI:   Tammie Zeng is a 68 year old female with a history of COPD and tobacco use presenting to the emergency department for evaluation of shortness of breath. The patient reports her shortness of breath has worsened over the past six days. She reports an increase in tobacco use, as she normally smokes around 10 cigarettes a day but has been smoking approximately 17 a day for the past week. She has a wet cough and has had increasing sputum production.         Independent Historian:   None - Patient Only         EXAM:   General:  Alert, interactive  Cardiovascular:  Well perfused  Lungs:  No respiratory distress, no accessory muscle use Diffuse wheezes  Neuro:  Moving all 4 extremities  Skin:  Warm, dry  Psych:  Normal affect      Independent Interpretation (X-rays, CTs, rhythm strip):  No acute infiltrates on CXR     Consultations/Discussion of Management or Tests:  None     Social Determinants of Health affecting care:   None     MEDICAL DECISION MAKING/ASSESSMENT AND PLAN:   68-year-old female presenting today with increase shortness of breath.  Reports increased tobacco use over the last several days.  On presentation she had diffuse wheezing.  She was given DuoNebs, Solu-Medrol with improvement of symptoms.  She was noted to be hypoxic with ambulation at 88%.  No prior O2 requirement.  White count slightly elevated.  X-ray showed no infiltrates.  Given her hypoxia, persistent wheezing will admit to hospital service for COPD exacerbation.     DIAGNOSIS:     ICD-10-CM    1. COPD exacerbation (H)  J44.1                DISPOSITION:   The patient was admitted to the hospital under the care of Dr. Sorenson.        Scribe Disclosure:  Benny POWELL, am serving as a scribe at  6:07 PM on 2/19/2024 to document services personally performed by Luna Rodriguez PA-C based on my observations and the provider's statements to me.   2/19/2024  [facility] EMERGENCY DEPT       Rachel Reddy DO  02/19/24 [id number]

## 2024-02-19 NOTE — TELEPHONE ENCOUNTER
Nurse Triage SBAR    Is this a 2nd Level Triage? YES, LICENSED PRACTITIONER REVIEW IS REQUIRED    Situation: Patient having SOB due to bronchitis, but also has a history of COPD. Patient lost her cat recently and has been very stressed as a result. She smokes to calm herself. Patient having moderate breathing difficulty. It is hard to lay flat. Patient states this is a new symptom as of recently. Patient has an oximeter which she states starts at 88 and works itself up to the low 90's. Patient coughing up a lot of flem and mucous. Was Seen for acute bronchitis but states it got worse over the weekend.     Background: Patient needs to be seen     Assessment: Patient needs to be seen     Protocol Recommended Disposition:   Go to ED Now    Recommendation: Patient needs to be evaluated, recommending to ADS    ADS     Does the patient meet one of the following criteria for ADS visit consideration? 16+ years old, with an [facility] PCP     TIP  Providers, please consider if this condition is appropriate for management at one of our Acute and Diagnostic Services sites.     If patient is a good candidate, please use dotphrase <dot>triageresponse and select Refer to ADS to document.      Reason for Disposition   MODERATE difficulty breathing (e.g., speaks in phrases, SOB even at rest, pulse 100-120) of new-onset or worse than normal    Additional Information   Negative: SEVERE difficulty breathing (e.g., struggling for each breath, speaks in single words, pulse > 120)   Negative: Breathing stopped and hasn't returned   Negative: Choking on something   Negative: Bluish (or gray) lips or face   Negative: Difficult to awaken or acting confused (e.g., disoriented, slurred speech)   Negative: Passed out (i.e., fainted, collapsed and was not responding)   Negative: Wheezing started suddenly after medicine, an allergic food, or bee sting   Negative: Stridor (harsh sound while breathing in)   Negative: Slow, shallow and weak breathing    "Negative: Sounds like a life-threatening emergency to the triager   Negative: Chest pain   Negative: Wheezing (high pitched whistling sound) and previous asthma attacks or use of asthma medicines   Negative: Difficulty breathing and within 14 days of COVID-19 Exposure   Negative: Difficulty breathing and only present when coughing   Negative: Difficulty breathing and only from stuffy nose   Negative: Difficulty breathing and only from stuffy nose or runny nose from common cold    Answer Assessment - Initial Assessment Questions  1. RESPIRATORY STATUS: \"Describe your breathing?\" (e.g., wheezing, shortness of breath, unable to speak, severe coughing)       Wheezing, trouble taking a deep breath   2. ONSET: \"When did this breathing problem begin?\"       Over the weekend, on Friday  3. PATTERN \"Does the difficult breathing come and go, or has it been constant since it started?\"       Comes and goes   4. SEVERITY: \"How bad is your breathing?\" (e.g., mild, moderate, severe)     - MILD: No SOB at rest, mild SOB with walking, speaks normally in sentences, can lie down, no retractions, pulse < 100.     - MODERATE: SOB at rest, SOB with minimal exertion and prefers to sit, cannot lie down flat, speaks in phrases, mild retractions, audible wheezing, pulse 100-120.     - SEVERE: Very SOB at rest, speaks in single words, struggling to breathe, sitting hunched forward, retractions, pulse > 120       Moderate   5. RECURRENT SYMPTOM: \"Have you had difficulty breathing before?\" If Yes, ask: \"When was the last time?\" and \"What happened that time?\"       Yes in the hospital back in July and August   6. CARDIAC HISTORY: \"Do you have any history of heart disease?\" (e.g., heart attack, angina, bypass surgery, angioplasty)       No   7. LUNG HISTORY: \"Do you have any history of lung disease?\"  (e.g., pulmonary embolus, asthma, emphysema)      COPD   8. CAUSE: \"What do you think is causing the breathing problem?\"       COPD- smoking very " "heavily after cat put down  9. OTHER SYMPTOMS: \"Do you have any other symptoms? (e.g., dizziness, runny nose, cough, chest pain, fever)      Coughing up mucous   10. O2 SATURATION MONITOR:  \"Do you use an oxygen saturation monitor (pulse oximeter) at home?\" If Yes, ask: \"What is your reading (oxygen level) today?\" \"What is your usual oxygen saturation reading?\" (e.g., 95%)        88%, low 90's   11. PREGNANCY: \"Is there any chance you are pregnant?\" \"When was your last menstrual period?\"        No  12. TRAVEL: \"Have you traveled out of the country in the last month?\" (e.g., travel history, exposures)        No    Protocols used: Breathing Difficulty-A-[place]    "

## 2024-02-20 VITALS
DIASTOLIC BLOOD PRESSURE: 66 MMHG | OXYGEN SATURATION: 92 % | WEIGHT: 100.31 LBS | BODY MASS INDEX: 18.46 KG/M2 | TEMPERATURE: 97.9 F | HEIGHT: 62 IN | SYSTOLIC BLOOD PRESSURE: 116 MMHG | HEART RATE: 81 BPM | RESPIRATION RATE: 18 BRPM

## 2024-02-20 LAB
ANION GAP SERPL CALCULATED.3IONS-SCNC: 10 MMOL/L (ref 7–15)
BUN SERPL-MCNC: 14.7 MG/DL (ref 8–23)
CALCIUM SERPL-MCNC: 9.9 MG/DL (ref 8.8–10.2)
CHLORIDE SERPL-SCNC: 100 MMOL/L (ref 98–107)
CREAT SERPL-MCNC: 0.6 MG/DL (ref 0.51–0.95)
DEPRECATED HCO3 PLAS-SCNC: 29 MMOL/L (ref 22–29)
EGFRCR SERPLBLD CKD-EPI 2021: >90 ML/MIN/1.73M2
ERYTHROCYTE [DISTWIDTH] IN BLOOD BY AUTOMATED COUNT: 18.3 % (ref 10–15)
GLUCOSE SERPL-MCNC: 114 MG/DL (ref 70–99)
HCT VFR BLD AUTO: 40.4 % (ref 35–47)
HGB BLD-MCNC: 12.4 G/DL (ref 11.7–15.7)
MCH RBC QN AUTO: 26.1 PG (ref 26.5–33)
MCHC RBC AUTO-ENTMCNC: 30.7 G/DL (ref 31.5–36.5)
MCV RBC AUTO: 85 FL (ref 78–100)
PLATELET # BLD AUTO: 446 10E3/UL (ref 150–450)
POTASSIUM SERPL-SCNC: 5.7 MMOL/L (ref 3.4–5.3)
RBC # BLD AUTO: 4.76 10E6/UL (ref 3.8–5.2)
SODIUM SERPL-SCNC: 139 MMOL/L (ref 135–145)
WBC # BLD AUTO: 10 10E3/UL (ref 4–11)

## 2024-02-20 PROCEDURE — 36415 COLL VENOUS BLD VENIPUNCTURE: CPT | Performed by: STUDENT IN AN ORGANIZED HEALTH CARE EDUCATION/TRAINING PROGRAM

## 2024-02-20 PROCEDURE — 250N000009 HC RX 250: Performed by: STUDENT IN AN ORGANIZED HEALTH CARE EDUCATION/TRAINING PROGRAM

## 2024-02-20 PROCEDURE — 80048 BASIC METABOLIC PNL TOTAL CA: CPT | Performed by: STUDENT IN AN ORGANIZED HEALTH CARE EDUCATION/TRAINING PROGRAM

## 2024-02-20 PROCEDURE — 250N000013 HC RX MED GY IP 250 OP 250 PS 637: Performed by: STUDENT IN AN ORGANIZED HEALTH CARE EDUCATION/TRAINING PROGRAM

## 2024-02-20 PROCEDURE — 99239 HOSP IP/OBS DSCHRG MGMT >30: CPT | Performed by: STUDENT IN AN ORGANIZED HEALTH CARE EDUCATION/TRAINING PROGRAM

## 2024-02-20 PROCEDURE — 96375 TX/PRO/DX INJ NEW DRUG ADDON: CPT

## 2024-02-20 PROCEDURE — 250N000011 HC RX IP 250 OP 636: Performed by: STUDENT IN AN ORGANIZED HEALTH CARE EDUCATION/TRAINING PROGRAM

## 2024-02-20 PROCEDURE — 96376 TX/PRO/DX INJ SAME DRUG ADON: CPT

## 2024-02-20 PROCEDURE — G0378 HOSPITAL OBSERVATION PER HR: HCPCS

## 2024-02-20 PROCEDURE — 85027 COMPLETE CBC AUTOMATED: CPT | Performed by: STUDENT IN AN ORGANIZED HEALTH CARE EDUCATION/TRAINING PROGRAM

## 2024-02-20 RX ORDER — SODIUM POLYSTYRENE SULFONATE 15 G/60ML
15 SUSPENSION ORAL; RECTAL ONCE
Qty: 60 ML | Refills: 0 | Status: COMPLETED | OUTPATIENT
Start: 2024-02-20 | End: 2024-02-20

## 2024-02-20 RX ORDER — AZITHROMYCIN 250 MG/1
250 TABLET, FILM COATED ORAL DAILY
Qty: 4 TABLET | Refills: 0 | Status: SHIPPED | OUTPATIENT
Start: 2024-02-20 | End: 2024-02-24

## 2024-02-20 RX ORDER — BENZONATATE 100 MG/1
100 CAPSULE ORAL 3 TIMES DAILY PRN
Status: DISCONTINUED | OUTPATIENT
Start: 2024-02-20 | End: 2024-02-20 | Stop reason: HOSPADM

## 2024-02-20 RX ORDER — CEFUROXIME AXETIL 500 MG/1
500 TABLET ORAL 2 TIMES DAILY
Qty: 10 TABLET | Refills: 0 | Status: SHIPPED | OUTPATIENT
Start: 2024-02-20 | End: 2024-02-25

## 2024-02-20 RX ORDER — LORAZEPAM 0.5 MG/1
0.5 TABLET ORAL EVERY 6 HOURS PRN
Qty: 12 TABLET | Refills: 0 | Status: ON HOLD | OUTPATIENT
Start: 2024-02-20 | End: 2024-04-20

## 2024-02-20 RX ORDER — PREDNISONE 10 MG/1
TABLET ORAL
Qty: 30 TABLET | Refills: 0 | Status: SHIPPED | OUTPATIENT
Start: 2024-02-21 | End: 2024-04-12

## 2024-02-20 RX ADMIN — IPRATROPIUM BROMIDE AND ALBUTEROL SULFATE 3 ML: .5; 3 SOLUTION RESPIRATORY (INHALATION) at 09:31

## 2024-02-20 RX ADMIN — METHYLPREDNISOLONE SODIUM SUCCINATE 62.5 MG: 125 INJECTION, POWDER, FOR SOLUTION INTRAMUSCULAR; INTRAVENOUS at 13:54

## 2024-02-20 RX ADMIN — METHYLPREDNISOLONE SODIUM SUCCINATE 62.5 MG: 125 INJECTION, POWDER, FOR SOLUTION INTRAMUSCULAR; INTRAVENOUS at 08:44

## 2024-02-20 RX ADMIN — UMECLIDINIUM 1 PUFF: 62.5 AEROSOL, POWDER ORAL at 08:43

## 2024-02-20 RX ADMIN — SODIUM POLYSTYRENE SULFONATE 15 G: 15 SUSPENSION ORAL; RECTAL at 13:03

## 2024-02-20 RX ADMIN — FLUTICASONE FUROATE AND VILANTEROL TRIFENATATE 1 PUFF: 100; 25 POWDER RESPIRATORY (INHALATION) at 08:43

## 2024-02-20 RX ADMIN — AZITHROMYCIN MONOHYDRATE 500 MG: 500 INJECTION, POWDER, LYOPHILIZED, FOR SOLUTION INTRAVENOUS at 00:23

## 2024-02-20 ASSESSMENT — ACTIVITIES OF DAILY LIVING (ADL)
ADLS_ACUITY_SCORE: 34
ADLS_ACUITY_SCORE: 33
ADLS_ACUITY_SCORE: 33
ADLS_ACUITY_SCORE: 34
ADLS_ACUITY_SCORE: 34

## 2024-02-20 NOTE — PHARMACY-ADMISSION MEDICATION HISTORY
Pharmacist Admission Medication History    Admission medication history is complete. The information provided in this note is only as accurate as the sources available at the time of the update.    Information Source(s): Patient, Family member, and CareEverywhere/SureScripts via in-person    Pertinent Information: Patient would like refill on Albuterol Inhaler, she states her insurance will not cover. States she is no longer on Daliresp, did not agree with her.    Changes made to PTA medication list:  Added: None  Deleted: Prednisone, Nicotine enid  Changed: None    Allergies reviewed with patient and updates made in EHR: yes    Medication History Completed By: Hussein Edmond [facility] 2/19/2024 6:39 PM    PTA Med List   Medication Sig Last Dose    aspirin (ASA) 325 MG EC tablet Take 325 mg by mouth every 6 hours as needed for moderate pain Unknown at prn    Fluticasone-Umeclidin-Vilant (TRELEGY ELLIPTA) 100-62.5-25 MCG/ACT oral inhaler Inhale 1 puff into the lungs daily 2/19/2024 at am    guaiFENesin (MUCINEX) 600 MG 12 hr tablet Take 1,200 mg by mouth 2 times daily as needed for congestion 2/18/2024 at prn    ipratropium - albuterol 0.5 mg/2.5 mg/3 mL (DUONEB) 0.5-2.5 (3) MG/3ML neb solution NEBULIZE 1 VIAL 4 TIMES DAILY. MAY USE EVERY 4 HOURS AS NEEDED FOR DSYPNEA/WHEEZING THEN TO ONLY ONCE AS NEEDED 2/19/2024

## 2024-02-20 NOTE — DISCHARGE SUMMARY
Shift Summary [id number]    Admitting Diagnosis: COPD exacerbation (H)     Patient A&Ox4. VSS, on RA sat 95 %. SOB with activity, Denied pain and CP. Up independent. Pt discharging home via family. AVS instructions reviewed, teaching with teach back. copy of AVS and medications handed to Pt. Belongings returned to Pt.

## 2024-02-20 NOTE — PROVIDER NOTIFICATION
MD Notification    Notified Person: MD    Notified Person Name: Johanna Coats    Notification Date/Time: 02/20/2024 1147    Notification Interaction: Vocera message    Purpose of Notification: K+ 5.7    Orders Received: Yes of  Kayexalate

## 2024-02-20 NOTE — PROGRESS NOTES
Vitally stable on RA, IND, Reg diet,  wheezing in bilateral lung field ,able to make needs known,CMS intact, frequent cough with increase sputum production, PIV SL, Will  continue to monitor

## 2024-02-20 NOTE — PROGRESS NOTES
RECEIVING UNIT ED HANDOFF REVIEW    ED Nurse Handoff Report was reviewed by: James Hameed RN on February 19, 2024 at 8:58 PM

## 2024-02-20 NOTE — PROGRESS NOTES
Received pt on the Unit. Pt settled in bed.Belongings with pt. A&O x4. VSS on RA. O2 sats at low 90s. STORY with activities. Infrequent productive cough. Denied chest pain.Ind in the room. R PIV with intermittent antx. PRN  Ativan x1 given for anxiety.

## 2024-02-20 NOTE — H&P
[facility]    History and Physical - Hospitalist Service       Date of Admission:  2/19/2024    Assessment & Plan      Tammie Zeng is a 68 year old female admitted on 2/19/2024. She presents with shortness of breath / cough.       COPD exacerbation (H)    Chronic obstructive pulmonary disease, unspecified COPD type (H)    SOB    Wheezing    Assessment: Presents with 5-day history of increasing shortness of breath and wheezing with a productive cough.  Labs and admission pertinent for mild leukocytosis of 12.3, otherwise rest of her CBC/BMP is overall unremarkable.  Chest x-ray was obtained that showed emphysema, there was no focal consolidation, pleural effusion or pneumothorax.  Although no concrete evidence of consolidation seen on x-ray given her significant cough with purulent sputum, will cover for community-acquired pneumonia at this time.    Plan:   - [place] to observation  -DuoNebs every 4 hours as needed  -Continue prior to mission Trelegy  -Solu-Medrol 60 mg every 8 hours  -IV ceftriaxone and azithromycin  -Cough suppressant as needed  -Follow vitals/Temp  -Oximetry with supplemental O2 as needed      Generalized anxiety disorder    Assessment/Plan: ativan PRN for anxiety      Hepatic steatosis    Assessment/Plan: Stable, follows an outpatient          Diet:  Regular Diet  DVT Prophylaxis: Pneumatic Compression Devices  Royal Catheter: Not present  Lines: None     Cardiac Monitoring: None  Code Status:  FULL CODE    Clinically Significant Risk Factors Present on Admission           # Hypercalcemia: Highest Ca = 10.3 mg/dL in last 2 days, will monitor as appropriate      # Drug Induced Platelet Defect: home medication list includes an antiplatelet medication                   Disposition Plan      Expected Discharge Date: 02/20/2024                  Dante Spencer MD  Hospitalist Service  [facility]  Securely message with "[employer] (more info)  Text page  via [facility] Paging/Directory     ______________________________________________________________________    Chief Complaint     SOB    History is obtained from the patient    History of Present Illness     Tammie Zeng is a 68 year old female with past medical history of COPD, generalized anxiety disorder, hepatic steatosis who presents for evaluation of shortness of breath.    Patient reports that for the past 5 days she has had progressively worsening shortness of breath initially with exertion but now over the past several days even at rest.  She also notes an increasingly more productive cough and increased respiratory rate at rest.  She woke up this morning severely short of breath and was unable to ambulate due to the bathroom due to her dyspnea.  Given the constellation of her symptoms, she sought further evaluation in the ED.  She overall reports that her current symptoms are similar to previous COPD exacerbations.  At baseline she takes Trelegy and nebulizer treatments.  She did take a single dose of prednisone 10 mg tablet today with no relief at all.  At baseline she is not on supplemental oxygen.  She is a current every day smoker, smoking roughly 0.5 PPD.  She otherwise has no fevers or chills, she does endorse some cold sweats last night.  Otherwise denies any new calf pain or leg swelling.  She denies any recent exposures to sick contacts.  She has no sore throat.  She has no nausea/vomiting or abdominal pain.  She has no other complaints at this time.  Her daughter is at bedside.    Past Medical History    Past Medical History:   Diagnosis Date    Chronic obstructive pulmonary disease, unspecified COPD type (H) 06/13/2017    has seen [facility], fev1 under 1.0    Depressive disorder, not elsewhere classified 05/2006    following sudden death of her mother    Elevated LFTs 2023    hepatic steatosis 4/23 us    Generalized anxiety disorder     Hepatic steatosis 04/2023    on us done for elev lfts     Herpes simplex without mention of complication     MOUTH    Nephrolithiasis     TOBACCO ABUSE-CONTINUOUS        Past Surgical History   Past Surgical History:   Procedure Laterality Date    CHOLECYSTECTOMY, LAPOROSCOPIC  1980's    done at [facility]    TUBAL LIGATION  1998       Prior to Admission Medications   Prior to Admission Medications   Prescriptions Last Dose Informant Patient Reported? Taking?   Fluticasone-Umeclidin-Vilant (TRELEGY ELLIPTA) 100-62.5-25 MCG/ACT oral inhaler 2/19/2024 at am Self No Yes   Sig: Inhale 1 puff into the lungs daily   albuterol (PROAIR HFA/PROVENTIL HFA/VENTOLIN HFA) 108 (90 Base) MCG/ACT inhaler Not Taking Self No No   Sig: Inhale 2 puffs into the lungs every 4 hours as needed for wheezing or shortness of breath   Patient not taking: Reported on 2/19/2024   aspirin (ASA) 325 MG EC tablet Unknown at prn Self Yes Yes   Sig: Take 325 mg by mouth every 6 hours as needed for moderate pain   guaiFENesin (MUCINEX) 600 MG 12 hr tablet 2/18/2024 at prn Self Yes Yes   Sig: Take 1,200 mg by mouth 2 times daily as needed for congestion   ipratropium - albuterol 0.5 mg/2.5 mg/3 mL (DUONEB) 0.5-2.5 (3) MG/3ML neb solution 2/19/2024 Self No Yes   Sig: NEBULIZE 1 VIAL 4 TIMES DAILY. MAY USE EVERY 4 HOURS AS NEEDED FOR DSYPNEA/WHEEZING THEN TO ONLY ONCE AS NEEDED      Facility-Administered Medications: None        Review of Systems    The 10 point Review of Systems is negative other than noted in the HPI or here.     Social History   I have reviewed this patient's social history and updated it with pertinent information if needed.  Social History     Tobacco Use    Smoking status: Every Day     Packs/day: 0.50     Years: 25.00     Additional pack years: 0.00     Total pack years: 12.50     Types: Cigarettes    Smokeless tobacco: Never    Tobacco comments:     states is cutting back - smokes 1/2 ppd (01/03/11)   Vaping Use    Vaping Use: Never used   Substance Use Topics    Alcohol use: Yes      Alcohol/week: 0.0 standard drinks of alcohol     Comment: 3 mixed drinks per week    Drug use: No         Family History   I have reviewed this patient's family history and updated it with pertinent information if needed.  Family History   Problem Relation Age of Onset    Asthma Daughter     Diabetes Paternal Grandmother     Cancer Maternal Aunt         skin    Allergies Daughter     Depression Mother     Eye Disorder Mother         cataracts and glacoma    Gastrointestinal Disease Maternal Grandmother         gallbladder    Gastrointestinal Disease Daughter         ulcerative cholitis    Osteoporosis Mother     Respiratory Mother         asthmatic bronchitis         Allergies   Allergies   Allergen Reactions    Pcn [Penicillins]      convulsions     ------------------------------------------------------------------------     Physical Exam   Vital Signs: Temp: 98.6  F (37  C) Temp src: Temporal BP: (!) 130/91 Pulse: 99   Resp: 22 SpO2: 94 % O2 Device: None (Room air)    Weight: 100 lbs 0 oz    Constitutional: awake, alert, cooperative, no apparent distress.   Eyes: Lids and lashes normal, pupils equal, round and reactive to light   ENT: Normocephalic, without obvious abnormality, atraumatic, sinuses nontender on palpation   Hematologic / Lymphatic: no cervical lymphadenopathy   Respiratory: Increased respiratory effort at rest with tachypnea.  There is bilateral expiratory wheezing.  Cardiovascular: Borderline tachycardic with regular rhythm, S1 and S2 with no m/r/g   GI: Normal bowel sounds, soft, non-distended, non-tender.   Skin: normal skin color, texture, turgor   Musculoskeletal: There is no redness, warmth, or swelling of the joints. Full range of motion noted.   Neurologic: Awake, alert, oriented to name, place and time. César. Motor is 5 out of 5 bilaterally. Sensory is intact.   Neuropsychiatric: normal mood and  affect    ----------------------------------------------------------------------------------------------------------------------------------    Medical Decision Making       ------------------ MEDICAL DECISION MAKING ------------------------------------------------------------------------------------------------------  High complexity decision making       Data   ------------------------- PAST 24 HR DATA REVIEWED -----------------------------------------------    I have personally reviewed the following data over the past 24 hrs:    12.3 (H)  \   13.1   / 504 (H)     139 97 (L) 18.4 /  103 (H)   4.3 28 0.59 \     ALT: 13 AST: 21 AP: 102 TBILI: 0.4   ALB: 4.6 TOT PROTEIN: 7.9 LIPASE: N/A     Trop: 7 BNP: 279       Imaging results reviewed over the past 24 hrs:   Recent Results (from the past 24 hour(s))   XR Chest 2 Views    Narrative    CHEST TWO VIEWS 2/19/2024 12:36 PM     HISTORY: Productive cough, shortness of breath, COPD, negative viral  tests.    COMPARISON: CT chest 1/4/2024. Chest radiograph 8/19/2023.       Impression    IMPRESSION: Emphysema. No focal consolidation, pleural effusion or  pneumothorax. Cardiomediastinal silhouette is unremarkable.     TRICIA VLILASENOR MD         SYSTEM ID:  [id number]     ------------------------- ENCOUNTER LABS ----------------------------------------------------------------  Recent Labs   Lab 02/19/24  1642 02/15/24  1110   WBC 12.3* 13.7*   HGB 13.1 12.9   MCV 84 85   * 350    138   POTASSIUM 4.3 3.9   CHLORIDE 97* 98   CO2 28 25   BUN 18.4 10.1   CR 0.59 0.56   ANIONGAP 14 15   MOLLY 10.3* 9.5   * 104*   ALBUMIN 4.6 4.4   PROTTOTAL 7.9 7.3   BILITOTAL 0.4 0.6   ALKPHOS 102 107   ALT 13 10   AST 21 20       Most Recent 3 CBC's:  Recent Labs   Lab Test 02/19/24  1642 02/15/24  1110 08/21/23  0759   WBC 12.3* 13.7* 14.0*   HGB 13.1 12.9 13.4   MCV 84 85 96   * 350 333     Most Recent 3 BMP's:  Recent Labs   Lab Test 02/19/24  1642  02/15/24  1110 08/20/23  1006    138 141   POTASSIUM 4.3 3.9 4.4   CHLORIDE 97* 98 104   CO2 28 25 23   BUN 18.4 10.1 9.3   CR 0.59 0.56 0.59   ANIONGAP 14 15 14   MOLLY 10.3* 9.5 9.8   * 104* 82     Most Recent 2 LFT's:  Recent Labs   Lab Test 02/19/24  1642 02/15/24  1110   AST 21 20   ALT 13 10   ALKPHOS 102 107   BILITOTAL 0.4 0.6     Most Recent 3 INR's:No lab results found.  Most Recent 3 Troponin's:No lab results found.  Most Recent 3 BNP's:  Recent Labs   Lab Test 02/19/24  1642 08/19/23  0839 07/24/23  1149   NTBNPI 279 122 237     Most Recent D-dimer:  Recent Labs   Lab Test 08/19/23  0839   DD 0.42     Most Recent Cholesterol Panel:  Recent Labs   Lab Test 02/15/24  1110   CHOL 197   LDL 73      TRIG 86     Most Recent 6 Bacteria Isolates From Any Culture (See EPIC Reports for Culture Details):No lab results found.  Most Recent TSH and T4:  Recent Labs   Lab Test 02/15/24  1110   TSH 2.04     Most Recent Urinalysis:No lab results found.  Most Recent ESR & CRP:No lab results found.

## 2024-02-20 NOTE — DISCHARGE SUMMARY
[facility]  Hospitalist Discharge Summary      Date of Admission:  2/19/2024  Date of Discharge:  2/20/2024  Discharging Provider: Dante Spencer MD  Discharge Service: Hospitalist Service    Discharge Diagnoses     Acute COPD exacerbation (H)    Chronic obstructive pulmonary disease, unspecified COPD type (H)    SOB    Wheezing    Clinically Significant Risk Factors          Follow-ups Needed After Discharge   Follow-up Appointments     Follow-up and recommended labs and tests       Follow up with primary care provider, Mikel Navarro, within 7 days for   hospital follow- up.  No follow up labs or test are needed.            Unresulted Labs Ordered in the Past 30 Days of this Admission       No orders found for last 31 day(s).          Discharge Disposition   Discharged to home  Condition at discharge: Stable    Hospital Course      Tammie Zeng is a 68 year old female admitted on 2/19/2024. She presents with shortness of breath / cough.       Acute COPD exacerbation (H)    Chronic obstructive pulmonary disease, unspecified COPD type (H)    SOB    Wheezing    Assessment: Presents with 5-day history of increasing shortness of breath and wheezing with a productive cough.  Labs and admission pertinent for mild leukocytosis of 12.3, otherwise rest of her CBC/BMP is overall unremarkable.  Chest x-ray was obtained that showed emphysema, there was no focal consolidation, pleural effusion or pneumothorax.  Although no concrete evidence of consolidation seen on x-ray given her significant cough with purulent sputum, will cover for community-acquired pneumonia at this time.    Plan:   -DuoNebs as needed  -Continue prior to admission Trelegy  -Solu-Medrol 60 mg every 8 hours -> prednisone taper at discharge  -IV ceftriaxone and azithromycin -> Ceftin and azithromycin at discharge  -Cough suppressant as needed      Generalized anxiety disorder    Assessment/Plan: ativan PRN for anxiety      Hepatic  steatosis    Assessment/Plan: Stable, follows an outpatient    Consultations This Hospital Stay   None    Code Status   Full Code    Time Spent on this Encounter   I, Dante Spencer MD, personally saw the patient today and spent greater than 30 minutes discharging this patient.       Dante Spencer MD  [facility]  [address] [place] [facility] [address]  Phone: [phone]  Fax: [phone]  ______________________________________________________________________    Physical Exam   Vital Signs: Temp: 97.9  F (36.6  C) Temp src: Oral BP: 116/66 Pulse: 81   Resp: 18 SpO2: 92 % O2 Device: None (Room air)    Weight: 100 lbs 4.95 oz  ----------------------------------------------------------------------------------------       Primary Care Physician   Mikel Navarro    Discharge Orders      Reason for your hospital stay    You had shortness of breath from a COPD exacerbation.     Follow-up and recommended labs and tests     Follow up with primary care provider, Mikel Navarro, within 7 days for hospital follow- up.  No follow up labs or test are needed.     Activity    Your activity upon discharge: activity as tolerated     Diet    Follow this diet upon discharge: Orders Placed This Encounter      Regular Diet Adult       Significant Results and Procedures   Most Recent 3 CBC's:  Recent Labs   Lab Test 02/20/24  0836 02/19/24  1642 02/15/24  1110   WBC 10.0 12.3* 13.7*   HGB 12.4 13.1 12.9   MCV 85 84 85    504* 350     Most Recent 3 BMP's:  Recent Labs   Lab Test 02/20/24  0836 02/19/24  1642 02/15/24  1110    139 138   POTASSIUM 5.7* 4.3 3.9   CHLORIDE 100 97* 98   CO2 29 28 25   BUN 14.7 18.4 10.1   CR 0.60 0.59 0.56   ANIONGAP 10 14 15   MOLLY 9.9 10.3* 9.5   * 103* 104*     Most Recent 2 LFT's:  Recent Labs   Lab Test 02/19/24  1642 02/15/24  1110   AST 21 20   ALT 13 10   ALKPHOS 102 107   BILITOTAL 0.4 0.6     Most Recent 3 INR's:No lab results found.  Most Recent 3  Troponin's:No lab results found.  Most Recent 3 BNP's:  Recent Labs   Lab Test 02/19/24  1642 08/19/23  0839 07/24/23  1149   NTBNPI 279 122 237     Most Recent D-dimer:  Recent Labs   Lab Test 08/19/23  0839   DD 0.42     Most Recent Cholesterol Panel:  Recent Labs   Lab Test 02/15/24  1110   CHOL 197   LDL 73      TRIG 86     7-Day Micro Results       Collected Updated Procedure Result Status      02/19/2024 1044 02/19/2024 1139 Symptomatic Influenza A/B, RSV, & SARS-CoV2 PCR (COVID-19) Nasopharyngeal [[id number]]    Swab from Nasopharyngeal    Final result Component Value   Influenza A PCR Negative   Influenza B PCR Negative   RSV PCR Negative   SARS CoV2 PCR Negative   NEGATIVE: SARS-CoV-2 (COVID-19) RNA not detected, presumed negative.                  Most Recent TSH and T4:  Recent Labs   Lab Test 02/15/24  1110   TSH 2.04     Most Recent Hemoglobin A1c:No lab results found.  Most Recent Urinalysis:No lab results found.  Most Recent ESR & CRP:No lab results found.,   Results for orders placed or performed during the hospital encounter of 02/19/24   XR Chest 2 Views    Narrative    CHEST TWO VIEWS 2/19/2024 12:36 PM     HISTORY: Productive cough, shortness of breath, COPD, negative viral  tests.    COMPARISON: CT chest 1/4/2024. Chest radiograph 8/19/2023.       Impression    IMPRESSION: Emphysema. No focal consolidation, pleural effusion or  pneumothorax. Cardiomediastinal silhouette is unremarkable.     TRICIA VILLASENOR MD         SYSTEM ID:  [id number]       Discharge Medications   Current Discharge Medication List        START taking these medications    Details   azithromycin (ZITHROMAX) 250 MG tablet Take 1 tablet (250 mg) by mouth daily for 4 days  Qty: 4 tablet, Refills: 0    Associated Diagnoses: COPD exacerbation (H)      cefuroxime (CEFTIN) 500 MG tablet Take 1 tablet (500 mg) by mouth 2 times daily for 5 days  Qty: 10 tablet, Refills: 0    Associated Diagnoses: COPD exacerbation (H)       LORazepam (ATIVAN) 0.5 MG tablet Take 1 tablet (0.5 mg) by mouth every 6 hours as needed for anxiety  Qty: 12 tablet, Refills: 0    Associated Diagnoses: COPD exacerbation (H)      predniSONE (DELTASONE) 10 MG tablet 4 tabs daily for 3 days, then 3 tabs daily for 3 days, then 2 tabs daily for 3 days, then 1 tab daily for 3 days, then stop  Qty: 30 tablet, Refills: 0    Associated Diagnoses: COPD exacerbation (H)           CONTINUE these medications which have NOT CHANGED    Details   aspirin (ASA) 325 MG EC tablet Take 325 mg by mouth every 6 hours as needed for moderate pain      Fluticasone-Umeclidin-Vilant (TRELEGY ELLIPTA) 100-62.5-25 MCG/ACT oral inhaler Inhale 1 puff into the lungs daily  Qty: 60 each, Refills: 1    Associated Diagnoses: Chronic obstructive pulmonary disease, unspecified COPD type (H)      guaiFENesin (MUCINEX) 600 MG 12 hr tablet Take 1,200 mg by mouth 2 times daily as needed for congestion      ipratropium - albuterol 0.5 mg/2.5 mg/3 mL (DUONEB) 0.5-2.5 (3) MG/3ML neb solution NEBULIZE 1 VIAL 4 TIMES DAILY. MAY USE EVERY 4 HOURS AS NEEDED FOR DSYPNEA/WHEEZING THEN TO ONLY ONCE AS NEEDED  Qty: 90 mL, Refills: 1    Associated Diagnoses: Chronic obstructive pulmonary disease, unspecified COPD type (H)           STOP taking these medications       albuterol (PROAIR HFA/PROVENTIL HFA/VENTOLIN HFA) 108 (90 Base) MCG/ACT inhaler Comments:   Reason for Stopping:             Allergies   Allergies   Allergen Reactions    Pcn [Penicillins]      convulsions

## 2024-02-22 ENCOUNTER — PATIENT OUTREACH (OUTPATIENT)
Dept: CARE COORDINATION | Facility: CLINIC | Age: 69
End: 2024-02-22
Payer: COMMERCIAL

## 2024-02-22 NOTE — PROGRESS NOTES
Clinic Care Coordination Contact  [facility]/Voicemail       Clinical Data: Care Coordinator Outreach  Outreach attempted x 2.  Left message on patient's voicemail with call back information and requested return call.  Plan:  Care Coordinator will do no further outreaches at this time.    Margarita LONDONO Community Health Worker  Clinic Care Coordination  [facility]  Phone: [phone]

## 2024-02-27 ENCOUNTER — OFFICE VISIT (OUTPATIENT)
Dept: FAMILY MEDICINE | Facility: CLINIC | Age: 69
End: 2024-02-27
Payer: COMMERCIAL

## 2024-02-27 VITALS
HEIGHT: 62 IN | WEIGHT: 106 LBS | TEMPERATURE: 97.5 F | OXYGEN SATURATION: 95 % | RESPIRATION RATE: 16 BRPM | SYSTOLIC BLOOD PRESSURE: 119 MMHG | BODY MASS INDEX: 19.51 KG/M2 | HEART RATE: 115 BPM | DIASTOLIC BLOOD PRESSURE: 81 MMHG

## 2024-02-27 DIAGNOSIS — F41.1 GENERALIZED ANXIETY DISORDER: Primary | ICD-10-CM

## 2024-02-27 DIAGNOSIS — J44.1 COPD EXACERBATION (H): ICD-10-CM

## 2024-02-27 PROBLEM — J96.01 ACUTE RESPIRATORY FAILURE WITH HYPOXIA (H): Status: RESOLVED | Noted: 2023-07-24 | Resolved: 2024-02-27

## 2024-02-27 PROCEDURE — 99496 TRANSJ CARE MGMT HIGH F2F 7D: CPT | Performed by: INTERNAL MEDICINE

## 2024-02-27 RX ORDER — PREDNISONE 10 MG/1
TABLET ORAL
Qty: 30 TABLET | Refills: 0 | Status: CANCELLED | OUTPATIENT
Start: 2024-02-27

## 2024-02-27 RX ORDER — BUDESONIDE 0.5 MG/2ML
0.5 INHALANT ORAL 2 TIMES DAILY
Qty: 300 ML | Refills: 3 | Status: SHIPPED | OUTPATIENT
Start: 2024-02-27 | End: 2024-02-29

## 2024-02-27 ASSESSMENT — PAIN SCALES - GENERAL: PAINLEVEL: NO PAIN (0)

## 2024-02-27 NOTE — PATIENT INSTRUCTIONS
You can try stopping the trelegy and going to pulmicort inhaler twice daily, the duoneb three times daily and as needed.  If your breathing worsens we should go back to the trelegy.    Mikel Navarro M.D.

## 2024-02-27 NOTE — PROGRESS NOTES
Tia Akins is a 68 year old, presenting for the following health issues:  No chief complaint on file.    This is a hospital follow-up for this 68-year-old.  She was admitted on February 19 and discharged on February 20 for acute COPD exacerbation.  She has a long history of COPD and was hospitalized twice in 2023 as noted.  She had a 5-day history of increasing shortness of breath and wheezing with a productive cough.  On admission her labs showed a leukocytosis and a chest x-ray showed emphysema with no focal consolidation, pleural effusion or pneumothorax.  She was admitted and given DuoNebs as needed, continued on her Trelegy inhaler, given Solu-Medrol and changed to prednisone at discharge, and given IV ceftriaxone and Zithromax.  With that she did not improve.  I am seeing her in follow-up today.  Her daughter is present with her today.    She does continue to smoke but less so.  Her cough is mostly at night and overall she feels much better.  She is not having fevers or chest pain.  Her oxygen level has been fine and she does not have home oxygen.  No edema.  No abdominal pain or nausea.    We also discussed her anxiety.  In the hospital she was given Ativan to use as needed and it has helped.  Her daughter feels like she does have more anxiety than the patient feels.  She can get anxious about things and it seems to prohibit her from doing things.  In speaking with the patient she does not feel like it is a significant issue.    Past Medical History:   Diagnosis Date    Chronic obstructive pulmonary disease, unspecified COPD type (H) 06/13/2017    has seen [facility], fev1 under 1.0, hospitalized twice in 2023, once in 2024    Depressive disorder, not elsewhere classified 05/2006    following sudden death of her mother    Elevated LFTs 2023    hepatic steatosis 4/23 us    Generalized anxiety disorder     Hepatic steatosis 04/2023    on us done for elev lfts    Herpes simplex without mention of  complication     MOUTH    Nephrolithiasis     TOBACCO ABUSE-CONTINUOUS      Past Surgical History:   Procedure Laterality Date    CHOLECYSTECTOMY, LAPOROSCOPIC      done at [facility]    TUBAL LIGATION       Social History     Socioeconomic History    Marital status: Single     Spouse name: Not on file    Number of children: 1    Years of education: Not on file    Highest education level: Not on file   Occupational History    Occupation: retired, machinest for [email or web address]   Tobacco Use    Smoking status: Every Day     Packs/day: 0.50     Years: 25.00     Additional pack years: 0.00     Total pack years: 12.50     Types: Cigarettes    Smokeless tobacco: Never    Tobacco comments:     states is cutting back - smokes 1/2 ppd (11)   Vaping Use    Vaping Use: Never used   Substance and Sexual Activity    Alcohol use: Yes     Alcohol/week: 0.0 standard drinks of alcohol     Comment: 3 mixed drinks per week    Drug use: No    Sexual activity: Not Currently     Partners: Male     Birth control/protection: Surgical     Comment: tubal - S.O. - Jos   Other Topics Concern     Service No    Blood Transfusions No    Caffeine Concern No    Occupational Exposure No    Hobby Hazards No    Sleep Concern Yes     Comment: secondary to grief - mom  suddenly at the end of April    Stress Concern Yes    Weight Concern Yes    Special Diet Yes     Comment: trys to eat healthy    Back Care No    Exercise No    Bike Helmet No    Seat Belt Yes    Self-Exams No   Social History Narrative    Not on file     Social Determinants of Health     Financial Resource Strain: Low Risk  (2/15/2024)    Financial Resource Strain     Within the past 12 months, have you or your family members you live with been unable to get utilities (heat, electricity) when it was really needed?: No   Food Insecurity: Low Risk  (2/15/2024)    Food Insecurity     Within the past 12 months, did you worry that your food would run out before you  got money to buy more?: No     Within the past 12 months, did the food you bought just not last and you didn t have money to get more?: No   Transportation Needs: Low Risk  (2/15/2024)    Transportation Needs     Within the past 12 months, has lack of transportation kept you from medical appointments, getting your medicines, non-medical meetings or appointments, work, or from getting things that you need?: No   Physical Activity: Insufficiently Active (2/15/2024)    Exercise Vital Sign     Days of Exercise per Week: 1 day     Minutes of Exercise per Session: 20 min   Stress: No Stress Concern Present (2/15/2024)    [employer] - Occupational Stress Questionnaire     Feeling of Stress : Only a little   Social Connections: Unknown (2/15/2024)    Social Connection and Isolation Panel [NHANES]     Frequency of Communication with Friends and Family: Not on file     Frequency of Social Gatherings with Friends and Family: Twice a week     Attends Buddhist Services: Not on file     Active Member of Clubs or Organizations: Not on file     Attends Club or Organization Meetings: Not on file     Marital Status: Not on file   Interpersonal Safety: Low Risk  (2/27/2024)    Interpersonal Safety     Do you feel physically and emotionally safe where you currently live?: Yes     Within the past 12 months, have you been hit, slapped, kicked or otherwise physically hurt by someone?: No     Within the past 12 months, have you been humiliated or emotionally abused in other ways by your partner or ex-partner?: No   Housing Stability: Low Risk  (2/15/2024)    Housing Stability     Do you have housing? : Yes     Are you worried about losing your housing?: No     Current Outpatient Medications   Medication Sig Dispense Refill    aspirin (ASA) 325 MG EC tablet Take 325 mg by mouth every 6 hours as needed for moderate pain      budesonide (PULMICORT) 0.5 MG/2ML neb solution Take 2 mLs (0.5 mg) by nebulization 2  "times daily 300 mL 3    Fluticasone-Umeclidin-Vilant (TRELEGY ELLIPTA) 100-62.5-25 MCG/ACT oral inhaler Inhale 1 puff into the lungs daily 60 each 1    guaiFENesin (MUCINEX) 600 MG 12 hr tablet Take 1,200 mg by mouth 2 times daily as needed for congestion      ipratropium - albuterol 0.5 mg/2.5 mg/3 mL (DUONEB) 0.5-2.5 (3) MG/3ML neb solution NEBULIZE 1 VIAL 4 TIMES DAILY. MAY USE EVERY 4 HOURS AS NEEDED FOR DSYPNEA/WHEEZING THEN TO ONLY ONCE AS NEEDED 90 mL 1    LORazepam (ATIVAN) 0.5 MG tablet Take 1 tablet (0.5 mg) by mouth every 6 hours as needed for anxiety 12 tablet 0    predniSONE (DELTASONE) 10 MG tablet 4 tabs daily for 3 days, then 3 tabs daily for 3 days, then 2 tabs daily for 3 days, then 1 tab daily for 3 days, then stop 30 tablet 0     Allergies   Allergen Reactions    Pcn [Penicillins]      convulsions     FAMILY HISTORY NOTED AND REVIEWED    REVIEW OF SYSTEMS: above    PHYSICAL EXAM    /81   Pulse 115   Temp 97.5  F (36.4  C) (Temporal)   Resp 16   Ht 1.575 m (5' 2\")   Wt 48.1 kg (106 lb)   LMP 01/24/2006   SpO2 95%   BMI 19.39 kg/m      Patient appears non toxic  Lungs dec flow but otherwise clear  Cv tachy, reg, no murmer, rub or gallop, no jvp or edema  Abdomen non-tender    ASSESSMENT:  Copd exacerbation, suspect viral related, doubt pneumonia or other bacterial cause  Ongoing smoking, discussed with the patient the need to quit  Anxiety, mild, for now she does not desire treatment    PLAN:  The cost of the trilogy is quite a bit so she would like to make a change if possible.  We will change to Pulmicort nebs twice a day and use the DuoNeb 3 times a day and as needed.  I explained that it is not clear if this will be is good and if she does worsen she will let me know right away.  I also explained that her that in the future if she is getting worse she should contact us right away rather than wait.  She understands this.  I will see her back in 3 months or as needed.  She is " up-to-date on her immunizations    Medications were reconciled today the new list was given to the patient with changes    Mikel Navarro M.D.                Hospital Follow-up Visit:    Hospital/Nursing Home/IP Rehab Facility: [facility]  Date of Admission: 02/19/2024  Date of Discharge: 02/20/2024  Reason(s) for Admission:     Acute COPD exacerbation (H)    Chronic obstructive pulmonary disease, unspecified COPD type (H)    SOB    Wheezing       Was your hospitalization related to COVID-19? No   Problems taking medications regularly:  None  Medication changes since discharge: None  Problems adhering to non-medication therapy:  None    Summary of hospitalization:  [facility] discharge summary reviewed  Diagnostic Tests/Treatments reviewed.  Follow up needed: none  Other Healthcare Providers Involved in Patient s Care:         None  Update since discharge: improved.         Plan of care communicated with patient and family                     Objective    LMP 01/24/2006   There is no height or weight on file to calculate BMI.  Physical Exam               Signed Electronically by: Mikel Navarro MD

## 2024-02-28 DIAGNOSIS — J44.1 COPD EXACERBATION (H): ICD-10-CM

## 2024-02-29 RX ORDER — BUDESONIDE 0.5 MG/2ML
0.5 INHALANT ORAL 2 TIMES DAILY
Qty: 360 ML | Refills: 3 | Status: SHIPPED | OUTPATIENT
Start: 2024-02-29 | End: 2024-05-02

## 2024-03-06 ENCOUNTER — TRANSFERRED RECORDS (OUTPATIENT)
Dept: HEALTH INFORMATION MANAGEMENT | Facility: CLINIC | Age: 69
End: 2024-03-06
Payer: COMMERCIAL

## 2024-03-12 ENCOUNTER — HOSPITAL ENCOUNTER (OUTPATIENT)
Dept: CARDIAC REHAB | Facility: CLINIC | Age: 69
Discharge: HOME | End: 2024-03-12
Attending: INTERNAL MEDICINE
Payer: COMMERCIAL

## 2024-03-12 PROCEDURE — 94626 PHY/QHP OP PULM RHB W/MNTR: CPT | Performed by: CLINICAL EXERCISE PHYSIOLOGIST

## 2024-03-28 ENCOUNTER — HOSPITAL ENCOUNTER (OUTPATIENT)
Dept: CARDIAC REHAB | Facility: CLINIC | Age: 69
Discharge: HOME | End: 2024-03-28
Attending: INTERNAL MEDICINE
Payer: COMMERCIAL

## 2024-03-28 PROCEDURE — 94625 PHY/QHP OP PULM RHB W/O MNTR: CPT

## 2024-04-04 ENCOUNTER — HOSPITAL ENCOUNTER (OUTPATIENT)
Dept: CARDIAC REHAB | Facility: CLINIC | Age: 69
Discharge: HOME | End: 2024-04-04
Attending: INTERNAL MEDICINE
Payer: COMMERCIAL

## 2024-04-04 PROCEDURE — 94625 PHY/QHP OP PULM RHB W/O MNTR: CPT | Performed by: REHABILITATION PRACTITIONER

## 2024-04-11 ENCOUNTER — NURSE TRIAGE (OUTPATIENT)
Dept: FAMILY MEDICINE | Facility: CLINIC | Age: 69
End: 2024-04-11
Payer: COMMERCIAL

## 2024-04-11 DIAGNOSIS — J44.1 COPD EXACERBATION (H): ICD-10-CM

## 2024-04-11 RX ORDER — PREDNISONE 10 MG/1
TABLET ORAL
Qty: 30 TABLET | Refills: 0 | Status: CANCELLED | OUTPATIENT
Start: 2024-04-11

## 2024-04-11 NOTE — TELEPHONE ENCOUNTER
"Called and spoke with pt.     Chest \"heaviness\" has been present since 4/9.  Heaviness comes and goes. She states she feels better after using nebulizer.   Pt states that after neb, heaviness is \" a lot better\". She states the morning are the worst.   Pt declines a fever.     Assisted with scheduling per provider note below.     Apr 12, 2024 11:30 AM  (Arrive by 11:10 AM)  Provider Visit with Mikel Navarro MD  [facility] ([facility] ) [phone]     Routing to PCP for review. Okay to keep appt as scheduled for tomorrow?    Thank you,  Shantel Beach RN    "

## 2024-04-11 NOTE — TELEPHONE ENCOUNTER
Nurse Triage SBAR    Is this a 2nd Level Triage? NO    Situation: Pt complains of chest pressure, nasal congestion and drainage to her lungs since . Pt is requesting a prescription for prednisone.    Background: Pt has hx of COPD and is a smoker.. Pt has no know COVID-19 contact and has not tested for COVID-19.    Assessment: Pt reports chest pressure, SOB when taking a deep breath or moving. Oxygen level during call 100 % after using nebulizer. Pt denies fever or chills.  No wheezing. She has intermittent cough with clear mucus. Pt states she has no energy to leave her house and is unable to arrange clinic visit.     Protocol Recommended Disposition: Call  Now. Pt declines    Recommendation: Per triage protocol, pt should be seen at ED via ambulance. Pt declines.     Routed to provider- Please advice on Rx request. Should pt schedule VV with team? Provider agrees with ED evaluation?     Does the patient meet one of the following criteria for ADS visit consideration? 16+ years old, with an [facility] PCP Pt is unable to arrange transportation.     TIP  Providers, please consider if this condition is appropriate for management at one of our Acute and Diagnostic Services sites.     If patient is a good candidate, please use dotphrase <dot>triageresponse and select Refer to ADS to document.      Reason for Disposition   Chest pain   Chest pain lasting longer than 5 minutes and ANY of the following:         Pain is crushing, pressure-like, or heavy         Over 44 years old          Over 30 years old and one cardiac risk factor (e.g diabetes, high blood pressure, high cholesterol, smoker, or family history of heart disease)         History of heart disease (e.g. angina, heart attack, heart failure, bypass surgery, takes nitroglycerin)    Additional Information   Negative: SEVERE difficulty breathing (e.g., struggling for each breath, speaks in single words)   Negative: Difficult to awaken or acting confused (e.g.,  disoriented, slurred speech)   Negative: Shock suspected (e.g., cold/pale/clammy skin, too weak to stand, low BP, rapid pulse)   Negative: Passed out (i.e., lost consciousness, collapsed and was not responding)   Negative: SEVERE difficulty breathing (e.g., struggling for each breath, speaks in single words, pulse > 120)   Negative: Breathing stopped and hasn't returned   Negative: Choking on something   Negative: Bluish (or gray) lips or face   Negative: Difficult to awaken or acting confused (e.g., disoriented, slurred speech)   Negative: Passed out (i.e., fainted, collapsed and was not responding)   Negative: Wheezing started suddenly after medicine, an allergic food, or bee sting   Negative: Stridor (harsh sound while breathing in)   Negative: Slow, shallow and weak breathing   Negative: Sounds like a life-threatening emergency to the triager    Protocols used: Breathing Difficulty-A-OH, Chest Pain-A-OH

## 2024-04-11 NOTE — TELEPHONE ENCOUNTER
I am just not getting the sense as to the chest pressure and how significant it is.  How long has it been going on?  Is it a constant thing it does not come and go and is getting worse?  Any fevers?  I do have openings tomorrow at which time I could see her in person.    Mikel Navarro M.D.

## 2024-04-11 NOTE — TELEPHONE ENCOUNTER
Called and spoke with pt to relay provider note below.   She verbalized understanding, and declines further questions at this time.     Thank you,  Shantel Beach RN

## 2024-04-12 ENCOUNTER — HOSPITAL ENCOUNTER (INPATIENT)
Facility: CLINIC | Age: 69
LOS: 8 days | Discharge: NURSING, REHAB OR LONG-TERM CARE | DRG: 190 | End: 2024-04-20
Attending: EMERGENCY MEDICINE | Admitting: INTERNAL MEDICINE
Payer: COMMERCIAL

## 2024-04-12 ENCOUNTER — APPOINTMENT (OUTPATIENT)
Dept: GENERAL RADIOLOGY | Facility: CLINIC | Age: 69
DRG: 190 | End: 2024-04-12
Attending: EMERGENCY MEDICINE
Payer: COMMERCIAL

## 2024-04-12 ENCOUNTER — APPOINTMENT (OUTPATIENT)
Dept: CARDIOLOGY | Facility: CLINIC | Age: 69
DRG: 190 | End: 2024-04-12
Attending: PHYSICIAN ASSISTANT
Payer: COMMERCIAL

## 2024-04-12 DIAGNOSIS — F41.1 GENERALIZED ANXIETY DISORDER: ICD-10-CM

## 2024-04-12 DIAGNOSIS — J44.1 COPD WITH ACUTE EXACERBATION (H): ICD-10-CM

## 2024-04-12 DIAGNOSIS — J44.1 COPD EXACERBATION (H): ICD-10-CM

## 2024-04-12 DIAGNOSIS — J96.01 ACUTE RESPIRATORY FAILURE WITH HYPOXIA (H): ICD-10-CM

## 2024-04-12 DIAGNOSIS — E44.0 MODERATE MALNUTRITION (H): Primary | ICD-10-CM

## 2024-04-12 DIAGNOSIS — K76.0 HEPATIC STEATOSIS: ICD-10-CM

## 2024-04-12 LAB
ALBUMIN SERPL BCG-MCNC: 4.4 G/DL (ref 3.5–5.2)
ALP SERPL-CCNC: 95 U/L (ref 40–150)
ALT SERPL W P-5'-P-CCNC: 17 U/L (ref 0–50)
ANION GAP SERPL CALCULATED.3IONS-SCNC: 13 MMOL/L (ref 7–15)
AST SERPL W P-5'-P-CCNC: 30 U/L (ref 0–45)
ATRIAL RATE - MUSE: 116 BPM
BASOPHILS # BLD AUTO: 0.1 10E3/UL (ref 0–0.2)
BASOPHILS NFR BLD AUTO: 1 %
BILIRUB SERPL-MCNC: 0.3 MG/DL
BUN SERPL-MCNC: 12.2 MG/DL (ref 8–23)
CALCIUM SERPL-MCNC: 9.5 MG/DL (ref 8.8–10.2)
CHLORIDE SERPL-SCNC: 97 MMOL/L (ref 98–107)
CREAT SERPL-MCNC: 0.7 MG/DL (ref 0.51–0.95)
D DIMER PPP FEU-MCNC: 0.52 UG/ML FEU (ref 0–0.5)
DEPRECATED HCO3 PLAS-SCNC: 27 MMOL/L (ref 22–29)
DIASTOLIC BLOOD PRESSURE - MUSE: NORMAL MMHG
EGFRCR SERPLBLD CKD-EPI 2021: >90 ML/MIN/1.73M2
EOSINOPHIL # BLD AUTO: 0.1 10E3/UL (ref 0–0.7)
EOSINOPHIL NFR BLD AUTO: 1 %
ERYTHROCYTE [DISTWIDTH] IN BLOOD BY AUTOMATED COUNT: 17.5 % (ref 10–15)
FLUAV RNA SPEC QL NAA+PROBE: NEGATIVE
FLUBV RNA RESP QL NAA+PROBE: NEGATIVE
GLUCOSE SERPL-MCNC: 109 MG/DL (ref 70–99)
HCT VFR BLD AUTO: 44.8 % (ref 35–47)
HGB BLD-MCNC: 14.3 G/DL (ref 11.7–15.7)
HOLD SPECIMEN: NORMAL
IMM GRANULOCYTES # BLD: 0 10E3/UL
IMM GRANULOCYTES NFR BLD: 0 %
INTERPRETATION ECG - MUSE: NORMAL
LVEF ECHO: NORMAL
LYMPHOCYTES # BLD AUTO: 2.5 10E3/UL (ref 0.8–5.3)
LYMPHOCYTES NFR BLD AUTO: 25 %
MCH RBC QN AUTO: 27.9 PG (ref 26.5–33)
MCHC RBC AUTO-ENTMCNC: 31.9 G/DL (ref 31.5–36.5)
MCV RBC AUTO: 88 FL (ref 78–100)
MONOCYTES # BLD AUTO: 1.4 10E3/UL (ref 0–1.3)
MONOCYTES NFR BLD AUTO: 14 %
NEUTROPHILS # BLD AUTO: 5.8 10E3/UL (ref 1.6–8.3)
NEUTROPHILS NFR BLD AUTO: 59 %
NRBC # BLD AUTO: 0 10E3/UL
NRBC BLD AUTO-RTO: 0 /100
NT-PROBNP SERPL-MCNC: 128 PG/ML (ref 0–900)
P AXIS - MUSE: 86 DEGREES
PLATELET # BLD AUTO: 293 10E3/UL (ref 150–450)
POTASSIUM SERPL-SCNC: 4 MMOL/L (ref 3.4–5.3)
PR INTERVAL - MUSE: 150 MS
PROCALCITONIN SERPL IA-MCNC: 0.06 NG/ML
PROT SERPL-MCNC: 7.3 G/DL (ref 6.4–8.3)
QRS DURATION - MUSE: 76 MS
QT - MUSE: 328 MS
QTC - MUSE: 455 MS
R AXIS - MUSE: 70 DEGREES
RBC # BLD AUTO: 5.12 10E6/UL (ref 3.8–5.2)
RSV RNA SPEC NAA+PROBE: NEGATIVE
SARS-COV-2 RNA RESP QL NAA+PROBE: NEGATIVE
SODIUM SERPL-SCNC: 137 MMOL/L (ref 135–145)
SYSTOLIC BLOOD PRESSURE - MUSE: NORMAL MMHG
T AXIS - MUSE: 87 DEGREES
TROPONIN T SERPL HS-MCNC: 10 NG/L
TROPONIN T SERPL HS-MCNC: 8 NG/L
VENTRICULAR RATE- MUSE: 116 BPM
WBC # BLD AUTO: 9.9 10E3/UL (ref 4–11)

## 2024-04-12 PROCEDURE — 250N000011 HC RX IP 250 OP 636: Performed by: PHYSICIAN ASSISTANT

## 2024-04-12 PROCEDURE — 99285 EMERGENCY DEPT VISIT HI MDM: CPT | Mod: 25 | Performed by: EMERGENCY MEDICINE

## 2024-04-12 PROCEDURE — 120N000001 HC R&B MED SURG/OB

## 2024-04-12 PROCEDURE — 36415 COLL VENOUS BLD VENIPUNCTURE: CPT | Performed by: EMERGENCY MEDICINE

## 2024-04-12 PROCEDURE — 999N000157 HC STATISTIC RCP TIME EA 10 MIN

## 2024-04-12 PROCEDURE — 258N000003 HC RX IP 258 OP 636: Performed by: EMERGENCY MEDICINE

## 2024-04-12 PROCEDURE — 84484 ASSAY OF TROPONIN QUANT: CPT | Performed by: EMERGENCY MEDICINE

## 2024-04-12 PROCEDURE — 84145 PROCALCITONIN (PCT): CPT | Performed by: EMERGENCY MEDICINE

## 2024-04-12 PROCEDURE — 96365 THER/PROPH/DIAG IV INF INIT: CPT | Performed by: EMERGENCY MEDICINE

## 2024-04-12 PROCEDURE — 36415 COLL VENOUS BLD VENIPUNCTURE: CPT | Performed by: PHYSICIAN ASSISTANT

## 2024-04-12 PROCEDURE — 999N000208 ECHOCARDIOGRAM COMPLETE

## 2024-04-12 PROCEDURE — 94640 AIRWAY INHALATION TREATMENT: CPT | Performed by: EMERGENCY MEDICINE

## 2024-04-12 PROCEDURE — 83880 ASSAY OF NATRIURETIC PEPTIDE: CPT | Performed by: PHYSICIAN ASSISTANT

## 2024-04-12 PROCEDURE — 250N000013 HC RX MED GY IP 250 OP 250 PS 637: Performed by: PHYSICIAN ASSISTANT

## 2024-04-12 PROCEDURE — 87637 SARSCOV2&INF A&B&RSV AMP PRB: CPT | Performed by: EMERGENCY MEDICINE

## 2024-04-12 PROCEDURE — 99223 1ST HOSP IP/OBS HIGH 75: CPT | Mod: AI | Performed by: PHYSICIAN ASSISTANT

## 2024-04-12 PROCEDURE — 85025 COMPLETE CBC W/AUTO DIFF WBC: CPT | Performed by: EMERGENCY MEDICINE

## 2024-04-12 PROCEDURE — 84155 ASSAY OF PROTEIN SERUM: CPT | Performed by: EMERGENCY MEDICINE

## 2024-04-12 PROCEDURE — 84484 ASSAY OF TROPONIN QUANT: CPT | Performed by: PHYSICIAN ASSISTANT

## 2024-04-12 PROCEDURE — 250N000011 HC RX IP 250 OP 636: Performed by: EMERGENCY MEDICINE

## 2024-04-12 PROCEDURE — 250N000009 HC RX 250: Performed by: EMERGENCY MEDICINE

## 2024-04-12 PROCEDURE — 255N000002 HC RX 255 OP 636: Performed by: EMERGENCY MEDICINE

## 2024-04-12 PROCEDURE — 93306 TTE W/DOPPLER COMPLETE: CPT | Mod: 26 | Performed by: INTERNAL MEDICINE

## 2024-04-12 PROCEDURE — 96375 TX/PRO/DX INJ NEW DRUG ADDON: CPT | Performed by: EMERGENCY MEDICINE

## 2024-04-12 PROCEDURE — 94640 AIRWAY INHALATION TREATMENT: CPT | Mod: 76

## 2024-04-12 PROCEDURE — 85379 FIBRIN DEGRADATION QUANT: CPT | Performed by: PHYSICIAN ASSISTANT

## 2024-04-12 PROCEDURE — 93005 ELECTROCARDIOGRAM TRACING: CPT | Performed by: EMERGENCY MEDICINE

## 2024-04-12 PROCEDURE — 250N000009 HC RX 250: Performed by: PHYSICIAN ASSISTANT

## 2024-04-12 PROCEDURE — 71046 X-RAY EXAM CHEST 2 VIEWS: CPT

## 2024-04-12 RX ORDER — AMOXICILLIN 250 MG
1 CAPSULE ORAL 2 TIMES DAILY PRN
Status: DISCONTINUED | OUTPATIENT
Start: 2024-04-12 | End: 2024-04-20 | Stop reason: HOSPADM

## 2024-04-12 RX ORDER — AZITHROMYCIN 250 MG/1
250 TABLET, FILM COATED ORAL DAILY
Status: COMPLETED | OUTPATIENT
Start: 2024-04-13 | End: 2024-04-16

## 2024-04-12 RX ORDER — IPRATROPIUM BROMIDE AND ALBUTEROL SULFATE 2.5; .5 MG/3ML; MG/3ML
SOLUTION RESPIRATORY (INHALATION)
Status: DISCONTINUED
Start: 2024-04-12 | End: 2024-04-12 | Stop reason: HOSPADM

## 2024-04-12 RX ORDER — BUDESONIDE 0.5 MG/2ML
0.5 INHALANT ORAL 2 TIMES DAILY
Status: DISCONTINUED | OUTPATIENT
Start: 2024-04-12 | End: 2024-04-20 | Stop reason: HOSPADM

## 2024-04-12 RX ORDER — MAGNESIUM SULFATE HEPTAHYDRATE 40 MG/ML
2 INJECTION, SOLUTION INTRAVENOUS ONCE
Status: COMPLETED | OUTPATIENT
Start: 2024-04-12 | End: 2024-04-12

## 2024-04-12 RX ORDER — IPRATROPIUM BROMIDE AND ALBUTEROL SULFATE 2.5; .5 MG/3ML; MG/3ML
3 SOLUTION RESPIRATORY (INHALATION) ONCE
Status: COMPLETED | OUTPATIENT
Start: 2024-04-12 | End: 2024-04-12

## 2024-04-12 RX ORDER — METHYLPREDNISOLONE SODIUM SUCCINATE 125 MG/2ML
60 INJECTION, POWDER, LYOPHILIZED, FOR SOLUTION INTRAMUSCULAR; INTRAVENOUS EVERY 12 HOURS
Status: DISCONTINUED | OUTPATIENT
Start: 2024-04-12 | End: 2024-04-12

## 2024-04-12 RX ORDER — ALBUTEROL SULFATE 0.83 MG/ML
2.5 SOLUTION RESPIRATORY (INHALATION)
Status: DISCONTINUED | OUTPATIENT
Start: 2024-04-12 | End: 2024-04-12

## 2024-04-12 RX ORDER — ONDANSETRON 2 MG/ML
4 INJECTION INTRAMUSCULAR; INTRAVENOUS EVERY 6 HOURS PRN
Status: DISCONTINUED | OUTPATIENT
Start: 2024-04-12 | End: 2024-04-20 | Stop reason: HOSPADM

## 2024-04-12 RX ORDER — AZITHROMYCIN 500 MG/1
500 INJECTION, POWDER, LYOPHILIZED, FOR SOLUTION INTRAVENOUS ONCE
Status: COMPLETED | OUTPATIENT
Start: 2024-04-12 | End: 2024-04-12

## 2024-04-12 RX ORDER — IPRATROPIUM BROMIDE AND ALBUTEROL SULFATE 2.5; .5 MG/3ML; MG/3ML
3 SOLUTION RESPIRATORY (INHALATION)
Status: DISCONTINUED | OUTPATIENT
Start: 2024-04-12 | End: 2024-04-18

## 2024-04-12 RX ORDER — POLYETHYLENE GLYCOL 3350 17 G/17G
17 POWDER, FOR SOLUTION ORAL 2 TIMES DAILY PRN
Status: DISCONTINUED | OUTPATIENT
Start: 2024-04-12 | End: 2024-04-20 | Stop reason: HOSPADM

## 2024-04-12 RX ORDER — BISACODYL 10 MG
10 SUPPOSITORY, RECTAL RECTAL DAILY PRN
Status: DISCONTINUED | OUTPATIENT
Start: 2024-04-12 | End: 2024-04-20 | Stop reason: HOSPADM

## 2024-04-12 RX ORDER — AMOXICILLIN 250 MG
2 CAPSULE ORAL 2 TIMES DAILY PRN
Status: DISCONTINUED | OUTPATIENT
Start: 2024-04-12 | End: 2024-04-20 | Stop reason: HOSPADM

## 2024-04-12 RX ORDER — CALCIUM CARBONATE 500 MG/1
1000 TABLET, CHEWABLE ORAL 4 TIMES DAILY PRN
Status: DISCONTINUED | OUTPATIENT
Start: 2024-04-12 | End: 2024-04-20 | Stop reason: HOSPADM

## 2024-04-12 RX ORDER — ACETAMINOPHEN 650 MG/1
650 SUPPOSITORY RECTAL EVERY 4 HOURS PRN
Status: DISCONTINUED | OUTPATIENT
Start: 2024-04-12 | End: 2024-04-20 | Stop reason: HOSPADM

## 2024-04-12 RX ORDER — LIDOCAINE 40 MG/G
CREAM TOPICAL
Status: DISCONTINUED | OUTPATIENT
Start: 2024-04-12 | End: 2024-04-20 | Stop reason: HOSPADM

## 2024-04-12 RX ORDER — PROCHLORPERAZINE MALEATE 5 MG
5 TABLET ORAL EVERY 6 HOURS PRN
Status: DISCONTINUED | OUTPATIENT
Start: 2024-04-12 | End: 2024-04-20 | Stop reason: HOSPADM

## 2024-04-12 RX ORDER — LORAZEPAM 0.5 MG/1
0.5 TABLET ORAL EVERY 6 HOURS PRN
Status: DISCONTINUED | OUTPATIENT
Start: 2024-04-12 | End: 2024-04-20 | Stop reason: HOSPADM

## 2024-04-12 RX ORDER — ACETAMINOPHEN 325 MG/1
650 TABLET ORAL EVERY 4 HOURS PRN
Status: DISCONTINUED | OUTPATIENT
Start: 2024-04-12 | End: 2024-04-20 | Stop reason: HOSPADM

## 2024-04-12 RX ORDER — GUAIFENESIN 600 MG/1
1200 TABLET, EXTENDED RELEASE ORAL 2 TIMES DAILY
Status: DISCONTINUED | OUTPATIENT
Start: 2024-04-12 | End: 2024-04-20 | Stop reason: HOSPADM

## 2024-04-12 RX ORDER — ONDANSETRON 4 MG/1
4 TABLET, ORALLY DISINTEGRATING ORAL EVERY 6 HOURS PRN
Status: DISCONTINUED | OUTPATIENT
Start: 2024-04-12 | End: 2024-04-20 | Stop reason: HOSPADM

## 2024-04-12 RX ORDER — ALBUTEROL SULFATE 0.83 MG/ML
5 SOLUTION RESPIRATORY (INHALATION) ONCE
Status: COMPLETED | OUTPATIENT
Start: 2024-04-12 | End: 2024-04-12

## 2024-04-12 RX ORDER — METHYLPREDNISOLONE SODIUM SUCCINATE 125 MG/2ML
125 INJECTION, POWDER, LYOPHILIZED, FOR SOLUTION INTRAMUSCULAR; INTRAVENOUS ONCE
Status: COMPLETED | OUTPATIENT
Start: 2024-04-12 | End: 2024-04-12

## 2024-04-12 RX ORDER — METHYLPREDNISOLONE SODIUM SUCCINATE 125 MG/2ML
60 INJECTION, POWDER, LYOPHILIZED, FOR SOLUTION INTRAMUSCULAR; INTRAVENOUS EVERY 12 HOURS
Status: DISCONTINUED | OUTPATIENT
Start: 2024-04-12 | End: 2024-04-13

## 2024-04-12 RX ORDER — PROCHLORPERAZINE 25 MG
12.5 SUPPOSITORY, RECTAL RECTAL EVERY 12 HOURS PRN
Status: DISCONTINUED | OUTPATIENT
Start: 2024-04-12 | End: 2024-04-20 | Stop reason: HOSPADM

## 2024-04-12 RX ADMIN — GUAIFENESIN 1200 MG: 600 TABLET, EXTENDED RELEASE ORAL at 12:02

## 2024-04-12 RX ADMIN — HUMAN ALBUMIN MICROSPHERES AND PERFLUTREN 9 ML: 10; .22 INJECTION, SOLUTION INTRAVENOUS at 11:35

## 2024-04-12 RX ADMIN — SODIUM CHLORIDE 500 ML: 9 INJECTION, SOLUTION INTRAVENOUS at 07:53

## 2024-04-12 RX ADMIN — AZITHROMYCIN MONOHYDRATE 500 MG: 500 INJECTION, POWDER, LYOPHILIZED, FOR SOLUTION INTRAVENOUS at 12:02

## 2024-04-12 RX ADMIN — IPRATROPIUM BROMIDE AND ALBUTEROL SULFATE 3 ML: .5; 3 SOLUTION RESPIRATORY (INHALATION) at 13:00

## 2024-04-12 RX ADMIN — GUAIFENESIN 1200 MG: 600 TABLET, EXTENDED RELEASE ORAL at 20:49

## 2024-04-12 RX ADMIN — METHYLPREDNISOLONE SODIUM SUCCINATE 125 MG: 125 INJECTION, POWDER, FOR SOLUTION INTRAMUSCULAR; INTRAVENOUS at 07:54

## 2024-04-12 RX ADMIN — ALBUTEROL SULFATE 5 MG: 2.5 SOLUTION RESPIRATORY (INHALATION) at 11:45

## 2024-04-12 RX ADMIN — IPRATROPIUM BROMIDE AND ALBUTEROL SULFATE 3 ML: .5; 3 SOLUTION RESPIRATORY (INHALATION) at 07:01

## 2024-04-12 RX ADMIN — MAGNESIUM SULFATE HEPTAHYDRATE 2 G: 40 INJECTION, SOLUTION INTRAVENOUS at 07:56

## 2024-04-12 RX ADMIN — METHYLPREDNISOLONE SODIUM SUCCINATE 62.5 MG: 125 INJECTION, POWDER, FOR SOLUTION INTRAMUSCULAR; INTRAVENOUS at 17:13

## 2024-04-12 RX ADMIN — BUDESONIDE 0.5 MG: 0.5 INHALANT RESPIRATORY (INHALATION) at 20:12

## 2024-04-12 RX ADMIN — IPRATROPIUM BROMIDE AND ALBUTEROL SULFATE 3 ML: .5; 3 SOLUTION RESPIRATORY (INHALATION) at 07:52

## 2024-04-12 RX ADMIN — IPRATROPIUM BROMIDE AND ALBUTEROL SULFATE 3 ML: .5; 3 SOLUTION RESPIRATORY (INHALATION) at 08:32

## 2024-04-12 ASSESSMENT — ACTIVITIES OF DAILY LIVING (ADL)
ADLS_ACUITY_SCORE: 20
ADLS_ACUITY_SCORE: 37
ADLS_ACUITY_SCORE: 20
ADLS_ACUITY_SCORE: 20
ADLS_ACUITY_SCORE: 37
ADLS_ACUITY_SCORE: 37
ADLS_ACUITY_SCORE: 20
ADLS_ACUITY_SCORE: 37
ADLS_ACUITY_SCORE: 20
ADLS_ACUITY_SCORE: 20
ADLS_ACUITY_SCORE: 37

## 2024-04-12 ASSESSMENT — COLUMBIA-SUICIDE SEVERITY RATING SCALE - C-SSRS
1. IN THE PAST MONTH, HAVE YOU WISHED YOU WERE DEAD OR WISHED YOU COULD GO TO SLEEP AND NOT WAKE UP?: NO
2. HAVE YOU ACTUALLY HAD ANY THOUGHTS OF KILLING YOURSELF IN THE PAST MONTH?: NO
6. HAVE YOU EVER DONE ANYTHING, STARTED TO DO ANYTHING, OR PREPARED TO DO ANYTHING TO END YOUR LIFE?: NO

## 2024-04-12 NOTE — PHARMACY-ADMISSION MEDICATION HISTORY
Pharmacist Admission Medication History    Admission medication history is complete. The information provided in this note is only as accurate as the sources available at the time of the update.    Information Source(s): Patient, Hospital records, and CareEverywhere/SureScripts via in-person    Pertinent Information: None    Changes made to PTA medication list:  Added: None  Deleted: None  Changed: None      Medication History Completed By: Bar Harevy RPH 4/12/2024 10:17 AM    PTA Med List   Medication Sig Last Dose    aspirin (ASA) 325 MG EC tablet Take 325 mg by mouth every 6 hours as needed for moderate pain Unknown at prn    budesonide (PULMICORT) 0.5 MG/2ML neb solution Take 2 mLs (0.5 mg) by nebulization 2 times daily 4/11/2024    guaiFENesin (MUCINEX) 600 MG 12 hr tablet Take 1,200 mg by mouth 2 times daily as needed for congestion Unknown at prn    ipratropium - albuterol 0.5 mg/2.5 mg/3 mL (DUONEB) 0.5-2.5 (3) MG/3ML neb solution NEBULIZE 1 VIAL 4 TIMES DAILY. MAY USE EVERY 4 HOURS AS NEEDED FOR DSYPNEA/WHEEZING THEN TO ONLY ONCE AS NEEDED 4/11/2024    LORazepam (ATIVAN) 0.5 MG tablet Take 1 tablet (0.5 mg) by mouth every 6 hours as needed for anxiety Unknown at prn

## 2024-04-12 NOTE — ED TRIAGE NOTES
Patient not feeling well since Sun.  Worse on Tues.  Cough, cold, congestion.  Feels like drainage going into lungs.  Hx of COPD.  Now hard time breathing.     Triage Assessment (Adult)       Row Name 04/12/24 0653          Triage Assessment    Airway WDL WDL        Respiratory WDL    Respiratory WDL X;cough;all     Rhythm/Pattern, Respiratory shortness of breath;labored        Cardiac WDL    Cardiac WDL WDL        Cognitive/Neuro/Behavioral WDL    Cognitive/Neuro/Behavioral WDL WDL

## 2024-04-12 NOTE — ED PROVIDER NOTES
History     Chief Complaint:  Shortness of Breath       The history is provided by the patient and a relative.      Tammie Zeng is a 68 year old female on Asprin 325 mg with history of COPD who reports to the ED for shortness of breath. The patient reports 4 days ago she began feeling ill with sneezing. She states  3 days ago she woke up and began experiencing sore t throat, ear pain, headache, post nasal drip, chills, shortness of breath, and a productive cough. She notes overnight she began experiencing an increase in shortness of breath. She states she has nebulizers at home but not in a location next to where she often needs them. She reports she does have a rescue inhaler at her bedside. Daughter notes the patient goes to two pulmonary appointments per week. Patient reports she has to miss these appointments due to shortness of breath with exertion. She states she is also experiencing chest tightness and epigastric abdominal discomfort from coughing. Daughter adds she herself has been sick recently as well. Patient denies recent fever, chest pain, sharp pains, vomiting, hemoptysis, abdominal pain, changes in bowels, changes in urinary, lower extremity swelling, O2 use at home, history of heart problems, history of heart attacks, or history of blood clots.     Independent Historian:   Daughter - They report supplemental history.     Review of External Notes:   Reviewed nurse triage note from yesterday regarding the patient's symptoms.  They recommend that she come to the ER if her symptoms or not improving.      Medications:    Aspirin 325 mg  Budesonide  Fluticasone-Umeclidin-Vilant oral inhaler  Guaifenesin  Ipratropium - albuterol   Lorazepam   Prednisone    Past Medical History:    Chronic obstructive pulmonary disease, unspecified COPD type   Depressive disorder, not elsewhere classified  Elevated LFTs  Generalized anxiety disorder  Hepatic steatosis  Herpes simplex without mention of  "complication  Nephrolithiasis  Tobacco abuse  Generalized anxiety disorder    Past Surgical History:    Tubal ligation  Cholecystectomy, laporoscopic       Physical Exam   Patient Vitals for the past 24 hrs:   BP Temp Temp src Pulse Resp SpO2 Height Weight   04/12/24 1145 112/74 -- -- 100 30 96 % -- --   04/12/24 1115 -- -- -- 93 20 97 % -- --   04/12/24 0930 -- -- -- 102 26 92 % -- --   04/12/24 0925 -- -- -- -- -- (!) 72 % -- --   04/12/24 0900 -- -- -- 93 15 90 % -- --   04/12/24 0830 -- -- -- 90 18 (!) 86 % -- --   04/12/24 0810 -- -- -- 90 21 91 % -- --   04/12/24 0800 110/75 -- -- 90 -- -- -- --   04/12/24 0739 -- -- -- 96 24 92 % -- --   04/12/24 0719 -- -- -- 103 25 95 % -- --   04/12/24 0659 (!) 124/96 -- -- 112 -- 93 % -- --   04/12/24 0655 -- -- -- -- 21 92 % -- --   04/12/24 0654 (!) 141/85 97.6  F (36.4  C) Oral 117 20 (!) 88 % 1.6 m (5' 3\") 46.7 kg (103 lb)        Physical Exam  Vitals and nursing note reviewed.   Constitutional:       General: She is not in acute distress.     Appearance: She is ill-appearing. She is not toxic-appearing.   HENT:      Head: Normocephalic and atraumatic.      Right Ear: External ear normal.      Left Ear: External ear normal.      Nose: Nose normal.      Mouth/Throat:      Mouth: Mucous membranes are moist.   Eyes:      Extraocular Movements: Extraocular movements intact.      Conjunctiva/sclera: Conjunctivae normal.   Cardiovascular:      Rate and Rhythm: Regular rhythm. Tachycardia present.      Heart sounds: No murmur heard.  Pulmonary:      Effort: Pulmonary effort is normal. Tachypnea present. No respiratory distress.      Breath sounds: Wheezing (Diffuse expiratory) present. No rhonchi or rales.   Abdominal:      General: Abdomen is flat. Bowel sounds are normal. There is no distension.      Palpations: Abdomen is soft.      Tenderness: There is no abdominal tenderness. There is no guarding or rebound.   Musculoskeletal:         General: No swelling, deformity or " signs of injury.      Cervical back: Normal range of motion and neck supple.   Skin:     General: Skin is warm and dry.      Findings: No rash.   Neurological:      Mental Status: She is alert and oriented to person, place, and time.   Psychiatric:         Behavior: Behavior normal.           Emergency Department Course   ECG  ECG results from 04/12/24   EKG 12-lead, tracing only     Value    Systolic Blood Pressure     Diastolic Blood Pressure     Ventricular Rate 116    Atrial Rate 116    MD Interval 150    QRS Duration 76        QTc 455    P Axis 86    R AXIS 70    T Axis 87    Interpretation ECG      Sinus tachycardia  Biatrial enlargement  Abnormal ECG  When compared with ECG of 12-APR-2024 06:47, (unconfirmed)  Previous ECG has undetermined rhythm, needs review  ST no longer elevated in Inferior leads  ST no longer depressed in Anterior leads  ECG taken at 0649, ECG read at 0702         Imaging:  Echocardiogram Complete   Final Result      XR Chest 2 Views   Preliminary Result   IMPRESSION: No acute cardiopulmonary disease.              Laboratory:  Labs Ordered and Resulted from Time of ED Arrival to Time of ED Departure   COMPREHENSIVE METABOLIC PANEL - Abnormal       Result Value    Sodium 137      Potassium 4.0      Carbon Dioxide (CO2) 27      Anion Gap 13      Urea Nitrogen 12.2      Creatinine 0.70      GFR Estimate >90      Calcium 9.5      Chloride 97 (*)     Glucose 109 (*)     Alkaline Phosphatase 95      AST 30      ALT 17      Protein Total 7.3      Albumin 4.4      Bilirubin Total 0.3     CBC WITH PLATELETS AND DIFFERENTIAL - Abnormal    WBC Count 9.9      RBC Count 5.12      Hemoglobin 14.3      Hematocrit 44.8      MCV 88      MCH 27.9      MCHC 31.9      RDW 17.5 (*)     Platelet Count 293      % Neutrophils 59      % Lymphocytes 25      % Monocytes 14      % Eosinophils 1      % Basophils 1      % Immature Granulocytes 0      NRBCs per 100 WBC 0      Absolute Neutrophils 5.8       Absolute Lymphocytes 2.5      Absolute Monocytes 1.4 (*)     Absolute Eosinophils 0.1      Absolute Basophils 0.1      Absolute Immature Granulocytes 0.0      Absolute NRBCs 0.0     D DIMER QUANTITATIVE - Abnormal    D-Dimer Quantitative 0.52 (*)    TROPONIN T, HIGH SENSITIVITY - Normal    Troponin T, High Sensitivity 10     PROCALCITONIN - Normal    Procalcitonin 0.06     INFLUENZA A/B, RSV, & SARS-COV2 PCR - Normal    Influenza A PCR Negative      Influenza B PCR Negative      RSV PCR Negative      SARS CoV2 PCR Negative     NT PROBNP INPATIENT - Normal    N terminal Pro BNP Inpatient 128     TROPONIN T, HIGH SENSITIVITY      Emergency Department Course & Assessments:    Interventions:  Medications   ipratropium - albuterol 0.5 mg/2.5 mg/3 mL (DUONEB) 0.5-2.5 (3) MG/3ML neb solution (  Canceled Entry 4/12/24 0833)   ipratropium - albuterol 0.5 mg/2.5 mg/3 mL (DUONEB) neb solution 3 mL (has no administration in time range)   albuterol (PROVENTIL) neb solution 2.5 mg (has no administration in time range)   guaiFENesin (MUCINEX) 12 hr tablet 1,200 mg (1,200 mg Oral $Given 4/12/24 1202)   methylPREDNISolone sodium succinate (solu-MEDROL) injection 62.5 mg (has no administration in time range)   azithromycin (ZITHROMAX) 500 mg vial to attach to  mL bag (500 mg Intravenous $New Bag 4/12/24 1202)   azithromycin (ZITHROMAX) tablet 250 mg (has no administration in time range)   ipratropium - albuterol 0.5 mg/2.5 mg/3 mL (DUONEB) neb solution 3 mL (3 mLs Nebulization $Given 4/12/24 0752)   ipratropium - albuterol 0.5 mg/2.5 mg/3 mL (DUONEB) neb solution 3 mL (3 mLs Nebulization $Given 4/12/24 0701)   methylPREDNISolone sodium succinate (solu-MEDROL) injection 125 mg (125 mg Intravenous $Given 4/12/24 0754)   ipratropium - albuterol 0.5 mg/2.5 mg/3 mL (DUONEB) neb solution 3 mL (3 mLs Nebulization $Given 4/12/24 0832)   sodium chloride 0.9% BOLUS 500 mL (0 mLs Intravenous Stopped 4/12/24 0833)   magnesium sulfate 2 g  in 50 mL sterile water intermittent infusion (0 g Intravenous Stopped 24 0826)   albuterol (PROVENTIL) neb solution 5 mg (5 mg Nebulization $Given 24 1145)   sodium chloride (PF) 0.9% PF flush 10 mL (10 mLs Intravenous $Given 24 1135)   perflutren diluted 1mL to 2mL with saline (OPTISON) diluted injection 9 mL (9 mLs Intravenous $Given 24 1135)        Independent Interpretation (X-rays, CTs, rhythm strip):  None    Assessments/Consultations/Discussion of Management or Tests:  ED Course as of 24 1211      0709 I obtained history and examined the patient as noted above.    0833 I rechecked the patient and explained findings.    46 I rechecked the patient and explained findings.    1004 I spoke with Chely Daigle PA-C on behalf of Dr. Scott, hospitalist, who agreed to admit the patient.        Social Determinants of Health affecting care:   None    Disposition:  The patient was admitted to the hospital under the care of Dr. Scott.     Impression & Plan    Medical Decision Makin-year-old female presenting with shortness of breath and chest tightness.  She is wheezing and tachypneic on exam.  I suspect this is COPD exacerbation.  Less likely pneumonia, pneumothorax, CHF.  Have low suspicion for PE.  She was given nebs and steroids as well as magnesium and felt somewhat improved.  Her chest x-ray shows no signs of any infiltrates or other acute findings.  Her lab work is also reassuring and EKG shows no ischemic changes.  We attempted to ambulate the patient with pulse ox and she desaturated to the 70s.  She is stable on 1 L in the low 90s.  Will plan to admit for COPD exacerbation and hypoxia.      Diagnosis:    ICD-10-CM    1. COPD with acute exacerbation (H)  J44.1       2. Acute respiratory failure with hypoxia (H)  J96.01            Discharge Medications:  New Prescriptions    No medications on file          Scribe Disclosure:  Smitha POWELL, am serving as a scribe at  7:46 AM on 4/12/2024 to document services personally performed by Kei Francois MD based on my observations and the provider's statements to me.     4/12/2024   Kei Francois MD Goodwin, Shaun M, MD  04/12/24 1211

## 2024-04-12 NOTE — ED NOTES
[facility]  ED Nurse Handoff Report    ED Chief complaint: Shortness of Breath      ED Diagnosis: COPD exacerbation  Final diagnoses:   None       Code Status: DNR/DNI    Allergies:   Allergies   Allergen Reactions    Pcn [Penicillins]      convulsions       Patient Story: Patient not feeling well since Sun. Worse on Tues. Cough, cold, congestion. Feels like drainage going into lungs. Hx of COPD. Now hard time breathing.   Focused Assessment:  SOB, STORY, hypoxic with activity (borderline at rest)  Labs Ordered and Resulted from Time of ED Arrival to Time of ED Departure   COMPREHENSIVE METABOLIC PANEL - Abnormal       Result Value    Sodium 137      Potassium 4.0      Carbon Dioxide (CO2) 27      Anion Gap 13      Urea Nitrogen 12.2      Creatinine 0.70      GFR Estimate >90      Calcium 9.5      Chloride 97 (*)     Glucose 109 (*)     Alkaline Phosphatase 95      AST 30      ALT 17      Protein Total 7.3      Albumin 4.4      Bilirubin Total 0.3     CBC WITH PLATELETS AND DIFFERENTIAL - Abnormal    WBC Count 9.9      RBC Count 5.12      Hemoglobin 14.3      Hematocrit 44.8      MCV 88      MCH 27.9      MCHC 31.9      RDW 17.5 (*)     Platelet Count 293      % Neutrophils 59      % Lymphocytes 25      % Monocytes 14      % Eosinophils 1      % Basophils 1      % Immature Granulocytes 0      NRBCs per 100 WBC 0      Absolute Neutrophils 5.8      Absolute Lymphocytes 2.5      Absolute Monocytes 1.4 (*)     Absolute Eosinophils 0.1      Absolute Basophils 0.1      Absolute Immature Granulocytes 0.0      Absolute NRBCs 0.0     TROPONIN T, HIGH SENSITIVITY - Normal    Troponin T, High Sensitivity 10     PROCALCITONIN - Normal    Procalcitonin 0.06     INFLUENZA A/B, RSV, & SARS-COV2 PCR - Normal    Influenza A PCR Negative      Influenza B PCR Negative      RSV PCR Negative      SARS CoV2 PCR Negative       XR Chest 2 Views   Preliminary Result   IMPRESSION: No acute cardiopulmonary disease.              Treatments and/or interventions provided: nebs, magnesium, solu-medrol, O2  Patient's response to treatments and/or interventions: improvement on breathing while at rest    To be done/followed up on inpatient unit:  per admitting    Does this patient have any cognitive concerns?:  n/a    Activity level - Baseline/Home:  Independent  Activity Level - Current:   Independent    Patient's Preferred language: English   Needed?: No    Isolation: None  Infection: Not Applicable  Patient tested for COVID 19 prior to admission: YES  Bariatric?: No    Vital Signs:   Vitals:    04/12/24 0810 04/12/24 0830 04/12/24 0900 04/12/24 0930   BP:       Pulse: 90 90 93 102   Resp: 21 18 15 26   Temp:       TempSrc:       SpO2: 91% (!) 86% 90% 92%   Weight:       Height:           Cardiac Rhythm: SR/ST    Was the PSS-3 completed:   Yes  What interventions are required if any?               Family Comments: daughter at bedside in ED  OBS brochure/video discussed/provided to patient/family: No    For the majority of the shift this patient's behavior was Green.   Behavioral interventions performed were care and rounding.    ED NURSE PHONE NUMBER: *[phone]

## 2024-04-12 NOTE — H&P
"[facility]    History and Physical - Hospitalist Service       Date of Admission:  4/12/2024    Assessment & Plan   Tammie Zeng is a 68 year old female with PMHx of non-oxygen dependent COPD, ongoing tobacco use, RODNEY, and hepatic steatosis admitted on 4/12/2024. She presented with worsening SOB in the setting of recent URI. Admitted for further evaluation of COPD exacerbation.     Acute hypoxic respiratory failure in the setting of COPD exacerbation   Ongoing tobacco use  Known pulmonary nodules: Follows with [facility]. Last clinic visit 1/2024. Onset of viral illness with nasal and chest congestion, productive cough, sore throat, chills on 4/7 with progressively worsening STORY and chest heaviness. No dizziness, syncope. No radiation of chest pain to arm or jaw. No recent travel, prolonged car rides. Notes that she has been \"sitting around\" a lot. No LIAN. No personal or family hx of bleeding coagulopathy or VTE. No recent surgery. No recent Covid. Has to sleep with head elevated at night. Pt continues to smoke 0.5 ppd.   *On admission, tachycardic to 117, hypoxic to 72% on room air with ambulation.   *CMP, CBC WNL. Procal 0.06. Trop 10. D--dimer 0.52 (WNL age adjusted), . Covid, influenza, RSV negative. EKG sinus tachy. CXR negative.   *Received duoneb X2, Mag 2 g, Solumedrol 125 mg X1, 500 ml bolus. Reports some improvement in sx, still with mild wheezing and poor lung exchange on admission exam.   *Hospitalization 8/2023 and 2/2024 for COPD exacerbation. CT chest 1/4/2024 with small pulmonary nodules measuring up to 3 mm, emphysema, biapical pleural parenchymal scarring, mild bronchial wall thickening.   - Admit to inpatient status   - IV solumedrol 60 mg BID   - Duonebs q4 hrs while awake, PRN albuterol   - Azithromycin X5 days given COPD exacerbation with new productive cough   - Per med rec, reports not taking Trelegy Ellipta, continue PTA Pulmicort   - Scheduled mucinex   - " Supplemental oxygen   - Obtain ambulatory oxygen levels to determine if pt qualifies for home O2, she is interested in home O2, feels like she may need it nocturnally and with activity   - Outpatient Pulm follow-up with [facility] planned in June, pt potentially interested in having home steroids on hand if possible   - Serial trops   - Echo (none prior) given chest heaviness and STORY, though suspect all related to COPD exacerbation   - Consider CT chest if no clinical improvement on steroids   - Regular diet     ADDENDUM 1207: Echo reviewed.   1. Left ventricular systolic function is normal. The visual ejection fraction  is 60-65%.  2. No regional wall motion abnormalities noted.  3. The right ventricle is normal in structure, function and size.  4. No evidence for significant valvular pathology.    No change in plan as above     RODNEY: Ativan PRN for anxiety (rarely uses)     Hepatic steatosis: Stable, noted on US 4/3/2023. Follow up outpatient.     Diet: Regular Diet Adult  DVT Prophylaxis: Pneumatic Compression Devices and Ambulate every shift  Royal Catheter: Not present  Lines: None     Cardiac Monitoring: None  Code Status: No CPR- Do NOT Intubate, confirmed with patient. Daughter at bedside. [facility] consult ordered to fill out POLST (pt does not have one filled out).     Clinically Significant Risk Factors Present on Admission                # Drug Induced Platelet Defect: home medication list includes an antiplatelet medication     # Acute Respiratory Failure: Documented O2 saturation < 91%.  Continue supplemental oxygen as needed                Disposition Plan     Medically Ready for Discharge: Anticipated in 2-4 Days     The patient's care was discussed with the Attending Physician, Dr. Scott, Bedside Nurse, and Patient.    Chely Daigle PA-C  Hospitalist Service  [facility]  Securely message with [employer] (more info)  Text page via [facility] Paging/Directory  "    ______________________________________________________________________    Chief Complaint   SOB    History is obtained from the patient    History of Present Illness   Tammie Zeng is a 68 year old female with PMHx of non-oxygen dependent COPD, ongoing tobacco use, RODNEY, and hepatic steatosis admitted on 4/12/2024. She presented with worsening SOB in the setting of recent URI. Admitted for further evaluation of COPD exacerbation.     Follows with Dr. Renner of [facility]. Last clinic visit 9/5/2023. Onset of viral illness with nasal and chest congestion, productive cough, sore throat, chills on 4/7 with progressively worsening STORY and chest heaviness. No dizziness, syncope. No radiation of chest pain to arm or jaw. No recent travel, prolonged car rides. Notes that she has been \"sitting around\" a lot. No LIAN. No personal or family hx of bleeding coagulopathy or VTE. No recent surgery. No recent Covid. Has to sleep with head elevated at night     Seen in the ED by Dr. Francois. On admission, tachycardic to 117, hypoxic to 72% on room air with ambulation. CMP, CBC WNL. Procal 0.06. Trop 10. . Covid, influenza, RSV negative. EKG sinus tachy. CXR negative. Received duoneb X2, Mag 2 g, Solumedrol 125 mg X1, 500 ml bolus. Reports some improvement in sx, still with mild wheezing and poor lung exchange on admission exam.     Reviewed outpatient pulmonology notes. Hospitalization 8/2023 and 2/2024 for COPD exacerbation. CT chest 1/4/2024 with small pulmonary nodules measuring up to 3 mm, emphysema, biapical pleural parenchymal scarring, mild bronchial wall thickening.     During interview, pt confirms the above hx. No chest pain at rest. Reports CP with coughing, STORY, or with laying flat. Describes as a heaviness. No LIAN. No calf tenderness. Reports orthopnea.     Past Medical History    Past Medical History:   Diagnosis Date    Chronic obstructive pulmonary disease, unspecified COPD type (H) 06/13/2017    has " seen [facility], fev1 under 1.0, hospitalized twice in 2023, once in 2024    Depressive disorder, not elsewhere classified 05/2006    following sudden death of her mother    Elevated LFTs 2023    hepatic steatosis 4/23 us    Generalized anxiety disorder     Hepatic steatosis 04/2023    on us done for elev lfts    Herpes simplex without mention of complication     MOUTH    Nephrolithiasis     TOBACCO ABUSE-CONTINUOUS        Past Surgical History   Past Surgical History:   Procedure Laterality Date    CHOLECYSTECTOMY, LAPOROSCOPIC  1980's    done at [facility]    TUBAL LIGATION  1998     Prior to Admission Medications   Prior to Admission Medications   Prescriptions Last Dose Informant Patient Reported? Taking?   Fluticasone-Umeclidin-Vilant (TRELEGY ELLIPTA) 100-62.5-25 MCG/ACT oral inhaler Not Taking Self No No   Sig: Inhale 1 puff into the lungs daily   Patient not taking: Reported on 4/12/2024   LORazepam (ATIVAN) 0.5 MG tablet Unknown at prn  No Yes   Sig: Take 1 tablet (0.5 mg) by mouth every 6 hours as needed for anxiety   aspirin (ASA) 325 MG EC tablet Unknown at prn Self Yes Yes   Sig: Take 325 mg by mouth every 6 hours as needed for moderate pain   budesonide (PULMICORT) 0.5 MG/2ML neb solution 4/11/2024  No Yes   Sig: Take 2 mLs (0.5 mg) by nebulization 2 times daily   guaiFENesin (MUCINEX) 600 MG 12 hr tablet Unknown at prn Self Yes Yes   Sig: Take 1,200 mg by mouth 2 times daily as needed for congestion   ipratropium - albuterol 0.5 mg/2.5 mg/3 mL (DUONEB) 0.5-2.5 (3) MG/3ML neb solution 4/11/2024 Self No Yes   Sig: NEBULIZE 1 VIAL 4 TIMES DAILY. MAY USE EVERY 4 HOURS AS NEEDED FOR DSYPNEA/WHEEZING THEN TO ONLY ONCE AS NEEDED      Facility-Administered Medications: None        Review of Systems    The 10 point Review of Systems is negative other than noted in the HPI.    Social History   I have reviewed this patient's social history and updated it with pertinent information if needed.  Social History      Tobacco Use    Smoking status: Every Day     Current packs/day: 0.50     Average packs/day: 0.5 packs/day for 25.0 years (12.5 ttl pk-yrs)     Types: Cigarettes    Smokeless tobacco: Never    Tobacco comments:     states is cutting back - smokes 1/2 ppd (01/03/11)   Vaping Use    Vaping status: Never Used   Substance Use Topics    Alcohol use: Yes     Alcohol/week: 0.0 standard drinks of alcohol     Comment: 3 mixed drinks per week    Drug use: No         Family History   I have reviewed this patient's family history and updated it with pertinent information if needed.  Family History   Problem Relation Age of Onset    Asthma Daughter     Diabetes Paternal Grandmother     Cancer Maternal Aunt         skin    Allergies Daughter     Depression Mother     Eye Disorder Mother         cataracts and glacoma    Gastrointestinal Disease Maternal Grandmother         gallbladder    Gastrointestinal Disease Daughter         ulcerative cholitis    Osteoporosis Mother     Respiratory Mother         asthmatic bronchitis     Allergies   Allergies   Allergen Reactions    Pcn [Penicillins]      convulsions        Physical Exam   Vital Signs: Temp: 97.6  F (36.4  C) Temp src: Oral BP: 110/75 Pulse: 102   Resp: 26 SpO2: 92 % O2 Device: Nasal cannula Oxygen Delivery: 1 LPM  Weight: 103 lbs 0 oz    CONSTITUTIONAL: Pt laying in bed, dressed in hospital garb. Appears comfortable. Cooperative with interview. Accompanied by daughter at bedside.   HEENT: Normocephalic, atraumatic. Pupils equal, round, and reactive to light. Negative for conjunctival redness or scleral icterus.  Oral mucosa pink and moist; negative for ulcerations, erythema, or exudates.  Dentition in good repair.   CARDIOVASCULAR: Regular rhythm, tachycardic. No murmurs, rubs, or extra heart sounds appreciated. Pulses +2/4 and regular in upper and lower extremities, bilaterally.   RESPIRATORY: No increased work of breathing.  Supplemental oxygenation via NC at 1 LPM.  Diffuse, mild expiratory wheezes, poor lung exchange.   GASTROINTESTINAL:  Abdomen soft, non-distended. BS auscultated in all four quadrants. Negative for tenderness to palpation.    MUSCULOSKELETAL: Strength +5/5 in upper and lower extremities, bilaterally. No gross deformities noted. Normal muscle tone.   HEMATOLOGIC/LYMPHATIC/IMMUNOLOGIC: No calf tenderness to palpation. Negative for lower extremity edema, bilaterally.  NEUROLOGIC: Alert and oriented to person, place, and time. No focal neuro deficits, appreciate input.   SKIN: Warm, dry, intact.     Medical Decision Making       75 MINUTES SPENT BY ME on the date of service doing chart review, history, exam, documentation & further activities per the note.      Data     I have personally reviewed the following data over the past 24 hrs:    9.9  \   14.3   / 293     137 97 (L) 12.2 /  109 (H)   4.0 27 0.70 \     ALT: 17 AST: 30 AP: 95 TBILI: 0.3   ALB: 4.4 TOT PROTEIN: 7.3 LIPASE: N/A     Trop: 10 BNP: 128     Procal: 0.06 CRP: N/A Lactic Acid: N/A       INR:  N/A PTT:  N/A   D-dimer:  0.52 (H) Fibrinogen:  N/A       Imaging results reviewed over the past 24 hrs:   Recent Results (from the past 24 hour(s))   XR Chest 2 Views    Narrative    CHEST TWO VIEWS   4/12/2024 7:52 AM     HISTORY: Shortness of breath.     COMPARISON: 2/19/2024.      Impression    IMPRESSION: No acute cardiopulmonary disease.

## 2024-04-13 LAB
ANION GAP SERPL CALCULATED.3IONS-SCNC: 10 MMOL/L (ref 7–15)
BASOPHILS # BLD AUTO: 0 10E3/UL (ref 0–0.2)
BASOPHILS NFR BLD AUTO: 0 %
BUN SERPL-MCNC: 11 MG/DL (ref 8–23)
CALCIUM SERPL-MCNC: 9.7 MG/DL (ref 8.8–10.2)
CHLORIDE SERPL-SCNC: 102 MMOL/L (ref 98–107)
CREAT SERPL-MCNC: 0.57 MG/DL (ref 0.51–0.95)
DEPRECATED HCO3 PLAS-SCNC: 27 MMOL/L (ref 22–29)
EGFRCR SERPLBLD CKD-EPI 2021: >90 ML/MIN/1.73M2
EOSINOPHIL # BLD AUTO: 0 10E3/UL (ref 0–0.7)
EOSINOPHIL NFR BLD AUTO: 0 %
ERYTHROCYTE [DISTWIDTH] IN BLOOD BY AUTOMATED COUNT: 17.3 % (ref 10–15)
GLUCOSE SERPL-MCNC: 163 MG/DL (ref 70–99)
HCT VFR BLD AUTO: 40.1 % (ref 35–47)
HGB BLD-MCNC: 12.6 G/DL (ref 11.7–15.7)
IMM GRANULOCYTES # BLD: 0.1 10E3/UL
IMM GRANULOCYTES NFR BLD: 1 %
LYMPHOCYTES # BLD AUTO: 0.9 10E3/UL (ref 0.8–5.3)
LYMPHOCYTES NFR BLD AUTO: 5 %
MCH RBC QN AUTO: 27.5 PG (ref 26.5–33)
MCHC RBC AUTO-ENTMCNC: 31.4 G/DL (ref 31.5–36.5)
MCV RBC AUTO: 87 FL (ref 78–100)
MONOCYTES # BLD AUTO: 0.6 10E3/UL (ref 0–1.3)
MONOCYTES NFR BLD AUTO: 4 %
NEUTROPHILS # BLD AUTO: 14.4 10E3/UL (ref 1.6–8.3)
NEUTROPHILS NFR BLD AUTO: 90 %
NRBC # BLD AUTO: 0 10E3/UL
NRBC BLD AUTO-RTO: 0 /100
PLATELET # BLD AUTO: 312 10E3/UL (ref 150–450)
POTASSIUM SERPL-SCNC: 4.3 MMOL/L (ref 3.4–5.3)
RBC # BLD AUTO: 4.59 10E6/UL (ref 3.8–5.2)
SODIUM SERPL-SCNC: 139 MMOL/L (ref 135–145)
WBC # BLD AUTO: 16 10E3/UL (ref 4–11)

## 2024-04-13 PROCEDURE — 99233 SBSQ HOSP IP/OBS HIGH 50: CPT | Performed by: INTERNAL MEDICINE

## 2024-04-13 PROCEDURE — 250N000011 HC RX IP 250 OP 636: Performed by: INTERNAL MEDICINE

## 2024-04-13 PROCEDURE — 250N000011 HC RX IP 250 OP 636: Performed by: PHYSICIAN ASSISTANT

## 2024-04-13 PROCEDURE — 85025 COMPLETE CBC W/AUTO DIFF WBC: CPT | Performed by: PHYSICIAN ASSISTANT

## 2024-04-13 PROCEDURE — 94762 N-INVAS EAR/PLS OXIMTRY CONT: CPT

## 2024-04-13 PROCEDURE — 80048 BASIC METABOLIC PNL TOTAL CA: CPT | Performed by: PHYSICIAN ASSISTANT

## 2024-04-13 PROCEDURE — 120N000001 HC R&B MED SURG/OB

## 2024-04-13 PROCEDURE — 250N000009 HC RX 250: Performed by: INTERNAL MEDICINE

## 2024-04-13 PROCEDURE — 250N000013 HC RX MED GY IP 250 OP 250 PS 637: Performed by: PHYSICIAN ASSISTANT

## 2024-04-13 PROCEDURE — 94640 AIRWAY INHALATION TREATMENT: CPT | Mod: 76

## 2024-04-13 PROCEDURE — 250N000009 HC RX 250: Performed by: PHYSICIAN ASSISTANT

## 2024-04-13 PROCEDURE — 250N000013 HC RX MED GY IP 250 OP 250 PS 637: Performed by: INTERNAL MEDICINE

## 2024-04-13 PROCEDURE — 999N000127 HC STATISTIC PERIPHERAL IV START W US GUIDANCE

## 2024-04-13 PROCEDURE — 999N000157 HC STATISTIC RCP TIME EA 10 MIN

## 2024-04-13 PROCEDURE — 94640 AIRWAY INHALATION TREATMENT: CPT

## 2024-04-13 PROCEDURE — 36415 COLL VENOUS BLD VENIPUNCTURE: CPT | Performed by: PHYSICIAN ASSISTANT

## 2024-04-13 RX ORDER — PANTOPRAZOLE SODIUM 40 MG/1
40 TABLET, DELAYED RELEASE ORAL
Status: DISCONTINUED | OUTPATIENT
Start: 2024-04-13 | End: 2024-04-20 | Stop reason: HOSPADM

## 2024-04-13 RX ORDER — MULTIPLE VITAMINS W/ MINERALS TAB 9MG-400MCG
1 TAB ORAL DAILY
Status: DISCONTINUED | OUTPATIENT
Start: 2024-04-13 | End: 2024-04-20 | Stop reason: HOSPADM

## 2024-04-13 RX ORDER — METHYLPREDNISOLONE SODIUM SUCCINATE 125 MG/2ML
60 INJECTION, POWDER, LYOPHILIZED, FOR SOLUTION INTRAMUSCULAR; INTRAVENOUS EVERY 8 HOURS
Status: DISCONTINUED | OUTPATIENT
Start: 2024-04-13 | End: 2024-04-15

## 2024-04-13 RX ADMIN — IPRATROPIUM BROMIDE AND ALBUTEROL SULFATE 3 ML: .5; 3 SOLUTION RESPIRATORY (INHALATION) at 20:00

## 2024-04-13 RX ADMIN — Medication 1 TABLET: at 13:18

## 2024-04-13 RX ADMIN — METHYLPREDNISOLONE SODIUM SUCCINATE 62.5 MG: 125 INJECTION, POWDER, FOR SOLUTION INTRAMUSCULAR; INTRAVENOUS at 22:35

## 2024-04-13 RX ADMIN — METHYLPREDNISOLONE SODIUM SUCCINATE 62.5 MG: 125 INJECTION, POWDER, FOR SOLUTION INTRAMUSCULAR; INTRAVENOUS at 05:52

## 2024-04-13 RX ADMIN — LORAZEPAM 0.5 MG: 0.5 TABLET ORAL at 16:41

## 2024-04-13 RX ADMIN — IPRATROPIUM BROMIDE AND ALBUTEROL SULFATE 3 ML: .5; 3 SOLUTION RESPIRATORY (INHALATION) at 10:40

## 2024-04-13 RX ADMIN — AZITHROMYCIN DIHYDRATE 250 MG: 250 TABLET ORAL at 07:45

## 2024-04-13 RX ADMIN — BUDESONIDE 0.5 MG: 0.5 INHALANT RESPIRATORY (INHALATION) at 20:00

## 2024-04-13 RX ADMIN — METHYLPREDNISOLONE SODIUM SUCCINATE 62.5 MG: 125 INJECTION, POWDER, FOR SOLUTION INTRAMUSCULAR; INTRAVENOUS at 13:17

## 2024-04-13 RX ADMIN — IPRATROPIUM BROMIDE AND ALBUTEROL SULFATE 3 ML: .5; 3 SOLUTION RESPIRATORY (INHALATION) at 14:44

## 2024-04-13 RX ADMIN — BUDESONIDE 0.5 MG: 0.5 INHALANT RESPIRATORY (INHALATION) at 07:43

## 2024-04-13 RX ADMIN — GUAIFENESIN 1200 MG: 600 TABLET, EXTENDED RELEASE ORAL at 07:45

## 2024-04-13 ASSESSMENT — ACTIVITIES OF DAILY LIVING (ADL)
ADLS_ACUITY_SCORE: 20

## 2024-04-13 NOTE — PLAN OF CARE
Goal Outcome Evaluation:       Summary:  COPD Exacerbation  DATE & TIME: 4/13/24,8921-4891                                                                                          Cognitive Concerns/ Orientation : A&O x4, anxious at times  BEHAVIOR & AGGRESSION TOOL COLOR: Green  ABNL VS/O2: VSS on 1L O2, needs 1L with ambulation. Patient dipped to 80% O2 sat while ambulating to BR  MOBILITY: Independent/SBA  PAIN MANAGMENT: Denies  DIET: Regular  BOWEL/BLADDER: Continent B/B in BR  ABNL LAB/BG: WBC 16, absolute neutrophils 14.4,   DRAIN/DEVICES: L PIV SL  TELEMETRY RHYTHM: NSR   SKIN: Intact, blanchable redness to both knees & coccyx area  TESTS/PROCEDURES: None this shift.   D/C DAY/GOALS/PLACE: Pending  OTHER IMPORTANT INFO: STORY,  productive cough with clear sputum. LS expiratory wheezes. Overnight oximetry study completed.  [facility] following. Switched to Q8 solumedrol, baseline on room air at home.  PRN Ativan 0.5mg given d/t anxiety, relief of symptoms noted on recheck.

## 2024-04-13 NOTE — PROGRESS NOTES
"CLINICAL NUTRITION SERVICES  -  ASSESSMENT NOTE    Recommendations Ordered by Registered Dietitian (RD):   Ordered trial of strawberry Ensure Enlive  Ordered MVI/M   Malnutrition:   % Weight Loss:  Weight loss does not meet criteria for malnutrition  % Intake:  <75% for >/= 3 months (moderate malnutrition)  Subcutaneous Fat Loss:  Upper arm region Mild depletion  Muscle Loss:  Temporal region Moderate depletion and Clavicle bone region Mild depletion  Fluid Retention:  None noted    Malnutrition Diagnosis: Moderate malnutrition  In Context of:  Acute illness or injury  Chronic illness or disease     REASON FOR ASSESSMENT  Tammie Zeng is a 68 year old female seen by Registered Dietitian for Admission Nutrition Risk Screen for answering \"yes\" to recently losing weight without trying (2-13#) and \"yes\" to recently eating poorly d/t a decrease in appetite.    PMH of COPD, ongoing tobacco use, RODNEY, hepatic steatosis. Presents with acute hypoxic respiratory failure in the setting of COPD exacerbation.    NUTRITION HISTORY    - Spoke with patient at bedside. Per patient, her appetite has been lower ever since she got dentures in December 2023. She said she has only been able to eat 1-2 meals per day consisting of soft foods like mashed potatoes, soup, noodles, etc. She has noticed weight loss since then as well. She usually weighs ~110# and is down to around 100#.    CURRENT NUTRITION ORDERS  Diet Order: Regular     Current Intake/Tolerance:  - Per patient, she isn't a big fan of the hospital food. She is self selecting softer foods like mashed potatoes and soup. Discussed importance of meeting protein needs at this time w/ weight loss and decrease in intakes. Patient willing to trial a strawberry Ensure for today. Encouraged patient to order a protein food at each meal.    NUTRITION FOCUSED PHYSICAL ASSESSMENT FOR DIAGNOSING MALNUTRITION)  Yes          Observed:    Muscle wasting (refer to documentation in " "Malnutrition section) and Subcutaneous fat loss (refer to documentation in Malnutrition section)    ANTHROPOMETRICS  Height: 5' 3\"  Weight: 46.7 kg, 103 lbs 0 oz  Body mass index is 18.25 kg/m .  Weight Status:  Underweight BMI <18.5  IBW: 52.3 kg  % IBW: 89%  Weight History: 6.4% wt loss in 3 months  Wt Readings from Last 10 Encounters:   04/12/24 46.7 kg (103 lb)   02/27/24 48.1 kg (106 lb)   02/19/24 45.5 kg (100 lb 5 oz)   02/15/24 49.9 kg (110 lb)   08/30/23 50.3 kg (111 lb)   08/19/23 49 kg (108 lb)   08/10/23 50.4 kg (111 lb 1.6 oz)   07/24/23 47.6 kg (105 lb)   01/19/23 51.7 kg (114 lb)   04/28/22 50.8 kg (112 lb)     LABS  Labs not back yet today    MEDICATIONS  Medications reviewed    ASSESSED NUTRITION NEEDS PER APPROVED PRACTICE GUIDELINES:  Dosing Weight 46.7 kg  Estimated Energy Needs: 8295-5027 kcals (30-35 Kcal/Kg)  Justification: repletion and underweight  Estimated Protein Needs: 56-70 grams protein (1.2-1.5 g pro/Kg)  Justification: Repletion  Estimated Fluid Needs: 1 mL/kcal or per provider pending fluid status    NUTRITION DIAGNOSIS:  Inadequate oral intake related to poor appetite, need of softer foods for dentures as evidenced by pt report of poor appetite x4 months accompanied w/ 6.4% weight loss in 3 months    NUTRITION INTERVENTIONS  Recommendations / Nutrition Prescription  See above    Implementation  Nutrition education: Per Provider order if indicated   Medical Food Supplement - ordered trial per pt request  Multivitamin/Mineral - ordered    Nutrition Goals  Patient to consume % of nutritionally adequate meal trays TID, or the equivalent with supplements/snacks    MONITORING AND EVALUATION:  Progress towards goals will be monitored and evaluated per protocol and Practice Guidelines    Cheri Vanessa RD, LD  Clinical Dietitian - [facility]  "

## 2024-04-13 NOTE — PLAN OF CARE
Goal Outcome Evaluation:    Summary:  COPD Exacerbation  DATE & TIME: 4/13/24, [id number]   Cognitive Concerns/ Orientation : A&OX4   BEHAVIOR & AGGRESSION TOOL COLOR: Green  ABNL VS/O2: VSS on room air, needs 1L with ambulation  MOBILITY: Independent  PAIN MANAGMENT: Denies  DIET: Regular  BOWEL/BLADDER: Continent   ABNL LAB/BG: WBC 16  DRAIN/DEVICES: L PIV SL  TELEMETRY RHYTHM: NSR  SKIN: Intact, blanchable redness to both knees  TESTS/PROCEDURES: None this shift.   D/C DAY/GOALS/PLACE: Pending  OTHER IMPORTANT INFO: STORY,  productive cough with clear sputum. LS expiratory wheezes. Over night oximetry study completed  [facility] following. Switched to Q8 solumedrol, baseline on room air at home

## 2024-04-13 NOTE — UTILIZATION REVIEW
Admission Status; Secondary Review Determination       Under the authority of the Utilization Management Committee, the utilization review process indicated a secondary review on the above patient. The review outcome is based on review of the medical records, discussions with staff, and applying clinical experience noted on the date of the review.     (x) Inpatient Status Appropriate - This patient's medical care is consistent with medical management for inpatient care and reasonable inpatient medical practice.     RATIONALE FOR DETERMINATION   68-year-old female with a history of non-oxygen dependent COPD, ongoing tobacco use (0.5 ppd), RODNEY, and hepatic steatosis was admitted on 4/12/2024 for acute hypoxic respiratory failure secondary to a COPD exacerbation. Her symptoms began on 4/7 following a viral upper respiratory infection, characterized by nasal and chest congestion, a productive cough, sore throat, chills, worsening dyspnea on exertion, and chest heaviness, without dizziness, syncope, or chest pain radiation. On admission, she was tachycardic at 117 bpm and hypoxic, with oxygen saturation dropping to 72% on room air during ambulation. Laboratory tests including CMP, CBC were within normal limits; procalcitonin was 0.06, troponin 10, D-dimer 0.52 (age-adjusted WNL), and . Imaging showed no acute changes on chest X-ray, and she had a history of small pulmonary nodules and emphysema on a CT scan from 1/2024. Treatment included duonebs, magnesium, solumedrol, and azithromycin, with plans for outpatient pulmonary follow-up and an echocardiogram to evaluate chest heaviness related to suspected COPD exacerbation.  The expected length of stay at the time of admission was more than 2 nights because of the severity of illness, intensity of service provided, and risk for adverse outcome. Inpatient admission is appropriate.         This document was produced using voice recognition software       The  information on this document is developed by the utilization review team in order for the business office to ensure compliance. This only denotes the appropriateness of proper admission status and does not reflect the quality of care rendered.   The definitions of Inpatient Status and Observation Status used in making the determination above are those provided in the CMS Coverage Manual, Chapter 1 and Chapter 6, section 70.4.   Sincerely,   MARILYN CABELLO MD   System Medical Director   Utilization Management   [facility].

## 2024-04-13 NOTE — PROGRESS NOTES
Overnight Oximtery     Overnight Oximtery Test was set-up on Tammie Zeng  1955      Date and Time: 04/13/24 5:58 AM       Patient was on 2l NASAL CANULA    Patient's SpO2 was 93-99    Tammie Zeng overnight oximetry test was completed.    Patient was on 2L          Meshac C Saleem, RT

## 2024-04-13 NOTE — PLAN OF CARE
PT:  Order received and chart reviewed. Spoke with nurse who stated that patient is up I'lly in room, is at baseline for mobility, and has no skilled PT needs. Defer PT at this time.

## 2024-04-13 NOTE — PROGRESS NOTES
"[facility]    Medicine Progress Note - Hospitalist Service    Date of Admission:  4/12/2024    Assessment & Plan     Tammie Zneg is a 68 year old female with PMHx of non-oxygen dependent COPD, ongoing tobacco use, RODNEY, and hepatic steatosis admitted on 4/12/2024. She presented with worsening SOB in the setting of recent URI. Admitted for further evaluation of COPD exacerbation.      Acute hypoxic respiratory failure in the setting of COPD exacerbation   Ongoing tobacco use  Known pulmonary nodules: Follows with [facility]. Last clinic visit 1/2024. Onset of viral illness with nasal and chest congestion, productive cough, sore throat, chills on 4/7 with progressively worsening STORY and chest heaviness. No dizziness, syncope. No radiation of chest pain to arm or jaw. No recent travel, prolonged car rides. Notes that she has been \"sitting around\" a lot. No LIAN. No personal or family hx of bleeding coagulopathy or VTE. No recent surgery. No recent Covid. Has to sleep with head elevated at night. Pt continues to smoke 0.5 ppd.   *On admission, tachycardic to 117, hypoxic to 72% on room air with ambulation.   *CMP, CBC WNL. Procal 0.06. Trop 10. D--dimer 0.52 (WNL age adjusted), . Covid, influenza, RSV negative. EKG sinus tachy. CXR negative.   *Received duoneb X2, Mag 2 g, Solumedrol 125 mg X1, 500 ml bolus. Reports some improvement in sx, still with mild wheezing and poor lung exchange on admission exam.   *Hospitalization 8/2023 and 2/2024 for COPD exacerbation. CT chest 1/4/2024 with small pulmonary nodules measuring up to 3 mm, emphysema, biapical pleural parenchymal scarring, mild bronchial wall thickening.   - Admitted  to inpatient status   Patient is very wheezy on 4/13/2024  - IV solumedrol 60 mg BID increase Solu-Medrol to 60 mg every 8 hours to help with wheezing  - Duonebs q4 hrs while awake, PRN albuterol   - Azithromycin X5 days given COPD exacerbation with new productive " 1. Have you been to the ER, urgent care clinic since your last visit? Hospitalized since your last visit? No    2. Have you seen or consulted any other health care providers outside of the [address] [place] [address] since your last visit? Include any pap smears or colon screening. Eye Exam Jan 2019    Chief Complaint   Patient presents with    Hypertension       Patient has C/O sinus pressure and BP fluctuations. cough   - Per med rec, reports not taking Trelegy Ellipta, continue PTA Pulmicort   - Scheduled mucinex   - Supplemental oxygen   - Obtain ambulatory oxygen levels to determine if pt qualifies for home O2, she is interested in home O2, feels like she may need it nocturnally and with activity   - Outpatient Pulm follow-up with [facility] planned in June, pt potentially interested in having home steroids on hand if possible   - Serial trops   - Echo (none prior) given chest heaviness and STORY, though suspect all related to COPD exacerbation   - Consider CT chest if no clinical improvement on steroids   - Regular diet     Discussed with patient about the importance of quitting smoking.  She wants to stop cold turkey by herself     : Echo reviewed.   1. Left ventricular systolic function is normal. The visual ejection fraction  is 60-65%.  2. No regional wall motion abnormalities noted.  3. The right ventricle is normal in structure, function and size.  4. No evidence for significant valvular pathology.    Moderate malnutrition;  Supplements per nutrition services          RODNEY: Ativan PRN for anxiety (rarely uses)      Hepatic steatosis: Stable, noted on US 4/3/2023. Follow up outpatient.      Diet: Regular Diet Adult  DVT Prophylaxis: Pneumatic Compression Devices and Ambulate every shift  Royal Catheter: Not present  Lines: None     Cardiac Monitoring: None  Code Status: No CPR- Do NOT Intubate, confirmed with patient. Daughter at bedside. [facility] consult ordered to fill out POLST (pt does not have one filled out).            Clinically Significant Risk Factors Present on Admission                # Drug Induced Platelet Defect: home medication list includes an antiplatelet medication     # Acute Respiratory Failure: Documented O2 saturation < 91%.  Continue supplemental oxygen as needed                     Clinically Significant Risk Factors                          # Moderate Malnutrition: based on nutrition  assessment, PRESENT ON ADMISSION          Disposition Plan     Medically Ready for Discharge: Anticipated in 2-4 Days    Once respiratory status improves     Discussed with bedside RN and patient    Bere Scott MD  Hospitalist Service  [facility]  Securely message with [employer] (more info)  Text page via [employer] Paging/Directory   ______________________________________________________________________    Interval History   Patient is resting comfortably in bed.  Had a hard time breathing this morning.  Neb treatments seem to help.  Still very wheezy having productive cough.  No other acute issues    Physical Exam   Vital Signs: Temp: 97.9  F (36.6  C) Temp src: Oral BP: 95/63 Pulse: 86   Resp: 20 SpO2: 94 % O2 Device: Nasal cannula Oxygen Delivery: 1 LPM  Weight: 103 lbs 0 oz    General Appearance: Alert awake, not in acute distress  Respiratory: Bilateral diffuse wheezing heard on auscultation  Cardiovascular: Normal rate rhythm regular  GI: Soft, nontender nondistended bowel sounds positive  Skin:   Other:      Medical Decision Making       52 MINUTES SPENT BY ME on the date of service doing chart review, history, exam, documentation & further activities per the note.      Data     I have personally reviewed the following data over the past 24 hrs:    16.0 (H)  \   12.6   / 312     139 102 11.0 /  163 (H)   4.3 27 0.57 \       Imaging results reviewed over the past 24 hrs:   No results found for this or any previous visit (from the past 24 hour(s)).

## 2024-04-13 NOTE — PLAN OF CARE
Goal Outcome Evaluation:  Summary:  COPD Exacerbation  DATE & TIME: 4/12/24  [id number]    Cognitive Concerns/ Orientation : A&OX4   BEHAVIOR & AGGRESSION TOOL COLOR: Green  CIWA SCORE: NA   ABNL VS/O2: VSS on 2L, Stats in mid 90s.   MOBILITY: Ind with her O2  PAIN MANAGMENT: Denied  DIET: Reg  BOWEL/BLADDER: Continent   ABNL LAB/BG: Torp 8  DRAIN/DEVICES: IPV SL  TELEMETRY RHYTHM: NS  SKIN: intact, 2nd nurse AA.  TESTS/PROCEDURES: None this shift.   D/C DAY/GOALS/PLACE: Pending  OTHER IMPORTANT INFO: Pt calls approprietly. Has scheduled over night oximetry study. Cont pulse oxy. [facility] following. STORY.

## 2024-04-13 NOTE — PLAN OF CARE
Summary:  COPD Exacerbation  DATE & TIME: 4/12/24, 1843 - 7567    Cognitive Concerns/ Orientation : A&OX4   BEHAVIOR & AGGRESSION TOOL COLOR: Green  CIWA SCORE: NA   ABNL VS/O2: VSS O2 2 LPM  MOBILITY: Independent  PAIN MANAGMENT: Denied  DIET: Regular  BOWEL/BLADDER: Continent   ABNL LAB/BG: AM labs pending  DRAIN/DEVICES: PIV SL  TELEMETRY RHYTHM: SR with BBB  SKIN: Intact, blanchable redness to both knees  TESTS/PROCEDURES: None this shift.   D/C DAY/GOALS/PLACE: Pending  OTHER IMPORTANT INFO: STORY, intermittent productive cough. LS expiratory wheezes. Pt calls approprietly. Over night oximetry study completed  [facility] following.     Goal Outcome Evaluation:      Plan of Care Reviewed With: patient    Overall Patient Progress: improvingOverall Patient Progress: improving

## 2024-04-14 PROCEDURE — 250N000013 HC RX MED GY IP 250 OP 250 PS 637: Performed by: PHYSICIAN ASSISTANT

## 2024-04-14 PROCEDURE — 250N000009 HC RX 250: Performed by: INTERNAL MEDICINE

## 2024-04-14 PROCEDURE — 999N000157 HC STATISTIC RCP TIME EA 10 MIN

## 2024-04-14 PROCEDURE — 94640 AIRWAY INHALATION TREATMENT: CPT | Mod: 76

## 2024-04-14 PROCEDURE — 250N000009 HC RX 250: Performed by: PHYSICIAN ASSISTANT

## 2024-04-14 PROCEDURE — 99233 SBSQ HOSP IP/OBS HIGH 50: CPT | Performed by: INTERNAL MEDICINE

## 2024-04-14 PROCEDURE — 250N000013 HC RX MED GY IP 250 OP 250 PS 637: Performed by: INTERNAL MEDICINE

## 2024-04-14 PROCEDURE — 250N000011 HC RX IP 250 OP 636: Performed by: INTERNAL MEDICINE

## 2024-04-14 PROCEDURE — 120N000001 HC R&B MED SURG/OB

## 2024-04-14 PROCEDURE — 94640 AIRWAY INHALATION TREATMENT: CPT

## 2024-04-14 RX ADMIN — BUDESONIDE 0.5 MG: 0.5 INHALANT RESPIRATORY (INHALATION) at 19:32

## 2024-04-14 RX ADMIN — AZITHROMYCIN DIHYDRATE 250 MG: 250 TABLET ORAL at 07:36

## 2024-04-14 RX ADMIN — IPRATROPIUM BROMIDE AND ALBUTEROL SULFATE 3 ML: .5; 3 SOLUTION RESPIRATORY (INHALATION) at 15:14

## 2024-04-14 RX ADMIN — IPRATROPIUM BROMIDE AND ALBUTEROL SULFATE 3 ML: .5; 3 SOLUTION RESPIRATORY (INHALATION) at 10:57

## 2024-04-14 RX ADMIN — Medication 1 TABLET: at 07:36

## 2024-04-14 RX ADMIN — IPRATROPIUM BROMIDE AND ALBUTEROL SULFATE 3 ML: .5; 3 SOLUTION RESPIRATORY (INHALATION) at 19:32

## 2024-04-14 RX ADMIN — LORAZEPAM 0.5 MG: 0.5 TABLET ORAL at 22:41

## 2024-04-14 RX ADMIN — IPRATROPIUM BROMIDE AND ALBUTEROL SULFATE 3 ML: .5; 3 SOLUTION RESPIRATORY (INHALATION) at 07:31

## 2024-04-14 RX ADMIN — METHYLPREDNISOLONE SODIUM SUCCINATE 62.5 MG: 125 INJECTION, POWDER, FOR SOLUTION INTRAMUSCULAR; INTRAVENOUS at 13:18

## 2024-04-14 RX ADMIN — GUAIFENESIN 1200 MG: 600 TABLET, EXTENDED RELEASE ORAL at 07:36

## 2024-04-14 RX ADMIN — GUAIFENESIN 1200 MG: 600 TABLET, EXTENDED RELEASE ORAL at 22:10

## 2024-04-14 RX ADMIN — METHYLPREDNISOLONE SODIUM SUCCINATE 62.5 MG: 125 INJECTION, POWDER, FOR SOLUTION INTRAMUSCULAR; INTRAVENOUS at 05:37

## 2024-04-14 RX ADMIN — METHYLPREDNISOLONE SODIUM SUCCINATE 62.5 MG: 125 INJECTION, POWDER, FOR SOLUTION INTRAMUSCULAR; INTRAVENOUS at 22:10

## 2024-04-14 RX ADMIN — LORAZEPAM 0.5 MG: 0.5 TABLET ORAL at 16:33

## 2024-04-14 RX ADMIN — BUDESONIDE 0.5 MG: 0.5 INHALANT RESPIRATORY (INHALATION) at 07:31

## 2024-04-14 RX ADMIN — PANTOPRAZOLE SODIUM 40 MG: 40 TABLET, DELAYED RELEASE ORAL at 06:34

## 2024-04-14 ASSESSMENT — ACTIVITIES OF DAILY LIVING (ADL)
ADLS_ACUITY_SCORE: 20
ADLS_ACUITY_SCORE: 20
ADLS_ACUITY_SCORE: 21
ADLS_ACUITY_SCORE: 20
ADLS_ACUITY_SCORE: 21
ADLS_ACUITY_SCORE: 20
ADLS_ACUITY_SCORE: 21
ADLS_ACUITY_SCORE: 20
ADLS_ACUITY_SCORE: 21
ADLS_ACUITY_SCORE: 20
ADLS_ACUITY_SCORE: 20
ADLS_ACUITY_SCORE: 21
ADLS_ACUITY_SCORE: 20

## 2024-04-14 NOTE — PROGRESS NOTES
"[facility]    Medicine Progress Note - Hospitalist Service    Date of Admission:  4/12/2024    Assessment & Plan     Tammie Zeng is a 68 year old female with PMHx of non-oxygen dependent COPD, ongoing tobacco use, RODNEY, and hepatic steatosis admitted on 4/12/2024. She presented with worsening SOB in the setting of recent URI. Admitted for further evaluation of COPD exacerbation.      Acute hypoxic respiratory failure in the setting of COPD exacerbation   Ongoing tobacco use  Known pulmonary nodules: Follows with [facility]. Last clinic visit 1/2024. Onset of viral illness with nasal and chest congestion, productive cough, sore throat, chills on 4/7 with progressively worsening STORY and chest heaviness. No dizziness, syncope. No radiation of chest pain to arm or jaw. No recent travel, prolonged car rides. Notes that she has been \"sitting around\" a lot. No LIAN. No personal or family hx of bleeding coagulopathy or VTE. No recent surgery. No recent Covid. Has to sleep with head elevated at night. Pt continues to smoke 0.5 ppd.   *On admission, tachycardic to 117, hypoxic to 72% on room air with ambulation.   *CMP, CBC WNL. Procal 0.06. Trop 10. D--dimer 0.52 (WNL age adjusted), . Covid, influenza, RSV negative. EKG sinus tachy. CXR negative.   *Received duoneb X2, Mag 2 g, Solumedrol 125 mg X1, 500 ml bolus. Reports some improvement in sx, still with mild wheezing and poor lung exchange on admission exam.   *Hospitalization 8/2023 and 2/2024 for COPD exacerbation. CT chest 1/4/2024 with small pulmonary nodules measuring up to 3 mm, emphysema, biapical pleural parenchymal scarring, mild bronchial wall thickening.   - Admitted  to inpatient status   Patient is very wheezy on 4/13/2024  - IV solumedrol 60 mg BID increase Solu-Medrol to 60 mg every 8 hours to help with wheezing  - Duonebs q4 hrs while awake, PRN albuterol   - Azithromycin X5 days given COPD exacerbation with new productive " cough   - Per med rec, reports not taking Trelegy Ellipta, continue PTA Pulmicort   - Scheduled mucinex   - Supplemental oxygen   And he is desatting to the 70s to 80s with ambulation.  Most likely will need oxygen on discharge  - Outpatient Pulm follow-up with [facility] planned in June, pt potentially interested in having home steroids on hand if possible   Serial tropes tropes normal 10-8    Discussed with patient about the importance of quitting smoking.  She wants to stop cold turkey by herself     : Echo reviewed.   1. Left ventricular systolic function is normal. The visual ejection fraction  is 60-65%.  2. No regional wall motion abnormalities noted.  3. The right ventricle is normal in structure, function and size.  4. No evidence for significant valvular pathology.    Continue the same treatment with IV steroids, nebulizers and Z-Bonilla, oxygen supplementation on 4/14/2024    Patient is slow to improve.  Most likely will need oxygen on discharge  Needs Home oxygen assessment prior to discharge    Nocturnal oximetry study done in the hospital on 2 L of oxygen which showed no desatting on 2 L of oxygen    Moderate malnutrition;  Supplements per nutrition services          RODNEY: Ativan PRN for anxiety (rarely uses)      Hepatic steatosis: Stable, noted on US 4/3/2023. Follow up outpatient.      Diet: Regular Diet Adult  DVT Prophylaxis: Pneumatic Compression Devices and Ambulate every shift  Royal Catheter: Not present  Lines: None     Cardiac Monitoring: None  Code Status: No CPR- Do NOT Intubate, confirmed with patient. Daughter at bedside. [facility] consult ordered to fill out POLST (pt does not have one filled out).            Clinically Significant Risk Factors Present on Admission                # Drug Induced Platelet Defect: home medication list includes an antiplatelet medication     # Acute Respiratory Failure: Documented O2 saturation < 91%.  Continue supplemental oxygen as needed                "      Clinically Significant Risk Factors                         # Cachexia: Estimated body mass index is 17.65 kg/m  as calculated from the following:    Height as of this encounter: 1.6 m (5' 3\").    Weight as of this encounter: 45.2 kg (99 lb 10.4 oz)., PRESENT ON ADMISSION  # Moderate Malnutrition: based on nutrition assessment, PRESENT ON ADMISSION          Disposition Plan     Medically Ready for Discharge: Anticipated in 2-4 Days    Once respiratory status, shortness of breath improves and need for oxygen goes down     Discussed with bedside RN and patient, patient's daughter Sonia updated over the phone    Daughter wants to talk to  about possible assisted living facilities referral for her mom for the future    Bere Scott MD  Hospitalist Service  [facility]  Securely message with [employer] (more info)  Text page via [employer] Paging/Directory   ______________________________________________________________________    Interval History   Patient is resting comfortably in bed.  Complains of shortness of breath with minimal activity taking few steps.  And desats to the 70s with minimal activity walking to the bathroom.  Needing 1 to 2 L of oxygen by nasal cannula currently.  Still having productive cough.  Discussed with her that she is improving very slowly.  Might need to stay in the hospital for the next 2 to 3 days    Physical Exam   Vital Signs: Temp: 97.5  F (36.4  C) Temp src: Axillary BP: 95/53 Pulse: 88   Resp: 20 SpO2: 94 % O2 Device: None (Room air) Oxygen Delivery: 1 LPM  Weight: 99 lbs 10.37 oz    General Appearance: Alert awake, not in acute distress  Respiratory: Bilateral diffuse.  Expiratory wheezing heard on auscultation  Cardiovascular: Normal rate rhythm regular  GI: Soft, nontender nondistended bowel sounds positive  Skin:   Other:      Medical Decision Making       49 MINUTES SPENT BY ME on the date of service doing chart review, history, exam, " documentation & further activities per the note.      Data         Imaging results reviewed over the past 24 hrs:   No results found for this or any previous visit (from the past 24 hour(s)).

## 2024-04-14 NOTE — PLAN OF CARE
Summary:  COPD Exacerbation  DATE & TIME: 4/13/24, [id number]   Cognitive Concerns/ Orientation : A&OX4   BEHAVIOR & AGGRESSION TOOL COLOR: Green  ABNL VS/O2: VSS on room air, needs 1L with ambulation.   MOBILITY: SBA   PAIN MANAGMENT: Denies  DIET: Regular  BOWEL/BLADDER: Continent   ABNL LAB/BG: WBC 16  DRAIN/DEVICES: L PIV SL  TELEMETRY RHYTHM: NSR  SKIN: Intact, blanchable redness to both knees  TESTS/PROCEDURES: None this shift.   D/C DAY/GOALS/PLACE: Pending  OTHER IMPORTANT INFO: STORY,  productive cough with clear sputum. LS expiratory wheezes. Over night oximetry study completed  [facility] following. Switched to Q8 solumedrol, baseline on room air at home

## 2024-04-14 NOTE — PLAN OF CARE
Goal Outcome Evaluation:    Summary:  COPD Exacerbation  DATE & TIME: 4/14/24, 9822-5933  Cognitive Concerns/ Orientation : A&OX4   BEHAVIOR & AGGRESSION TOOL COLOR: Green  ABNL VS/O2: VSS on room air, needs 1L with ambulation, desats to 84-85% on room air  MOBILITY: SBA  PAIN MANAGMENT: Denies  DIET: Regular, good appetite  BOWEL/BLADDER: Continent, BM x1 this shift  ABNL LAB/BG: WBC 16  DRAIN/DEVICES: L PIV SL  TELEMETRY RHYTHM: NSR  SKIN: Intact, blanchable redness to both knees  TESTS/PROCEDURES: None this shift.   D/C DAY/GOALS/PLACE: Pending  OTHER IMPORTANT INFO: STORY,  productive cough with clear sputum. LS expiratory wheezes. [facility] following. On Q8 solumedrol, baseline on room air at home. On oral azithromycin

## 2024-04-15 PROCEDURE — 120N000001 HC R&B MED SURG/OB

## 2024-04-15 PROCEDURE — 250N000013 HC RX MED GY IP 250 OP 250 PS 637: Performed by: PHYSICIAN ASSISTANT

## 2024-04-15 PROCEDURE — 999N000157 HC STATISTIC RCP TIME EA 10 MIN

## 2024-04-15 PROCEDURE — 250N000011 HC RX IP 250 OP 636: Performed by: INTERNAL MEDICINE

## 2024-04-15 PROCEDURE — 94640 AIRWAY INHALATION TREATMENT: CPT | Mod: 76

## 2024-04-15 PROCEDURE — 94640 AIRWAY INHALATION TREATMENT: CPT

## 2024-04-15 PROCEDURE — 250N000013 HC RX MED GY IP 250 OP 250 PS 637: Performed by: INTERNAL MEDICINE

## 2024-04-15 PROCEDURE — 99232 SBSQ HOSP IP/OBS MODERATE 35: CPT | Performed by: INTERNAL MEDICINE

## 2024-04-15 PROCEDURE — 250N000009 HC RX 250: Performed by: PHYSICIAN ASSISTANT

## 2024-04-15 PROCEDURE — 250N000012 HC RX MED GY IP 250 OP 636 PS 637: Performed by: INTERNAL MEDICINE

## 2024-04-15 PROCEDURE — 250N000009 HC RX 250: Performed by: INTERNAL MEDICINE

## 2024-04-15 RX ORDER — GUAIFENESIN 200 MG/10ML
10 LIQUID ORAL EVERY 4 HOURS PRN
Status: DISCONTINUED | OUTPATIENT
Start: 2024-04-15 | End: 2024-04-15

## 2024-04-15 RX ORDER — ALBUTEROL SULFATE 90 UG/1
2 AEROSOL, METERED RESPIRATORY (INHALATION) EVERY 4 HOURS PRN
Status: DISCONTINUED | OUTPATIENT
Start: 2024-04-15 | End: 2024-04-20 | Stop reason: HOSPADM

## 2024-04-15 RX ORDER — PREDNISONE 20 MG/1
40 TABLET ORAL DAILY
Status: DISCONTINUED | OUTPATIENT
Start: 2024-04-15 | End: 2024-04-16

## 2024-04-15 RX ORDER — BENZONATATE 100 MG/1
100 CAPSULE ORAL 3 TIMES DAILY PRN
Status: DISCONTINUED | OUTPATIENT
Start: 2024-04-15 | End: 2024-04-20 | Stop reason: HOSPADM

## 2024-04-15 RX ADMIN — METHYLPREDNISOLONE SODIUM SUCCINATE 62.5 MG: 125 INJECTION, POWDER, FOR SOLUTION INTRAMUSCULAR; INTRAVENOUS at 06:38

## 2024-04-15 RX ADMIN — ALBUTEROL SULFATE 2 PUFF: 108 INHALANT RESPIRATORY (INHALATION) at 20:45

## 2024-04-15 RX ADMIN — GUAIFENESIN 10 ML: 200 SOLUTION ORAL at 15:49

## 2024-04-15 RX ADMIN — PANTOPRAZOLE SODIUM 40 MG: 40 TABLET, DELAYED RELEASE ORAL at 06:38

## 2024-04-15 RX ADMIN — Medication 1 TABLET: at 08:34

## 2024-04-15 RX ADMIN — ALBUTEROL SULFATE 2 PUFF: 108 INHALANT RESPIRATORY (INHALATION) at 12:59

## 2024-04-15 RX ADMIN — LORAZEPAM 0.5 MG: 0.5 TABLET ORAL at 06:38

## 2024-04-15 RX ADMIN — GUAIFENESIN 1200 MG: 600 TABLET, EXTENDED RELEASE ORAL at 20:42

## 2024-04-15 RX ADMIN — GUAIFENESIN 1200 MG: 600 TABLET, EXTENDED RELEASE ORAL at 08:34

## 2024-04-15 RX ADMIN — BENZONATATE 100 MG: 100 CAPSULE ORAL at 15:48

## 2024-04-15 RX ADMIN — IPRATROPIUM BROMIDE AND ALBUTEROL SULFATE 3 ML: .5; 3 SOLUTION RESPIRATORY (INHALATION) at 20:10

## 2024-04-15 RX ADMIN — LORAZEPAM 0.5 MG: 0.5 TABLET ORAL at 12:58

## 2024-04-15 RX ADMIN — IPRATROPIUM BROMIDE AND ALBUTEROL SULFATE 3 ML: .5; 3 SOLUTION RESPIRATORY (INHALATION) at 11:09

## 2024-04-15 RX ADMIN — BUDESONIDE 0.5 MG: 0.5 INHALANT RESPIRATORY (INHALATION) at 20:10

## 2024-04-15 RX ADMIN — IPRATROPIUM BROMIDE AND ALBUTEROL SULFATE 3 ML: .5; 3 SOLUTION RESPIRATORY (INHALATION) at 15:54

## 2024-04-15 RX ADMIN — IPRATROPIUM BROMIDE AND ALBUTEROL SULFATE 3 ML: .5; 3 SOLUTION RESPIRATORY (INHALATION) at 07:25

## 2024-04-15 RX ADMIN — BUDESONIDE 0.5 MG: 0.5 INHALANT RESPIRATORY (INHALATION) at 07:25

## 2024-04-15 RX ADMIN — AZITHROMYCIN DIHYDRATE 250 MG: 250 TABLET ORAL at 08:35

## 2024-04-15 RX ADMIN — PREDNISONE 40 MG: 20 TABLET ORAL at 15:10

## 2024-04-15 RX ADMIN — LORAZEPAM 0.5 MG: 0.5 TABLET ORAL at 18:42

## 2024-04-15 ASSESSMENT — ACTIVITIES OF DAILY LIVING (ADL)
ADLS_ACUITY_SCORE: 21
ADLS_ACUITY_SCORE: 21
ADLS_ACUITY_SCORE: 20
ADLS_ACUITY_SCORE: 20
ADLS_ACUITY_SCORE: 21
ADLS_ACUITY_SCORE: 20
ADLS_ACUITY_SCORE: 21
ADLS_ACUITY_SCORE: 21
ADLS_ACUITY_SCORE: 20
ADLS_ACUITY_SCORE: 21
ADLS_ACUITY_SCORE: 20
ADLS_ACUITY_SCORE: 20
ADLS_ACUITY_SCORE: 21
ADLS_ACUITY_SCORE: 21
ADLS_ACUITY_SCORE: 20
ADLS_ACUITY_SCORE: 20
ADLS_ACUITY_SCORE: 21

## 2024-04-15 NOTE — PROVIDER NOTIFICATION
MD Notification    Notified Person: MD    Notified Person Name: Dr. Cheema    Notification Date/Time:4/15/2024  4:13 PM      Notification Interaction:cale    Purpose of Notification: pts wheezing appears worse this afternoon- even after nebulizer, without any activity    Orders Received:  No new orders, continue to monitor  Comments:

## 2024-04-15 NOTE — PLAN OF CARE
Summary:  COPD Exacerbation  DATE & TIME: 4/14/24, 2071 - 0601  Cognitive Concerns/ Orientation : A&OX4   BEHAVIOR & AGGRESSION TOOL COLOR: Green  ABNL VS/O2: VSS on room air, needs O2 1 LPM with ambulation  MOBILITY: SBA  PAIN MANAGMENT: Denies  DIET: Regular, good appetite  BOWEL/BLADDER: Continent,   ABNL LAB/BG: AM labs pending  DRAIN/DEVICES:  PIV SL  TELEMETRY RHYTHM: NSR  SKIN: Intact, blanchable redness to both knees  TESTS/PROCEDURES: None this shift.   D/C DAY/GOALS/PLACE: Pending  OTHER IMPORTANT INFO: STORY,  productive cough with clear sputum. LS expiratory wheezes. [facility] following. On Q8 solumedrol, baseline on room air at home. Needs reminder to use O2 when having SOB. Prn Ativan given this AM for anxiety

## 2024-04-15 NOTE — PROGRESS NOTES
[facility]    Internal Medicine Hospitalist Progress Note  04/15/2024  I evaluated patient on the above date.    Kurt Cheema Jr., MD  [phone] (p)  Text Page  Vocera        Assessment & Plan New actions/orders today (04/15/2024) are underlined. All lab results in the assessment and plan were reviewed.    Tammie Zeng is a 68 year old female with PMHx including non-oxygen dependent COPD with ongoing tobacco use; who presented 4/12/2024 with worsening SOB in the setting of recent URI and found with acute COPD exacerbation with respiratory failure.    On initial evaluation, pt was afebrile, hypertensive, tachycardic, sats down to 70's in ED. ECG showed sinus tachycardic w/o acute ischemic changes. Labs notable for CBC with normal WBC; BMP normal; NTP-BNP normal; trop negative; procalcitonin 0.06; d-dimer 0.52; COVID-19, influenza and RSV negative. CXR showed no acute findings.         Acute COPD exacerbation with acute hypoxic respiratory failure.  Suspected bacterial bronchitis contributing to above.  Ongoing tobacco use.  * Follows with [facility].   * Recent viral URI symptoms PTA. Continues to smoke. Noted pt not taking PTA fluticasone-umeclidinium-vilanterol; was taking budesonide nebs and ipratropium-albuterol nebs.  * Initial presentation as above. Started on nebs, IV steroids and azithromycin on admit. Echo after admit showed LVEF 60-65%; no regional wall motion abnormalities; RV normal in structure, function and size; no evidence for significant valvular pathology.  * On 4/13, on 1L O2. Still wheezy. IV methylprednisolone increased frequency. Counseled on quitting smoking.  * On 4/14, still needing 1-2L O2, desats with ambulation. Noted that nocturnal oximetry study done in the hospital on 2 L of oxygen showed no desaturation on 2 L of oxygen. Later in the day able to wean off O2 at times.  * On 4/15, sats low 90's on RA, still needing O2 with activity. Still some wheezing.  -  "Continue O2 with activity - may need home O2 for activity.  - Continue azithromycin (started 4/12) - stop after 4/16.  - Discontinue IV methylprednisolone and start prednisone 40 mg daily.  - Continue budesonide nebs BID; ipratropium-albuterol nebs q4h.  - Continue guaifenesin BID.  - Order PRN benzonatate.  - Previously counseled on quitting smoking - pt to try to stop \"cold turkey\" by herself.    Known pulmonary nodules.  * CT chest 1/4/2024 with small pulmonary nodules measuring up to 3 mm, emphysema, biapical pleural parenchymal scarring, mild bronchial wall thickening.   - Follow-up serial imaging outpatient.    Moderate malnutrition related to acute and chronic medical issues.  * Noted by Nutrition; see their documentation.  - Continue diet as tolerated.  - Continue supplements.    Anxiety.  - Continue PTA PRN lorazepam (notes pt rarely uses).      Hepatic steatosis.  * Noted on US 4/3/2023.  - Continue to follow up outpatient.       Clinically Significant Risk Factors                          # Moderate Malnutrition: based on nutrition assessment, PRESENT ON ADMISSION            COVID-19 testing.  COVID-19 PCR Results          4/28/2022    11:22 7/24/2023    12:26 8/19/2023    08:39 2/19/2024    10:44 4/12/2024    07:24   COVID-19 PCR Results   SARS CoV2 PCR Negative  Negative  Negative  Negative  Negative      COVID-19 Antibody Results, Testing for Immunity           No data to display                Diet: Regular Diet Adult    Prophylaxis: PCD's, ambulation.   Royal Catheter: Not present  Lines: None     Code Status: No CPR- Do NOT Intubate    Disposition Plan  .  Medically Ready for Discharge: Anticipated Tomorrow  Expected discharge location: prior living arrangement.  Discharge pending: continued symptomatic improvement.    Entered: Kurt Cheema MD 04/15/2024, 7:11 AM         Interval History   Off O2 at rest, still needing it during the day.  Still periods of anxiety and severe dyspnea at times; " "not ready to go home yet.    -Data reviewed today: I reviewed all new labs and imaging over the last 24 hours. I personally reviewed the EKG tracing showing findings as above .    Physical Exam    , Blood pressure 121/68, pulse 90, temperature 98  F (36.7  C), temperature source Oral, resp. rate 20, height 1.6 m (5' 3\"), weight 47.7 kg (105 lb 1.6 oz), last menstrual period 01/24/2006, SpO2 92%, not currently breastfeeding. O2 Device: None (Room air) Oxygen Delivery: 1 LPM  Vitals:    04/12/24 0654 04/14/24 0500 04/15/24 0631   Weight: 46.7 kg (103 lb) 45.2 kg (99 lb 10.4 oz) 47.7 kg (105 lb 1.6 oz)     Vital Signs with Ranges  Temp:  [97.5  F (36.4  C)-98.3  F (36.8  C)] 98  F (36.7  C)  Pulse:  [81-99] 90  Resp:  [20] 20  BP: ()/(53-68) 121/68  SpO2:  [87 %-98 %] 92 %  Patient Vitals for the past 24 hrs:   BP Temp Temp src Pulse Resp SpO2 Weight   04/15/24 0631 -- -- -- -- -- -- 47.7 kg (105 lb 1.6 oz)   04/14/24 2326 121/68 98  F (36.7  C) Oral 90 20 92 % --   04/14/24 2216 -- -- -- -- -- (!) 87 % --   04/14/24 1932 -- -- -- -- -- 96 % --   04/14/24 1623 -- -- -- -- -- 95 % --   04/14/24 1520 126/66 98.3  F (36.8  C) Oral 99 20 95 % --   04/14/24 1514 -- -- -- -- -- 92 % --   04/14/24 1322 -- -- -- -- -- 94 % --   04/14/24 1057 -- -- -- -- -- 98 % --   04/14/24 0745 95/53 -- -- 88 -- 95 % --   04/14/24 0731 -- -- -- -- -- 93 % --   04/14/24 0717 -- 97.5  F (36.4  C) Axillary 81 20 92 % --     I/O's Last 24 hours  I/O last 3 completed shifts:  In: 243 [P.O.:240; I.V.:3]  Out: -     Constitutional: Awake, alert, oriented.  Respiratory: Fair air movement, +expiratory wheezes, no crackles.  Cardiovascular: RRR, no m/r/g.  GI: Soft, nt, nd, +BS.  Skin/Integumen: No bilateral lower extremity edema.  Other:        Data    Labs reviewed.  Recent Labs   Lab 04/13/24  1135 04/12/24  0711   WBC 16.0* 9.9   HGB 12.6 14.3   MCV 87 88    293    137   POTASSIUM 4.3 4.0   CHLORIDE 102 97*   CO2 27 27   BUN " "11.0 12.2   CR 0.57 0.70   ANIONGAP 10 13   MOLLY 9.7 9.5   * 109*   ALBUMIN  --  4.4   PROTTOTAL  --  7.3   BILITOTAL  --  0.3   ALKPHOS  --  95   ALT  --  17   AST  --  30     Recent Labs   Lab Test 04/12/24  1148 04/12/24  0711 02/19/24  1642 08/19/23  0839   NT-PROBNP, INPATIENT  --  128 279 122   TROPONIN T HIGH SENSITIVITY 8 10 7 9     Recent Labs   Lab 04/13/24  1135 04/12/24  0711   * 109*     No lab results found.    No results for input(s): \"INR\", \"[id number]\" in the last 168 hours.  Recent Labs   Lab 04/13/24  1135 04/12/24  0724 04/12/24  0711   WBC 16.0*  --  9.9   DD  --   --  0.52*   PCAL  --   --  0.06   [id number]  --  Negative  --        MICRO:  CULTURES (INCLUDING BLOOD AND URINE):  No lab results found in last 7 days.    No results found for this or any previous visit (from the past 24 hour(s)).    Medications   All medications were reviewed.    Infusions:  Current Facility-Administered Medications   Medication Dose Route Frequency Provider Last Rate Last Admin     Scheduled Medications:  Current Facility-Administered Medications   Medication Dose Route Frequency Provider Last Rate Last Admin    azithromycin (ZITHROMAX) tablet 250 mg  250 mg Oral Daily Chely Daigle PA-C   250 mg at 04/14/24 0736    budesonide (PULMICORT) neb solution 0.5 mg  0.5 mg Nebulization BID Chely Daigle PA-C   0.5 mg at 04/14/24 1932    guaiFENesin (MUCINEX) 12 hr tablet 1,200 mg  1,200 mg Oral BID Chely Daigle PA-C   1,200 mg at 04/14/24 2210    ipratropium - albuterol 0.5 mg/2.5 mg/3 mL (DUONEB) neb solution 3 mL  3 mL Nebulization Q4H Bere Reid MD   3 mL at 04/14/24 1932    methylPREDNISolone sodium succinate (solu-MEDROL) injection 62.5 mg  62.5 mg Intravenous Q8H Bere Scott MD   62.5 mg at 04/15/24 0638    multivitamin w/minerals (THERA-VIT-M) tablet 1 tablet  1 tablet Oral Daily Bere Scott MD   1 tablet at 04/14/24 0736    pantoprazole (PROTONIX) " EC tablet 40 mg  40 mg Oral QAM AC Bere Scott MD   40 mg at 04/15/24 0638    sodium chloride (PF) 0.9% PF flush 3 mL  3 mL Intracatheter Q8H Chely Daigle PA-C   3 mL at 04/14/24 1623     PRN Medications:  Current Facility-Administered Medications   Medication Dose Route Frequency Provider Last Rate Last Admin    acetaminophen (TYLENOL) tablet 650 mg  650 mg Oral Q4H PRN Chely Daigle PA-C        Or    acetaminophen (TYLENOL) Suppository 650 mg  650 mg Rectal Q4H PRN Chely Daigle PA-C        bisacodyl (DULCOLAX) suppository 10 mg  10 mg Rectal Daily PRN Chely Daigle PA-C        calcium carbonate (TUMS) chewable tablet 1,000 mg  1,000 mg Oral 4x Daily PRN Chely Daigle PA-C        lidocaine (LMX4) cream   Topical Q1H PRN Chely Daigle PA-C        lidocaine 1 % 0.1-1 mL  0.1-1 mL Other Q1H PRN Chely Daigle PA-C        LORazepam (ATIVAN) tablet 0.5 mg  0.5 mg Oral Q6H PRN Chely Daigle PA-C   0.5 mg at 04/15/24 0638    ondansetron (ZOFRAN ODT) ODT tab 4 mg  4 mg Oral Q6H PRN Chely Daigle PA-C        Or    ondansetron (ZOFRAN) injection 4 mg  4 mg Intravenous Q6H PRN Chely Daigle PA-C        polyethylene glycol (MIRALAX) Packet 17 g  17 g Oral BID PRN Chely Daigle PA-C        prochlorperazine (COMPAZINE) injection 5 mg  5 mg Intravenous Q6H PRN Chely Daigle PA-C        Or    prochlorperazine (COMPAZINE) tablet 5 mg  5 mg Oral Q6H PRN Chely Daigle PA-C        Or    prochlorperazine (COMPAZINE) suppository 12.5 mg  12.5 mg Rectal Q12H PRN Chely Daigle PA-C        senna-docusate (SENOKOT-S/PERICOLACE) 8.6-50 MG per tablet 1 tablet  1 tablet Oral BID PRN Chely Daigle PA-C        Or    senna-docusate (SENOKOT-S/PERICOLACE) 8.6-50 MG per tablet 2 tablet  2 tablet Oral BID PRN Chely Daigle PA-C        sodium chloride (PF)  0.9% PF flush 3 mL  3 mL Intracatheter q1 min prn Chely Daigle PA-C   3 mL at 04/15/24 0607

## 2024-04-15 NOTE — PLAN OF CARE
Goal Outcome Evaluation:  Summary:  COPD Exacerbation  DATE & TIME: 4/14/24, 2868-9785  Cognitive Concerns/ Orientation : A&OX4   BEHAVIOR & AGGRESSION TOOL COLOR: Green  ABNL VS/O2: VSS on room air, needs 1L with ambulation, desats to 84-85% on room air  MOBILITY: SBA  PAIN MANAGMENT: Denies  DIET: Regular, good appetite  BOWEL/BLADDER: Continent,   ABNL LAB/BG: WBC 16  DRAIN/DEVICES: L PIV SL  TELEMETRY RHYTHM: NSR  SKIN: Intact, blanchable redness to both knees  TESTS/PROCEDURES: None this shift.   D/C DAY/GOALS/PLACE: Pending  OTHER IMPORTANT INFO: STORY,  productive cough with clear sputum. LS expiratory wheezes. [facility] following. On Q8 solumedrol, baseline on room air at home. Pt given ativan X2 for anxiety. Pt educated to COPD

## 2024-04-16 LAB — GLUCOSE BLDC GLUCOMTR-MCNC: 81 MG/DL (ref 70–99)

## 2024-04-16 PROCEDURE — 999N000157 HC STATISTIC RCP TIME EA 10 MIN

## 2024-04-16 PROCEDURE — 94640 AIRWAY INHALATION TREATMENT: CPT | Mod: 76

## 2024-04-16 PROCEDURE — 120N000001 HC R&B MED SURG/OB

## 2024-04-16 PROCEDURE — 250N000013 HC RX MED GY IP 250 OP 250 PS 637: Performed by: INTERNAL MEDICINE

## 2024-04-16 PROCEDURE — 99232 SBSQ HOSP IP/OBS MODERATE 35: CPT | Performed by: INTERNAL MEDICINE

## 2024-04-16 PROCEDURE — 250N000009 HC RX 250: Performed by: INTERNAL MEDICINE

## 2024-04-16 PROCEDURE — 250N000012 HC RX MED GY IP 250 OP 636 PS 637: Performed by: INTERNAL MEDICINE

## 2024-04-16 PROCEDURE — 94640 AIRWAY INHALATION TREATMENT: CPT

## 2024-04-16 PROCEDURE — 250N000013 HC RX MED GY IP 250 OP 250 PS 637: Performed by: PHYSICIAN ASSISTANT

## 2024-04-16 PROCEDURE — 250N000009 HC RX 250: Performed by: PHYSICIAN ASSISTANT

## 2024-04-16 PROCEDURE — 250N000011 HC RX IP 250 OP 636: Performed by: INTERNAL MEDICINE

## 2024-04-16 RX ORDER — METHYLPREDNISOLONE SODIUM SUCCINATE 40 MG/ML
40 INJECTION, POWDER, LYOPHILIZED, FOR SOLUTION INTRAMUSCULAR; INTRAVENOUS EVERY 8 HOURS
Status: DISCONTINUED | OUTPATIENT
Start: 2024-04-16 | End: 2024-04-18

## 2024-04-16 RX ADMIN — AZITHROMYCIN DIHYDRATE 250 MG: 250 TABLET ORAL at 09:32

## 2024-04-16 RX ADMIN — ALBUTEROL SULFATE 2 PUFF: 108 INHALANT RESPIRATORY (INHALATION) at 09:36

## 2024-04-16 RX ADMIN — ALBUTEROL SULFATE 2 PUFF: 108 INHALANT RESPIRATORY (INHALATION) at 14:00

## 2024-04-16 RX ADMIN — IPRATROPIUM BROMIDE AND ALBUTEROL SULFATE 3 ML: .5; 3 SOLUTION RESPIRATORY (INHALATION) at 07:51

## 2024-04-16 RX ADMIN — LORAZEPAM 0.5 MG: 0.5 TABLET ORAL at 15:30

## 2024-04-16 RX ADMIN — Medication 1 TABLET: at 09:32

## 2024-04-16 RX ADMIN — IPRATROPIUM BROMIDE AND ALBUTEROL SULFATE 3 ML: .5; 3 SOLUTION RESPIRATORY (INHALATION) at 11:46

## 2024-04-16 RX ADMIN — BUDESONIDE 0.5 MG: 0.5 INHALANT RESPIRATORY (INHALATION) at 19:55

## 2024-04-16 RX ADMIN — IPRATROPIUM BROMIDE AND ALBUTEROL SULFATE 3 ML: .5; 3 SOLUTION RESPIRATORY (INHALATION) at 19:55

## 2024-04-16 RX ADMIN — LORAZEPAM 0.5 MG: 0.5 TABLET ORAL at 21:06

## 2024-04-16 RX ADMIN — BUDESONIDE 0.5 MG: 0.5 INHALANT RESPIRATORY (INHALATION) at 07:51

## 2024-04-16 RX ADMIN — GUAIFENESIN 1200 MG: 600 TABLET, EXTENDED RELEASE ORAL at 09:32

## 2024-04-16 RX ADMIN — IPRATROPIUM BROMIDE AND ALBUTEROL SULFATE 3 ML: .5; 3 SOLUTION RESPIRATORY (INHALATION) at 15:59

## 2024-04-16 RX ADMIN — PREDNISONE 40 MG: 20 TABLET ORAL at 09:32

## 2024-04-16 RX ADMIN — ALBUTEROL SULFATE 2 PUFF: 108 INHALANT RESPIRATORY (INHALATION) at 03:02

## 2024-04-16 RX ADMIN — LORAZEPAM 0.5 MG: 0.5 TABLET ORAL at 09:32

## 2024-04-16 RX ADMIN — PANTOPRAZOLE SODIUM 40 MG: 40 TABLET, DELAYED RELEASE ORAL at 06:30

## 2024-04-16 RX ADMIN — BENZONATATE 100 MG: 100 CAPSULE ORAL at 11:09

## 2024-04-16 RX ADMIN — METHYLPREDNISOLONE SODIUM SUCCINATE 40 MG: 40 INJECTION, POWDER, FOR SOLUTION INTRAMUSCULAR; INTRAVENOUS at 17:08

## 2024-04-16 RX ADMIN — GUAIFENESIN 1200 MG: 600 TABLET, EXTENDED RELEASE ORAL at 21:06

## 2024-04-16 ASSESSMENT — ACTIVITIES OF DAILY LIVING (ADL)
ADLS_ACUITY_SCORE: 21
ADLS_ACUITY_SCORE: 24
ADLS_ACUITY_SCORE: 21
ADLS_ACUITY_SCORE: 20
ADLS_ACUITY_SCORE: 21
ADLS_ACUITY_SCORE: 20
ADLS_ACUITY_SCORE: 23
ADLS_ACUITY_SCORE: 20
ADLS_ACUITY_SCORE: 21
ADLS_ACUITY_SCORE: 23
ADLS_ACUITY_SCORE: 21
ADLS_ACUITY_SCORE: 24
ADLS_ACUITY_SCORE: 21
DEPENDENT_IADLS:: INDEPENDENT
ADLS_ACUITY_SCORE: 24
ADLS_ACUITY_SCORE: 24
ADLS_ACUITY_SCORE: 21

## 2024-04-16 NOTE — PLAN OF CARE
Goal Outcome Evaluation:      Summary: COPD Exacerbation  DATE & TIME: 4/15/24 to 4/16/24 [id number] Cognitive Concerns/ Orientation : AOx4   BEHAVIOR & AGGRESSION TOOL COLOR: Green  CIWA SCORE: NA   ABNL VS/O2: VSS on O2 @ 1 lpm  MOBILITY: ind  PAIN MANAGMENT: denies  DIET: regular  BOWEL/BLADDER: continent  ABNL LAB/BG: Glucose 163  DRAIN/DEVICES: PIV SL  TELEMETRY RHYTHM: NA  SKIN: Intact, blanchable redness to both knees.  TESTS/PROCEDURES: NA  D/C DATE: pending  OTHER IMPORTANT INFO: dyspneic at rest, increased STORY, productive cough with clear sputum. LS expiratory wheezes - albuterol emergency inhaletr at the bedside (ok per MD). Albuterol prn x1. [facility] following.

## 2024-04-16 NOTE — PLAN OF CARE
Goal Outcome Evaluation:         Summary:  COPD Exacerbation  DATE & TIME: 4/15/24, 2864-8563  Cognitive Concerns/ Orientation : A&OX4   BEHAVIOR & AGGRESSION TOOL COLOR: Green  ABNL VS/O2: VSS on room air, needs O2 1 LPM with ambulation  MOBILITY: IND  PAIN MANAGMENT: Denies  DIET: Regular, good appetite  BOWEL/BLADDER: Continent, BM+  ABNL LAB/BG: ---  DRAIN/DEVICES:  PIV SL  SKIN: Intact, blanchable redness to both knees  TESTS/PROCEDURES: None this shift.   D/C DAY/GOALS/PLACE: Pending- likely tomorrow?  OTHER IMPORTANT INFO: dyspneic at rest & increased STORY,  productive cough with clear sputum. LS expiratory wheezes- albuterol emergency inhaler at the bedside (ok per MD) . [facility] following.

## 2024-04-16 NOTE — PROGRESS NOTES
"[facility]    Medicine Progress Note - Hospitalist Service    Date of Admission:  4/12/2024    Assessment & Plan     Tammie Zeng is a 68 year old female with PMHx of non-oxygen dependent COPD, ongoing tobacco use, RODNEY, and hepatic steatosis admitted on 4/12/2024. She presented with worsening SOB in the setting of recent URI. Admitted for further evaluation of COPD exacerbation.      Acute hypoxic respiratory failure in the setting of COPD exacerbation   Ongoing tobacco use  Known pulmonary nodules: Follows with [facility]. Last clinic visit 1/2024. Onset of viral illness with nasal and chest congestion, productive cough, sore throat, chills on 4/7 with progressively worsening STORY and chest heaviness. No dizziness, syncope. No radiation of chest pain to arm or jaw. No recent travel, prolonged car rides. Notes that she has been \"sitting around\" a lot. No LIAN. No personal or family hx of bleeding coagulopathy or VTE. No recent surgery. No recent Covid. Has to sleep with head elevated at night. Pt continues to smoke 0.5 ppd.   *On admission, tachycardic to 117, hypoxic to 72% on room air with ambulation.   *CMP, CBC WNL. Procal 0.06. Trop 10. D--dimer 0.52 (WNL age adjusted), . Covid, influenza, RSV negative. EKG sinus tachy. CXR negative.   *Received duoneb X2, Mag 2 g, Solumedrol 125 mg X1, 500 ml bolus. Reports some improvement in sx, still with mild wheezing and poor lung exchange on admission exam.   *Hospitalization 8/2023 and 2/2024 for COPD exacerbation. CT chest 1/4/2024 with small pulmonary nodules measuring up to 3 mm, emphysema, biapical pleural parenchymal scarring, mild bronchial wall thickening.   - Admitted  to inpatient status   Patient is very wheezy on 4/13/2024  - IV solumedrol 60 mg BID increase Solu-Medrol to 60 mg every 8 hours to help with wheezing  - Duonebs q4 hrs while awake, PRN albuterol   - Azithromycin X5 days given COPD exacerbation with new productive " cough   - Per med rec, reports not taking Trelegy Ellipta, continue PTA Pulmicort   - Scheduled mucinex   - Supplemental oxygen   And he is desatting to the 70s to 80s with ambulation.  Most likely will need oxygen on discharge  - Outpatient Pulm follow-up with [facility] planned in June, pt potentially interested in having home steroids on hand if possible   Serial tropes tropes normal 10-8    Discussed with patient about the importance of quitting smoking.  She wants to stop cold turkey by herself     : Echo reviewed.   1. Left ventricular systolic function is normal. The visual ejection fraction  is 60-65%.  2. No regional wall motion abnormalities noted.  3. The right ventricle is normal in structure, function and size.  4. No evidence for significant valvular pathology.    Continue the same treatment with IV steroids, nebulizers and Z-Bonilla, oxygen supplementation on 4/14/2024    Patient is slow to improve.  Most likely will need oxygen on discharge  Needs Home oxygen assessment prior to discharge    Nocturnal oximetry study done in the hospital on 2 L of oxygen which showed no desatting on 2 L of oxygen    Patient was placed to oral prednisone 40 mg daily on 4/15/2024.  Patient very short of breath and wheezing on 4/16/2024  Restart Solu-Medrol 40 mg every 8 hours until respiratory status improves    Moderate malnutrition;  Supplements per nutrition services          RODNEY: Ativan PRN for anxiety (rarely uses)      Hepatic steatosis: Stable, noted on US 4/3/2023. Follow up outpatient.      Diet: Regular Diet Adult  DVT Prophylaxis: Pneumatic Compression Devices and Ambulate every shift  Royal Catheter: Not present  Lines: None     Cardiac Monitoring: None  Code Status: No CPR- Do NOT Intubate, confirmed with patient. Daughter at bedside. [facility] consult ordered to fill out POLST (pt does not have one filled out).            Clinically Significant Risk Factors Present on Admission                # Drug Induced  Platelet Defect: home medication list includes an antiplatelet medication     # Acute Respiratory Failure: Documented O2 saturation < 91%.  Continue supplemental oxygen as needed                     Clinically Significant Risk Factors                          # Moderate Malnutrition: based on nutrition assessment           Disposition Plan     Medically Ready for Discharge: Anticipated in 2-4 Days    Once respiratory status, shortness of breath improves and need for oxygen goes down     Discussed with bedside RN and patient      Bere Scott MD  Hospitalist Service  [facility]  Securely message with [employer] (more info)  Text page via [email or web address] Paging/Directory   ______________________________________________________________________    Interval History   Patient is complaining of worsening shortness of breath since 4/15/2024.  Diffusely wheezing.  Productive cough.  Currently on 1 L of oxygen by nasal cannula.  No other acute issues    Physical Exam   Vital Signs: Temp: 98.6  F (37  C) Temp src: Oral BP: 133/80 Pulse: 83   Resp: 20 SpO2: 98 % O2 Device: Nasal cannula Oxygen Delivery: 1 LPM  Weight: 105 lbs 1.6 oz    General Appearance: Alert awake, not in acute distress  Respiratory: Bilateral diffuse.  Expiratory wheezing heard on auscultation  Cardiovascular: Normal rate rhythm regular  GI: Soft, nontender nondistended bowel sounds positive  Skin:   Other:      Medical Decision Making       49 MINUTES SPENT BY ME on the date of service doing chart review, history, exam, documentation & further activities per the note.      Data         Imaging results reviewed over the past 24 hrs:   No results found for this or any previous visit (from the past 24 hour(s)).

## 2024-04-16 NOTE — PLAN OF CARE
Goal Outcome Evaluation:               Summary: COPD Exacerbation  DATE & TIME: 4/16/24 1036-8017 Cognitive Concerns/ Orientation : AOx4, anxious- ativan helpful for anxiety and breathing  BEHAVIOR & AGGRESSION TOOL COLOR: Green   ABNL VS/O2: VSS on O2 @ 1 lpm  MOBILITY: ind- calls for help if feeling too weak/winded  PAIN MANAGMENT: denies  DIET: regular, poor appetite- needs encouragement.  BOWEL/BLADDER: continent  ABNL LAB-----  DRAIN/DEVICES: PIV SL  SKIN: Intact, blanchable redness to both knees., scattered bruising  TESTS/PROCEDURES: NA  D/C DATE: pending  OTHER IMPORTANT INFO: dyspneic at rest, increased STORY, non productive cough. LS expiratory wheezes - steroids switched back to IV from PO. Scheduled nebs per RT, albuterol emergency inhaler at the bedside (ok per MD). [facility] following.

## 2024-04-16 NOTE — CONSULTS
Care Management Initial Consult    General Information  Assessment completed with: Patient, Children, Daughter Sonia  Type of CM/SW Visit: Initial Assessment    Primary Care Provider verified and updated as needed: Yes   Readmission within the last 30 days: no previous admission in last 30 days      Reason for Consult: discharge planning  Advance Care Planning: Advance Care Planning Reviewed: questions answered, other (see comments)  Patient was given a [place] [facility] form       Communication Assessment  Patient's communication style: spoken language (English or Bilingual)    Hearing Difficulty or Deaf: no   Wear Glasses or Blind: no    Cognitive  Cognitive/Neuro/Behavioral: .WDL except, mood/behavior        Orientation: oriented x 4, other (see comments) (forgetful)  Mood/Behavior: calm, cooperative          Living Environment:   People in home: alone     Current living Arrangements: house      Able to return to prior arrangements: other (see comments)  Living Arrangement Comments: Will need PT/OT assessment.  Patient exhibiting much dyspnea with very little activity    Family/Social Support:  Care provided by: self  Provides care for: no one, unable/limited ability to care for self     Children          Description of Support System: Supportive    Support Assessment: Lacks necessary supervision and assistance, Limited social contact and support, Minimal outside structure and leisure time activities    Current Resources:   Patient receiving home care services: No     Community Resources: None  Equipment currently used at home: none  Supplies currently used at home: Nebulizer tubing    Employment/Financial:  Employment Status: retired     Employment/ Comments: Retired   Financial Concerns: none           Does the patient's insurance plan have a 3 day qualifying hospital stay waiver?  Yes     Which insurance plan 3 day waiver is available? Alternative insurance waiver    Will the waiver be used for  post-acute placement? Undetermined at this time    Lifestyle & Psychosocial Needs:  Social Determinants of Health     Food Insecurity: Low Risk  (2/15/2024)    Food Insecurity     Within the past 12 months, did you worry that your food would run out before you got money to buy more?: No     Within the past 12 months, did the food you bought just not last and you didn t have money to get more?: No   Depression: Not at risk (2/27/2024)    PHQ-2     PHQ-2 Score: 1   Housing Stability: Low Risk  (2/15/2024)    Housing Stability     Do you have housing? : Yes     Are you worried about losing your housing?: No   Tobacco Use: High Risk (2/15/2024)    Patient History     Smoking Tobacco Use: Every Day     Smokeless Tobacco Use: Never     Passive Exposure: Not on file   Financial Resource Strain: Low Risk  (2/15/2024)    Financial Resource Strain     Within the past 12 months, have you or your family members you live with been unable to get utilities (heat, electricity) when it was really needed?: No   Alcohol Use: Not on file   Transportation Needs: Low Risk  (2/15/2024)    Transportation Needs     Within the past 12 months, has lack of transportation kept you from medical appointments, getting your medicines, non-medical meetings or appointments, work, or from getting things that you need?: No   Physical Activity: Insufficiently Active (2/15/2024)    Exercise Vital Sign     Days of Exercise per Week: 1 day     Minutes of Exercise per Session: 20 min   Interpersonal Safety: Low Risk  (2/27/2024)    Interpersonal Safety     Do you feel physically and emotionally safe where you currently live?: Yes     Within the past 12 months, have you been hit, slapped, kicked or otherwise physically hurt by someone?: No     Within the past 12 months, have you been humiliated or emotionally abused in other ways by your partner or ex-partner?: No   Stress: No Stress Concern Present (2/15/2024)    [employer] -  "Occupational Stress Questionnaire     Feeling of Stress : Only a little   Social Connections: Unknown (2/15/2024)    Social Connection and Isolation Panel [NHANES]     Frequency of Communication with Friends and Family: Not on file     Frequency of Social Gatherings with Friends and Family: Twice a week     Attends Sikhism Services: Not on file     Active Member of Clubs or Organizations: Not on file     Attends Club or Organization Meetings: Not on file     Marital Status: Not on file   Health Literacy: Not on file       Functional Status:  Prior to admission patient needed assistance:   Dependent ADLs:: Independent  Dependent IADLs:: Independent       Mental Health Status:  Mental Health Status: No Current Concerns       Chemical Dependency Status:  Chemical Dependency Status: No Current Concerns             Values/Beliefs:  Spiritual, Cultural Beliefs, Sikhism Practices, Values that affect care: no               Additional Information:  Care Coordinator met with patient and her Daughter Sonia.  The role of care management was explained.  Patient has a history of COPD.  She has been following with [facility] and is currently enrolled in outpatient [facility]monary Rehab thru [facility]..  Patient has a home nebulizer that she does use.  Patient does not have a rescue inhaler and has never used home oxygen.  Patient does smoke cigarettes and has trialed many different means to help her quit, but nothing has helped her.  She feels she will just need to quit \"cold turkey\".  Patient lives in a house with a few steps to enter and a few to go out to her back yard, otherwise it is main floor living.  Patient and her daughter both shared it is getting increasingly difficult for patient to maintain living alone due to her dyspnea.  Patient would like to transition to a living environment where she has more support and shared she can wake up in the night and be so short of breath that she just slumps " over side ways in bed until she gains enough strength to reposition herself.  Discussed TCU vs home with home care support.  Sonia noted she really worries about her mother at night.  Sonia is an only child and has a young child herself, so would not be able to stay with her mother.  Patient will most likely require oxygen at discharge.  Will ask for PT/OT assessments, as her insurance will need both PT and OT to authorize rehab benefits.  Discussed contacting [facility] with Sonia.  She was also given the brochure for safety alert systems.  Care Management will continue to follow for safe discharge planning and seek the help from the  pending therapy recommendations.    Pat Danielle RN  Inpatient Care Management  [phone]

## 2024-04-17 ENCOUNTER — APPOINTMENT (OUTPATIENT)
Dept: OCCUPATIONAL THERAPY | Facility: CLINIC | Age: 69
DRG: 190 | End: 2024-04-17
Attending: INTERNAL MEDICINE
Payer: COMMERCIAL

## 2024-04-17 PROCEDURE — 97165 OT EVAL LOW COMPLEX 30 MIN: CPT | Mod: GO

## 2024-04-17 PROCEDURE — 250N000009 HC RX 250: Performed by: PHYSICIAN ASSISTANT

## 2024-04-17 PROCEDURE — 250N000013 HC RX MED GY IP 250 OP 250 PS 637: Performed by: PHYSICIAN ASSISTANT

## 2024-04-17 PROCEDURE — 250N000013 HC RX MED GY IP 250 OP 250 PS 637: Performed by: INTERNAL MEDICINE

## 2024-04-17 PROCEDURE — 97530 THERAPEUTIC ACTIVITIES: CPT | Mod: GO

## 2024-04-17 PROCEDURE — 99232 SBSQ HOSP IP/OBS MODERATE 35: CPT | Performed by: INTERNAL MEDICINE

## 2024-04-17 PROCEDURE — 94640 AIRWAY INHALATION TREATMENT: CPT | Mod: 76

## 2024-04-17 PROCEDURE — 250N000009 HC RX 250: Performed by: INTERNAL MEDICINE

## 2024-04-17 PROCEDURE — 94640 AIRWAY INHALATION TREATMENT: CPT

## 2024-04-17 PROCEDURE — 120N000001 HC R&B MED SURG/OB

## 2024-04-17 PROCEDURE — 250N000011 HC RX IP 250 OP 636: Performed by: INTERNAL MEDICINE

## 2024-04-17 PROCEDURE — 999N000157 HC STATISTIC RCP TIME EA 10 MIN

## 2024-04-17 RX ADMIN — IPRATROPIUM BROMIDE AND ALBUTEROL SULFATE 3 ML: .5; 3 SOLUTION RESPIRATORY (INHALATION) at 11:37

## 2024-04-17 RX ADMIN — LORAZEPAM 0.5 MG: 0.5 TABLET ORAL at 09:56

## 2024-04-17 RX ADMIN — ALBUTEROL SULFATE 2 PUFF: 108 INHALANT RESPIRATORY (INHALATION) at 16:24

## 2024-04-17 RX ADMIN — LORAZEPAM 0.5 MG: 0.5 TABLET ORAL at 20:09

## 2024-04-17 RX ADMIN — BENZONATATE 100 MG: 100 CAPSULE ORAL at 06:40

## 2024-04-17 RX ADMIN — BENZONATATE 100 MG: 100 CAPSULE ORAL at 13:20

## 2024-04-17 RX ADMIN — PANTOPRAZOLE SODIUM 40 MG: 40 TABLET, DELAYED RELEASE ORAL at 06:40

## 2024-04-17 RX ADMIN — IPRATROPIUM BROMIDE AND ALBUTEROL SULFATE 3 ML: .5; 3 SOLUTION RESPIRATORY (INHALATION) at 19:32

## 2024-04-17 RX ADMIN — BUDESONIDE 0.5 MG: 0.5 INHALANT RESPIRATORY (INHALATION) at 07:31

## 2024-04-17 RX ADMIN — METHYLPREDNISOLONE SODIUM SUCCINATE 40 MG: 40 INJECTION, POWDER, FOR SOLUTION INTRAMUSCULAR; INTRAVENOUS at 00:36

## 2024-04-17 RX ADMIN — METHYLPREDNISOLONE SODIUM SUCCINATE 40 MG: 40 INJECTION, POWDER, FOR SOLUTION INTRAMUSCULAR; INTRAVENOUS at 09:18

## 2024-04-17 RX ADMIN — GUAIFENESIN 1200 MG: 600 TABLET, EXTENDED RELEASE ORAL at 09:15

## 2024-04-17 RX ADMIN — IPRATROPIUM BROMIDE AND ALBUTEROL SULFATE 3 ML: .5; 3 SOLUTION RESPIRATORY (INHALATION) at 07:31

## 2024-04-17 RX ADMIN — Medication 1 TABLET: at 09:16

## 2024-04-17 RX ADMIN — METHYLPREDNISOLONE SODIUM SUCCINATE 40 MG: 40 INJECTION, POWDER, FOR SOLUTION INTRAMUSCULAR; INTRAVENOUS at 16:19

## 2024-04-17 RX ADMIN — BUDESONIDE 0.5 MG: 0.5 INHALANT RESPIRATORY (INHALATION) at 19:32

## 2024-04-17 RX ADMIN — IPRATROPIUM BROMIDE AND ALBUTEROL SULFATE 3 ML: .5; 3 SOLUTION RESPIRATORY (INHALATION) at 15:33

## 2024-04-17 RX ADMIN — GUAIFENESIN 1200 MG: 600 TABLET, EXTENDED RELEASE ORAL at 20:09

## 2024-04-17 ASSESSMENT — ACTIVITIES OF DAILY LIVING (ADL)
ADLS_ACUITY_SCORE: 29
ADLS_ACUITY_SCORE: 24
ADLS_ACUITY_SCORE: 28
ADLS_ACUITY_SCORE: 29
ADLS_ACUITY_SCORE: 28
ADLS_ACUITY_SCORE: 29
ADLS_ACUITY_SCORE: 28
ADLS_ACUITY_SCORE: 28
ADLS_ACUITY_SCORE: 24
ADLS_ACUITY_SCORE: 24
IADL_COMMENTS: PT REPORTS BEING IND W/ ALL IADL'S AT BASELINE PRIOR TO ADMISSION. PT REPORTS THAT THEY STILL DRIVE.
ADLS_ACUITY_SCORE: 24
ADLS_ACUITY_SCORE: 28
ADLS_ACUITY_SCORE: 23
ADLS_ACUITY_SCORE: 29
ADLS_ACUITY_SCORE: 24
ADLS_ACUITY_SCORE: 28
ADLS_ACUITY_SCORE: 29
ADLS_ACUITY_SCORE: 28

## 2024-04-17 NOTE — PROGRESS NOTES
"CLINICAL NUTRITION SERVICES - REASSESSMENT NOTE    Malnutrition: (4/13)  % Weight Loss:  Weight loss does not meet criteria for malnutrition  % Intake:  <75% for >/= 3 months (moderate malnutrition)  Subcutaneous Fat Loss:  Upper arm region Mild depletion  Muscle Loss:  Temporal region Moderate depletion and Clavicle bone region Mild depletion  Fluid Retention:  None noted     Malnutrition Diagnosis: Moderate malnutrition  In Context of:  Acute illness or injury  Chronic illness or disease     EVALUATION OF PROGRESS TOWARD GOALS   Diet:  Regular Diet    Intake/Tolerance:    - Spoke with patient at bedside. Per patient, she has no appetite d/t just no interest in food, \"all I want is hot coffee for my throat\". She said she has been ordering a lot of make-your-own omelets. Ensure trial sent last week and patient said it was \"okay\" but doesn't want it automatically sent/ordered. Introduced other ONS options like magic cup or Gelatein but pt politely declined. Encouraged adequate intakes and encouraged patient to order a protein food at each meal.   - % intakes documented per flowsheets  - Ordering 2-3 meals/day per [facility]    ASSESSED NUTRITION NEEDS:  Dosing Weight 46.7 kg  Estimated Energy Needs: 6635-6560 kcals (30-35 Kcal/Kg)  Justification: repletion and underweight  Estimated Protein Needs: 56-70 grams protein (1.2-1.5 g pro/Kg)  Justification: Repletion  Estimated Fluid Needs: 1 mL/kcal or per provider pending fluid status    NEW FINDINGS:   Weight: trending up  04/17/24 0643 50.6 kg (111 lb 8.8 oz) Bed scale   04/15/24 0631 47.7 kg (105 lb 1.6 oz) --   04/14/24 0500 45.2 kg (99 lb 10.4 oz) Bed scale   04/12/24 0654 46.7 kg (103 lb) --     Disposition: potentially discharge tomorrow pending PT/OT evaluations    Previous Goals:   Patient to consume % of nutritionally adequate meal trays TID, or the equivalent with supplements/snacks   Evaluation: Not met    Previous Nutrition Diagnosis: "   Inadequate oral intake related to poor appetite, need of softer foods for dentures as evidenced by pt report of poor appetite x4 months accompanied w/ 6.4% weight loss in 3 months   Evaluation: No change    CURRENT NUTRITION DIAGNOSIS  Inadequate oral intake related to poor appetite, need of softer foods for dentures as evidenced by pt report of poor appetite x4 months accompanied w/ 6.4% weight loss in 3 months     INTERVENTIONS  Recommendations / Nutrition Prescription  Continue current diet    Implementation  Multivitamin/Mineral - continue    Goals  Patient to consume % of TID meals    MONITORING AND EVALUATION:  Progress towards goals will be monitored and evaluated per protocol and Practice Guidelines    Cheri Vanessa RD, LD  Clinical Dietitian - [facility]

## 2024-04-17 NOTE — PROGRESS NOTES
Patient has been assessed for Home Oxygen needs. Oxygen readings:    *Pulse oximetry (SpO2) = 86% on room air at rest while awake.    *SpO2 improved to 95% on 1 liters/minute at rest.    *SpO2 = 86% on room air during activity/with exercise.    *SpO2 improved to 91% on 1 liters/minute during activity/with exercise.

## 2024-04-17 NOTE — PLAN OF CARE
DATE & TIME: 04/17/2024 7404-4835    Cognitive Concerns/ Orientation : A&O X4   BEHAVIOR & AGGRESSION TOOL COLOR: Green   ABNL VS/O2: VSS on 1 LPM via NC, Activity intolerant O2 saturation at 90-92 on 1 LPM while ambulating. Desat's to mid 80's on RA. STORY/SOB present. Anxious at times with air hunger, PRN ativan used x 1.   MOBILITY: Independent to Bathroom, Pt takes frequent breaks when walking. PT/OT eval pending  PAIN MANAGMENT: Pt denies pain this shift.  DIET: Regular diet,  pt eating small amounts.   BOWEL/BLADDER: Pt is continent, will ambulate to bathroom without assistants. Reports BMx2 this morning  ABNL LAB/BG: no new labs  DRAIN/DEVICES: L PIV SL, interm IV solumedrol.   TELEMETRY RHYTHM: N/A  SKIN: Intact   TESTS/PROCEDURES: N/A  Discharge Barriers: Pending, PT/OT eval- Pt was evaluated for Home Oxygen-will be needing o2 at discharge.   OTHER IMPORTANT INFO: N/A

## 2024-04-17 NOTE — PLAN OF CARE
Goal Outcome Evaluation:    Summary: COPD Exacerbation  DATE & TIME: 4/16/24 9020-1497  Cognitive Concerns/ Orientation : AOx4  BEHAVIOR & AGGRESSION TOOL COLOR: Green   ABNL VS/O2: VSS on O2 @ 1 lpm  MOBILITY: ind- dyspnea on exertion  PAIN MANAGEMENT: denies pain this shift  DIET: regular  BOWEL/BLADDER: continent  ABNL LAB-----  DRAIN/DEVICES: PIV SL  SKIN: Intac: scattered bruising  TESTS/PROCEDURES: NA  D/C DATE: pending  OTHER IMPORTANT INFO: LS expiratory wheezes - steroids switched back to IV from PO. Scheduled nebs per RT, albuterol emergency inhaler at the bedside (ok per MD). [facility] following, PRN Tessalon given for cough

## 2024-04-17 NOTE — PLAN OF CARE
Summary: COPD Exacerbation  DATE & TIME: 4/17/24 5778-4025  Cognitive Concerns/ Orientation : Aox4; anxious, cooperative, pleasant  BEHAVIOR & AGGRESSION TOOL COLOR: Green       ABNL VS/O2: VSS on O2 @ 1L NC  MOBILITY: Ind- dyspnea on exertion  PAIN MANAGEMENT: Denies  DIET: Regular, good appetite  BOWEL/BLADDER: Continent, up to BR  ABNL LAB: None  DRAIN/DEVICES: PIV SL with int IV solumedrol   SKIN: Intac: scattered bruising  TESTS/PROCEDURES: N/A  D/C DATE: Possible discharge tomorrow per MD note.   OTHER IMPORTANT INFO: LS expiratory wheezes - scheduled nebs per RT, albuterol emergency inhaler at the bedside (ok per MD). [facility]. Home O2 assessment completed. OT following. PT consult ordered.

## 2024-04-17 NOTE — PROGRESS NOTES
"[facility]    Medicine Progress Note - Hospitalist Service    Date of Admission:  4/12/2024    Assessment & Plan     Tammie Zeng is a 68 year old female with PMHx of non-oxygen dependent COPD, ongoing tobacco use, RODNEY, and hepatic steatosis admitted on 4/12/2024. She presented with worsening SOB in the setting of recent URI. Admitted for further evaluation of COPD exacerbation.      Acute hypoxic respiratory failure in the setting of COPD exacerbation   Ongoing tobacco use  Known pulmonary nodules: Follows with [facility]. Last clinic visit 1/2024. Onset of viral illness with nasal and chest congestion, productive cough, sore throat, chills on 4/7 with progressively worsening STORY and chest heaviness. No dizziness, syncope. No radiation of chest pain to arm or jaw. No recent travel, prolonged car rides. Notes that she has been \"sitting around\" a lot. No LIAN. No personal or family hx of bleeding coagulopathy or VTE. No recent surgery. No recent Covid. Has to sleep with head elevated at night. Pt continues to smoke 0.5 ppd.   *On admission, tachycardic to 117, hypoxic to 72% on room air with ambulation.   *CMP, CBC WNL. Procal 0.06. Trop 10. D--dimer 0.52 (WNL age adjusted), . Covid, influenza, RSV negative. EKG sinus tachy. CXR negative.   *Received duoneb X2, Mag 2 g, Solumedrol 125 mg X1, 500 ml bolus. Reports some improvement in sx, still with mild wheezing and poor lung exchange on admission exam.   *Hospitalization 8/2023 and 2/2024 for COPD exacerbation. CT chest 1/4/2024 with small pulmonary nodules measuring up to 3 mm, emphysema, biapical pleural parenchymal scarring, mild bronchial wall thickening.   - Admitted  to inpatient status   Patient is very wheezy on 4/13/2024  - IV solumedrol 60 mg BID increase Solu-Medrol to 60 mg every 8 hours to help with wheezing  - Duonebs q4 hrs while awake, PRN albuterol   - Azithromycin X5 days given COPD exacerbation with new productive " cough   - Per med rec, reports not taking Trelegy Ellipta, continue PTA Pulmicort   - Scheduled mucinex   - Supplemental oxygen   And he is desatting to the 70s to 80s with ambulation.  Most likely will need oxygen on discharge  - Outpatient Pulm follow-up with [facility] planned in June, pt potentially interested in having home steroids on hand if possible   Serial tropes tropes normal 10-8    Discussed with patient about the importance of quitting smoking.  She wants to stop cold turkey by herself     : Echo reviewed.   1. Left ventricular systolic function is normal. The visual ejection fraction  is 60-65%.  2. No regional wall motion abnormalities noted.  3. The right ventricle is normal in structure, function and size.  4. No evidence for significant valvular pathology.    Continue the same treatment with IV steroids, nebulizers and Z-Bonilla, oxygen supplementation on 4/14/2024    Patient is slow to improve.  Most likely will need oxygen on discharge  Needs Home oxygen assessment prior to discharge    Nocturnal oximetry study done in the hospital on 2 L of oxygen which showed no desatting on 2 L of oxygen    Patient was placed to oral prednisone 40 mg daily on 4/15/2024.  Patient very short of breath and wheezing on 4/16/2024  Restarted  Solu-Medrol 40 mg every 8 hours on 4/16/2024 until respiratory status improves    Patient feels that her shortness of breath is improving less wheezy today    PT OT consultations requested for safe discharge disposition    Moderate malnutrition;  Supplements per nutrition services          RODNEY: Ativan PRN for anxiety (rarely uses)      Hepatic steatosis: Stable, noted on US 4/3/2023. Follow up outpatient.      Diet: Regular Diet Adult  DVT Prophylaxis: Pneumatic Compression Devices and Ambulate every shift  Royal Catheter: Not present  Lines: None     Cardiac Monitoring: None  Code Status: No CPR- Do NOT Intubate, confirmed with patient. Daughter at bedside. Advanced Care consult  ordered to fill out POLST (pt does not have one filled out).            Clinically Significant Risk Factors Present on Admission                # Drug Induced Platelet Defect: home medication list includes an antiplatelet medication     # Acute Respiratory Failure: Documented O2 saturation < 91%.  Continue supplemental oxygen as needed                     Clinically Significant Risk Factors                          # Moderate Malnutrition: based on nutrition assessment    # Financial/Environmental Concerns: none         Disposition Plan     Medically Ready for Discharge: Possible tomorrow depending on PT OT evaluations    Once respiratory status, shortness of breath improves and need for oxygen goes down     Discussed with bedside RN and patient      Bere Scott MD  Hospitalist Service  [facility]  Securely message with [employer] (more info)  Text page via [employer] Paging/Directory   ______________________________________________________________________    Interval History   She feels slightly better.  Shortness of breath improving.  Productive cough is improving.  Needs 1 to 2 L oxygen by nasal cannula on discharge, on oxygen assessment.  PT OT ordered, no other acute issues     Physical Exam   Vital Signs: Temp: 98.5  F (36.9  C) Temp src: Oral BP: (!) 144/86 Pulse: 80   Resp: 19 SpO2: 95 % O2 Device: Nasal cannula Oxygen Delivery: 1 LPM  Weight: 111 lbs 8.84 oz    General Appearance: Alert awake, not in acute distress  Respiratory: Good air entry, occasional wheezing heard, less wheezy today  Cardiovascular: Normal rate rhythm regular  GI: Soft, nontender nondistended bowel sounds positive  Skin:   Other:      Medical Decision Making       49 MINUTES SPENT BY ME on the date of service doing chart review, history, exam, documentation & further activities per the note.      Data         Imaging results reviewed over the past 24 hrs:   No results found for this or any previous visit (from the  past 24 hour(s)).

## 2024-04-17 NOTE — PROGRESS NOTES
04/17/24 1425   Appointment Info   Signing Clinician's Name / Credentials (OT) Vaibhav Jackson OTR/L   Living Environment   People in Home alone   Current Living Arrangements house   Home Accessibility stairs to enter home;stairs within home   Number of Stairs, Main Entrance 4   Number of Stairs, Within Home, Primary other (see comments)  (Full flight to basement (Does not use much))   Stair Railings, Within Home, Primary railings safe and in good condition   Transportation Anticipated family or friend will provide   Living Environment Comments Pt reports living alone in house w/ 4 ROSSANA. All needs met on one level. Tub shower.   Self-Care   Usual Activity Tolerance good   Current Activity Tolerance moderate   Equipment Currently Used at Home none   Fall history within last six months no   Activity/Exercise/Self-Care Comment Pt reports being IND w/ all ADL's at baseline prior to admission.   Instrumental Activities of Daily Living (IADL)   Previous Responsibilities meal prep;housekeeping;laundry;finances;medication management;driving   IADL Comments Pt reports being IND w/ all IADL's at baseline prior to admission. Pt reports that they still drive.   General Information   Onset of Illness/Injury or Date of Surgery 04/12/24   Referring Physician Bere Scott MD   Patient/Family Therapy Goal Statement (OT) Return to home.   Additional Occupational Profile Info/Pertinent History of Current Problem Tammie OTILIA Zeng is a 68 year old female with PMHx of non-oxygen dependent COPD, ongoing tobacco use, RODNEY, and hepatic steatosis admitted on 4/12/2024. She presented with worsening SOB in the setting of recent URI. Admitted for further evaluation of COPD exacerbation.   Existing Precautions/Restrictions fall   Cognitive Status Examination   Orientation Status orientation to person, place and time   Visual Perception   Visual Impairment/Limitations corrective lenses for reading   Sensory   Sensory Quick Adds sensation intact    Pain Assessment   Patient Currently in Pain No   Range of Motion Comprehensive   General Range of Motion no range of motion deficits identified   Strength Comprehensive (MMT)   General Manual Muscle Testing (MMT) Assessment no strength deficits identified   Bed Mobility   Bed Mobility supine-sit;sit-supine   Supine-Sit Garfield (Bed Mobility) independent   Sit-Supine Garfield (Bed Mobility) independent   Transfers   Transfers sit-stand transfer;toilet transfer   Sit-Stand Transfer   Sit-Stand Garfield (Transfers) independent   Toilet Transfer   Type (Toilet Transfer) stand-sit;sit-stand   Garfield Level (Toilet Transfer) independent   Activities of Daily Living   BADL Assessment/Intervention lower body dressing;grooming;toileting   Lower Body Dressing Assessment/Training   Garfield Level (Lower Body Dressing) independent   Grooming Assessment/Training   Garfield Level (Grooming) independent   Toileting   Garfield Level (Toileting) independent   Clinical Impression   Criteria for Skilled Therapeutic Interventions Met (OT) Yes, treatment indicated   OT Diagnosis Decreased activitiy tolerance   OT Problem List-Impairments impacting ADL problems related to;activity tolerance impaired   Assessment of Occupational Performance 1-3 Performance Deficits   Identified Performance Deficits home mgmt   Planned Therapy Interventions (OT) IADL retraining;home program guidelines;progressive activity/exercise;risk factor education   Clinical Decision Making Complexity (OT) problem focused assessment/low complexity   Risk & Benefits of therapy have been explained evaluation/treatment results reviewed;care plan/treatment goals reviewed;risks/benefits reviewed;current/potential barriers reviewed;patient   OT Total Evaluation Time   OT Eval, Low Complexity Minutes (87285) 10   OT Goals   Therapy Frequency (OT) Daily   OT Predicted Duration/Target Date for Goal Attainment 04/22/24   OT: Perform aerobic  activity with stable cardiovascular response continuous activity;5 minutes;ambulation   OT: Functional/aerobic ambulation tolerance with stable cardiovascular response in order to return to home and community environment Independent;Greater than 300 feet   OT: Navigation of stairs simulating home set up with stable cardiovascular response in order to return to home and community environment Independent;8 stairs   Interventions   Interventions Quick Adds Therapeutic Activity   Therapeutic Activities   Therapeutic Activity Minutes (86606) 20   Symptoms noted during/after treatment fatigue   Treatment Detail/Skilled Intervention Pt greeted sitting EOB w/ daughter and RN present in room; agreeable for OT evaluation. Pt on 1 L of O2 via NC throughout therapy session. Pt requesting to use bathroom. IND sit > stand EOB w/ no AD and manage own lines. Pt ambulated into bathroom and completed toileting and g/h ADL's IND. Following completion, pt returned to room and seated in chair. Educated pt on increasing activity tolerance as well as PLB. Pt able to recall techniques from last hospital stay. Time spent educating pt on home safety and completion of ADL's; pt reporting no concerns. Educated pt on use of shower chair and pt's daughter reports that they are planning on purchasing one. Pt w/ no other questions at this time. IND to stand from chair; Pt ambulated approx. 100 feet in hallway w/ no AD and close SBA. SpO2 ranged from 92-94% throughout mobility while on 1 L of O2 although pt reporting feeling fatigued/short of breath. Pt returned to room and seated EOB. Pt w/ all needs met and family present in room at end of therapy session.   OT Discharge Planning   OT Plan Energy conservation education, ambulation within hallway, stairs, 1 more session than d/c?   OT Discharge Recommendation (DC Rec) home with assist   OT Rationale for DC Rec Pt appears to be functioning near baseline but remains limited to decreased activity  tolerance. Pt lives alone but will have assist from daughter upon discharge as needed as daughter lives 20 minutes away. Pt requiring increased O2 at this time but remained >90% following activity. Anticipate pt will be safe to d/c home w/ assist from daughter as needed.   OT Brief overview of current status See above   Total Session Time   Timed Code Treatment Minutes 20   Total Session Time (sum of timed and untimed services) 30

## 2024-04-17 NOTE — PROGRESS NOTES
[id number]. A/O. Ind. Wheezes lungs, dry cough. 1L O2. Dyspnea on exertion. Ativan given once. Tolerating diet.

## 2024-04-18 ENCOUNTER — APPOINTMENT (OUTPATIENT)
Dept: PHYSICAL THERAPY | Facility: CLINIC | Age: 69
DRG: 190 | End: 2024-04-18
Attending: INTERNAL MEDICINE
Payer: COMMERCIAL

## 2024-04-18 ENCOUNTER — APPOINTMENT (OUTPATIENT)
Dept: OCCUPATIONAL THERAPY | Facility: CLINIC | Age: 69
DRG: 190 | End: 2024-04-18
Payer: COMMERCIAL

## 2024-04-18 PROCEDURE — 97530 THERAPEUTIC ACTIVITIES: CPT | Mod: GO

## 2024-04-18 PROCEDURE — 250N000009 HC RX 250: Performed by: INTERNAL MEDICINE

## 2024-04-18 PROCEDURE — 250N000011 HC RX IP 250 OP 636: Performed by: INTERNAL MEDICINE

## 2024-04-18 PROCEDURE — 120N000001 HC R&B MED SURG/OB

## 2024-04-18 PROCEDURE — 999N000157 HC STATISTIC RCP TIME EA 10 MIN

## 2024-04-18 PROCEDURE — 97161 PT EVAL LOW COMPLEX 20 MIN: CPT | Mod: GP | Performed by: PHYSICAL THERAPIST

## 2024-04-18 PROCEDURE — 250N000013 HC RX MED GY IP 250 OP 250 PS 637: Performed by: INTERNAL MEDICINE

## 2024-04-18 PROCEDURE — 97530 THERAPEUTIC ACTIVITIES: CPT | Mod: GP | Performed by: PHYSICAL THERAPIST

## 2024-04-18 PROCEDURE — 94640 AIRWAY INHALATION TREATMENT: CPT | Mod: 76

## 2024-04-18 PROCEDURE — 94640 AIRWAY INHALATION TREATMENT: CPT

## 2024-04-18 PROCEDURE — 250N000013 HC RX MED GY IP 250 OP 250 PS 637: Performed by: PHYSICIAN ASSISTANT

## 2024-04-18 PROCEDURE — 99232 SBSQ HOSP IP/OBS MODERATE 35: CPT | Performed by: INTERNAL MEDICINE

## 2024-04-18 PROCEDURE — 250N000009 HC RX 250: Performed by: PHYSICIAN ASSISTANT

## 2024-04-18 RX ORDER — IPRATROPIUM BROMIDE AND ALBUTEROL SULFATE 2.5; .5 MG/3ML; MG/3ML
3 SOLUTION RESPIRATORY (INHALATION) EVERY 4 HOURS PRN
Status: DISCONTINUED | OUTPATIENT
Start: 2024-04-18 | End: 2024-04-19

## 2024-04-18 RX ORDER — ASPIRIN 325 MG
325 TABLET, DELAYED RELEASE (ENTERIC COATED) ORAL EVERY 6 HOURS PRN
Status: DISCONTINUED | OUTPATIENT
Start: 2024-04-18 | End: 2024-04-20 | Stop reason: HOSPADM

## 2024-04-18 RX ORDER — PREDNISONE 20 MG/1
40 TABLET ORAL DAILY
Status: DISCONTINUED | OUTPATIENT
Start: 2024-04-19 | End: 2024-04-20 | Stop reason: HOSPADM

## 2024-04-18 RX ORDER — FLUTICASONE FUROATE AND VILANTEROL 100; 25 UG/1; UG/1
1 POWDER RESPIRATORY (INHALATION) DAILY
Status: DISCONTINUED | OUTPATIENT
Start: 2024-04-18 | End: 2024-04-18

## 2024-04-18 RX ADMIN — IPRATROPIUM BROMIDE AND ALBUTEROL SULFATE 3 ML: .5; 3 SOLUTION RESPIRATORY (INHALATION) at 11:24

## 2024-04-18 RX ADMIN — LORAZEPAM 0.5 MG: 0.5 TABLET ORAL at 16:19

## 2024-04-18 RX ADMIN — IPRATROPIUM BROMIDE AND ALBUTEROL SULFATE 3 ML: .5; 3 SOLUTION RESPIRATORY (INHALATION) at 07:32

## 2024-04-18 RX ADMIN — BENZONATATE 100 MG: 100 CAPSULE ORAL at 09:25

## 2024-04-18 RX ADMIN — PANTOPRAZOLE SODIUM 40 MG: 40 TABLET, DELAYED RELEASE ORAL at 06:42

## 2024-04-18 RX ADMIN — BUDESONIDE 0.5 MG: 0.5 INHALANT RESPIRATORY (INHALATION) at 19:04

## 2024-04-18 RX ADMIN — Medication 1 TABLET: at 09:21

## 2024-04-18 RX ADMIN — METHYLPREDNISOLONE SODIUM SUCCINATE 40 MG: 40 INJECTION, POWDER, FOR SOLUTION INTRAMUSCULAR; INTRAVENOUS at 01:16

## 2024-04-18 RX ADMIN — METHYLPREDNISOLONE SODIUM SUCCINATE 40 MG: 40 INJECTION, POWDER, FOR SOLUTION INTRAMUSCULAR; INTRAVENOUS at 09:21

## 2024-04-18 RX ADMIN — GUAIFENESIN 1200 MG: 600 TABLET, EXTENDED RELEASE ORAL at 21:48

## 2024-04-18 RX ADMIN — BENZONATATE 100 MG: 100 CAPSULE ORAL at 01:29

## 2024-04-18 RX ADMIN — GUAIFENESIN 1200 MG: 600 TABLET, EXTENDED RELEASE ORAL at 09:21

## 2024-04-18 RX ADMIN — UMECLIDINIUM BROMIDE AND VILANTEROL TRIFENATATE 1 PUFF: 62.5; 25 POWDER RESPIRATORY (INHALATION) at 21:50

## 2024-04-18 RX ADMIN — LORAZEPAM 0.5 MG: 0.5 TABLET ORAL at 09:25

## 2024-04-18 RX ADMIN — IPRATROPIUM BROMIDE AND ALBUTEROL SULFATE 3 ML: .5; 3 SOLUTION RESPIRATORY (INHALATION) at 15:43

## 2024-04-18 RX ADMIN — BUDESONIDE 0.5 MG: 0.5 INHALANT RESPIRATORY (INHALATION) at 07:32

## 2024-04-18 ASSESSMENT — ACTIVITIES OF DAILY LIVING (ADL)
ADLS_ACUITY_SCORE: 28
ADLS_ACUITY_SCORE: 28
ADLS_ACUITY_SCORE: 24
ADLS_ACUITY_SCORE: 29
ADLS_ACUITY_SCORE: 24
ADLS_ACUITY_SCORE: 24
ADLS_ACUITY_SCORE: 28
ADLS_ACUITY_SCORE: 29
ADLS_ACUITY_SCORE: 28
ADLS_ACUITY_SCORE: 29
ADLS_ACUITY_SCORE: 24
ADLS_ACUITY_SCORE: 28
ADLS_ACUITY_SCORE: 24
ADLS_ACUITY_SCORE: 28
ADLS_ACUITY_SCORE: 24

## 2024-04-18 NOTE — PLAN OF CARE
Summary: COPD Exacerbation  DATE & TIME: 4/18/24 4720-8992  Cognitive Concerns/ Orientation : A&Ox4; anxious, cooperative, pleasant  BEHAVIOR & AGGRESSION TOOL COLOR: Green       ABNL VS/O2: VSS on O2 1 L NC with ambulation. Sats in mid 90s on RA at rest.   MOBILITY: Ind during the day, denies dizziness, states she only needs help overnight.   PAIN MANAGEMENT: Denies  DIET: Regular, good appetite  BOWEL/BLADDER: Continent, up to BR  ABNL LAB: None  DRAIN/DEVICES: PIV SL   SKIN: Scattered bruising  TESTS/PROCEDURES: N/A  D/C DATE: PT recommending TCU, SW following for placement, will need home O2 at discharge.     OTHER IMPORTANT INFO:  LS inspiratory and expiratory wheezes. PRN nebs per RT, albuterol emergency inhaler at the bedside (ok per MD). Switched to PO prednisone. [facility] following. Home O2 assessment completed. PT/OT following. Tessalon x1 for cough. PRN ativan given x2 for anxiety.

## 2024-04-18 NOTE — PROGRESS NOTES
Care Management Follow Up    Length of Stay (days): 6    Expected Discharge Date: 04/19/2024     Concerns to be Addressed: adjustment to diagnosis/illness, discharge planning, substance/tobacco abuse/use  Lives alone in a house that is becoming too much for patient  Patient plan of care discussed at interdisciplinary rounds: Yes    Anticipated Discharge Disposition: Transitional Care  Disposition Comments: continue planning  Anticipated Discharge Services: Other (see comment) (TCU or home care with additional private duty night time help)  Anticipated Discharge DME: Oxygen    Patient/family educated on Medicare website which has current facility and service quality ratings:  yes  Education Provided on the Discharge Plan: Yes  Patient/Family in Agreement with the Plan: yes    Referrals Placed by CM/SW:  TCU referrals  Private pay costs discussed: Not applicable    Additional Information:  OT has changed recommendations to TCU.  Patient and her Daughter are in agreement for a TCU and are planning to begin the search for an assisted living in the near future.  Patient currently lives in a house and is feeling this is becoming too much for her.  Received call from patient's Daughter Sonia with TCU choices of 1.[facility], 2.[facility] and 3.[facility].  Referrals have been sent.  Patient is on oral steroids and is on 1L of oxygen at the present time.    Care Management will continue to follow for safe discharge planning.  The SW will take over management of patient's discharge to a TCU.  Will update the SW.  Sonia will be here tomorrow around 11:00 AM and would like to talk to the SW if time permits.    Pat Danielle, RN  Inpatient Care Management  [phone]

## 2024-04-18 NOTE — PLAN OF CARE
Summary: COPD Exacerbation  DATE & TIME: 4/17/24, 1930 - 3410  Cognitive Concerns/ Orientation : A&Ox4; anxious, cooperative, pleasant  BEHAVIOR & AGGRESSION TOOL COLOR: Green       ABNL VS/O2: VSS on O2 1 LPM via NC  MOBILITY: Independent, dyspnea on exertion  PAIN MANAGEMENT: Denies  DIET: Regular, good appetite  BOWEL/BLADDER: Continent, up to BR  ABNL LAB: None  DRAIN/DEVICES: PIV SL with intermittent IV solumedrol   SKIN: Scattered bruising  TESTS/PROCEDURES: N/A  D/C DATE: Possible discharge tomorrow per MD note.   OTHER IMPORTANT INFO: Prn Lorazepam given for reports of anxiety. Helpful per patient. LS inspiratory and expiratory wheezes. Scheduled nebs per RT, albuterol emergency inhaler at the bedside (ok per MD). [facility] following. Home O2 assessment completed. OT following. PT consult ordered.     Goal Outcome Evaluation:      Plan of Care Reviewed With: patient    Overall Patient Progress: improvingOverall Patient Progress: improving

## 2024-04-18 NOTE — PLAN OF CARE
"Goal Outcome Evaluation:          Summary: COPD Exacerbation  DATE & TIME: 4/17/24-4/18/24 1271-3488  Cognitive Concerns/ Orientation : A&Ox4; anxious, cooperative, pleasant  BEHAVIOR & AGGRESSION TOOL COLOR: Green       ABNL VS/O2: VSS on O2 1 LPM via NC  MOBILITY: SBA overnight as pt gets dizzy when waking up and standing- very dyspnea on exertion this shift-pt expressed \"I feel like I can't catch my breath\"   PAIN MANAGEMENT: Denies  DIET: Regular, good appetite  BOWEL/BLADDER: Continent, up to BR  ABNL LAB: None  DRAIN/DEVICES: PIV SL with intermittent IV solumedrol   SKIN: Scattered bruising  TESTS/PROCEDURES: N/A  D/C DATE: Possible discharge today 4/18 per MD note. Needs PT to see and will need home O2 at discharge.     OTHER IMPORTANT INFO:  LS inspiratory and expiratory wheezes. Scheduled nebs per RT, albuterol emergency inhaler at the bedside (ok per MD). [facility] following. Home O2 assessment completed. OT following. PT consult ordered.   Tessalon x1 for cough                "

## 2024-04-18 NOTE — PROGRESS NOTES
"[facility]    Hospitalist Progress Note    Date of Service (when I saw the patient): 04/18/2024  Admit date: 4/12/2024    Interval History   Full details of events over last 24 hours outlined below.   Mable is afebrile hemodynamically stable with oxygen in the mid 90s at rest.  But desaturates quickly with exertion.  She expresses frustration over progressive disease with recent exacerbation in February and current exacerbation.  She states that at night when she gets up to walk she gets severe shortness of breath chest heaviness and panics.  This has been going on for the last couple of months.  She follows with [facility] and they had set her up for pulmonary rehab but she has not been able to make it regularly due to her recent hospitalizations.    Both patient and daughter have questions about neck steps. Spent over 10 minutes in face to face counseling about plan.     Assessment & Plan   Acute hypoxic respiratory failure in the setting of COPD exacerbation   Severe obstruction by PFTs on 2021  *Hospitalization 8/2023 and 2/2024 for COPD exacerbation. CT chest 1/4/2024 with small pulmonary nodules measuring up to 3 mm, emphysema, biapical pleural parenchymal scarring, mild bronchial wall thickening.   Ongoing tobacco use  Known pulmonary nodules  * Follows with [facility]. Last clinic visit 1/2024.   * Onset of viral illness with nasal and chest congestion, productive cough, sore throat, chills on 4/7 with progressively worsening STORY and chest heaviness. No dizziness, syncope. No radiation of chest pain to arm or jaw. No recent travel, prolonged car rides. Notes that she has been \"sitting around\" a lot. No LIAN. No personal or family hx of bleeding coagulopathy or VTE. No recent surgery. No recent Covid. Has to sleep with head elevated at night. Pt continues to smoke 0.5 ppd.   *On admission, tachycardic to 117, hypoxic to 72% on room air with ambulation.   *CMP, CBC WNL. Procal 0.06. " Trop 10. D--dimer 0.52 (WNL age adjusted), . Covid, influenza, RSV negative. EKG sinus tachy. CXR negative.   * Serial troponins 8-10   * Echo: EF 60-65%. No regional wall motion abnormalities noted. The right ventricle is normal in structure, function and size. No evidence for significant valvular pathology.    IV solumedrol 60 mg BID increase Solu-Medrol to 60 mg every 8 hours to help with wheezing  Patient was placed to oral prednisone 40 mg daily on 4/15/2024.  Patient very short of breath and wheezing on 4/16/2024  Restarted  Solu-Medrol 40 mg every 8 hours on 4/16/2024  On 4/18 continues on Solu-Medrol 60 mg every 8 hours > Switch to prednisone 40 mg daily, starting in AM.   Patient not taking PTA Trelegy Ellipta, continues on PTA budesonide inhaler  Likely to benefit from LABA + LAMA.> start Anoro Ellipta Inhaler 1 puff daily.   Duonebs changed to q 4 hours PRN as PTA in preporation for discharge.  Received Azithromycin X5 days given COPD exacerbation with new productive cough   Scheduled mucinex   Supplemental oxygen   Outpatient Pulm follow-up with [facility] planned in June  Prior provider counseled regarding smoking cessation. She wants to stop cold turkey by herself  PT recommending discharge to TCU, anticipate discharge 4/20 if does well after above changes to regimen.  Ambulatory and Nocturnal oximetry on RA in prep for discharge on 04/18/24      Moderate malnutrition;  Supplements per nutrition services           RODNEY: Ativan PRN for anxiety (rarely uses)      Hepatic steatosis: Stable, noted on  4/3/2023. Follow up outpatient.       Clinically Significant Risk Factors                          # Moderate Malnutrition: based on nutrition assessment    # Financial/Environmental Concerns: none           Diet: Orders Placed This Encounter      Regular Diet Adult     IVF: None  DVT Prophylaxis: PFTS, and now anticipate discharge in the next 48 hours.   Royal Catheter: Not present    No CPR- Do NOT  Intubate  Disposition:  Anticipate discharge to TCU on 4/20.   Communication: Discussed with daughter and patient on 04/18/24    Jacquelyn Stubbs MD    Hospitalist Service  [facility]  Securely message with the Vocera Web Console (learn more here)  Text page via [facility] Paging/Directory      -Data reviewed today: I reviewed all new labs and imaging results over the last 24 hours. I personally reviewed no images or EKG's today.    Physical Exam   Temp: 98.4  F (36.9  C) Temp src: Oral BP: 122/77 Pulse: 88   Resp: 18 SpO2: 96 % O2 Device: None (Room air) Oxygen Delivery: 1 LPM  Vitals:    04/14/24 0500 04/15/24 0631 04/17/24 0643   Weight: 45.2 kg (99 lb 10.4 oz) 47.7 kg (105 lb 1.6 oz) 50.6 kg (111 lb 8.8 oz)     Vital Signs with Ranges  Temp:  [98.1  F (36.7  C)-98.8  F (37.1  C)] 98.4  F (36.9  C)  Pulse:  [79-88] 88  Resp:  [18] 18  BP: (117-153)/(59-78) 122/77  SpO2:  [90 %-97 %] 96 %  No intake/output data recorded.    Today's Exam  Constitutional:  NAD, cachectic, leaning forward to assist with breathing.   Neuropsyche:  alert and oriented, answers questions appropriately.   Respiratory:  Appears to have mild dyspnea with pursed lip breathing, decreased air exchange, faint isolated wheezes, no crackles.   Cardiovascular:  Regular rate and rhythm, no edema.  GI:  soft, NT/ND, BS normal  Skin/Integumen:  No acute rash or sign of bleeding.     Medications   All medications reviewed on 04/18/24      Current Facility-Administered Medications   Medication Dose Route Frequency Provider Last Rate Last Admin     Current Facility-Administered Medications   Medication Dose Route Frequency Provider Last Rate Last Admin    budesonide (PULMICORT) neb solution 0.5 mg  0.5 mg Nebulization BID Chely Daigle PA-C   0.5 mg at 04/18/24 0732    guaiFENesin (MUCINEX) 12 hr tablet 1,200 mg  1,200 mg Oral BID Chely Daigle PA-C   1,200 mg at 04/18/24 0921    ipratropium - albuterol 0.5  mg/2.5 mg/3 mL (DUONEB) neb solution 3 mL  3 mL Nebulization Q4H WA Bere Scott MD   3 mL at 04/18/24 1543    multivitamin w/minerals (THERA-VIT-M) tablet 1 tablet  1 tablet Oral Daily Bere Scott MD   1 tablet at 04/18/24 0921    pantoprazole (PROTONIX) EC tablet 40 mg  40 mg Oral QAM AC Bere Scott MD   40 mg at 04/18/24 0642    [START ON 4/19/2024] predniSONE (DELTASONE) tablet 40 mg  40 mg Oral Daily Jacquelyn Stubbs MD        sodium chloride (PF) 0.9% PF flush 3 mL  3 mL Intracatheter Q8H Chely Daigle PA-C   3 mL at 04/18/24 1619     PRN Meds:   Current Facility-Administered Medications   Medication Dose Route Frequency Provider Last Rate Last Admin    acetaminophen (TYLENOL) tablet 650 mg  650 mg Oral Q4H PRN Chely Daigle PA-C        Or    acetaminophen (TYLENOL) Suppository 650 mg  650 mg Rectal Q4H PRN Chely Daigle PA-C        albuterol (PROVENTIL HFA/VENTOLIN HFA) inhaler  2 puff Inhalation Q4H PRN Kurt Cheema MD   2 puff at 04/17/24 1624    benzonatate (TESSALON) capsule 100 mg  100 mg Oral TID PRN Kurt Cheema MD   100 mg at 04/18/24 0925    bisacodyl (DULCOLAX) suppository 10 mg  10 mg Rectal Daily PRN Chely Daigle PA-C        calcium carbonate (TUMS) chewable tablet 1,000 mg  1,000 mg Oral 4x Daily PRN Chely Daigle PA-C        lidocaine (LMX4) cream   Topical Q1H PRN Chely Daigle PA-C        lidocaine 1 % 0.1-1 mL  0.1-1 mL Other Q1H PRN Chely Daigle PA-C        LORazepam (ATIVAN) tablet 0.5 mg  0.5 mg Oral Q6H PRN Chely Daigle PA-C   0.5 mg at 04/18/24 1619    ondansetron (ZOFRAN ODT) ODT tab 4 mg  4 mg Oral Q6H PRN Chely Daigle PA-C        Or    ondansetron (ZOFRAN) injection 4 mg  4 mg Intravenous Q6H PRN Pilo, Chely Riley PA-C        polyethylene glycol (MIRALAX) Packet 17 g  17 g Oral BID PRN Pilo, Chely Riley PA-C        prochlorperazine  (COMPAZINE) injection 5 mg  5 mg Intravenous Q6H PRN Chely Daigle PA-C        Or    prochlorperazine (COMPAZINE) tablet 5 mg  5 mg Oral Q6H PRN Chely Daigle PA-C        Or    prochlorperazine (COMPAZINE) suppository 12.5 mg  12.5 mg Rectal Q12H PRN Chely Daigle PA-C        senna-docusate (SENOKOT-S/PERICOLACE) 8.6-50 MG per tablet 1 tablet  1 tablet Oral BID PRN Chely Daigle PA-C        Or    senna-docusate (SENOKOT-S/PERICOLACE) 8.6-50 MG per tablet 2 tablet  2 tablet Oral BID PRN Chely Daigle PA-C        sodium chloride (PF) 0.9% PF flush 3 mL  3 mL Intracatheter q1 min prn Chely Daigle PA-C   3 mL at 04/15/24 0638       Data   Recent Labs   Lab 04/16/24  0957 04/13/24  1135 04/12/24  0711   WBC  --  16.0* 9.9   HGB  --  12.6 14.3   MCV  --  87 88   PLT  --  312 293   NA  --  139 137   POTASSIUM  --  4.3 4.0   CHLORIDE  --  102 97*   CO2  --  27 27   BUN  --  11.0 12.2   CR  --  0.57 0.70   ANIONGAP  --  10 13   MOLLY  --  9.7 9.5   GLC 81 163* 109*   ALBUMIN  --   --  4.4   PROTTOTAL  --   --  7.3   BILITOTAL  --   --  0.3   ALKPHOS  --   --  95   ALT  --   --  17   AST  --   --  30       No results found for this or any previous visit (from the past 24 hour(s)).

## 2024-04-19 ENCOUNTER — APPOINTMENT (OUTPATIENT)
Dept: OCCUPATIONAL THERAPY | Facility: CLINIC | Age: 69
DRG: 190 | End: 2024-04-19
Payer: COMMERCIAL

## 2024-04-19 PROCEDURE — 120N000001 HC R&B MED SURG/OB

## 2024-04-19 PROCEDURE — 97535 SELF CARE MNGMENT TRAINING: CPT | Mod: GO

## 2024-04-19 PROCEDURE — 999N000157 HC STATISTIC RCP TIME EA 10 MIN

## 2024-04-19 PROCEDURE — 250N000009 HC RX 250: Performed by: INTERNAL MEDICINE

## 2024-04-19 PROCEDURE — 250N000009 HC RX 250: Performed by: PHYSICIAN ASSISTANT

## 2024-04-19 PROCEDURE — 94762 N-INVAS EAR/PLS OXIMTRY CONT: CPT

## 2024-04-19 PROCEDURE — 99232 SBSQ HOSP IP/OBS MODERATE 35: CPT | Performed by: INTERNAL MEDICINE

## 2024-04-19 PROCEDURE — 250N000013 HC RX MED GY IP 250 OP 250 PS 637: Performed by: INTERNAL MEDICINE

## 2024-04-19 PROCEDURE — 94640 AIRWAY INHALATION TREATMENT: CPT | Mod: 76

## 2024-04-19 PROCEDURE — 94640 AIRWAY INHALATION TREATMENT: CPT

## 2024-04-19 PROCEDURE — 250N000013 HC RX MED GY IP 250 OP 250 PS 637: Performed by: PHYSICIAN ASSISTANT

## 2024-04-19 PROCEDURE — 250N000012 HC RX MED GY IP 250 OP 636 PS 637: Performed by: INTERNAL MEDICINE

## 2024-04-19 RX ORDER — ALBUTEROL SULFATE 0.83 MG/ML
2.5 SOLUTION RESPIRATORY (INHALATION) EVERY 4 HOURS PRN
Status: DISCONTINUED | OUTPATIENT
Start: 2024-04-19 | End: 2024-04-20 | Stop reason: HOSPADM

## 2024-04-19 RX ADMIN — PREDNISONE 40 MG: 20 TABLET ORAL at 08:00

## 2024-04-19 RX ADMIN — BUDESONIDE 0.5 MG: 0.5 INHALANT RESPIRATORY (INHALATION) at 08:36

## 2024-04-19 RX ADMIN — PANTOPRAZOLE SODIUM 40 MG: 40 TABLET, DELAYED RELEASE ORAL at 06:29

## 2024-04-19 RX ADMIN — IPRATROPIUM BROMIDE AND ALBUTEROL SULFATE 3 ML: .5; 3 SOLUTION RESPIRATORY (INHALATION) at 08:36

## 2024-04-19 RX ADMIN — LORAZEPAM 0.5 MG: 0.5 TABLET ORAL at 21:37

## 2024-04-19 RX ADMIN — GUAIFENESIN 1200 MG: 600 TABLET, EXTENDED RELEASE ORAL at 07:59

## 2024-04-19 RX ADMIN — Medication 1 TABLET: at 08:00

## 2024-04-19 RX ADMIN — GUAIFENESIN 1200 MG: 600 TABLET, EXTENDED RELEASE ORAL at 21:37

## 2024-04-19 RX ADMIN — BUDESONIDE 0.5 MG: 0.5 INHALANT RESPIRATORY (INHALATION) at 19:35

## 2024-04-19 RX ADMIN — UMECLIDINIUM BROMIDE AND VILANTEROL TRIFENATATE 1 PUFF: 62.5; 25 POWDER RESPIRATORY (INHALATION) at 08:00

## 2024-04-19 ASSESSMENT — ACTIVITIES OF DAILY LIVING (ADL)
ADLS_ACUITY_SCORE: 23
ADLS_ACUITY_SCORE: 24
ADLS_ACUITY_SCORE: 23
ADLS_ACUITY_SCORE: 24
ADLS_ACUITY_SCORE: 23

## 2024-04-19 NOTE — PLAN OF CARE
Goal Outcome Evaluation:       DATE & TIME: 4/18/24-4/19/24 8187-3450  Cognitive Concerns/ Orientation : A&Ox4; anxious, cooperative, pleasant  BEHAVIOR & AGGRESSION TOOL COLOR: Green       ABNL VS/O2: VSS on O2 1 L NC. Sats in mid 90's. Pt doing overnight oxymetry study on RA and SAT's in 90's.  MOBILITY: Ind during the day, patient states she needs help during the night and will call as needed.  PAIN MANAGEMENT: Denies  DIET: Regular  BOWEL/BLADDER: Continent, up to BR  ABNL LAB: None  DRAIN/DEVICES: PIV SL   SKIN: Scattered bruising. Declined full skin assessment  TESTS/PROCEDURES: N/A  D/C DATE: PT recommending TCU, SW following for placement, will need home O2 at discharge.     OTHER IMPORTANT INFO:  LS with inspiratory and expiratory wheezes. On scheduled and PRN nebs and inhalers. Rescue inhaler at the bedside (ok per MD). Switched to PO prednisone. [facility] following. PT/OT following.

## 2024-04-19 NOTE — PLAN OF CARE
Physical Therapy:    Vocera Message to Hospitalist, and RN:  Could we release the Smoking Cessation order? Pt is a current smoker, smokes 0.5 ppd.     Ailyn Mckeon, PT, DPT

## 2024-04-19 NOTE — PROGRESS NOTES
Patient has been assessed for Home Oxygen needs. Oxygen readings:    *Pulse oximetry (SpO2) = 97% on room air at rest while awake.      *SpO2 = 94% on room air during activity/with exercise.

## 2024-04-19 NOTE — PLAN OF CARE
"Goal Outcome Evaluation:  Cognitive Concerns/ Orientation : A&Ox4; anxious, cooperative, pleasant  BEHAVIOR & AGGRESSION TOOL COLOR: Green       ABNL VS/O2: VSS, on room air  MOBILITY: Ind during the day-able to ambulate comfortably in the room but does have significant STORY if going any further  PAIN MANAGEMENT: Denies  DIET: Regular-no appetite  BOWEL/BLADDER: Continent, up to BR  ABNL LAB: None  DRAIN/DEVICES: PIV SL   SKIN: Scattered bruising. Declines full skin assessment  TESTS/PROCEDURES: N/A  D/C DATE: TCU when medically ready  OTHER IMPORTANT INFO: expiratory wheezes throughout. Rescue inhaler at the bedside (ok per MD). Receiving PO prednisone, scheduled nebs. Resting in room with daughter most of the day. Did have an episode this am while working with PT, had \"fussy vision in right eye for 30 seconds\" resolved after laying down in bed, neuros intact                        "

## 2024-04-19 NOTE — PROGRESS NOTES
04/18/24 1500   Appointment Info   Signing Clinician's Name / Credentials (PT) Ailyn Mckeon, PT, DPT   Living Environment   People in Home alone   Current Living Arrangements house   Home Accessibility stairs to enter home;stairs within home   Number of Stairs, Main Entrance 4   Number of Stairs, Within Home, Primary other (see comments)   Stair Railings, Within Home, Primary railings safe and in good condition   Transportation Anticipated family or friend will provide   Self-Care   Usual Activity Tolerance good   Current Activity Tolerance moderate   Fall history within last six months no   General Information   Onset of Illness/Injury or Date of Surgery 04/12/24   Referring Physician Bere Scott MD   Patient/Family Therapy Goals Statement (PT) Feel better   Pertinent History of Current Problem (include personal factors and/or comorbidities that impact the POC) Tammie OTILIA Zeng is a 68 year old female with PMHx of non-oxygen dependent COPD, ongoing tobacco use, RODNEY, and hepatic steatosis admitted on 4/12/2024. She presented with worsening SOB in the setting of recent URI. Admitted for further evaluation of COPD exacerbation.   Existing Precautions/Restrictions fall   General Observations Wheezing, coughing frequently   Cognition   Cognitive Status Comments Appeared WFL for fn mobility, see OT consult for further insight into cognition   Pain Assessment   Patient Currently in Pain No   Integumentary/Edema   Integumentary/Edema no deficits were identifed   Posture    Posture Protracted shoulders;Forward head position;Kyphosis   Range of Motion (ROM)   Range of Motion ROM is WFL   Strength (Manual Muscle Testing)   Strength Comments Grossly WFL, fatigues quickly   Bed Mobility   Comment, (Bed Mobility) SBA   Transfers   Comment, (Transfers) SBA   Gait/Stairs (Locomotion)   Comment, (Gait/Stairs) SBA   Balance   Balance Comments Benefits from 4WW to improve exercise capacity   Sensory Examination   Sensory  Perception patient reports no sensory changes   Coordination   Coordination no deficits were identified   Muscle Tone   Muscle Tone no deficits were identified   Clinical Impression   Criteria for Skilled Therapeutic Intervention Yes, treatment indicated   PT Diagnosis (PT) Impaired indep w/fn mo   Influenced by the following impairments Impaired skeletal muscle force production and endurance, impaired activity tolerance   Functional limitations due to impairments Indep and safety w/transfers, amb, and stairs   Clinical Presentation (PT Evaluation Complexity) stable   Clinical Presentation Rationale Muliple affecting comorbidities, stable presentation, assessment limitied to strenght, balance, and fn mobility.   Clinical Decision Making (Complexity) low complexity   Planned Therapy Interventions (PT) balance training;bed mobility training;gait training;patient/family education;postural re-education;stair training;strengthening;stretching;home exercise program;transfer training   Risk & Benefits of therapy have been explained evaluation/treatment results reviewed;care plan/treatment goals reviewed;risks/benefits reviewed;participants voiced agreement with care plan;current/potential barriers reviewed;participants included;patient;daughter   PT Total Evaluation Time   PT Eval, Low Complexity Minutes (10302) 10   Physical Therapy Goals   PT Frequency 3x/week   PT Predicted Duration/Target Date for Goal Attainment 04/28/24   PT Goals Bed Mobility;Transfers;Gait;Stairs;Aerobic Activity;PT Goal 1   PT: Bed Mobility Modified independent   PT: Transfers Modified independent   PT: Gait Modified independent   PT: Stairs Modified independent   PT: Perform aerobic activity with stable cardiovascular response 10 minutes;intermittent activity   PT: Goal 1 Pt will be able to perform 6MWT with no increase in pain and able to walk at a pace of 1.2m/s or better in order to navigate her environment.   Interventions   Interventions  Quick Adds Therapeutic Activity   Therapeutic Activity   Therapeutic Activities: dynamic activities to improve functional performance Minutes (40559) 25   Symptoms Noted During/After Treatment Fatigue   Treatment Detail/Skilled Intervention Amenable, dtr present and supportive. VSS on room air, just finished nebulizing tx. Completed sup>sit and sit>stand with SBA and no AD. Strong cough, mildly unstable, asking to sit right away. Amb ~20ft no AD and CGA. Amb ~230ft with 4WW and SBA. Educ on breathing techniques, use of 4WW including brakes and sitting, reviewed d/c recs and POC. All questions answered by end of session.   PT Discharge Planning   PT Plan Amb w/LRD, stairs, safety w/4WW, 6MWT, repeated sit<>stands   PT Discharge Recommendation (DC Rec) home with assist;home with home care physical therapy;Transitional Care Facility   PT Rationale for DC Rec Pt lives alone in house with stairs. She is making progress here while hospitalized but at this time she needs supervision with all fn mobility tasks. She has not demonstrated safety with ambulation and stairs. If d/c home, recommend 24/7 supervision. TCU rec to maximize fn outcomes, reduce risk of re-hospitalizatoin.   PT Brief overview of current status SB-CGA Ax1   Total Session Time   Timed Code Treatment Minutes 25   Total Session Time (sum of timed and untimed services) 35

## 2024-04-19 NOTE — PROGRESS NOTES
"[facility]    Hospitalist Progress Note    Date of Service (when I saw the patient): 04/19/2024  Admit date: 4/12/2024    Interval History   Full details of events over last 24 hours outlined below.   Mable is afebrile hemodynamically stable with oxygen in the mid 90s at rest.  But desaturates quickly with exertion.  Nocturnal oximetry on 4/18/24 revealed no significant desaturations below 88%      Assessment & Plan   Acute hypoxic respiratory failure in the setting of COPD exacerbation   Severe obstruction by PFTs on 2021  *Hospitalization 8/2023 and 2/2024 for COPD exacerbation. CT chest 1/4/2024 with small pulmonary nodules measuring up to 3 mm, emphysema, biapical pleural parenchymal scarring, mild bronchial wall thickening.   Ongoing tobacco use  Known pulmonary nodules  * Follows with [facility]. Last clinic visit 1/2024.   * Onset of viral illness with nasal and chest congestion, productive cough, sore throat, chills on 4/7 with progressively worsening STORY and chest heaviness. No dizziness, syncope. No radiation of chest pain to arm or jaw. No recent travel, prolonged car rides. Notes that she has been \"sitting around\" a lot. No LIAN. No personal or family hx of bleeding coagulopathy or VTE. No recent surgery. No recent Covid. Has to sleep with head elevated at night. Pt continues to smoke 0.5 ppd.   *On admission, tachycardic to 117, hypoxic to 72% on room air with ambulation.   *CMP, CBC WNL. Procal 0.06. Trop 10. D--dimer 0.52 (WNL age adjusted), . Covid, influenza, RSV negative. EKG sinus tachy. CXR negative.   * Serial troponins 8-10   * Echo: EF 60-65%. No regional wall motion abnormalities noted. The right ventricle is normal in structure, function and size. No evidence for significant valvular pathology.    IV solumedrol 60 mg BID increase Solu-Medrol to 60 mg every 8 hours to help with wheezing  Patient was placed to oral prednisone 40 mg daily on 4/15/2024.  Patient " very short of breath and wheezing on 4/16/2024  Restarted  Solu-Medrol 40 mg every 8 hours on 4/16/2024  On 4/18 continues on Solu-Medrol 60 mg every 8 hours > Switch to prednisone 40 mg daily, starting in AM. > on 04/19/24 doing quite well.   Patient not taking PTA Trelegy Ellipta, continues on PTA budesonide inhaler  Likely to benefit from LABA + LAMA.> started Anoro Ellipta Inhaler 1 puff daily on 4/18/24.  On 04/19/24, breathing continues to improve.   She tells me she could not afford Trelegy ($800). However, feeling very well on current regimen.   Called pharmacy [facility] $40 copay.   If she can not afford this will switch back to duonebs BID as she was doing before + PRN as before.  Albuterol nebulizers PRN while on LAMA.   Received Azithromycin X5 days given COPD exacerbation with new productive cough   Scheduled mucinex   Supplemental oxygen.   Outpatient Pulm follow-up with [facility] planned in June  Discussed the importance of smoking cessation, . She wants to stop cold turkey by herself  PT recommending discharge to TCU, anticipate discharge 4/20 if does well after above changes to regimen.  Ambulatory and Nocturnal oximetry on RA in prep for discharge on 04/18/24   Significant air-hunger leading to episodes of panic discussed on 04/19/24.She has been using PTA ativan for this and it is very effective. Discussed using this PRN only and trying to limit use in order to prevent tolerance and loss of effectiveness. Discussed avoidance of use before driving. Also discussed increased fall risk. Defer consideration of low dose morphine for air hunger to her pulmonologist or PCP.  She was not interested in changing medications at this time.     Moderate malnutrition;  Supplements per nutrition services       RODNEY: Ativan PRN for anxiety (rarely uses)      Hepatic steatosis: Stable, noted on US 4/3/2023. Follow up outpatient.       Clinically Significant Risk Factors                          # Moderate  Malnutrition: based on nutrition assessment    # Financial/Environmental Concerns: none           Diet: Orders Placed This Encounter      Regular Diet Adult     IVF: None  DVT Prophylaxis: PFTS, and now anticipate discharge in the next 48 hours.   Royal Catheter: Not present    No CPR- Do NOT Intubate  Disposition:  Anticipate discharge to TCU on 4/20.   Communication: Discussed with daughter and patient on 04/19/24    Jacquelyn Stubbs MD    Hospitalist Service  [facility]  Securely message with the Vocera Web Console (learn more here)  Text page via [employer] Paging/Directory      -Data reviewed today: I reviewed all new labs and imaging results over the last 24 hours. I personally reviewed no images or EKG's today.    Physical Exam   Temp: 98.2  F (36.8  C) Temp src: Oral BP: 121/78 Pulse: 79   Resp: 18 SpO2: 95 % O2 Device: None (Room air) Oxygen Delivery: 1 LPM  Vitals:    04/14/24 0500 04/15/24 0631 04/17/24 0643   Weight: 45.2 kg (99 lb 10.4 oz) 47.7 kg (105 lb 1.6 oz) 50.6 kg (111 lb 8.8 oz)     Vital Signs with Ranges  Temp:  [98.2  F (36.8  C)-98.7  F (37.1  C)] 98.2  F (36.8  C)  Pulse:  [74-90] 79  Resp:  [18] 18  BP: (117-122)/(68-78) 121/78  SpO2:  [93 %-96 %] 95 %  I/O last 3 completed shifts:  In: 120 [P.O.:120]  Out: -     Today's Exam  Constitutional:  NAD, cachectic, standing up with walker in room.   Neuropsyche:  alert and oriented, answers questions appropriately. Appears more calm.  Respiratory:  Appears to have mild dyspnea with pursed lip breathing, decreased air exchange, no wheezing, no crackles.   Cardiovascular:  Regular rate and rhythm, no edema.  GI:  soft, NT/ND, BS normal  Skin/Integumen:  No acute rash or sign of bleeding.     Medications   All medications reviewed on 04/19/24      Current Facility-Administered Medications   Medication Dose Route Frequency Provider Last Rate Last Admin     Current Facility-Administered Medications   Medication Dose Route  Frequency Provider Last Rate Last Admin    budesonide (PULMICORT) neb solution 0.5 mg  0.5 mg Nebulization BID Chely Daigle PA-C   0.5 mg at 04/19/24 0836    guaiFENesin (MUCINEX) 12 hr tablet 1,200 mg  1,200 mg Oral BID Chely Daigle PA-C   1,200 mg at 04/19/24 0759    multivitamin w/minerals (THERA-VIT-M) tablet 1 tablet  1 tablet Oral Daily Bere Scott MD   1 tablet at 04/19/24 0800    pantoprazole (PROTONIX) EC tablet 40 mg  40 mg Oral QAM AC Bere Scott MD   40 mg at 04/19/24 0629    predniSONE (DELTASONE) tablet 40 mg  40 mg Oral Daily Jacquelyn Stubbs MD   40 mg at 04/19/24 0800    sodium chloride (PF) 0.9% PF flush 3 mL  3 mL Intracatheter Q8H Chely Daigle PA-C   3 mL at 04/19/24 0801    umeclidinium-vilanterol (ANORO ELLIPTA) 62.5-25 MCG/ACT oral inhaler 1 puff  1 puff Inhalation Daily Jacquelyn Stubbs MD   1 puff at 04/19/24 0800     PRN Meds:   Current Facility-Administered Medications   Medication Dose Route Frequency Provider Last Rate Last Admin    acetaminophen (TYLENOL) tablet 650 mg  650 mg Oral Q4H PRN Chely Daigle PA-C        Or    acetaminophen (TYLENOL) Suppository 650 mg  650 mg Rectal Q4H PRN Chely Daigle PA-C        albuterol (PROVENTIL HFA/VENTOLIN HFA) inhaler  2 puff Inhalation Q4H PRN Kurt Cheema MD   2 puff at 04/17/24 1624    aspirin (ASA) EC tablet 325 mg  325 mg Oral Q6H PRN Jacuqelyn Stubbs MD        benzonatate (TESSALON) capsule 100 mg  100 mg Oral TID PRN Kurt Cheema MD   100 mg at 04/18/24 0925    bisacodyl (DULCOLAX) suppository 10 mg  10 mg Rectal Daily PRN Chely Daigle PA-C        calcium carbonate (TUMS) chewable tablet 1,000 mg  1,000 mg Oral 4x Daily PRN Chely Daigle PA-C        ipratropium - albuterol 0.5 mg/2.5 mg/3 mL (DUONEB) neb solution 3 mL  3 mL Nebulization Q4H PRN Jacquelyn Stubbs MD   3 mL at 04/19/24 0836    lidocaine (LMX4) cream   Topical Q1H PRN  Chely Daigle PA-C        lidocaine 1 % 0.1-1 mL  0.1-1 mL Other Q1H PRN Chely Daigle PA-C        LORazepam (ATIVAN) tablet 0.5 mg  0.5 mg Oral Q6H PRN Chely Daigle PA-C   0.5 mg at 04/18/24 1619    ondansetron (ZOFRAN ODT) ODT tab 4 mg  4 mg Oral Q6H PRN Chely Daigle PA-C        Or    ondansetron (ZOFRAN) injection 4 mg  4 mg Intravenous Q6H PRN Chely Daigle PA-C        polyethylene glycol (MIRALAX) Packet 17 g  17 g Oral BID PRN Chely Daigle PA-C        prochlorperazine (COMPAZINE) injection 5 mg  5 mg Intravenous Q6H PRN Chely Daigle PA-C        Or    prochlorperazine (COMPAZINE) tablet 5 mg  5 mg Oral Q6H PRN Chely Daigle PA-C        Or    prochlorperazine (COMPAZINE) suppository 12.5 mg  12.5 mg Rectal Q12H PRN Chely Daigle PA-C        senna-docusate (SENOKOT-S/PERICOLACE) 8.6-50 MG per tablet 1 tablet  1 tablet Oral BID PRN Chely Daigle PA-C        Or    senna-docusate (SENOKOT-S/PERICOLACE) 8.6-50 MG per tablet 2 tablet  2 tablet Oral BID PRN Chely Daigle PA-C        sodium chloride (PF) 0.9% PF flush 3 mL  3 mL Intracatheter q1 min prn Chely Daigle PA-C   3 mL at 04/18/24 2158       Data   Recent Labs   Lab 04/16/24  0957 04/13/24  1135   WBC  --  16.0*   HGB  --  12.6   MCV  --  87   PLT  --  312   NA  --  139   POTASSIUM  --  4.3   CHLORIDE  --  102   CO2  --  27   BUN  --  11.0   CR  --  0.57   ANIONGAP  --  10   MOLLY  --  9.7   GLC 81 163*       No results found for this or any previous visit (from the past 24 hour(s)).

## 2024-04-19 NOTE — PROGRESS NOTES
Care Management Follow Up    Length of Stay (days): 7    Expected Discharge Date: 04/20/2024     Concerns to be Addressed: adjustment to diagnosis/illness, discharge planning, substance/tobacco abuse/use  Lives alone in a house that is becoming too much for patient  Patient plan of care discussed at interdisciplinary rounds: Yes    Anticipated Discharge Disposition: Home, Home Care, Transitional Care  Disposition Comments: continue planning  Anticipated Discharge Services: Other (see comment) (TCU or home care with additional private duty night time help)  Anticipated Discharge DME: Oxygen    Patient/family educated on Medicare website which has current facility and service quality ratings:    Education Provided on the Discharge Plan: Yes  Patient/Family in Agreement with the Plan: yes    Referrals Placed by CM/SW:    Private pay costs discussed: private room/amenity fees and transportation costs    Additional Information:  Met with patient and daughter Sonia regarding arrangements for TCU and to give daughter resources for post discharge from TCU.  Estimated discharge is 4/20. Patient prefers to avoid the cost of a semi-private room.   [facility] has a semi-private room available for tomorrow.      DANIELLE BarahonaSW

## 2024-04-20 VITALS
HEIGHT: 63 IN | RESPIRATION RATE: 18 BRPM | WEIGHT: 111.55 LBS | BODY MASS INDEX: 19.77 KG/M2 | HEART RATE: 87 BPM | DIASTOLIC BLOOD PRESSURE: 84 MMHG | OXYGEN SATURATION: 91 % | SYSTOLIC BLOOD PRESSURE: 116 MMHG | TEMPERATURE: 98.8 F

## 2024-04-20 PROCEDURE — 94640 AIRWAY INHALATION TREATMENT: CPT

## 2024-04-20 PROCEDURE — 250N000009 HC RX 250: Performed by: PHYSICIAN ASSISTANT

## 2024-04-20 PROCEDURE — 250N000012 HC RX MED GY IP 250 OP 636 PS 637: Performed by: INTERNAL MEDICINE

## 2024-04-20 PROCEDURE — 250N000013 HC RX MED GY IP 250 OP 250 PS 637: Performed by: PHYSICIAN ASSISTANT

## 2024-04-20 PROCEDURE — 999N000157 HC STATISTIC RCP TIME EA 10 MIN

## 2024-04-20 PROCEDURE — 99239 HOSP IP/OBS DSCHRG MGMT >30: CPT | Performed by: INTERNAL MEDICINE

## 2024-04-20 PROCEDURE — 250N000013 HC RX MED GY IP 250 OP 250 PS 637: Performed by: INTERNAL MEDICINE

## 2024-04-20 RX ORDER — LORAZEPAM 0.5 MG/1
0.5 TABLET ORAL EVERY 6 HOURS PRN
Qty: 15 TABLET | Refills: 0 | Status: SHIPPED | OUTPATIENT
Start: 2024-04-20 | End: 2024-05-02

## 2024-04-20 RX ORDER — ALBUTEROL SULFATE 0.83 MG/ML
2.5 SOLUTION RESPIRATORY (INHALATION) EVERY 4 HOURS PRN
DISCHARGE
Start: 2024-04-20

## 2024-04-20 RX ORDER — PREDNISONE 10 MG/1
TABLET ORAL
DISCHARGE
Start: 2024-04-20 | End: 2024-05-02

## 2024-04-20 RX ORDER — BENZONATATE 100 MG/1
100 CAPSULE ORAL 3 TIMES DAILY PRN
DISCHARGE
Start: 2024-04-20 | End: 2024-05-02

## 2024-04-20 RX ORDER — ESCITALOPRAM OXALATE 5 MG/1
5 TABLET ORAL DAILY
DISCHARGE
Start: 2024-04-20 | End: 2024-05-29

## 2024-04-20 RX ORDER — PANTOPRAZOLE SODIUM 20 MG/1
20 TABLET, DELAYED RELEASE ORAL
DISCHARGE
Start: 2024-04-21 | End: 2024-04-29

## 2024-04-20 RX ORDER — MULTIPLE VITAMINS W/ MINERALS TAB 9MG-400MCG
1 TAB ORAL DAILY
Status: ON HOLD | DISCHARGE
Start: 2024-04-21 | End: 2024-07-13

## 2024-04-20 RX ORDER — ALBUTEROL SULFATE 90 UG/1
2 AEROSOL, METERED RESPIRATORY (INHALATION) EVERY 4 HOURS PRN
DISCHARGE
Start: 2024-04-20 | End: 2024-06-07

## 2024-04-20 RX ADMIN — LORAZEPAM 0.5 MG: 0.5 TABLET ORAL at 13:42

## 2024-04-20 RX ADMIN — PANTOPRAZOLE SODIUM 40 MG: 40 TABLET, DELAYED RELEASE ORAL at 08:40

## 2024-04-20 RX ADMIN — BUDESONIDE 0.5 MG: 0.5 INHALANT RESPIRATORY (INHALATION) at 08:53

## 2024-04-20 RX ADMIN — GUAIFENESIN 1200 MG: 600 TABLET, EXTENDED RELEASE ORAL at 08:40

## 2024-04-20 RX ADMIN — PREDNISONE 40 MG: 20 TABLET ORAL at 08:40

## 2024-04-20 RX ADMIN — UMECLIDINIUM BROMIDE AND VILANTEROL TRIFENATATE 1 PUFF: 62.5; 25 POWDER RESPIRATORY (INHALATION) at 08:40

## 2024-04-20 RX ADMIN — Medication 1 TABLET: at 08:40

## 2024-04-20 ASSESSMENT — ACTIVITIES OF DAILY LIVING (ADL)
ADLS_ACUITY_SCORE: 22
ADLS_ACUITY_SCORE: 23
ADLS_ACUITY_SCORE: 22
ADLS_ACUITY_SCORE: 22
ADLS_ACUITY_SCORE: 23
ADLS_ACUITY_SCORE: 22

## 2024-04-20 NOTE — PROGRESS NOTES
Care Management Discharge Note    Discharge Date: 04/20/2024       Discharge Disposition: Transitional Care-[facility].     Discharge Services:  (TCU or home care with additional private duty night time help)    Discharge DME: Oxygen    Discharge Transportation: family or friend will provide    Private pay costs discussed: private room/amenity fees, transportation costs, and insurance costs out of pocket expenses    Does the patient's insurance plan have a 3 day qualifying hospital stay waiver?  No    PAS Confirmation Code:  [id number]  Patient/family educated on Medicare website which has current facility and service quality ratings:      Education Provided on the Discharge Plan: Yes  Persons Notified of Discharge Plans: Daughters  Patient/Family in Agreement with the Plan: yes    Handoff Referral Completed: Yes    Additional Information: [facility]      [facility] Lem[facility]        PAS-RR    D: Per DHS regulation, LESLEE completed and submitted PAS-RR to [place] [employer] via the Senior [employer] Line.  PAS-RR confirmation # is : [id number]    I: LESLEE spoke with pt and they are aware a PAS-RR has been submitted.  LESLEE reviewed with pt that they may be contacted for a follow up appointment within 10 days of hospital discharge if their SNF stay is < 30 days.  Contact information for [employer] was also provided.    A: Pt verbalized understanding.    P: Further questions may be directed to [employer] at #[phone], option #4 for PAS-RR staff.    LESLEE requested that MD write discharge orders.

## 2024-04-20 NOTE — PLAN OF CARE
Physical Therapy Discharge Summary    Reason for therapy discharge:    Discharged to transitional care facility.    Progress towards therapy goal(s). See goals on Care Plan in [facility] electronic health record for goal details.  Goals partially met.  Barriers to achieving goals:   discharge from facility.    Therapy recommendation(s):    Continued therapy is recommended.  Rationale/Recommendations:  cont PT at TCU to further address deficits and optimize functional recovery.

## 2024-04-20 NOTE — PLAN OF CARE
Goal Outcome Evaluation:  Discharge    Patient discharged to TCU with daughter  Care plan note discharge instructions reviewed with pt and daughter. IV removed. Packet sent with daughter to TCU    Listed belongings gathered and given to patient (including from security/pharmacy). Yes  Care Plan and Patient education resolved: Yes  Prescriptions if needed, hard copies sent with patient  Yes  Medication Bin checked and emptied on discharge Yes  SW/care coordinator/charge RN aware of discharge: Yes

## 2024-04-20 NOTE — DISCHARGE SUMMARY
"[facility]  Hospitalist Discharge Summary      Date of Admission:  4/12/2024  Date of Discharge:  4/20/2024  Discharging Provider: Jacquelyn Stubbs MD  Discharge Service: Hospitalist Service    Discharge Diagnoses   Acute hypoxic respiratory failure in the setting of COPD exacerbation   Severe obstruction by PFTs on 2021  Ongoing tobacco use  Known pulmonary nodules  Moderate malnutrition  RODNEY  Hepatic steatosis    Clinically Significant Risk Factors     # Moderate Malnutrition: based on nutrition assessment      Follow-ups Needed After Discharge   Follow-up Appointments     Follow Up and recommended labs and tests      Follow up with TCU provider.   Follow up with PCP within a week of discharge from TCU.  Follow up with pulmonary medicine in June.  Continue pulmonary rehab.            Unresulted Labs Ordered in the Past 30 Days of this Admission       No orders found from 3/13/2024 to 4/13/2024.            Discharge Disposition   Discharged to TCU  Condition at discharge: Good    Hospital Course   Acute hypoxic respiratory failure in the setting of COPD exacerbation   Severe obstruction by PFTs on 2021  *Hospitalization 8/2023 and 2/2024 for COPD exacerbation. CT chest 1/4/2024 with small pulmonary nodules measuring up to 3 mm, emphysema, biapical pleural parenchymal scarring, mild bronchial wall thickening.   Ongoing tobacco use  Known pulmonary nodules  * Follows with [facility]. Last clinic visit 1/2024.   * Onset of viral illness with nasal and chest congestion, productive cough, sore throat, chills on 4/7 with progressively worsening STORY and chest heaviness. No dizziness, syncope. No radiation of chest pain to arm or jaw. No recent travel, prolonged car rides. Notes that she has been \"sitting around\" a lot. No LIAN. No personal or family hx of bleeding coagulopathy or VTE. No recent surgery. No recent Covid. Has to sleep with head elevated at night. Pt continues to smoke 0.5 ppd.   *On " admission, tachycardic to 117, hypoxic to 72% on room air with ambulation.   *CMP, CBC WNL. Procal 0.06. Trop 10. D--dimer 0.52 (WNL age adjusted), . Covid, influenza, RSV negative. EKG sinus tachy. CXR negative.   * Serial troponins 8-10   * Echo: EF 60-65%. No regional wall motion abnormalities noted. The right ventricle is normal in structure, function and size. No evidence for significant valvular pathology.    IV solumedrol 60 mg BID increase Solu-Medrol to 60 mg every 8 hours to help with wheezing  Patient was placed to oral prednisone 40 mg daily on 4/15/2024.  Patient very short of breath and wheezing on 4/16/2024  Restarted  Solu-Medrol 40 mg every 8 hours on 4/16/2024  On 4/18 continues on Solu-Medrol 60 mg every 8 hours > Switch to prednisone 40 mg daily, starting in AM. > on 04/19/24 doing quite well.   Patient not taking PTA Trelegy Ellipta, continues on PTA budesonide inhaler  Likely to benefit from LABA + LAMA.> started Anoro Ellipta Inhaler 1 puff daily on 4/18/24.  On 04/19/24, breathing continues to improve.   She tells me she could not afford Trelegy ($800). However, feeling very well on current regimen.   Called pharmacy [facility] Ell[facility] $40 copay.   If she can not afford this will switch back to duonebs BID as she was doing before + PRN as before.  Albuterol nebulizers PRN while on LAMA.   Received Azithromycin X5 days given COPD exacerbation with new productive cough   Scheduled mucinex   Supplemental oxygen.   Outpatient Pulm follow-up with [facility] planned in June  Discussed the importance of smoking cessation, . She wants to stop cold turkey by herself  PT recommending discharge to TCU, anticipate discharge 4/20 if does well after above changes to regimen.  Ambulatory and Nocturnal oximetry on RA in prep for discharge on 04/18/24   Significant air-hunger leading to episodes of panic discussed on 04/19/24.She has been using PTA ativan for this and it is very effective. Discussed using this  PRN only and trying to limit use in order to prevent tolerance and loss of effectiveness. Discussed avoidance of use before driving. Also discussed increased fall risk. Defer consideration of low dose morphine for air hunger to her pulmonologist or PCP.  She was not interested in changing medications at this time.     Moderate malnutrition:   Supplements per nutrition services       RODNEY: She has struggled with anxiety for quite some time. She does not have any history of depression.   She wanted to try a medication to control her symptoms. Discussed and started escitalopram 5 mg daily on day of discharge. We discussed potential GI and psychotropic side effects and will start slow. Informational sheet added to discharge paperwork.   Continues on Ativan PRN for anxiety and insomnia as discussed above.      Hepatic steatosis: Stable, noted on US 4/3/2023. Follow up outpatient. Liver enzymes on 4/12 normal.       Consultations This Hospital Stay   ADVANCE DIRECTIVE IP CONSULT  RESPIRATORY CARE IP CONSULT  PHYSICAL THERAPY ADULT IP CONSULT  CARE MANAGEMENT / SOCIAL WORK IP CONSULT  VASCULAR ACCESS ADULT IP CONSULT  CARE MANAGEMENT / SOCIAL WORK IP CONSULT  PHYSICAL THERAPY ADULT IP CONSULT  OCCUPATIONAL THERAPY ADULT IP CONSULT  PHARMACY LIAISON FOR MEDICATION COVERAGE CONSULT  PHYSICAL THERAPY ADULT IP CONSULT  OCCUPATIONAL THERAPY ADULT IP CONSULT  SMOKING CESSATION PROGRAM IP CONSULT    Code Status   No CPR- Do NOT Intubate    Time Spent on this Encounter   I, Jacquelyn Stubbs MD, personally saw the patient today and spent greater than 30 minutes discharging this patient.       Jacquelyn Stubbs MD  [place] [address] [facility]  [address]  [place] [address]  Phone: [phone]  ______________________________________________________________________    Physical Exam   Vital Signs: Temp: 98.8  F (37.1  C) Temp src: Oral BP: 116/84 Pulse: 87   Resp: 18 SpO2: 91 % O2 Device: None (Room  air)    Weight: 111 lbs 8.84 oz  Constitutional:  NAD, cachectic, standing up with walker in room.   Neuropsyche:  alert and oriented, answers questions appropriately. Appears more calm.  Respiratory:      Appears less SOB today sitting at bed in normal closed, decreased air exchange, scattered wheezing, no crackles.   Cardiovascular:  Regular rate and rhythm, no edema.  GI:  soft, NT/ND, BS normal  Skin/Integumen:  No acute rash or sign of bleeding.          Primary Care Physician   Mikel Navarro    Discharge Orders      General info for SNF    Length of Stay Estimate: Short Term Care: Estimated # of Days <30  Condition at Discharge: Stable  Level of care:skilled   Rehabilitation Potential: Good  Admission H&P remains valid and up-to-date: Yes  Recent Chemotherapy: N/A  Use Nursing Home Standing Orders: Yes     Mantoux instructions    Give two-step Mantoux (PPD) Per Facility Policy Yes     Follow Up and recommended labs and tests    Follow up with TCU provider.   Follow up with PCP within a week of discharge from TCU.  Follow up with pulmonary medicine in June.  Continue pulmonary rehab.     Reason for your hospital stay    You were admitted with a COPD exacerbation.     Activity - Up ad rosalina     No CPR- Do NOT Intubate     Physical Therapy Adult Consult    Evaluate and treat as clinically indicated.    Reason:  Deconditioned state after acute illness.     Occupational Therapy Adult Consult    Evaluate and treat as clinically indicated.    Reason:  Deconditioned state after acute illness.     Oxygen (SNF/TCU) Discharge     Diet    Follow this diet upon discharge: Orders Placed This Encounter      Regular Diet Adult       Significant Results and Procedures   Most Recent 3 CBC's:  Recent Labs   Lab Test 04/13/24  1135 04/12/24  0711 02/20/24  0836   WBC 16.0* 9.9 10.0   HGB 12.6 14.3 12.4   MCV 87 88 85    293 446     Most Recent 3 BMP's:  Recent Labs   Lab Test 04/16/24  0957 04/13/24  1135 04/12/24  0711  24  0836   NA  --  139 137 139   POTASSIUM  --  4.3 4.0 5.7*   CHLORIDE  --  102 97* 100   CO2  --  27 27 29   BUN  --  11.0 12.2 14.7   CR  --  0.57 0.70 0.60   ANIONGAP  --  10 13 10   MOLLY  --  9.7 9.5 9.9   GLC 81 163* 109* 114*     Most Recent 2 LFT's:  Recent Labs   Lab Test 24  0711 24  1642   AST 30 21   ALT 17 13   ALKPHOS 95 102   BILITOTAL 0.3 0.4   ,   Results for orders placed or performed during the hospital encounter of 24   XR Chest 2 Views    Narrative    CHEST TWO VIEWS   2024 7:52 AM     HISTORY: Shortness of breath.     COMPARISON: 2024.      Impression    IMPRESSION: No acute cardiopulmonary disease.     SHERITA BEAL MD         SYSTEM ID:  [id number]   Echocardiogram Complete     Value    LVEF  60-65%    Narrative    [id number]  [id number]  [id number]  [id number]^JADYN^GERARDO^ERICA     [facility]  Echocardiography Laboratory  [address] [place] [address]     Name: ROCIO HARE  MRN: [id number]  : 1955  Study Date: 2024 11:07 AM  Age: 68 yrs  Gender: Female  Patient Location: [facility]  Reason For Study: Chest Pain, STORY  Ordering Physician: GERARDO SALAMANCA  Referring Physician: Byron Leary  Performed By: Jcarlos Sosa RDCS     BSA: 1.5 m2  Height: 63 in  Weight: 103 lb  HR: 72  BP: 110/75 mmHg  ______________________________________________________________________________  Procedure  Complete Portable Echo Adult. Optison (NDC #[id number]) given intravenously.  ______________________________________________________________________________  Interpretation Summary     1. Left ventricular systolic function is normal. The visual ejection fraction  is 60-65%.  2. No regional wall motion abnormalities noted.  3. The right ventricle is normal in structure, function and size.  4. No evidence for significant valvular pathology.  ______________________________________________________________________________  Left  Ventricle  The left ventricle is normal in size. There is normal left ventricular wall  thickness. Left ventricular systolic function is normal. The visual ejection  fraction is 60-65%. Grade I or early diastolic dysfunction. No regional wall  motion abnormalities noted.     Right Ventricle  The right ventricle is normal in structure, function and size.     Atria  Normal left atrial size. Right atrial size is normal. There is no color  Doppler evidence of an atrial shunt.     Mitral Valve  The mitral valve is normal in structure and function.     Tricuspid Valve  The tricuspid valve is normal in structure and function.     Aortic Valve  The aortic valve is normal in structure and function.     Pulmonic Valve  The pulmonic valve is not well seen, but is grossly normal.     Vessels  Normal size aorta. IVC diameter <2.1 cm collapsing >50% with sniff suggests a  normal RA pressure of 3 mmHg.     Pericardium  There is no pericardial effusion.     Rhythm  Sinus rhythm was noted.  ______________________________________________________________________________  MMode/2D Measurements & Calculations  IVSd: 0.80 cm     LVIDd: 3.3 cm  LVIDs: 1.9 cm  LVPWd: 0.88 cm  FS: 42.9 %  LV mass(C)d: 74.0 grams  LV mass(C)dI: 50.8 grams/m2  Ao root diam: 3.0 cm  LA dimension: 2.6 cm  LA/Ao: 0.87  Ao root diam index Ht(cm/m): 1.9  Ao root diam index BSA (cm/m2): 2.1  LA Volume (BP): 12.0 ml  LA Volume Index (BP): 8.2 ml/m2  RWT: 0.53     TAPSE: 2.0 cm     Doppler Measurements & Calculations  MV E max joshua: 64.4 cm/sec  MV A max joshua: 87.1 cm/sec  MV E/A: 0.74  MV dec slope: 325.4 cm/sec2  MV dec time: 0.20 sec  PA acc time: 0.10 sec  TR max joshua: 272.8 cm/sec  TR max P.8 mmHg  E/E' av.6  Lateral E/e': 9.9  Medial E/e': 9.4  RV S Joshua: 15.6 cm/sec     ______________________________________________________________________________  Report approved by: Marion Templeton 2024 11:55 AM             Discharge Medications   Current  Discharge Medication List        START taking these medications    Details   albuterol (PROAIR HFA/PROVENTIL HFA/VENTOLIN HFA) 108 (90 Base) MCG/ACT inhaler Inhale 2 puffs into the lungs every 4 hours as needed for wheezing or shortness of breath    Comments: Pharmacy may dispense brand covered by insurance (Proair, or proventil or ventolin or generic albuterol inhaler)  Associated Diagnoses: COPD exacerbation (H)      albuterol (PROVENTIL) (2.5 MG/3ML) 0.083% neb solution Take 1 vial (2.5 mg) by nebulization every 4 hours as needed for wheezing    Associated Diagnoses: COPD exacerbation (H)      benzonatate (TESSALON) 100 MG capsule Take 1 capsule (100 mg) by mouth 3 times daily as needed for cough    Associated Diagnoses: COPD exacerbation (H)      escitalopram (LEXAPRO) 5 MG tablet Take 1 tablet (5 mg) by mouth daily    Associated Diagnoses: Generalized anxiety disorder      multivitamin w/minerals (THERA-VIT-M) tablet Take 1 tablet by mouth daily    Associated Diagnoses: Acute respiratory failure with hypoxia (H)      pantoprazole (PROTONIX) 20 MG EC tablet Take 1 tablet (20 mg) by mouth every morning (before breakfast) for 8 days    Comments: While on steroids.  Associated Diagnoses: COPD exacerbation (H)      predniSONE (DELTASONE) 10 MG tablet 4 tabs daily for 1 day, then 3 tabs daily for 2 days, then 2 tabs daily for 2 days, then 1 tab daily for 2 days, then stop    Associated Diagnoses: COPD exacerbation (H)      umeclidinium-vilanterol (ANORO ELLIPTA) 62.5-25 MCG/ACT oral inhaler Inhale 1 puff into the lungs daily    Associated Diagnoses: COPD exacerbation (H)           CONTINUE these medications which have CHANGED    Details   LORazepam (ATIVAN) 0.5 MG tablet Take 1 tablet (0.5 mg) by mouth every 6 hours as needed for anxiety  Qty: 15 tablet, Refills: 0    Associated Diagnoses: COPD exacerbation (H)           CONTINUE these medications which have NOT CHANGED    Details   aspirin (ASA) 325 MG EC tablet  Take 325 mg by mouth every 6 hours as needed for moderate pain      budesonide (PULMICORT) 0.5 MG/2ML neb solution Take 2 mLs (0.5 mg) by nebulization 2 times daily  Qty: 360 mL, Refills: 3    Associated Diagnoses: COPD exacerbation (H)      guaiFENesin (MUCINEX) 600 MG 12 hr tablet Take 1,200 mg by mouth 2 times daily as needed for congestion           STOP taking these medications       Fluticasone-Umeclidin-Vilant (TRELEGY ELLIPTA) 100-62.5-25 MCG/ACT oral inhaler Comments:   Reason for Stopping:         ipratropium - albuterol 0.5 mg/2.5 mg/3 mL (DUONEB) 0.5-2.5 (3) MG/3ML neb solution Comments:   Reason for Stopping:             Allergies   Allergies   Allergen Reactions    Pcn [Penicillins]      convulsions

## 2024-04-20 NOTE — PLAN OF CARE
Goal Outcome Evaluation:      Plan of Care Reviewed With: patient    Overall Patient Progress: improving    DATE & TIME: 4/19/24 1500 -4/20/24 0730  Cognitive Concerns/ Orientation : A&O x4. Reports feeling mild anxiety regarding going to TCU. Reassurance and emotional support provided. PRN Ativan given at bedtime  BEHAVIOR & AGGRESSION TOOL COLOR: Green       ABNL VS/O2: VSS on RA. STORY improving, O2 sats >94% when ambulating, see note  MOBILITY: Independent in room. SBA with W in hallway. Went for 3 long walks around the entire unit. Rest periods encouraged  PAIN MANAGEMENT: Denies  DIET: Regular, fair appetite, prefers sugary items/sweets  BOWEL/BLADDER: Continent, up to BR  ABNL LAB: WBC 16  DRAIN/DEVICES: PIV SL   SKIN: Scattered bruising. Declines full skin assessment  D/C DATE: To [facility] TCU 4/20 around 1500. Daughter to come at 1430 to review AVS with nurse and pt then transport pt to [facility]  Then transport her mother to [facility] TCU  OTHER IMPORTANT INFO: Expiratory wheezes throughout. Rescue inhaler at the bedside (ok per MD). Receiving PO prednisone, scheduled nebs.

## 2024-04-20 NOTE — PROGRESS NOTES
Care Management Follow Up    Length of Stay (days): 7    Expected Discharge Date: 04/20/2024     Concerns to be Addressed: adjustment to diagnosis/illness, discharge planning, substance/tobacco abuse/use  Lives alone in a house that is becoming too much for patient  Patient plan of care discussed at interdisciplinary rounds: Yes    Anticipated Discharge Disposition: [facility] TCU  Disposition Comments:  Anticipated Discharge Services:   Anticipated Discharge DME:     Patient/family educated on Medicare website which has current facility and service quality ratings:    Education Provided on the Discharge Plan: Yes  Patient/Family in Agreement with the Plan: yes    Referrals Placed by CM/SW:    Private pay costs discussed: private room/amenity fees and transportation costs    Additional Information:  Referrals were made to TCU.  Writer met with patient and her daughter Sonia.  Reviewed [employer] medicare SNF insurance coverage and extra cost for private room.   Patient prefers a semi-private room.  Discussed transportation medivan cost vs daughter transporting and the $75.00 fee for the portable oxygen tank to be delivered to the hospital to use for transport.  Daughter plans to transport patient and they understand the oxygen fee. Resources given, at daughter's request, for private duty HHA services which she plans to arrange for patient once she discharges home from the TCU.  She plans to use these services  to support patient at home while they look into senior housing/assisted living facility.  She also plans to arrange for a [facility] unit for her mother at home.  Of the TCU referrals made, [facility] can offer patient a semi-private room for Saturday.   Daughter will arrive at 1430 to patient's room, review the AVS with nurse and patient.  Then transport her mother to [facility].  They have the address and know to drive the TCU entrance and request assistance into the facility.   Patient is anxious above the move and  daughter provides reassurance.  [facility] SNF auhtorization received thru [employer].  The authorization begins 4/20 thru 4/26. The authorization # is [id number].  The auhtorization letter was faxed to [facility] and copy of letter is on the front of patient's chart.  Dr Stubbs aware of discharge plan.    DANIELLE BarahonaSW

## 2024-04-21 NOTE — PLAN OF CARE
Occupational Therapy Discharge Summary    Reason for therapy discharge:    Discharged to transitional care facility.    Progress towards therapy goal(s). See goals on Care Plan in [facility] electronic health record for goal details.  Goals partially met.  Barriers to achieving goals:   discharge from facility.    Therapy recommendation(s):    Continued therapy is recommended.  Rationale/Recommendations:  OT at TCU to progress ADL and IADL independence.

## 2024-04-22 ENCOUNTER — DOCUMENTATION ONLY (OUTPATIENT)
Dept: GERIATRICS | Facility: CLINIC | Age: 69
End: 2024-04-22

## 2024-04-22 ENCOUNTER — PATIENT OUTREACH (OUTPATIENT)
Dept: CARE COORDINATION | Facility: CLINIC | Age: 69
End: 2024-04-22

## 2024-04-22 ENCOUNTER — TRANSITIONAL CARE UNIT VISIT (OUTPATIENT)
Dept: GERIATRICS | Facility: CLINIC | Age: 69
End: 2024-04-22
Payer: COMMERCIAL

## 2024-04-22 VITALS
BODY MASS INDEX: 19.14 KG/M2 | OXYGEN SATURATION: 92 % | SYSTOLIC BLOOD PRESSURE: 142 MMHG | HEIGHT: 63 IN | RESPIRATION RATE: 19 BRPM | TEMPERATURE: 97.2 F | HEART RATE: 71 BPM | WEIGHT: 108 LBS | DIASTOLIC BLOOD PRESSURE: 84 MMHG

## 2024-04-22 DIAGNOSIS — F41.1 GENERALIZED ANXIETY DISORDER: ICD-10-CM

## 2024-04-22 DIAGNOSIS — Z87.891 PERSONAL HISTORY OF TOBACCO USE, PRESENTING HAZARDS TO HEALTH: ICD-10-CM

## 2024-04-22 DIAGNOSIS — E44.0 MODERATE MALNUTRITION (H): ICD-10-CM

## 2024-04-22 DIAGNOSIS — J44.1 COPD WITH ACUTE EXACERBATION (H): Primary | ICD-10-CM

## 2024-04-22 DIAGNOSIS — R53.81 PHYSICAL DECONDITIONING: ICD-10-CM

## 2024-04-22 DIAGNOSIS — R79.89 ELEVATED LFTS: ICD-10-CM

## 2024-04-22 PROCEDURE — 99310 SBSQ NF CARE HIGH MDM 45: CPT | Performed by: NURSE PRACTITIONER

## 2024-04-22 NOTE — LETTER
[facility]   To:   [facility] SW          Please give to facility    From:   Antonia Lemus  [facility]  Care Coordinator   [facility]   P: [phone]   [email or web address]   Patient Name:  Tammie Zeng YOB: 1955   Admit date: 4/20/24      *Information Needed:  Please contact me when the patient will discharge (or if they will move to long term care)- include the discharge date, disposition, and main diagnosis   If the patient is discharged with home care services, please provide the name of the agency    Also- Please inform me if a care conference is being held.   Phone, Fax or Email with information                              Thank you

## 2024-04-22 NOTE — PROGRESS NOTES
[facility] GERIATRICS    PRIMARY CARE PROVIDER AND CLINIC:  Mikel Navarro MD, [address] / [place] [address]  Chief Complaint   Patient presents with    Hospital F/U      [place] Medical Record Number:  [id number]  Place of Service where encounter took place:  [facility] (TCU) [25]    Tammie Zeng  is a 68 year old  (1955), admitted to the above facility from  [facility]. Hospital stay 4/12/24 through 4/20/24..   HPI:    PMH COPD, tobacco use,   COPD exacerbation/acute hypoxic respiratory failure ongoing tobacco use d/o, recent hospitalization 8/2023 and 2/2024 for COPD exacerbation  IV solumederol--> prednisone taper, azithormycin X 5 days, teaching on importance of smoking cessation, follows with [facility] f/u scheduled in June Malnutrition, ongoing  RODNEY: ongoing, started on citalopram  On exam today: patient sitting up on edge of bed, denies SOB at rest or STORY, does has a loose cough noted on exam, patient denies any change in breathing or coughing, state she walked with therapy this afternoon down to the general store, denies pain or discomfort, N/V/D or constipation, states she slept fair last night and appetite is good  CODE STATUS/ADVANCE DIRECTIVES DISCUSSION:  No CPR- Do NOT Intubate  DNR / DNI  ALLERGIES:   Allergies   Allergen Reactions    Pcn [Penicillins]      convulsions      PAST MEDICAL HISTORY:   Past Medical History:   Diagnosis Date    Chronic obstructive pulmonary disease, unspecified COPD type (H) 06/13/2017    has seen [facility], fev1 under 1.0, hospitalized twice in 2023, once in 2024    Depressive disorder, not elsewhere classified 05/2006    following sudden death of her mother    Elevated LFTs 2023    hepatic steatosis 4/23 us    Generalized anxiety disorder     Hepatic steatosis 04/2023    on us done for elev lfts    Herpes simplex without mention of complication     MOUTH    Nephrolithiasis     TOBACCO ABUSE-CONTINUOUS        PAST SURGICAL HISTORY:   has a past surgical history that includes cholecystectomy, laporoscopic (1980's) and tubal ligation (1998).  FAMILY HISTORY: family history includes Allergies in her daughter; Asthma in her daughter; Cancer in her maternal aunt; Depression in her mother; Diabetes in her paternal grandmother; Eye Disorder in her mother; Gastrointestinal Disease in her daughter and maternal grandmother; Osteoporosis in her mother; Respiratory in her mother.  SOCIAL HISTORY:   reports that she has been smoking cigarettes. She has a 12.5 pack-year smoking history. She has never used smokeless tobacco. She reports current alcohol use. She reports that she does not use drugs.  Patient's living condition: lives alone    Post Discharge Medication Reconciliation Status:   MED REC REQUIRED  Post Medication Reconciliation Status: discharge medications reconciled and changed, per note/orders       Current Outpatient Medications   Medication Sig Dispense Refill    albuterol (PROAIR HFA/PROVENTIL HFA/VENTOLIN HFA) 108 (90 Base) MCG/ACT inhaler Inhale 2 puffs into the lungs every 4 hours as needed for wheezing or shortness of breath      albuterol (PROVENTIL) (2.5 MG/3ML) 0.083% neb solution Take 1 vial (2.5 mg) by nebulization every 4 hours as needed for wheezing      aspirin (ASA) 325 MG EC tablet Take 325 mg by mouth every 6 hours as needed for moderate pain      benzonatate (TESSALON) 100 MG capsule Take 1 capsule (100 mg) by mouth 3 times daily as needed for cough      budesonide (PULMICORT) 0.5 MG/2ML neb solution Take 2 mLs (0.5 mg) by nebulization 2 times daily 360 mL 3    escitalopram (LEXAPRO) 5 MG tablet Take 1 tablet (5 mg) by mouth daily      guaiFENesin (MUCINEX) 600 MG 12 hr tablet Take 1,200 mg by mouth 2 times daily as needed for congestion      LORazepam (ATIVAN) 0.5 MG tablet Take 1 tablet (0.5 mg) by mouth every 6 hours as needed for anxiety 15 tablet 0    multivitamin w/minerals (THERA-VIT-M) tablet  "Take 1 tablet by mouth daily      pantoprazole (PROTONIX) 20 MG EC tablet Take 1 tablet (20 mg) by mouth every morning (before breakfast) for 8 days      predniSONE (DELTASONE) 10 MG tablet 4 tabs daily for 1 day, then 3 tabs daily for 2 days, then 2 tabs daily for 2 days, then 1 tab daily for 2 days, then stop      umeclidinium-vilanterol (ANORO ELLIPTA) 62.5-25 MCG/ACT oral inhaler Inhale 1 puff into the lungs daily       No current facility-administered medications for this visit.       ROS:  10 point ROS of systems including Constitutional, Eyes, Respiratory, Cardiovascular, Gastroenterology, Genitourinary, Integumentary, Musculoskeletal, Psychiatric were all negative except for pertinent positives noted in my HPI.    Vitals:  BP (!) 142/84   Pulse 71   Temp 97.2  F (36.2  C)   Resp 19   Ht 1.6 m (5' 3\")   Wt 49 kg (108 lb)   LMP 01/24/2006   SpO2 92%   BMI 19.13 kg/m    Exam:  GENERAL APPEARANCE:  Alert, in no distress, thin  ENT:  Mouth and posterior oropharynx normal, moist mucous membranes, normal hearing acuity  EYES:  EOM, conjunctivae, lids, pupils and irises normal, PERRL  RESP:  respiratory effort and palpation of chest normal, lungs clear to auscultation , no respiratory distress, diminished breath sounds throughout fields bilaterally  CV:  Palpation and auscultation of heart done , regular rate and rhythm, no murmur, rub, or gallop, no edema  ABDOMEN:  normal bowel sounds, soft, nontender, no hepatosplenomegaly or other masses  M/S:   patient sitting up on edge of bed  SKIN:  Inspection of skin and subcutaneous tissue baseline  NEURO:   speech wnl  PSYCH:  affect and mood normal    Lab/Diagnostic data:  Recent labs in [facility] reviewed by me today.  and Most Recent 3 CBC's:  Recent Labs   Lab Test 04/13/24  1135 04/12/24  0711 02/20/24  0836   WBC 16.0* 9.9 10.0   HGB 12.6 14.3 12.4   MCV 87 88 85    293 446     Most Recent 3 BMP's:  Recent Labs   Lab Test 04/16/24  0957 04/13/24  1135 " 04/12/24  0711 02/20/24  0836   NA  --  139 137 139   POTASSIUM  --  4.3 4.0 5.7*   CHLORIDE  --  102 97* 100   CO2  --  27 27 29   BUN  --  11.0 12.2 14.7   CR  --  0.57 0.70 0.60   ANIONGAP  --  10 13 10   MOLLY  --  9.7 9.5 9.9   GLC 81 163* 109* 114*     Most Recent 2 LFT's:  Recent Labs   Lab Test 04/12/24  0711 02/19/24  1642   AST 30 21   ALT 17 13   ALKPHOS 95 102   BILITOTAL 0.3 0.4       ASSESSMENT/PLAN:    (J44.1) COPD with acute exacerbation (H)  (primary encounter diagnosis)  (R53.81) Physical deconditioning  (Z87.891) Personal history of tobacco use, presenting hazards to health  Comment: acute/ongoing  Plan: PT and OT, monitor SaO2 at rest and with activity, continue prednisone taper, pulmicort nebs BID, albuterol nebs q 4 hours prn, anoro ellipta inhaler 1 Puff daily, schedule mucinex 600mg q 12 hours    (E44.0) Moderate malnutrition (H24)  Comment: acute/ongoing  Plan: dietician to follow    (F41.1) Generalized anxiety disorder  Comment: acute/ongoing  Plan: started on lexapro 5mg QD, continue ativan 0.5mg q 6 hours prn, offer ACP    (R79.89) Elevated LFTs  Comment: ongoing  Hepatic steatosis noted on  4/3/24,   Plan: follow labs, f/u as OP      Orders:  CBC and BMP on Thursday      Total time spent with patient visit at the skilled nursing facility was 45 min including patient visit and review of past records. Reviewed internal medicine and pulmonology progress notes, reviewed imaging and medication changes, discussed hospitalization, medications and future lab testing. Daughter at bedside during, Patient and Daughter wondering when patient will be ready to discharge to home    Electronically signed by:  Tonya Lynn Haase, APRN CNP

## 2024-04-22 NOTE — PROGRESS NOTES
Clinic Care Coordination Contact  Care Coordination Transition Communication    Clinical Data: Patient was hospitalized at [facility] from 4/20/24 to 4/20/24 with diagnosis of Acute hypoxic respiratory failure in the setting of COPD exacerbation   Severe obstruction by PFTs on 2021  Ongoing tobacco use  Known pulmonary nodules  Moderate malnutrition  RODNEY  Hepatic steatosis     Assessment: Patient has transitioned to TCU/ARU for short term rehabilitation:    Facility Name: [facility]  Transition Communication:  Notified facility of Primary Care- Care Coordination support via Epic fax.    Plan: Care Coordinator will await notification from facility staff informing of patient's discharge plans/needs. Care Coordinator will review chart and outreach to facility staff every 4 weeks and as needed.     Antonia Lemus,  [facility]  Clinic Care Coordinator  [facility]  [phone]  [email or web address]  4

## 2024-04-22 NOTE — LETTER
4/22/2024        RE: Tammie Zeng  [address]  [place] [address]        [facility] GERIATRICS    PRIMARY CARE PROVIDER AND CLINIC:  Mikel Navarro MD, [address] [facility] [address] / [place] [address]  Chief Complaint   Patient presents with     Hospital F/U      [place] Medical Record Number:  [id number]  Place of Service where encounter took place:  [facility] (TCU) [25]    Tammie Zeng  is a 68 year old  (1955), admitted to the above facility from  [facility]. Hospital stay 4/12/24 through 4/20/24..   HPI:    PMH COPD, tobacco use,   COPD exacerbation/acute hypoxic respiratory failure ongoing tobacco use d/o, recent hospitalization 8/2023 and 2/2024 for COPD exacerbation  IV solumederol--> prednisone taper, azithormycin X 5 days, teaching on importance of smoking cessation, follows [facility] f/u scheduled in June Malnutrition, ongoing  RODNEY: ongoing, started on citalopram  On exam today: patient sitting up on edge of bed, denies SOB at rest or STORY, does has a loose cough noted on exam, patient denies any change in breathing or coughing, state she walked with therapy this afternoon down to the general store, denies pain or discomfort, N/V/D or constipation, states she slept fair last night and appetite is good  CODE STATUS/ADVANCE DIRECTIVES DISCUSSION:  No CPR- Do NOT Intubate  DNR / DNI  ALLERGIES:   Allergies   Allergen Reactions     Pcn [Penicillins]      convulsions      PAST MEDICAL HISTORY:   Past Medical History:   Diagnosis Date     Chronic obstructive pulmonary disease, unspecified COPD type (H) 06/13/2017    has [facility], fev1 under 1.0, hospitalized twice in 2023, once in 2024     Depressive disorder, not elsewhere classified 05/2006    following sudden death of her mother     Elevated LFTs 2023    hepatic steatosis 4/23 us     Generalized anxiety disorder      Hepatic steatosis 04/2023    on us done for elev lfts     Herpes  simplex without mention of complication     MOUTH     Nephrolithiasis      TOBACCO ABUSE-CONTINUOUS       PAST SURGICAL HISTORY:   has a past surgical history that includes cholecystectomy, laporoscopic (1980's) and tubal ligation (1998).  FAMILY HISTORY: family history includes Allergies in her daughter; Asthma in her daughter; Cancer in her maternal aunt; Depression in her mother; Diabetes in her paternal grandmother; Eye Disorder in her mother; Gastrointestinal Disease in her daughter and maternal grandmother; Osteoporosis in her mother; Respiratory in her mother.  SOCIAL HISTORY:   reports that she has been smoking cigarettes. She has a 12.5 pack-year smoking history. She has never used smokeless tobacco. She reports current alcohol use. She reports that she does not use drugs.  Patient's living condition: lives alone    Post Discharge Medication Reconciliation Status:   MED REC REQUIRED  Post Medication Reconciliation Status: discharge medications reconciled and changed, per note/orders       Current Outpatient Medications   Medication Sig Dispense Refill     albuterol (PROAIR HFA/PROVENTIL HFA/VENTOLIN HFA) 108 (90 Base) MCG/ACT inhaler Inhale 2 puffs into the lungs every 4 hours as needed for wheezing or shortness of breath       albuterol (PROVENTIL) (2.5 MG/3ML) 0.083% neb solution Take 1 vial (2.5 mg) by nebulization every 4 hours as needed for wheezing       aspirin (ASA) 325 MG EC tablet Take 325 mg by mouth every 6 hours as needed for moderate pain       benzonatate (TESSALON) 100 MG capsule Take 1 capsule (100 mg) by mouth 3 times daily as needed for cough       budesonide (PULMICORT) 0.5 MG/2ML neb solution Take 2 mLs (0.5 mg) by nebulization 2 times daily 360 mL 3     escitalopram (LEXAPRO) 5 MG tablet Take 1 tablet (5 mg) by mouth daily       guaiFENesin (MUCINEX) 600 MG 12 hr tablet Take 1,200 mg by mouth 2 times daily as needed for congestion       LORazepam (ATIVAN) 0.5 MG tablet Take 1 tablet  "(0.5 mg) by mouth every 6 hours as needed for anxiety 15 tablet 0     multivitamin w/minerals (THERA-VIT-M) tablet Take 1 tablet by mouth daily       pantoprazole (PROTONIX) 20 MG EC tablet Take 1 tablet (20 mg) by mouth every morning (before breakfast) for 8 days       predniSONE (DELTASONE) 10 MG tablet 4 tabs daily for 1 day, then 3 tabs daily for 2 days, then 2 tabs daily for 2 days, then 1 tab daily for 2 days, then stop       umeclidinium-vilanterol (ANORO ELLIPTA) 62.5-25 MCG/ACT oral inhaler Inhale 1 puff into the lungs daily       No current facility-administered medications for this visit.       ROS:  10 point ROS of systems including Constitutional, Eyes, Respiratory, Cardiovascular, Gastroenterology, Genitourinary, Integumentary, Musculoskeletal, Psychiatric were all negative except for pertinent positives noted in my HPI.    Vitals:  BP (!) 142/84   Pulse 71   Temp 97.2  F (36.2  C)   Resp 19   Ht 1.6 m (5' 3\")   Wt 49 kg (108 lb)   LMP 01/24/2006   SpO2 92%   BMI 19.13 kg/m    Exam:  GENERAL APPEARANCE:  Alert, in no distress, thin  ENT:  Mouth and posterior oropharynx normal, moist mucous membranes, normal hearing acuity  EYES:  EOM, conjunctivae, lids, pupils and irises normal, PERRL  RESP:  respiratory effort and palpation of chest normal, lungs clear to auscultation , no respiratory distress, diminished breath sounds throughout fields bilaterally  CV:  Palpation and auscultation of heart done , regular rate and rhythm, no murmur, rub, or gallop, no edema  ABDOMEN:  normal bowel sounds, soft, nontender, no hepatosplenomegaly or other masses  M/S:   patient sitting up on edge of bed  SKIN:  Inspection of skin and subcutaneous tissue baseline  NEURO:   speech wnl  PSYCH:  affect and mood normal    Lab/Diagnostic data:  Recent labs in [facility] reviewed by me today.  and Most Recent 3 CBC's:  Recent Labs   Lab Test 04/13/24  1135 04/12/24  0711 02/20/24  0836   WBC 16.0* 9.9 10.0   HGB 12.6 14.3 " 12.4   MCV 87 88 85    293 446     Most Recent 3 BMP's:  Recent Labs   Lab Test 04/16/24  0957 04/13/24  1135 04/12/24  0711 02/20/24  0836   NA  --  139 137 139   POTASSIUM  --  4.3 4.0 5.7*   CHLORIDE  --  102 97* 100   CO2  --  27 27 29   BUN  --  11.0 12.2 14.7   CR  --  0.57 0.70 0.60   ANIONGAP  --  10 13 10   MOLLY  --  9.7 9.5 9.9   GLC 81 163* 109* 114*     Most Recent 2 LFT's:  Recent Labs   Lab Test 04/12/24  0711 02/19/24  1642   AST 30 21   ALT 17 13   ALKPHOS 95 102   BILITOTAL 0.3 0.4       ASSESSMENT/PLAN:    (J44.1) COPD with acute exacerbation (H)  (primary encounter diagnosis)  (R53.81) Physical deconditioning  (Z87.891) Personal history of tobacco use, presenting hazards to health  Comment: acute/ongoing  Plan: PT and OT, monitor SaO2 at rest and with activity, continue prednisone taper, pulmicort nebs BID, albuterol nebs q 4 hours prn, anoro ellipta inhaler 1 Puff daily, schedule mucinex 600mg q 12 hours    (E44.0) Moderate malnutrition (H24)  Comment: acute/ongoing  Plan: dietician to follow    (F41.1) Generalized anxiety disorder  Comment: acute/ongoing  Plan: started on lexapro 5mg QD, continue ativan 0.5mg q 6 hours prn, offer ACP    (R79.89) Elevated LFTs  Comment: ongoing  Hepatic steatosis noted on US 4/3/24,   Plan: follow labs, f/u as OP      Orders:  CBC and BMP on Thursday      Total time spent with patient visit at the [facility] nursing facility was 45 min including patient visit and review of past records. Reviewed internal medicine and pulmonology progress notes, reviewed imaging and medication changes, discussed hospitalization, medications and future lab testing. Daughter at bedside during, Patient and Daughter wondering when patient will be ready to discharge to home    Electronically signed by:  Tonya Lynn Haase, APRN CNP                   Sincerely,        Tonya Lynn Haase, APRN CNP

## 2024-04-23 ENCOUNTER — DISCHARGE SUMMARY NURSING HOME (OUTPATIENT)
Dept: GERIATRICS | Facility: CLINIC | Age: 69
End: 2024-04-23
Payer: COMMERCIAL

## 2024-04-23 VITALS
OXYGEN SATURATION: 92 % | HEART RATE: 71 BPM | TEMPERATURE: 97.2 F | WEIGHT: 108 LBS | HEIGHT: 63 IN | BODY MASS INDEX: 19.14 KG/M2 | SYSTOLIC BLOOD PRESSURE: 142 MMHG | DIASTOLIC BLOOD PRESSURE: 84 MMHG | RESPIRATION RATE: 19 BRPM

## 2024-04-23 DIAGNOSIS — F41.1 GENERALIZED ANXIETY DISORDER: ICD-10-CM

## 2024-04-23 DIAGNOSIS — E44.0 MODERATE MALNUTRITION (H): ICD-10-CM

## 2024-04-23 DIAGNOSIS — R53.81 PHYSICAL DECONDITIONING: ICD-10-CM

## 2024-04-23 DIAGNOSIS — R79.89 ELEVATED LFTS: ICD-10-CM

## 2024-04-23 DIAGNOSIS — J44.1 COPD WITH ACUTE EXACERBATION (H): Primary | ICD-10-CM

## 2024-04-23 DIAGNOSIS — Z87.891 PERSONAL HISTORY OF TOBACCO USE, PRESENTING HAZARDS TO HEALTH: ICD-10-CM

## 2024-04-23 PROCEDURE — 99316 NF DSCHRG MGMT 30 MIN+: CPT | Performed by: NURSE PRACTITIONER

## 2024-04-23 NOTE — PROGRESS NOTES
[facility] GERIATRICS DISCHARGE SUMMARY  PATIENT'S NAME: Tammie Zeng  YOB: 1955  MEDICAL RECORD NUMBER:  [id number]  Place of Service where encounter took place:  [facility]) [25]    PRIMARY CARE PROVIDER AND CLINIC RESPONSIBLE AFTER TRANSFER:   Mikel Navarro MD, [address] / [place] [address]    [facility] Provider     Transferring providers: Tonya Lynn Haase, APRN CNP, Laz Howard MD  Recent Hospitalization/ED:  [facility] Hospital stay 4/12/24 to 4/20/24.  Date of SNF Admission: April 20, 2024  Date of SNF (anticipated) Discharge: April 24, 2024  Discharged to: previous independent home  Cognitive Scores: BIMS: 15/15 and Short blessed: 2/28  Physical Function: Ambulating >1000 ft with without a device  DME: no device    CODE STATUS/ADVANCE DIRECTIVES DISCUSSION:  No CPR- Do NOT Intubate   ALLERGIES: Pcn [penicillins]    NURSING FACILITY COURSE   Medication Changes/Rationale:   See assessment and plan    Summary of nursing facility stay:   Patient quickly advanced to walking > 1000 feet without a device, independent with ADL's, patient denies SOB or STORY. States she is ready to discharge to home will Discharge with home PT through [facility]    Discharge Medications:  MED REC REQUIRED  Post Medication Reconciliation Status: medication reconcilation previously completed during another office visit       Current Outpatient Medications   Medication Sig Dispense Refill    albuterol (PROAIR HFA/PROVENTIL HFA/VENTOLIN HFA) 108 (90 Base) MCG/ACT inhaler Inhale 2 puffs into the lungs every 4 hours as needed for wheezing or shortness of breath      albuterol (PROVENTIL) (2.5 MG/3ML) 0.083% neb solution Take 1 vial (2.5 mg) by nebulization every 4 hours as needed for wheezing      aspirin (ASA) 325 MG EC tablet Take 325 mg by mouth every 6 hours as needed for moderate pain      benzonatate (TESSALON) 100 MG capsule Take 1 capsule (100 mg) by mouth 3  "times daily as needed for cough      budesonide (PULMICORT) 0.5 MG/2ML neb solution Take 2 mLs (0.5 mg) by nebulization 2 times daily 360 mL 3    escitalopram (LEXAPRO) 5 MG tablet Take 1 tablet (5 mg) by mouth daily      guaiFENesin (MUCINEX) 600 MG 12 hr tablet Take 1,200 mg by mouth 2 times daily as needed for congestion      LORazepam (ATIVAN) 0.5 MG tablet Take 1 tablet (0.5 mg) by mouth every 6 hours as needed for anxiety 15 tablet 0    multivitamin w/minerals (THERA-VIT-M) tablet Take 1 tablet by mouth daily      pantoprazole (PROTONIX) 20 MG EC tablet Take 1 tablet (20 mg) by mouth every morning (before breakfast) for 8 days      predniSONE (DELTASONE) 10 MG tablet 4 tabs daily for 1 day, then 3 tabs daily for 2 days, then 2 tabs daily for 2 days, then 1 tab daily for 2 days, then stop      umeclidinium-vilanterol (ANORO ELLIPTA) 62.5-25 MCG/ACT oral inhaler Inhale 1 puff into the lungs daily            Controlled medications:   not applicable/none     Past Medical History:   Past Medical History:   Diagnosis Date    Chronic obstructive pulmonary disease, unspecified COPD type (H) 06/13/2017    has seen [place] [facility], fev1 under 1.0, hospitalized twice in 2023, once in 2024    Depressive disorder, not elsewhere classified 05/2006    following sudden death of her mother    Elevated LFTs 2023    hepatic steatosis 4/23     Generalized anxiety disorder     Hepatic steatosis 04/2023    on us done for elev lfts    Herpes simplex without mention of complication     MOUTH    Nephrolithiasis     TOBACCO ABUSE-CONTINUOUS      Physical Exam:   Vitals: BP (!) 142/84   Pulse 71   Temp 97.2  F (36.2  C)   Resp 19   Ht 1.6 m (5' 3\")   Wt 49 kg (108 lb)   LMP 01/24/2006   SpO2 92%   BMI 19.13 kg/m    BMI: Body mass index is 19.13 kg/m .  GENERAL APPEARANCE:  Alert, in no distress, thin  ENT:  Mouth and posterior oropharynx normal, moist mucous membranes, normal hearing acuity  EYES:  EOM, conjunctivae, lids, pupils and " irises normal, PERRL  RESP:  respiratory effort and palpation of chest normal, lungs clear to auscultation , no respiratory distress, diminished breath sounds throughout fields bilaterally  CV:  Palpation and auscultation of heart done , regular rate and rhythm, no murmur, rub, or gallop, no edema  ABDOMEN:  normal bowel sounds, soft, nontender, no hepatosplenomegaly or other masses  M/S:   patient sitting on edge of bed  SKIN:  Inspection of skin and subcutaneous tissue baseline  NEURO:   speech wnl  PSYCH:  affect and mood normal     SNF labs: Recent labs in [facility] reviewed by me today.  and Most Recent 3 CBC's:  Recent Labs   Lab Test 04/13/24  1135 04/12/24  0711 02/20/24  0836   WBC 16.0* 9.9 10.0   HGB 12.6 14.3 12.4   MCV 87 88 85    293 446     Most Recent 3 BMP's:  Recent Labs   Lab Test 04/16/24  0957 04/13/24  1135 04/12/24  0711 02/20/24  0836   NA  --  139 137 139   POTASSIUM  --  4.3 4.0 5.7*   CHLORIDE  --  102 97* 100   CO2  --  27 27 29   BUN  --  11.0 12.2 14.7   CR  --  0.57 0.70 0.60   ANIONGAP  --  10 13 10   MOLLY  --  9.7 9.5 9.9   GLC 81 163* 109* 114*       ASSESSMENT/PLAN:     (J44.1) COPD with acute exacerbation (H)  (primary encounter diagnosis)  (R53.81) Physical deconditioning  (Z87.891) Personal history of tobacco use, presenting hazards to health  Comment: acute/ongoing  Plan: discharge to home, continue prednisone taper, pulmicort nebs BID, albuterol nebs q 4 hours prn, anoro ellipta inhaler 1 Puff daily, schedule mucinex 600mg q 12 hours     (E44.0) Moderate malnutrition (H24)  Comment: acute/ongoing  Plan: f/u with PCP     (F41.1) Generalized anxiety disorder  Comment: acute/ongoing  Plan: started on lexapro 5mg QD, continue ativan 0.5mg q 6 hours prn, f/u with PCP     (R79.89) Elevated LFTs  Comment: ongoing  Hepatic steatosis noted on US 4/3/24,   Plan:  f/u as OP with PCP       DISCHARGE PLAN:  Follow up labs: No labs orders/due  Medical Follow Up:      Follow up with primary  care provider in 1-2 weeks  [facility] scheduled appointments:  Appointments in Next Year      Apr 23, 2024 12:00 PM  Pulmonary Treatment with  Pulmonary Rehab 2  [facility] Cardiac [facility] Pul[facility] [place] ([facility] ) [phone]     Apr 23, 2024  1:00 PM  Discharge Summary with Tonya Lynn Haase, APRN CNP  [facility] Geriatrics ([facility] ) [phone] Apr 30, 2024 10:00 AM  Pulmonary Treatment with  Pulmonary Rehab 2  [facility] [place] ([facility] ) [phone]     May 02, 2024 12:00 PM  Pulmonary Treatment with  Pulmonary Rehab 2  [facility] [place] ([facility] ) [phone]     May 07, 2024 12:00 PM  Pulmonary Treatment with  Pulmonary Rehab 2  [facility] [place] ([facility] ) [phone]     May 09, 2024 12:00 PM  Pulmonary Treatment with  Pulmonary Rehab 2  [facility] [place] ([facility] ) [phone]     May 14, 2024 10:00 AM  Pulmonary Treatment with  Pulmonary Rehab 2  [facility] [place] ([facility] ) [phone]     May 16, 2024 12:00 PM  Pulmonary Treatment with  Pulmonary Rehab 2  [facility] [place] ([facility] ) [phone]     May 21, 2024 10:00 AM  Pulmonary Treatment with  Pulmonary Rehab 2  [facility] [place] ([facility] ) [phone]     May 23, 2024 12:00 PM  Pulmonary Treatment with  Pulmonary Rehab 2  [facility] [place] ([facility] )  [phone]     May 28, 2024 10:00 AM  Pulmonary Treatment with  Pulmonary Rehab 2  [facility] Cardiac and Pulmonary Rehabilitation [place] ([facility] ) [phone]     May 30, 2024 12:00 PM  Pulmonary Treatment with  Pulmonary Rehab 2  [facility] Cardiac [facility] [place] ([facility] ) [phone]     May 30, 2024  1:00 PM  (Arrive by 12:40 PM)  Provider Visit with Mikel Navarro MD  [facility] ([facility] ) [phone]     Feb 17, 2025 10:30 AM  (Arrive by 10:10 AM)  Annual Wellness Visit with Mikel Navarro MD  [facility] ([facility] ) [phone]           Discharge Services: Home Care:  Physical Therapy  Discharge Instructions Verbalized to Patient at Discharge:   None    TOTAL DISCHARGE TIME:   Greater than 30 minutes  Electronically signed by:  Tonya Lynn Haase, APRN CNP     Home care Face to Face documentation done in EPIC attached to Home care orders for [facility]. , Documentation of Face to Face and Certification for Home Health Services    I certify that patient: Tammie Zeng is under my care and that I, or a nurse practitioner or physician's assistant working with me, had a face-to-face encounter that meets the physician face-to-face encounter requirements with this patient on: 4/23/2024.    This encounter with the patient was in whole, or in part, for the following medical condition, which is the primary reason for home health care: COPD exacerbation.    I certify that, based on my findings, the following services are medically necessary home health services: Physical Therapy.    My clinical findings support the need for the above services because: Physical Therapy Services are needed to assess and treat the following functional impairments: home safety.    Further, I certify that my clinical findings  support that this patient is homebound (i.e. absences from home require considerable and taxing effort and are for medical reasons or Religion services or infrequently or of short duration when for other reasons) because:  patient is walking > 1000 feet without a device .    Based on the above findings. I certify that this patient is confined to the home and needs intermittent skilled nursing care, physical therapy and/or speech therapy.  The patient is under my care, and I have initiated the establishment of the plan of care.  This patient will be followed by a physician who will periodically review the plan of care.  Physician/Provider to provide follow up care: Mikel Navarro    Attending hospital physician (the Medicare certified [facility] provider): Tonya Lynn Haase, APRN CNP  Physician Signature: See electronic signature associated with these discharge orders.  Date: 4/24/2024

## 2024-04-23 NOTE — LETTER
4/23/2024        RE: Tammie Zeng  [address]  [place] [address]        [facility] GERIATRICS DISCHARGE SUMMARY  PATIENT'S NAME: Tammie Zeng  YOB: 1955  MEDICAL RECORD NUMBER:  [id number]  [facility] [[facility]    PRIMARY CARE PROVIDER AND CLINIC RESPONSIBLE AFTER TRANSFER:   Mikel Navarro MD, [address] / [place] [address]    [facility] Provider     Transferring providers: Tonya Lynn Haase, APRN CNP, Laz Howard MD  Recent Hospitalization/ED:  [facility] Hospital stay 4/12/24 to 4/20/24.  Date of SNF Admission: April 20, 2024  Date of SNF (anticipated) Discharge: April 24, 2024  Discharged to: previous independent home  Cognitive Scores: BIMS: 15/15 and Short blessed: 2/28  Physical Function: Ambulating >1000 ft with without a device  DME: no device    CODE STATUS/ADVANCE DIRECTIVES DISCUSSION:  No CPR- Do NOT Intubate   ALLERGIES: Pcn [penicillins]    NURSING FACILITY COURSE   Medication Changes/Rationale:   See assessment and plan    Summary of nursing facility stay:   Patient quickly advanced to walking > 1000 feet without a device, independent with ADL's, patient denies SOB or STORY. States she is ready to discharge to home will Discharge with home PT through [facility]    Discharge Medications:  MED REC REQUIRED  Post Medication Reconciliation Status: medication reconcilation previously completed during another office visit       Current Outpatient Medications   Medication Sig Dispense Refill     albuterol (PROAIR HFA/PROVENTIL HFA/VENTOLIN HFA) 108 (90 Base) MCG/ACT inhaler Inhale 2 puffs into the lungs every 4 hours as needed for wheezing or shortness of breath       albuterol (PROVENTIL) (2.5 MG/3ML) 0.083% neb solution Take 1 vial (2.5 mg) by nebulization every 4 hours as needed for wheezing       aspirin (ASA) 325 MG EC tablet Take 325 mg by mouth every 6 hours as needed for  "moderate pain       benzonatate (TESSALON) 100 MG capsule Take 1 capsule (100 mg) by mouth 3 times daily as needed for cough       budesonide (PULMICORT) 0.5 MG/2ML neb solution Take 2 mLs (0.5 mg) by nebulization 2 times daily 360 mL 3     escitalopram (LEXAPRO) 5 MG tablet Take 1 tablet (5 mg) by mouth daily       guaiFENesin (MUCINEX) 600 MG 12 hr tablet Take 1,200 mg by mouth 2 times daily as needed for congestion       LORazepam (ATIVAN) 0.5 MG tablet Take 1 tablet (0.5 mg) by mouth every 6 hours as needed for anxiety 15 tablet 0     multivitamin w/minerals (THERA-VIT-M) tablet Take 1 tablet by mouth daily       pantoprazole (PROTONIX) 20 MG EC tablet Take 1 tablet (20 mg) by mouth every morning (before breakfast) for 8 days       predniSONE (DELTASONE) 10 MG tablet 4 tabs daily for 1 day, then 3 tabs daily for 2 days, then 2 tabs daily for 2 days, then 1 tab daily for 2 days, then stop       umeclidinium-vilanterol (ANORO ELLIPTA) 62.5-25 MCG/ACT oral inhaler Inhale 1 puff into the lungs daily            Controlled medications:   not applicable/none     Past Medical History:   Past Medical History:   Diagnosis Date     Chronic obstructive pulmonary disease, unspecified COPD type (H) 06/13/2017    has seen [place] [facility], fev1 under 1.0, hospitalized twice in 2023, once in 2024     Depressive disorder, not elsewhere classified 05/2006    following sudden death of her mother     Elevated LFTs 2023    hepatic steatosis 4/23      Generalized anxiety disorder      Hepatic steatosis 04/2023    on us done for elev lfts     Herpes simplex without mention of complication     MOUTH     Nephrolithiasis      TOBACCO ABUSE-CONTINUOUS      Physical Exam:   Vitals: BP (!) 142/84   Pulse 71   Temp 97.2  F (36.2  C)   Resp 19   Ht 1.6 m (5' 3\")   Wt 49 kg (108 lb)   LMP 01/24/2006   SpO2 92%   BMI 19.13 kg/m    BMI: Body mass index is 19.13 kg/m .  GENERAL APPEARANCE:  Alert, in no distress, thin  ENT:  Mouth and posterior " oropharynx normal, moist mucous membranes, normal hearing acuity  EYES:  EOM, conjunctivae, lids, pupils and irises normal, PERRL  RESP:  respiratory effort and palpation of chest normal, lungs clear to auscultation , no respiratory distress, diminished breath sounds throughout fields bilaterally  CV:  Palpation and auscultation of heart done , regular rate and rhythm, no murmur, rub, or gallop, no edema  ABDOMEN:  normal bowel sounds, soft, nontender, no hepatosplenomegaly or other masses  M/S:   patient sitting on edge of bed  SKIN:  Inspection of skin and subcutaneous tissue baseline  NEURO:   speech wnl  PSYCH:  affect and mood normal     SNF labs: Recent labs in [facility] reviewed by me today.  and Most Recent 3 CBC's:  Recent Labs   Lab Test 04/13/24  1135 04/12/24  0711 02/20/24  0836   WBC 16.0* 9.9 10.0   HGB 12.6 14.3 12.4   MCV 87 88 85    293 446     Most Recent 3 BMP's:  Recent Labs   Lab Test 04/16/24  0957 04/13/24  1135 04/12/24  0711 02/20/24  0836   NA  --  139 137 139   POTASSIUM  --  4.3 4.0 5.7*   CHLORIDE  --  102 97* 100   CO2  --  27 27 29   BUN  --  11.0 12.2 14.7   CR  --  0.57 0.70 0.60   ANIONGAP  --  10 13 10   MOLLY  --  9.7 9.5 9.9   GLC 81 163* 109* 114*       ASSESSMENT/PLAN:     (J44.1) COPD with acute exacerbation (H)  (primary encounter diagnosis)  (R53.81) Physical deconditioning  (Z87.891) Personal history of tobacco use, presenting hazards to health  Comment: acute/ongoing  Plan: discharge to home, continue prednisone taper, pulmicort nebs BID, albuterol nebs q 4 hours prn, anoro ellipta inhaler 1 Puff daily, schedule mucinex 600mg q 12 hours     (E44.0) Moderate malnutrition (H24)  Comment: acute/ongoing  Plan: f/u with PCP     (F41.1) Generalized anxiety disorder  Comment: acute/ongoing  Plan: started on lexapro 5mg QD, continue ativan 0.5mg q 6 hours prn, f/u with PCP     (R79.89) Elevated LFTs  Comment: ongoing  Hepatic steatosis noted on US 4/3/24,   Plan:  f/u as OP with  PCP       DISCHARGE PLAN:  Follow up labs: No labs orders/due  Medical Follow Up:      Follow up with primary care provider in 1-2 weeks  [facility] scheduled appointments:  Appointments in Next Year      Apr 23, 2024 12:00 PM  Pulmonary Treatment with  Pulmonary Rehab 2  [facility] Cardiac and Pulmonary Rehabilitation [place] ([facility] ) [phone]     Apr 23, 2024  1:00 PM  Discharge Summary with Tonya Lynn Haase, APRN CNP  [facility] Geriatrics ([facility] ) [phone] Apr 30, 2024 10:00 AM  Pulmonary Treatment with  Pulmonary Rehab 2  [facility] Cardiac and Pulmonary Rehabilitation [place] ([facility] ) [phone]     May 02, 2024 12:00 PM  Pulmonary Treatment with  Pulmonary Rehab 2  [facility] Pulmonary [facility] [place] ([facility] ) [phone]     May 07, 2024 12:00 PM  Pulmonary Treatment with  Pulmonary Rehab 2  [facility] Card[facility] Pulmonary Rehabilitation [place] ([facility] ) [phone]     May 09, 2024 12:00 PM  Pulmonary Treatment with  Pulmonary Rehab 2  [facility] Card[facility] Pulmonary Rehabilitation [place] ([facility] ) [phone]     May 14, 2024 10:00 AM  Pulmonary Treatment with  Pulmonary Rehab 2  [facility] Pulmonary [facility] [place] ([facility] ) [phone]     May 16, 2024 12:00 PM  Pulmonary Treatment with  Pulmonary Rehab 2  [facility] Card[facility] Pulmonary Rehabilitation [place] ([facility] ) [phone]     May 21, 2024 10:00 AM  Pulmonary Treatment with  Pulmonary Rehab 2  [facility] Pulmonary [facility] [place] ([facility] ) [phone]     May 23, 2024 12:00 PM  Pulmonary Treatment with  Pulmonary Rehab  2  [facility] Cardiac and Pulmonary Rehabilitation [place] ([facility] ) [phone]     May 28, 2024 10:00 AM  Pulmonary Treatment with  Pulmonary Rehab 2  [facility] Pulmonary [facility] [place] ([facility] ) [phone]     May 30, 2024 12:00 PM  Pulmonary Treatment with  Pulmonary Rehab 2  [facility] Cardiac and Pulmonary Rehabilitation [place] ([facility] ) [phone]     May 30, 2024  1:00 PM  (Arrive by 12:40 PM)  Provider Visit with Mikel Navarro MD  [facility] ([facility] ) [phone]     Feb 17, 2025 10:30 AM  (Arrive by 10:10 AM)  Annual Wellness Visit with Mikel Navarro MD  [facility] ([facility] ) [phone]           Discharge Services: Home Care:  Physical Therapy  Discharge Instructions Verbalized to Patient at Discharge:   None    TOTAL DISCHARGE TIME:   Greater than 30 minutes  Electronically signed by:  Tonya Lynn Haase, APRN CNP     Home care Face to Face documentation done in EPIC attached to Home care orders for [facility]. , Documentation of Face to Face and Certification for Home Health Services    I certify that patient: Tammie Zeng is under my care and that I, or a nurse practitioner or physician's assistant working with me, had a face-to-face encounter that meets the physician face-to-face encounter requirements with this patient on: 4/23/2024.    This encounter with the patient was in whole, or in part, for the following medical condition, which is the primary reason for home health care: COPD exacerbation.    I certify that, based on my findings, the following services are medically necessary home health services: Physical Therapy.    My clinical findings support the need for the above services because: Physical Therapy Services are needed to assess and treat  the following functional impairments: home safety.    Further, I certify that my clinical findings support that this patient is homebound (i.e. absences from home require considerable and taxing effort and are for medical reasons or Mandaen services or infrequently or of short duration when for other reasons) because:  patient is walking > 1000 feet without a device .    Based on the above findings. I certify that this patient is confined to the home and needs intermittent skilled nursing care, physical therapy and/or speech therapy.  The patient is under my care, and I have initiated the establishment of the plan of care.  This patient will be followed by a physician who will periodically review the plan of care.  Physician/Provider to provide follow up care: Mikel Navarro    Attending hospital physician (the Medicare certified [facility] provider): Tonya Lynn Haase, APRN CNP  Physician Signature: See electronic signature associated with these discharge orders.  Date: 4/24/2024              Sincerely,        Tonya Lynn Haase, APRN CNP

## 2024-04-26 ENCOUNTER — TELEPHONE (OUTPATIENT)
Dept: FAMILY MEDICINE | Facility: CLINIC | Age: 69
End: 2024-04-26
Payer: COMMERCIAL

## 2024-04-26 NOTE — TELEPHONE ENCOUNTER
Patient Contact    Attempt # 1    Was call answered?  Yes. Writer relayed PCP's message below, home care nurse expressed verbal understanding.    Radha Oseguera, RN  [facility]

## 2024-04-26 NOTE — TELEPHONE ENCOUNTER
URBANO Galvan called requesting approval for start of care visit  (PT) delay until 05/01/2024 due to agency staffing.

## 2024-05-01 ENCOUNTER — TELEPHONE (OUTPATIENT)
Dept: FAMILY MEDICINE | Facility: CLINIC | Age: 69
End: 2024-05-01
Payer: COMMERCIAL

## 2024-05-02 ENCOUNTER — OFFICE VISIT (OUTPATIENT)
Dept: FAMILY MEDICINE | Facility: CLINIC | Age: 69
End: 2024-05-02
Payer: COMMERCIAL

## 2024-05-02 ENCOUNTER — TELEPHONE (OUTPATIENT)
Dept: FAMILY MEDICINE | Facility: CLINIC | Age: 69
End: 2024-05-02

## 2024-05-02 ENCOUNTER — HOSPITAL ENCOUNTER (OUTPATIENT)
Dept: CARDIAC REHAB | Facility: CLINIC | Age: 69
Discharge: HOME | End: 2024-05-02
Attending: INTERNAL MEDICINE
Payer: COMMERCIAL

## 2024-05-02 VITALS
DIASTOLIC BLOOD PRESSURE: 79 MMHG | HEART RATE: 83 BPM | TEMPERATURE: 96.9 F | HEIGHT: 63 IN | SYSTOLIC BLOOD PRESSURE: 119 MMHG | WEIGHT: 105 LBS | RESPIRATION RATE: 18 BRPM | OXYGEN SATURATION: 96 % | BODY MASS INDEX: 18.61 KG/M2

## 2024-05-02 DIAGNOSIS — J96.01 ACUTE RESPIRATORY FAILURE WITH HYPOXIA (H): ICD-10-CM

## 2024-05-02 DIAGNOSIS — J44.9 COPD, SEVERE (H): ICD-10-CM

## 2024-05-02 DIAGNOSIS — F41.1 GENERALIZED ANXIETY DISORDER: ICD-10-CM

## 2024-05-02 DIAGNOSIS — J44.1 COPD EXACERBATION (H): Primary | ICD-10-CM

## 2024-05-02 DIAGNOSIS — Z72.0 TOBACCO USE: ICD-10-CM

## 2024-05-02 DIAGNOSIS — K76.0 HEPATIC STEATOSIS: ICD-10-CM

## 2024-05-02 DIAGNOSIS — Z78.9 ALCOHOL USE: ICD-10-CM

## 2024-05-02 PROCEDURE — 94625 PHY/QHP OP PULM RHB W/O MNTR: CPT | Performed by: REHABILITATION PRACTITIONER

## 2024-05-02 PROCEDURE — 99495 TRANSJ CARE MGMT MOD F2F 14D: CPT | Performed by: PHYSICIAN ASSISTANT

## 2024-05-02 RX ORDER — LORAZEPAM 0.5 MG/1
0.5 TABLET ORAL 2 TIMES DAILY PRN
Qty: 30 TABLET | Refills: 0 | Status: SHIPPED | OUTPATIENT
Start: 2024-05-02

## 2024-05-02 ASSESSMENT — PAIN SCALES - GENERAL: PAINLEVEL: NO PAIN (0)

## 2024-05-02 NOTE — PROGRESS NOTES
"      Tia Akins is a 68 year old, presenting for the following health issues:  Hospital F/U  Hospital Course (discharge summary dated 4/20/24):  Acute hypoxic respiratory failure in the setting of COPD exacerbation   Severe obstruction by PFTs on 2021  *Hospitalization 8/2023 and 2/2024 for COPD exacerbation. CT chest 1/4/2024 with small pulmonary nodules measuring up to 3 mm, emphysema, biapical pleural parenchymal scarring, mild bronchial wall thickening.   Ongoing tobacco use  Known pulmonary nodules  * Follows with [facility]. Last clinic visit 1/2024.   * Onset of viral illness with nasal and chest congestion, productive cough, sore throat, chills on 4/7 with progressively worsening STORY and chest heaviness. No dizziness, syncope. No radiation of chest pain to arm or jaw. No recent travel, prolonged car rides. Notes that she has been \"sitting around\" a lot. No LIAN. No personal or family hx of bleeding coagulopathy or VTE. No recent surgery. No recent Covid. Has to sleep with head elevated at night. Pt continues to smoke 0.5 ppd.   *On admission, tachycardic to 117, hypoxic to 72% on room air with ambulation.   *CMP, CBC WNL. Procal 0.06. Trop 10. D--dimer 0.52 (WNL age adjusted), . Covid, influenza, RSV negative. EKG sinus tachy. CXR negative.   * Serial troponins 8-10   * Echo: EF 60-65%. No regional wall motion abnormalities noted. The right ventricle is normal in structure, function and size. No evidence for significant valvular pathology.     IV solumedrol 60 mg BID increase Solu-Medrol to 60 mg every 8 hours to help with wheezing  Patient was placed to oral prednisone 40 mg daily on 4/15/2024.  Patient very short of breath and wheezing on 4/16/2024  Restarted  Solu-Medrol 40 mg every 8 hours on 4/16/2024  On 4/18 continues on Solu-Medrol 60 mg every 8 hours > Switch to prednisone 40 mg daily, starting in AM. > on 04/19/24 doing quite well.   Patient not taking PTA Trelegy Ellipta, continues on " PTA budesonide inhaler  Likely to benefit from LABA + LAMA.> started Anoro Ellipta Inhaler 1 puff daily on 4/18/24.  On 04/19/24, breathing continues to improve.   She tells me she could not afford Trelegy ($800). However, feeling very well on current regimen.   Called pharmacy Anora Ellipta $40 copay.   If she can not afford this will switch back to duonebs BID as she was doing before + PRN as before.  Albuterol nebulizers PRN while on LAMA.   Received Azithromycin X5 days given COPD exacerbation with new productive cough   Scheduled mucinex   Supplemental oxygen.   Outpatient Pulm follow-up with [facility] planned in June  Discussed the importance of smoking cessation, . She wants to stop cold turkey by herself  PT recommending discharge to TCU, anticipate discharge 4/20 if does well after above changes to regimen.  Ambulatory and Nocturnal oximetry on RA in prep for discharge on 04/18/24   Significant air-hunger leading to episodes of panic discussed on 04/19/24.She has been using PTA ativan for this and it is very effective. Discussed using this PRN only and trying to limit use in order to prevent tolerance and loss of effectiveness. Discussed avoidance of use before driving. Also discussed increased fall risk. Defer consideration of low dose morphine for air hunger to her pulmonologist or PCP.  She was not interested in changing medications at this time.     Moderate malnutrition:   Supplements per nutrition services        RODNEY: She has struggled with anxiety for quite some time. She does not have any history of depression.   She wanted to try a medication to control her symptoms. Discussed and started escitalopram 5 mg daily on day of discharge. We discussed potential GI and psychotropic side effects and will start slow. Informational sheet added to discharge paperwork.   Continues on Ativan PRN for anxiety and insomnia as discussed above.      Hepatic steatosis: Stable, noted on US 4/3/2023. Follow up  outpatient. Liver enzymes on 4/12 normal.      HPI     She continues to smoke and occas vapes and trying to cut down and stopped smoking in the house  She has 3 etoh/d     Hospital Follow-up Visit:    Hospital/Nursing Home/IP Rehab Facility: [facility]  Date of Admission: 4/12/2024  Date of Discharge: 4/20/2024, discharged from TCU on 4/24/24  Reason(s) for Admission:     Acute hypoxic respiratory failure in the setting of COPD exacerbation   Severe obstruction by PFTs on 2021  Ongoing tobacco use  Known pulmonary nodules  Moderate malnutrition  RODNEY  Hepatic steatosis    Was the patient in the ICU or did the patient experience delirium during hospitalization?  No  Do you have any other stressors you would like to discuss with your provider? No    Problems taking medications regularly:  None  Medication changes since discharge: inhaler changed  Problems adhering to non-medication therapy:  None    Summary of hospitalization:  [facility] discharge summary reviewed  Diagnostic Tests/Treatments reviewed.  Follow up needed: Pulm  Other Healthcare Providers Involved in Patient s Care:         Surgical follow-up appointment - pt going to pulm rehab  Update since discharge: improved.     Past Medical History:   Diagnosis Date    Chronic obstructive pulmonary disease, unspecified COPD type (H) 06/13/2017    has seen [facility], fev1 under 1.0, hospitalized twice in 2023, once in 2024    Depressive disorder, not elsewhere classified 05/2006    following sudden death of her mother    Elevated LFTs 2023    hepatic steatosis 4/23     Generalized anxiety disorder     Hepatic steatosis 04/2023    on us done for elev lfts    Herpes simplex without mention of complication     MOUTH    Nephrolithiasis     TOBACCO ABUSE-CONTINUOUS      Past Surgical History:   Procedure Laterality Date    CHOLECYSTECTOMY, LAPOROSCOPIC  1980's    done at [facility]    TUBAL LIGATION  1998     Social History  "    Tobacco Use    Smoking status: Every Day     Current packs/day: 0.50     Average packs/day: 0.5 packs/day for 25.0 years (12.5 ttl pk-yrs)     Types: Cigarettes    Smokeless tobacco: Never    Tobacco comments:     states is cutting back - smokes 1/2 ppd (01/03/11)   Substance Use Topics    Alcohol use: Yes     Alcohol/week: 0.0 standard drinks of alcohol     Comment: 3 mixed drinks per week     Current Outpatient Medications   Medication Sig Dispense Refill    albuterol (PROAIR HFA/PROVENTIL HFA/VENTOLIN HFA) 108 (90 Base) MCG/ACT inhaler Inhale 2 puffs into the lungs every 4 hours as needed for wheezing or shortness of breath      albuterol (PROVENTIL) (2.5 MG/3ML) 0.083% neb solution Take 1 vial (2.5 mg) by nebulization every 4 hours as needed for wheezing      aspirin (ASA) 325 MG EC tablet Take 325 mg by mouth every 6 hours as needed for moderate pain      escitalopram (LEXAPRO) 5 MG tablet Take 1 tablet (5 mg) by mouth daily      guaiFENesin (MUCINEX) 600 MG 12 hr tablet Take 1,200 mg by mouth 2 times daily as needed for congestion      LORazepam (ATIVAN) 0.5 MG tablet Take 1 tablet (0.5 mg) by mouth 2 times daily as needed for anxiety 30 tablet 0    multivitamin w/minerals (THERA-VIT-M) tablet Take 1 tablet by mouth daily      umeclidinium-vilanterol (ANORO ELLIPTA) 62.5-25 MCG/ACT oral inhaler Inhale 1 puff into the lungs daily       Allergies   Allergen Reactions    Pcn [Penicillins]      convulsions       PHYSICAL EXAM:    /79 (BP Location: Right arm, Patient Position: Sitting, Cuff Size: Adult Regular)   Pulse 83   Temp 96.9  F (36.1  C) (Temporal)   Resp 18   Ht 1.6 m (5' 3\")   Wt 47.6 kg (105 lb)   LMP 01/24/2006   SpO2 96%   BMI 18.60 kg/m      Patient appears non toxic  Lungs; breath sounds distant but clear  Heart: RRR  Psych: approp affect and mood    Assessment and Plan:     (J44.1) COPD exacerbation (H)  (primary encounter diagnosis)  Comment: pt switched to Anora ellipta during " hosp. Has completed abx and oral steroids  Plan: has appts for pulm rehab and to follow up with her pulmonologist. Smoking cessation again advised. Filled out FMLA forms for pt's dtr who helps care for her during these exacerbations/hospitalizations    (J96.01) Acute respiratory failure with hypoxia (H)  Comment:   Plan: currently stable    (F41.1) Generalized anxiety disorder  Comment: pt was started on lexapro at discharge. Discussed ativan is potentially habit forming and for prn use only.   Plan: LORazepam (ATIVAN) 0.5 MG tablet        #30 with no rf    (Z72.0) Tobacco use  Comment:   Plan: cessation advised    (Z78.9) Alcohol use  Comment: has 3 drinks per day  Plan:  recd she cut back given hepatic steatosis    (K76.0) Hepatic steatosis  Comment:   Plan: US reviewed      Parris Grimes PA-C

## 2024-05-02 NOTE — TELEPHONE ENCOUNTER
[employer] FMLA form for daughter, Mable Zeng completed by Johann Pruitt faxed.  Original given to Pt and daughter.  Copy sent to [facility] and placed in [employer] accordion file

## 2024-05-07 ENCOUNTER — HOSPITAL ENCOUNTER (OUTPATIENT)
Dept: CARDIAC REHAB | Facility: CLINIC | Age: 69
Discharge: HOME | End: 2024-05-07
Attending: INTERNAL MEDICINE
Payer: COMMERCIAL

## 2024-05-07 ENCOUNTER — PATIENT OUTREACH (OUTPATIENT)
Dept: CARE COORDINATION | Facility: CLINIC | Age: 69
End: 2024-05-07
Payer: COMMERCIAL

## 2024-05-07 PROCEDURE — 94625 PHY/QHP OP PULM RHB W/O MNTR: CPT | Performed by: REHABILITATION PRACTITIONER

## 2024-05-07 NOTE — PROGRESS NOTES
Clinic Care Coordination Chart Review     Situation: Patient chart reviewed by care coordination leader.     Background: Primary Care Care Coordination referral entered by Inpatient Care Manager through IP to Amb CM handoff process following hospitalization 4/12/24-4/20/24. Patient discharged to TCU     Assessment: Per chart review, patient has discharged from TCU and already completed post-discharge follow up with PCP.  She had home care support and is also starting OP Cardiac Rehab.     Plan/Recommendations: No intervention planned by [facility] Primary Care Care Coordination team at this time.     Sol Dimas, JENNIFERN, RN   Manager of Ambulatory Care Management  [facility]

## 2024-05-09 ENCOUNTER — HOSPITAL ENCOUNTER (OUTPATIENT)
Dept: CARDIAC REHAB | Facility: CLINIC | Age: 69
Discharge: HOME | End: 2024-05-09
Attending: INTERNAL MEDICINE
Payer: COMMERCIAL

## 2024-05-09 PROCEDURE — 94625 PHY/QHP OP PULM RHB W/O MNTR: CPT | Performed by: REHABILITATION PRACTITIONER

## 2024-05-14 ENCOUNTER — HOSPITAL ENCOUNTER (OUTPATIENT)
Dept: CARDIAC REHAB | Facility: CLINIC | Age: 69
Discharge: HOME | End: 2024-05-14
Attending: INTERNAL MEDICINE
Payer: COMMERCIAL

## 2024-05-14 PROCEDURE — 94625 PHY/QHP OP PULM RHB W/O MNTR: CPT

## 2024-05-16 ENCOUNTER — HOSPITAL ENCOUNTER (OUTPATIENT)
Dept: CARDIAC REHAB | Facility: CLINIC | Age: 69
Discharge: HOME | End: 2024-05-16
Attending: INTERNAL MEDICINE
Payer: COMMERCIAL

## 2024-05-16 PROCEDURE — 94625 PHY/QHP OP PULM RHB W/O MNTR: CPT

## 2024-05-21 ENCOUNTER — HOSPITAL ENCOUNTER (OUTPATIENT)
Dept: CARDIAC REHAB | Facility: CLINIC | Age: 69
Discharge: HOME | End: 2024-05-21
Attending: INTERNAL MEDICINE
Payer: COMMERCIAL

## 2024-05-21 PROCEDURE — 94625 PHY/QHP OP PULM RHB W/O MNTR: CPT

## 2024-05-23 ENCOUNTER — HOSPITAL ENCOUNTER (OUTPATIENT)
Dept: CARDIAC REHAB | Facility: CLINIC | Age: 69
Discharge: HOME | End: 2024-05-23
Attending: INTERNAL MEDICINE
Payer: COMMERCIAL

## 2024-05-23 PROCEDURE — 94625 PHY/QHP OP PULM RHB W/O MNTR: CPT

## 2024-05-29 DIAGNOSIS — F41.1 GENERALIZED ANXIETY DISORDER: ICD-10-CM

## 2024-05-29 DIAGNOSIS — J44.1 COPD EXACERBATION (H): ICD-10-CM

## 2024-05-29 RX ORDER — ESCITALOPRAM OXALATE 5 MG/1
5 TABLET ORAL DAILY
Qty: 90 TABLET | Refills: 2 | Status: SHIPPED | OUTPATIENT
Start: 2024-05-29

## 2024-05-30 ENCOUNTER — HOSPITAL ENCOUNTER (OUTPATIENT)
Dept: CARDIAC REHAB | Facility: CLINIC | Age: 69
Discharge: HOME | End: 2024-05-30
Attending: INTERNAL MEDICINE
Payer: COMMERCIAL

## 2024-05-30 PROCEDURE — 94625 PHY/QHP OP PULM RHB W/O MNTR: CPT | Performed by: REHABILITATION PRACTITIONER

## 2024-06-03 ENCOUNTER — HOSPITAL ENCOUNTER (OUTPATIENT)
Facility: CLINIC | Age: 69
Setting detail: OBSERVATION
Discharge: HOME | End: 2024-06-04
Attending: EMERGENCY MEDICINE | Admitting: INTERNAL MEDICINE
Payer: COMMERCIAL

## 2024-06-03 ENCOUNTER — APPOINTMENT (OUTPATIENT)
Dept: GENERAL RADIOLOGY | Facility: CLINIC | Age: 69
End: 2024-06-03
Attending: EMERGENCY MEDICINE
Payer: COMMERCIAL

## 2024-06-03 DIAGNOSIS — J44.1 COPD EXACERBATION (H): ICD-10-CM

## 2024-06-03 DIAGNOSIS — J44.9 CHRONIC OBSTRUCTIVE PULMONARY DISEASE, UNSPECIFIED COPD TYPE (H): Primary | ICD-10-CM

## 2024-06-03 LAB
ALBUMIN SERPL BCG-MCNC: 3.9 G/DL (ref 3.5–5.2)
ALP SERPL-CCNC: 91 U/L (ref 40–150)
ALT SERPL W P-5'-P-CCNC: 9 U/L (ref 0–50)
ANION GAP SERPL CALCULATED.3IONS-SCNC: 13 MMOL/L (ref 7–15)
AST SERPL W P-5'-P-CCNC: 24 U/L (ref 0–45)
ATRIAL RATE - MUSE: 99 BPM
BASE EXCESS BLDV CALC-SCNC: -2 MMOL/L (ref -3–3)
BASE EXCESS BLDV CALC-SCNC: -4 MMOL/L (ref -3–3)
BASE EXCESS BLDV CALC-SCNC: -5 MMOL/L (ref -3–3)
BASOPHILS # BLD AUTO: 0.1 10E3/UL (ref 0–0.2)
BASOPHILS NFR BLD AUTO: 1 %
BILIRUB SERPL-MCNC: 0.3 MG/DL
BUN SERPL-MCNC: 8.7 MG/DL (ref 8–23)
CALCIUM SERPL-MCNC: 8.5 MG/DL (ref 8.8–10.2)
CHLORIDE SERPL-SCNC: 100 MMOL/L (ref 98–107)
CREAT SERPL-MCNC: 0.69 MG/DL (ref 0.51–0.95)
DEPRECATED HCO3 PLAS-SCNC: 24 MMOL/L (ref 22–29)
DIASTOLIC BLOOD PRESSURE - MUSE: NORMAL MMHG
EGFRCR SERPLBLD CKD-EPI 2021: >90 ML/MIN/1.73M2
EOSINOPHIL # BLD AUTO: 0.2 10E3/UL (ref 0–0.7)
EOSINOPHIL NFR BLD AUTO: 2 %
ERYTHROCYTE [DISTWIDTH] IN BLOOD BY AUTOMATED COUNT: 15.6 % (ref 10–15)
FLUAV RNA SPEC QL NAA+PROBE: NEGATIVE
FLUBV RNA RESP QL NAA+PROBE: NEGATIVE
GLUCOSE BLDC GLUCOMTR-MCNC: 160 MG/DL (ref 70–99)
GLUCOSE BLDC GLUCOMTR-MCNC: 231 MG/DL (ref 70–99)
GLUCOSE BLDC GLUCOMTR-MCNC: 253 MG/DL (ref 70–99)
GLUCOSE SERPL-MCNC: 222 MG/DL (ref 70–99)
HCO3 BLDV-SCNC: 23 MMOL/L (ref 21–28)
HCO3 BLDV-SCNC: 26 MMOL/L (ref 21–28)
HCO3 BLDV-SCNC: 27 MMOL/L (ref 21–28)
HCT VFR BLD AUTO: 41.6 % (ref 35–47)
HGB BLD-MCNC: 12.7 G/DL (ref 11.7–15.7)
HOLD SPECIMEN: NORMAL
IMM GRANULOCYTES # BLD: 0 10E3/UL
IMM GRANULOCYTES NFR BLD: 0 %
INTERPRETATION ECG - MUSE: NORMAL
LACTATE BLD-SCNC: 1 MMOL/L
LACTATE BLD-SCNC: 2.2 MMOL/L
LACTATE BLD-SCNC: 2.4 MMOL/L
LACTATE SERPL-SCNC: 1.7 MMOL/L (ref 0.7–2)
LYMPHOCYTES # BLD AUTO: 5.2 10E3/UL (ref 0.8–5.3)
LYMPHOCYTES NFR BLD AUTO: 44 %
MCH RBC QN AUTO: 27.5 PG (ref 26.5–33)
MCHC RBC AUTO-ENTMCNC: 30.5 G/DL (ref 31.5–36.5)
MCV RBC AUTO: 90 FL (ref 78–100)
MONOCYTES # BLD AUTO: 1.1 10E3/UL (ref 0–1.3)
MONOCYTES NFR BLD AUTO: 10 %
NEUTROPHILS # BLD AUTO: 4.9 10E3/UL (ref 1.6–8.3)
NEUTROPHILS NFR BLD AUTO: 43 %
NRBC # BLD AUTO: 0 10E3/UL
NRBC BLD AUTO-RTO: 0 /100
P AXIS - MUSE: 88 DEGREES
PCO2 BLDV: 49 MM HG (ref 40–50)
PCO2 BLDV: 60 MM HG (ref 40–50)
PCO2 BLDV: 76 MM HG (ref 40–50)
PH BLDV: 7.15 [PH] (ref 7.32–7.43)
PH BLDV: 7.26 [PH] (ref 7.32–7.43)
PH BLDV: 7.28 [PH] (ref 7.32–7.43)
PLATELET # BLD AUTO: 322 10E3/UL (ref 150–450)
PO2 BLDV: 27 MM HG (ref 25–47)
PO2 BLDV: 30 MM HG (ref 25–47)
PO2 BLDV: 42 MM HG (ref 25–47)
POTASSIUM SERPL-SCNC: 4 MMOL/L (ref 3.4–5.3)
PR INTERVAL - MUSE: 156 MS
PROT SERPL-MCNC: 6.2 G/DL (ref 6.4–8.3)
QRS DURATION - MUSE: 74 MS
QT - MUSE: 358 MS
QTC - MUSE: 459 MS
R AXIS - MUSE: 72 DEGREES
RBC # BLD AUTO: 4.61 10E6/UL (ref 3.8–5.2)
RSV RNA SPEC NAA+PROBE: NEGATIVE
SAO2 % BLDV: 38 % (ref 70–75)
SAO2 % BLDV: 39 % (ref 70–75)
SAO2 % BLDV: 70 % (ref 70–75)
SARS-COV-2 RNA RESP QL NAA+PROBE: NEGATIVE
SODIUM SERPL-SCNC: 137 MMOL/L (ref 135–145)
SYSTOLIC BLOOD PRESSURE - MUSE: NORMAL MMHG
T AXIS - MUSE: 92 DEGREES
VENTRICULAR RATE- MUSE: 99 BPM
WBC # BLD AUTO: 11.4 10E3/UL (ref 4–11)

## 2024-06-03 PROCEDURE — 96376 TX/PRO/DX INJ SAME DRUG ADON: CPT

## 2024-06-03 PROCEDURE — 36415 COLL VENOUS BLD VENIPUNCTURE: CPT

## 2024-06-03 PROCEDURE — 120N000001 HC R&B MED SURG/OB

## 2024-06-03 PROCEDURE — 36415 COLL VENOUS BLD VENIPUNCTURE: CPT | Performed by: EMERGENCY MEDICINE

## 2024-06-03 PROCEDURE — 83605 ASSAY OF LACTIC ACID: CPT

## 2024-06-03 PROCEDURE — 250N000013 HC RX MED GY IP 250 OP 250 PS 637: Performed by: INTERNAL MEDICINE

## 2024-06-03 PROCEDURE — 82040 ASSAY OF SERUM ALBUMIN: CPT | Performed by: EMERGENCY MEDICINE

## 2024-06-03 PROCEDURE — 96374 THER/PROPH/DIAG INJ IV PUSH: CPT

## 2024-06-03 PROCEDURE — 94640 AIRWAY INHALATION TREATMENT: CPT

## 2024-06-03 PROCEDURE — 99207 PR APP CREDIT; MD BILLING SHARED VISIT: CPT | Performed by: INTERNAL MEDICINE

## 2024-06-03 PROCEDURE — 82803 BLOOD GASES ANY COMBINATION: CPT

## 2024-06-03 PROCEDURE — 250N000009 HC RX 250: Performed by: HOSPITALIST

## 2024-06-03 PROCEDURE — 999N000157 HC STATISTIC RCP TIME EA 10 MIN

## 2024-06-03 PROCEDURE — 258N000003 HC RX IP 258 OP 636: Performed by: HOSPITALIST

## 2024-06-03 PROCEDURE — 250N000009 HC RX 250: Performed by: INTERNAL MEDICINE

## 2024-06-03 PROCEDURE — 999N000185 HC STATISTIC TRANSPORT TIME EA 15 MIN

## 2024-06-03 PROCEDURE — 258N000003 HC RX IP 258 OP 636: Performed by: EMERGENCY MEDICINE

## 2024-06-03 PROCEDURE — 87637 SARSCOV2&INF A&B&RSV AMP PRB: CPT | Performed by: EMERGENCY MEDICINE

## 2024-06-03 PROCEDURE — 250N000011 HC RX IP 250 OP 636: Performed by: INTERNAL MEDICINE

## 2024-06-03 PROCEDURE — 85025 COMPLETE CBC W/AUTO DIFF WBC: CPT | Performed by: EMERGENCY MEDICINE

## 2024-06-03 PROCEDURE — 99223 1ST HOSP IP/OBS HIGH 75: CPT | Performed by: HOSPITALIST

## 2024-06-03 PROCEDURE — 94640 AIRWAY INHALATION TREATMENT: CPT | Mod: 76

## 2024-06-03 PROCEDURE — 99285 EMERGENCY DEPT VISIT HI MDM: CPT

## 2024-06-03 PROCEDURE — 93005 ELECTROCARDIOGRAM TRACING: CPT

## 2024-06-03 PROCEDURE — 71045 X-RAY EXAM CHEST 1 VIEW: CPT

## 2024-06-03 PROCEDURE — 96361 HYDRATE IV INFUSION ADD-ON: CPT

## 2024-06-03 PROCEDURE — 87040 BLOOD CULTURE FOR BACTERIA: CPT | Performed by: EMERGENCY MEDICINE

## 2024-06-03 PROCEDURE — 96360 HYDRATION IV INFUSION INIT: CPT

## 2024-06-03 RX ORDER — NALOXONE HYDROCHLORIDE 0.4 MG/ML
0.2 INJECTION, SOLUTION INTRAMUSCULAR; INTRAVENOUS; SUBCUTANEOUS
Status: DISCONTINUED | OUTPATIENT
Start: 2024-06-03 | End: 2024-06-04 | Stop reason: HOSPADM

## 2024-06-03 RX ORDER — IPRATROPIUM BROMIDE AND ALBUTEROL SULFATE 2.5; .5 MG/3ML; MG/3ML
3 SOLUTION RESPIRATORY (INHALATION)
Status: DISCONTINUED | OUTPATIENT
Start: 2024-06-03 | End: 2024-06-03

## 2024-06-03 RX ORDER — ESCITALOPRAM OXALATE 5 MG/1
5 TABLET ORAL DAILY
Status: DISCONTINUED | OUTPATIENT
Start: 2024-06-04 | End: 2024-06-04 | Stop reason: HOSPADM

## 2024-06-03 RX ORDER — GUAIFENESIN 600 MG/1
600 TABLET, EXTENDED RELEASE ORAL 2 TIMES DAILY PRN
Status: DISCONTINUED | OUTPATIENT
Start: 2024-06-03 | End: 2024-06-04 | Stop reason: HOSPADM

## 2024-06-03 RX ORDER — AMOXICILLIN 250 MG
1 CAPSULE ORAL 2 TIMES DAILY PRN
Status: DISCONTINUED | OUTPATIENT
Start: 2024-06-03 | End: 2024-06-04 | Stop reason: HOSPADM

## 2024-06-03 RX ORDER — HYDROMORPHONE HCL IN WATER/PF 6 MG/30 ML
0.4 PATIENT CONTROLLED ANALGESIA SYRINGE INTRAVENOUS
Status: DISCONTINUED | OUTPATIENT
Start: 2024-06-03 | End: 2024-06-04 | Stop reason: HOSPADM

## 2024-06-03 RX ORDER — ONDANSETRON 2 MG/ML
4 INJECTION INTRAMUSCULAR; INTRAVENOUS EVERY 6 HOURS PRN
Status: DISCONTINUED | OUTPATIENT
Start: 2024-06-03 | End: 2024-06-04 | Stop reason: HOSPADM

## 2024-06-03 RX ORDER — METHYLPREDNISOLONE SODIUM SUCCINATE 40 MG/ML
40 INJECTION, POWDER, LYOPHILIZED, FOR SOLUTION INTRAMUSCULAR; INTRAVENOUS EVERY 12 HOURS
Status: DISCONTINUED | OUTPATIENT
Start: 2024-06-03 | End: 2024-06-04

## 2024-06-03 RX ORDER — BENZONATATE 100 MG/1
100 CAPSULE ORAL 3 TIMES DAILY PRN
COMMUNITY
End: 2024-06-10

## 2024-06-03 RX ORDER — ACETAMINOPHEN 650 MG/1
650 SUPPOSITORY RECTAL EVERY 4 HOURS PRN
Status: DISCONTINUED | OUTPATIENT
Start: 2024-06-03 | End: 2024-06-04 | Stop reason: HOSPADM

## 2024-06-03 RX ORDER — IPRATROPIUM BROMIDE AND ALBUTEROL SULFATE 2.5; .5 MG/3ML; MG/3ML
3 SOLUTION RESPIRATORY (INHALATION)
Status: DISCONTINUED | OUTPATIENT
Start: 2024-06-03 | End: 2024-06-04 | Stop reason: HOSPADM

## 2024-06-03 RX ORDER — ONDANSETRON 4 MG/1
4 TABLET, ORALLY DISINTEGRATING ORAL EVERY 6 HOURS PRN
Status: DISCONTINUED | OUTPATIENT
Start: 2024-06-03 | End: 2024-06-04 | Stop reason: HOSPADM

## 2024-06-03 RX ORDER — CARBOXYMETHYLCELLULOSE SODIUM 5 MG/ML
1 SOLUTION/ DROPS OPHTHALMIC
Status: DISCONTINUED | OUTPATIENT
Start: 2024-06-03 | End: 2024-06-04 | Stop reason: HOSPADM

## 2024-06-03 RX ORDER — AMOXICILLIN 250 MG
2 CAPSULE ORAL 2 TIMES DAILY PRN
Status: DISCONTINUED | OUTPATIENT
Start: 2024-06-03 | End: 2024-06-04 | Stop reason: HOSPADM

## 2024-06-03 RX ORDER — HYDROMORPHONE HCL IN WATER/PF 6 MG/30 ML
0.2 PATIENT CONTROLLED ANALGESIA SYRINGE INTRAVENOUS
Status: DISCONTINUED | OUTPATIENT
Start: 2024-06-03 | End: 2024-06-04 | Stop reason: HOSPADM

## 2024-06-03 RX ORDER — PREDNISONE 20 MG/1
40 TABLET ORAL DAILY
Status: DISCONTINUED | OUTPATIENT
Start: 2024-06-03 | End: 2024-06-03

## 2024-06-03 RX ORDER — LIDOCAINE 40 MG/G
CREAM TOPICAL
Status: DISCONTINUED | OUTPATIENT
Start: 2024-06-03 | End: 2024-06-03

## 2024-06-03 RX ORDER — CALCIUM CARBONATE 500 MG/1
1000 TABLET, CHEWABLE ORAL 4 TIMES DAILY PRN
Status: DISCONTINUED | OUTPATIENT
Start: 2024-06-03 | End: 2024-06-04 | Stop reason: HOSPADM

## 2024-06-03 RX ORDER — ACETAMINOPHEN 325 MG/1
650 TABLET ORAL EVERY 4 HOURS PRN
Status: DISCONTINUED | OUTPATIENT
Start: 2024-06-03 | End: 2024-06-04 | Stop reason: HOSPADM

## 2024-06-03 RX ORDER — LIDOCAINE 40 MG/G
CREAM TOPICAL
Status: DISCONTINUED | OUTPATIENT
Start: 2024-06-03 | End: 2024-06-04 | Stop reason: HOSPADM

## 2024-06-03 RX ORDER — NALOXONE HYDROCHLORIDE 0.4 MG/ML
0.4 INJECTION, SOLUTION INTRAMUSCULAR; INTRAVENOUS; SUBCUTANEOUS
Status: DISCONTINUED | OUTPATIENT
Start: 2024-06-03 | End: 2024-06-04 | Stop reason: HOSPADM

## 2024-06-03 RX ORDER — ALBUTEROL SULFATE 0.83 MG/ML
2.5 SOLUTION RESPIRATORY (INHALATION)
Status: DISCONTINUED | OUTPATIENT
Start: 2024-06-03 | End: 2024-06-04 | Stop reason: HOSPADM

## 2024-06-03 RX ORDER — SODIUM CHLORIDE 9 MG/ML
INJECTION, SOLUTION INTRAVENOUS CONTINUOUS
Status: DISCONTINUED | OUTPATIENT
Start: 2024-06-03 | End: 2024-06-03

## 2024-06-03 RX ADMIN — METHYLPREDNISOLONE SODIUM SUCCINATE 40 MG: 40 INJECTION, POWDER, FOR SOLUTION INTRAMUSCULAR; INTRAVENOUS at 13:00

## 2024-06-03 RX ADMIN — SODIUM CHLORIDE, POTASSIUM CHLORIDE, SODIUM LACTATE AND CALCIUM CHLORIDE 1000 ML: 600; 310; 30; 20 INJECTION, SOLUTION INTRAVENOUS at 03:38

## 2024-06-03 RX ADMIN — SODIUM CHLORIDE 1000 ML: 9 INJECTION, SOLUTION INTRAVENOUS at 05:07

## 2024-06-03 RX ADMIN — IPRATROPIUM BROMIDE AND ALBUTEROL SULFATE 3 ML: .5; 3 SOLUTION RESPIRATORY (INHALATION) at 09:38

## 2024-06-03 RX ADMIN — ALBUTEROL SULFATE 2.5 MG: 2.5 SOLUTION RESPIRATORY (INHALATION) at 05:06

## 2024-06-03 RX ADMIN — IPRATROPIUM BROMIDE AND ALBUTEROL SULFATE 3 ML: .5; 3 SOLUTION RESPIRATORY (INHALATION) at 16:29

## 2024-06-03 RX ADMIN — GUAIFENESIN 600 MG: 600 TABLET, EXTENDED RELEASE ORAL at 23:55

## 2024-06-03 RX ADMIN — IPRATROPIUM BROMIDE AND ALBUTEROL SULFATE 3 ML: .5; 3 SOLUTION RESPIRATORY (INHALATION) at 19:24

## 2024-06-03 RX ADMIN — METHYLPREDNISOLONE SODIUM SUCCINATE 40 MG: 40 INJECTION, POWDER, FOR SOLUTION INTRAMUSCULAR; INTRAVENOUS at 23:55

## 2024-06-03 RX ADMIN — IPRATROPIUM BROMIDE AND ALBUTEROL SULFATE 3 ML: .5; 3 SOLUTION RESPIRATORY (INHALATION) at 12:10

## 2024-06-03 ASSESSMENT — ACTIVITIES OF DAILY LIVING (ADL)
ADLS_ACUITY_SCORE: 38
ADLS_ACUITY_SCORE: 38
ADLS_ACUITY_SCORE: 37
ADLS_ACUITY_SCORE: 38
ADLS_ACUITY_SCORE: 37
ADLS_ACUITY_SCORE: 38
ADLS_ACUITY_SCORE: 37
ADLS_ACUITY_SCORE: 37
ADLS_ACUITY_SCORE: 38
ADLS_ACUITY_SCORE: 37
ADLS_ACUITY_SCORE: 38
ADLS_ACUITY_SCORE: 37

## 2024-06-03 NOTE — PHARMACY-ADMISSION MEDICATION HISTORY
Pharmacy Intern Admission Medication History    Admission medication history is complete. The information provided in this note is only as accurate as the sources available at the time of the update.    Information Source(s): Patient and CareEverywhere/SureScripts via in-person    Pertinent Information:   Patient states she is no longer taking her multivitamin    Changes made to PTA medication list:  Added: Benzonatate  Deleted: None  Changed: None    Allergies reviewed with patient and updates made in EHR: yes    Medication History Completed By: Flaquita Aquino 6/3/2024 10:01 AM    PTA Med List   Medication Sig Last Dose    albuterol (PROAIR HFA/PROVENTIL HFA/VENTOLIN HFA) 108 (90 Base) MCG/ACT inhaler Inhale 2 puffs into the lungs every 4 hours as needed for wheezing or shortness of breath  at PRN    albuterol (PROVENTIL) (2.5 MG/3ML) 0.083% neb solution Take 1 vial (2.5 mg) by nebulization every 4 hours as needed for wheezing  at PRN    aspirin (ASA) 325 MG EC tablet Take 325 mg by mouth every 6 hours as needed for moderate pain  at PRN    benzonatate (TESSALON) 100 MG capsule Take 100 mg by mouth 3 times daily as needed for cough  at PRN    escitalopram (LEXAPRO) 5 MG tablet Take 1 tablet (5 mg) by mouth daily     guaiFENesin (MUCINEX) 600 MG 12 hr tablet Take 1,200 mg by mouth 2 times daily as needed for congestion  at PRN    LORazepam (ATIVAN) 0.5 MG tablet Take 1 tablet (0.5 mg) by mouth 2 times daily as needed for anxiety  at PRN    umeclidinium-vilanterol (ANORO ELLIPTA) 62.5-25 MCG/ACT oral inhaler Inhale 1 puff into the lungs daily 6/2/2024

## 2024-06-03 NOTE — PLAN OF CARE
Chief Complaint / current diagnosis: admit 6/3/24 with COPD exacerbation. Currently been stable on room air since shift start at 1500. Patient will transfer to [facility] this evening. Report called.     [id number]:  Neuro/Psych: A&Ox4; aware of resources regarding smoking cessation.   Vitals/Pain: VSS on RA, denies pain  Cardiac: stable HR/BP; no tele orders  Respiratory: expiratory wheezes, scheduled steroid solumedrol and nebs  Activity: standby assist, PT following  Skin/Wounds: intact, scattered bruising  GI/: continent, I/O every shift = order  Diet/nutrition: regular diet, blood sugars elevated, no insulin ordered, service aware  Access: PIV C/D/I  Education/Discharge Plan: discharge tomorrow  Patient/Staff Safety: bed in low/locked position, call light in reach, ID band on, all appropriate equipment/monitors on & audible.    _____________________________________________________    Electronically signed by:  Elizabeth Villareal, RN-BSN  [facility] Nurse    Chart documentation was completed, in part, with [employer] voice-recognition software. Even though reviewed, some grammatical, spelling, and word errors may remain.     Goal Outcome Evaluation:      Plan of Care Reviewed With: patient    Overall Patient Progress: improvingOverall Patient Progress: improving    Problem: Adult Inpatient Plan of Care  Goal: Plan of Care Review  Description: The Plan of Care Review/Shift note should be completed every shift.  The Outcome Evaluation is a brief statement about your assessment that the patient is improving, declining, or no change.  This information will be displayed automatically on your shift  note.  Outcome: Progressing  Flowsheets (Taken 6/3/2024 1632)  Plan of Care Reviewed With: patient  Overall Patient Progress: improving

## 2024-06-03 NOTE — PROGRESS NOTES
sPatient has been assessed for Home Oxygen needs. Oxygen readings:    *Pulse oximetry (SpO2) = 91% on room air at rest while awake.    *SpO2 improved to 95% on 1liters/minute at rest.    *SpO2 = 87% on room air during activity/with exercise.    *SpO2 improved to 92% on 2liters/minute during activity/with exercise.

## 2024-06-03 NOTE — PROGRESS NOTES
Hospitalist addendum  Pt seen this am and afternoon  On bipap in Ed  Weaned off bipap on IMC  Feels much better. Nebs help  No cough or infectious sxs  Improved vbg  Good oxygenation    Increasing sob over last several days.   Exam; alert, nad, nontoxic  Breathing easily, distant BS, exp wheeze bilat  Benign abd  RRR no r/g/m. Not tach  Extrem warm      Plan;   Wean oxygen  Solumedrol  Stop imc  Cont pulse ox  Scheduled nebs  Saline lock if po fine  PT jerica   Discussed care plan with pt and rn  Pcp follow up   Pulmonary follow up   Consider pul rehab    Bear Carson MD

## 2024-06-03 NOTE — H&P
[facility]    Hospitalist History and Physical  Date of Admission:  6/3/2024    Primary Care Physician   Mikel Navarro    Chief Complaint  Shortness of Breath    History obtained from the patient and the medical chart    History of Present Illness   Tammie Zeng is a 68 year old female with a past medical history of COPD, depression, hepatic steatosis, ongoing tobacco use presents to hospital with shortness of breath.  Patient reports that for the last 2 to 3 days she has been having worsening shortness of breath with a nonproductive cough.  She reports associated chest tightness worse when she is lying down or on her left side.  She was trying to get her nebulizer overnight when she noticed that her pulse oximeter was reading oxygen saturations in the 70s.  She felt so short of breath she could not get to her nebulizer so she called EMS.  EMS administered albuterol and Solu-Medrol and brought the patient to the hospital.  The patient denies any fevers.  She continues to smoke approximately half pack per day.    Past Medical History    I have reviewed this patient's medical history and updated it with pertinent information if needed.   Past Medical History:   Diagnosis Date    Chronic obstructive pulmonary disease, unspecified COPD type (H) 06/13/2017    has [facility], fev1 under 1.0, hospitalized twice in 2023, once in 2024    Depressive disorder, not elsewhere classified 05/2006    following sudden death of her mother    Elevated LFTs 2023    hepatic steatosis 4/23 us    Generalized anxiety disorder     Hepatic steatosis 04/2023    on us done for elev lfts    Herpes simplex without mention of complication     MOUTH    Nephrolithiasis     TOBACCO ABUSE-CONTINUOUS        Past Surgical History   I have reviewed this patient's surgical history and updated it with pertinent information if needed.  Past Surgical History:   Procedure Laterality Date    CHOLECYSTECTOMY, LAPOROSCOPIC   1980's    done at [facility]    TUBAL LIGATION  1998       Allergies   Allergies   Allergen Reactions    Pcn [Penicillins]      convulsions       Social History   I have reviewed this patient's social history and updated it with pertinent information if needed. Tammie Zeng  reports that she has been smoking cigarettes. She has a 12.5 pack-year smoking history. She has never used smokeless tobacco. She reports current alcohol use. She reports that she does not use drugs.    Family History   I have reviewed this patient's family history and updated it with pertinent information if needed.   Family History   Problem Relation Age of Onset    Asthma Daughter     Diabetes Paternal Grandmother     Cancer Maternal Aunt         skin    Allergies Daughter     Depression Mother     Eye Disorder Mother         cataracts and glacoma    Gastrointestinal Disease Maternal Grandmother         gallbladder    Gastrointestinal Disease Daughter         ulcerative cholitis    Osteoporosis Mother     Respiratory Mother         asthmatic bronchitis       Physical Exam   Temp: 97.6  F (36.4  C) Temp src: Oral BP: 120/58 Pulse: 81   Resp: 26 SpO2: 98 % O2 Device: BiPAP/CPAP    Vital Signs with Ranges  Temp:  [97.6  F (36.4  C)] 97.6  F (36.4  C)  Pulse:  [81-96] 81  Resp:  [19-28] 26  BP: (120-130)/(58-78) 120/58  FiO2 (%):  [30 %] 30 %  SpO2:  [98 %-100 %] 98 %  113 lbs 8.59 oz  Physical Exam  Vitals reviewed.   Constitutional:       General: She is not in acute distress.     Comments: Pleasant lady appears chronically ill seen on bipap in the emergency room.    HENT:      Head: Normocephalic and atraumatic.   Cardiovascular:      Rate and Rhythm: Normal rate and regular rhythm.   Pulmonary:      Effort: Pulmonary effort is normal.      Breath sounds: Wheezing present.      Comments: Seen on bipap  Abdominal:      General: Abdomen is flat. Bowel sounds are normal.      Palpations: Abdomen is soft.   Musculoskeletal:          General: No swelling.   Neurological:      General: No focal deficit present.      Mental Status: She is alert and oriented to person, place, and time. Mental status is at baseline.         Assessment & Plan   Tammie Zeng is a 68 year old female with a past medical history of COPD, ongoing tobacco use, depression, hepatic steatosis presents to the hospital for an acute COPD exacerbation.      Acute COPD exacerbation  Acute hypoxic hypercapnic respiratory failure  Ongoing tobacco use  Patient presenting with 2-day history of worsening shortness of breath, cough, chest tightness.  Due to worsening symptoms EMS were called overnight.  Patient was found to have diffuse wheezing and treated with steroids, nebulizers and brought to the hospital.  The patient was placed on BiPAP in the emergency room and reports significant improvement in her symptoms.  Chest x-ray negative for any acute infiltrates or lung pathology.  Viral swab negative for COVID-19, influenza A, B, RSV. Lactic acid is elevated, but I suspect this is from her respiratory distress and increased work of breathing rather than an infection. Repeat vbg improving.   Bipap, oxygen  Prednisone 40 daily   Duonebs schduled  Albuterol prn  Admit to [facility]      Chronic medical conditions -Resume pta meds as needed once med rec is complete    Mdd  Hepatic steatosis    DVT ppx: lovenox sc  Expected length of stay greater than 2 days  Code Status: DNR/I    Medical Decision Making       75 MINUTES SPENT BY ME on the date of service doing chart review, history, exam, documentation & further activities per the note.

## 2024-06-03 NOTE — ED TRIAGE NOTES
Patient BIBA from home with shortness of breath.  She reports feeling short of breath with productive cough over the past 3 days.  Tonight, the shortness of breath significantly worsened.  O2 sats for EMS were 55% on RA.  EMS put her on CPAP, gave 2 dounebs, and 125 mg of Solumedrol.  BS by .  Hx of COPD.     Triage Assessment (Adult)       Row Name 06/03/24 0316          Triage Assessment    Airway WDL WDL        Respiratory WDL    Respiratory WDL X;cough;all      Rhythm/Pattern, Respiratory shortness of breath;labored      Cough Frequency frequent      Cough Type productive         Skin Circulation/Temperature WDL    Skin Circulation/Temperature WDL WDL        Cardiac WDL    Cardiac WDL WDL        Peripheral/Neurovascular WDL    Peripheral Neurovascular WDL WDL        Cognitive/Neuro/Behavioral WDL    Cognitive/Neuro/Behavioral WDL WDL

## 2024-06-03 NOTE — PLAN OF CARE
Goal Outcome Evaluation:    Pt is satting 90-93% on room air, VSS, good PO intake, SR, Possible discharge tomorrow.

## 2024-06-03 NOTE — ED PROVIDER NOTES
Emergency Department Note      History of Present Illness     Chief Complaint  Shortness of Breath    HPI  Tammie Zeng is a 68 year old female who presents to the ED for worsening shortness of breath and dry cough for approximately 3 days. She has a history of COPD and continuous tobacco abuse among others. Per EMS, patient today experienced shortness of breath accompanied with nausea She was found to have labored breathing, diminished lung sounds and O2 saturations in the 70s. Patient was placed on CPAP, received albuterol breathing treatments and 125 mg of solumedrol en route.   Patient reports she is feeling improved. Reports smoking approximately a half pack of cigarettes daily. She mentions not drinking enough fluids or eating well recently. Denies using supplemental oxygen at home. Cannot recall last time she had a breathing treatment at home.     Independent Historian  EMS as detailed above.    Review of External Notes  Discharge summary 4/20/24 for COPD exacerbation  Past Medical History   Medical History and Problem List  COPD  Depression  Anxiety  Hepatic steatosis  Nephrolithiasis  Tobacco abuse    Medications  Asa  Lexapro  Ativan    Surgical History   Tubal ligation  Cholecystectomy    Physical Exam   Patient Vitals for the past 24 hrs:   BP Temp Temp src Pulse Resp SpO2 Weight   06/03/24 0600 122/76 -- -- 76 23 97 % --   06/03/24 0530 123/77 -- -- 87 13 97 % --   06/03/24 0500 (!) 111/93 -- -- 79 18 90 % --   06/03/24 0445 -- -- -- 86 22 94 % --   06/03/24 0430 128/75 -- -- 83 18 99 % --   06/03/24 0415 -- -- -- 81 26 98 % --   06/03/24 0400 120/58 -- -- 81 19 100 % --   06/03/24 0345 125/62 -- -- 90 21 99 % --   06/03/24 0317 130/78 97.6  F (36.4  C) Oral 96 28 99 % 51.5 kg (113 lb 8.6 oz)     Physical Exam  General: Appears well-developed and well-nourished.   Head: No signs of trauma.   CV: Normal rate and regular rhythm.    Resp: Decreased but present breath sounds throughout.  GI: Soft.  There is no tenderness.  No rebound or guarding.  Normal bowel sounds.   MSK: Normal range of motion. no edema. No Calf tenderness.  Neuro: The patient is alert and oriented. Speech normal.  Skin: Skin is warm and dry. No rash noted.   Psych: normal mood and affect. behavior is normal.     Diagnostics   Lab Results   Labs Ordered and Resulted from Time of ED Arrival to Time of ED Departure   COMPREHENSIVE METABOLIC PANEL - Abnormal       Result Value    Sodium 137      Potassium 4.0      Carbon Dioxide (CO2) 24      Anion Gap 13      Urea Nitrogen 8.7      Creatinine 0.69      GFR Estimate >90      Calcium 8.5 (*)     Chloride 100      Glucose 222 (*)     Alkaline Phosphatase 91      AST 24      ALT 9      Protein Total 6.2 (*)     Albumin 3.9      Bilirubin Total 0.3     CBC WITH PLATELETS AND DIFFERENTIAL - Abnormal    WBC Count 11.4 (*)     RBC Count 4.61      Hemoglobin 12.7      Hematocrit 41.6      MCV 90      MCH 27.5      MCHC 30.5 (*)     RDW 15.6 (*)     Platelet Count 322      % Neutrophils 43      % Lymphocytes 44      % Monocytes 10      % Eosinophils 2      % Basophils 1      % Immature Granulocytes 0      NRBCs per 100 WBC 0      Absolute Neutrophils 4.9      Absolute Lymphocytes 5.2      Absolute Monocytes 1.1      Absolute Eosinophils 0.2      Absolute Basophils 0.1      Absolute Immature Granulocytes 0.0      Absolute NRBCs 0.0     ISTAT GASES LACTATE VENOUS POCT - Abnormal    Lactic Acid POCT 2.2 (*)     Bicarbonate Venous POCT 26      O2 Sat, Venous POCT 38 (*)     pCO2 Venous POCT 76 (*)     pH Venous POCT 7.15 (*)     pO2 Venous POCT 30      Base Excess/Deficit (+/-) POCT -5.0 (*)    ISTAT GASES LACTATE VENOUS POCT - Abnormal    Lactic Acid POCT 2.4 (*)     Bicarbonate Venous POCT 27      O2 Sat, Venous POCT 39 (*)     pCO2 Venous POCT 60 (*)     pH Venous POCT 7.26 (*)     pO2 Venous POCT 27      Base Excess/Deficit (+/-) POCT -2.0     INFLUENZA A/B, RSV, & SARS-COV2 PCR - Normal    Influenza  A PCR Negative      Influenza B PCR Negative      RSV PCR Negative      SARS CoV2 PCR Negative     LACTIC ACID WHOLE BLOOD   BLOOD CULTURE   BLOOD CULTURE     Imaging  XR Chest Port 1 View   Final Result   IMPRESSION: Lungs are clear. No pleural effusion or pneumothorax. Normal heart size and pulmonary vascularity.        EKG   ECG taken at 0322, ECG read at 0415  Normal sinus rhythm. Normal ECG.  Rate 99 bpm. FL interval 156 ms. QRS duration 74 ms. QT/QTc 358/459 ms. P-R-T axes 88 72 92.    Independent Interpretation  On my independent interpretation of CXR there is no pneumothorax    ED Course    Medications Administered  Medications   albuterol (PROVENTIL) neb solution 2.5 mg (2.5 mg Nebulization $Given 6/3/24 0506)   sodium chloride 0.9% BOLUS 1,000 mL (1,000 mLs Intravenous $New Bag 6/3/24 0507)   lactated ringers BOLUS 1,000 mL (0 mLs Intravenous Stopped 6/3/24 0453)       Procedures  Procedures     Discussion of Management  Admitting HospitalistEric    Social Determinants of Health adding to complexity of care  None    ED Course  ED Course as of 06/03/24 0626   Mon Jun 03, 2024   0312 I obtained history and examined the patient as noted above.     0400 I rechecked and updated the patient.     0427 I spoke with Dr. Reyes, of the hospitalist team, regarding the patient. They will accept the patient for admission.       Medical Decision Making / Diagnosis   CMS Diagnoses: The Lactic acid level is elevated due to hypoxia, at this time there is no sign of severe sepsis or septic shock. and None    [facility]  Tammiealphonso Zeng is a 68 year old female presents due to shortness of breath.  She has a history of significant COPD and reports that she has had some worsening breathing over the last couple of days, and became significantly worse tonight.  She checked her oxygen and she was hypoxic at home.  EMS found her to be satting in the mid 50s and started CPAP.  She was given breathing treatments and  Solu-Medrol en route and did feel much better.  My evaluation she did have some rapid breathing, but was not in significant aspiratory distress.  Blood work was obtained that did show significant elevation of her CO2 and acidosis consistent with CO2 retention.  Chest x-ray did not show any clear infiltrates and her COVID/influenza swab was negative.  Given the significant CO2 retention, I did maintain her on BiPAP and she was admitted to the hospitalist service.      Disposition  The patient was admitted to the hospital.     ICD-10 Codes:    ICD-10-CM    1. COPD exacerbation (H)  J44.1              Scribe Disclosure:  Angelita POWELL, am serving as a scribe at 5:42 AM on 6/3/2024 to document services personally performed by Cristiano Robertson MD based on my observations and the provider's statements to me.        Cristiano Robertson MD  06/03/24 0643

## 2024-06-03 NOTE — PLAN OF CARE
Goal Outcome Evaluation:    Pt settled in room, BIPAP removed per patient request and NC applied at 2 LPM with sats in the mid 90's.

## 2024-06-04 ENCOUNTER — APPOINTMENT (OUTPATIENT)
Dept: PHYSICAL THERAPY | Facility: CLINIC | Age: 69
End: 2024-06-04
Attending: INTERNAL MEDICINE
Payer: COMMERCIAL

## 2024-06-04 VITALS
HEART RATE: 95 BPM | BODY MASS INDEX: 20.11 KG/M2 | SYSTOLIC BLOOD PRESSURE: 100 MMHG | WEIGHT: 113.54 LBS | RESPIRATION RATE: 18 BRPM | TEMPERATURE: 98.6 F | DIASTOLIC BLOOD PRESSURE: 68 MMHG | OXYGEN SATURATION: 95 %

## 2024-06-04 LAB
ANION GAP SERPL CALCULATED.3IONS-SCNC: 11 MMOL/L (ref 7–15)
BUN SERPL-MCNC: 11 MG/DL (ref 8–23)
CALCIUM SERPL-MCNC: 9.1 MG/DL (ref 8.8–10.2)
CHLORIDE SERPL-SCNC: 102 MMOL/L (ref 98–107)
CREAT SERPL-MCNC: 0.58 MG/DL (ref 0.51–0.95)
DEPRECATED HCO3 PLAS-SCNC: 25 MMOL/L (ref 22–29)
EGFRCR SERPLBLD CKD-EPI 2021: >90 ML/MIN/1.73M2
ERYTHROCYTE [DISTWIDTH] IN BLOOD BY AUTOMATED COUNT: 15.8 % (ref 10–15)
GLUCOSE BLDC GLUCOMTR-MCNC: 141 MG/DL (ref 70–99)
GLUCOSE BLDC GLUCOMTR-MCNC: 160 MG/DL (ref 70–99)
GLUCOSE BLDC GLUCOMTR-MCNC: 163 MG/DL (ref 70–99)
GLUCOSE SERPL-MCNC: 147 MG/DL (ref 70–99)
HBA1C MFR BLD: 5.7 %
HCT VFR BLD AUTO: 35 % (ref 35–47)
HGB BLD-MCNC: 11.1 G/DL (ref 11.7–15.7)
MCH RBC QN AUTO: 27.2 PG (ref 26.5–33)
MCHC RBC AUTO-ENTMCNC: 31.7 G/DL (ref 31.5–36.5)
MCV RBC AUTO: 86 FL (ref 78–100)
PLATELET # BLD AUTO: 293 10E3/UL (ref 150–450)
POTASSIUM SERPL-SCNC: 4.1 MMOL/L (ref 3.4–5.3)
RBC # BLD AUTO: 4.08 10E6/UL (ref 3.8–5.2)
SODIUM SERPL-SCNC: 138 MMOL/L (ref 135–145)
WBC # BLD AUTO: 13.3 10E3/UL (ref 4–11)

## 2024-06-04 PROCEDURE — 99239 HOSP IP/OBS DSCHRG MGMT >30: CPT | Performed by: INTERNAL MEDICINE

## 2024-06-04 PROCEDURE — 999N000157 HC STATISTIC RCP TIME EA 10 MIN

## 2024-06-04 PROCEDURE — G0378 HOSPITAL OBSERVATION PER HR: HCPCS

## 2024-06-04 PROCEDURE — 94640 AIRWAY INHALATION TREATMENT: CPT

## 2024-06-04 PROCEDURE — 82962 GLUCOSE BLOOD TEST: CPT

## 2024-06-04 PROCEDURE — 97530 THERAPEUTIC ACTIVITIES: CPT | Mod: GP | Performed by: PHYSICAL THERAPIST

## 2024-06-04 PROCEDURE — 80048 BASIC METABOLIC PNL TOTAL CA: CPT | Performed by: HOSPITALIST

## 2024-06-04 PROCEDURE — 85027 COMPLETE CBC AUTOMATED: CPT | Performed by: HOSPITALIST

## 2024-06-04 PROCEDURE — 36415 COLL VENOUS BLD VENIPUNCTURE: CPT | Performed by: HOSPITALIST

## 2024-06-04 PROCEDURE — 97161 PT EVAL LOW COMPLEX 20 MIN: CPT | Mod: GP | Performed by: PHYSICAL THERAPIST

## 2024-06-04 PROCEDURE — 250N000012 HC RX MED GY IP 250 OP 636 PS 637: Performed by: INTERNAL MEDICINE

## 2024-06-04 PROCEDURE — 83036 HEMOGLOBIN GLYCOSYLATED A1C: CPT | Performed by: INTERNAL MEDICINE

## 2024-06-04 PROCEDURE — 250N000009 HC RX 250: Performed by: INTERNAL MEDICINE

## 2024-06-04 PROCEDURE — 250N000013 HC RX MED GY IP 250 OP 250 PS 637: Performed by: INTERNAL MEDICINE

## 2024-06-04 RX ORDER — BUDESONIDE 0.5 MG/2ML
0.5 INHALANT ORAL DAILY
Qty: 180 ML | Refills: 0 | Status: SHIPPED | OUTPATIENT
Start: 2024-06-04 | End: 2024-06-04

## 2024-06-04 RX ORDER — PREDNISONE 20 MG/1
40 TABLET ORAL DAILY
Status: DISCONTINUED | OUTPATIENT
Start: 2024-06-04 | End: 2024-06-04 | Stop reason: HOSPADM

## 2024-06-04 RX ORDER — PREDNISONE 20 MG/1
40 TABLET ORAL DAILY
Qty: 10 TABLET | Refills: 0 | Status: SHIPPED | OUTPATIENT
Start: 2024-06-06 | End: 2024-06-10

## 2024-06-04 RX ORDER — BUDESONIDE 0.5 MG/2ML
0.5 INHALANT ORAL 2 TIMES DAILY
Qty: 180 ML | Refills: 0 | Status: SHIPPED | OUTPATIENT
Start: 2024-06-04 | End: 2024-06-10

## 2024-06-04 RX ADMIN — IPRATROPIUM BROMIDE AND ALBUTEROL SULFATE 3 ML: .5; 3 SOLUTION RESPIRATORY (INHALATION) at 00:10

## 2024-06-04 RX ADMIN — IPRATROPIUM BROMIDE AND ALBUTEROL SULFATE 3 ML: .5; 3 SOLUTION RESPIRATORY (INHALATION) at 15:05

## 2024-06-04 RX ADMIN — IPRATROPIUM BROMIDE AND ALBUTEROL SULFATE 3 ML: .5; 3 SOLUTION RESPIRATORY (INHALATION) at 11:01

## 2024-06-04 RX ADMIN — PREDNISONE 40 MG: 20 TABLET ORAL at 10:33

## 2024-06-04 RX ADMIN — ESCITALOPRAM OXALATE 5 MG: 5 TABLET, FILM COATED ORAL at 08:39

## 2024-06-04 ASSESSMENT — ACTIVITIES OF DAILY LIVING (ADL)
ADLS_ACUITY_SCORE: 38
ADLS_ACUITY_SCORE: 38
ADLS_ACUITY_SCORE: 37
ADLS_ACUITY_SCORE: 37
ADLS_ACUITY_SCORE: 38
ADLS_ACUITY_SCORE: 37
ADLS_ACUITY_SCORE: 37
ADLS_ACUITY_SCORE: 38
ADLS_ACUITY_SCORE: 38
DEPENDENT_IADLS:: INDEPENDENT
ADLS_ACUITY_SCORE: 37
ADLS_ACUITY_SCORE: 37
ADLS_ACUITY_SCORE: 38
ADLS_ACUITY_SCORE: 38
ADLS_ACUITY_SCORE: 37

## 2024-06-04 NOTE — UTILIZATION REVIEW
"  Admission Status; Secondary Review Determination         Under the authority of the Utilization Management Committee, the utilization review process indicated a secondary review on the above patient.  The review outcome is based on review of the medical records, discussions with staff, and applying clinical experience noted on the date of the review.          (x) Observation Status Appropriate - This patient does not meet hospital inpatient criteria and is placed in observation status. If this patient's primary payer is Medicare and was admitted as an inpatient, Condition Code 44 should be used and patient status changed to \"observation\".     RATIONALE FOR DETERMINATION    68-year-old female with COPD, ongoing tobacco use, depression, and hepatic steatosis presented with an acute COPD exacerbation and acute hypoxic hypercapnic respiratory failure. She had a 2-day history of worsening shortness of breath, cough, and chest tightness. EMS was called, and she was treated with steroids and nebulizers, then brought to the hospital. In the emergency room, she was placed on BiPAP with significant symptom improvement. Chest x-ray was negative for acute pathology, and viral swabs were negative for COVID-19, influenza A, B, and RSV. Elevated lactic acid was attributed to respiratory distress. She was rapidly weaned off oxygen and has been off oxygen since yesterday.    This patient is expected to need a short hospitalization due to the rapid improvement in respiratory status with no indication of ongoing severe respiratory compromise or infection requiring prolonged stay.  The severity of illness, intensity of service provided, expected LOS and risk for adverse outcome make the care appropriate for further observation; however, doesn't meet criteria for hospital inpatient admission. Dr Carson notified of this determination.    This document was produced using voice recognition software.      The information on this " document is developed by the utilization review team in order for the business office to ensure compliance.  This only denotes the appropriateness of proper admission status and does not reflect the quality of care rendered.         The definitions of Inpatient Status and Observation Status used in making the determination above are those provided in the CMS Coverage Manual, Chapter 1 and Chapter 6, section 70.4.      Sincerely,     MARILYN CABELLO MD    System Medical Director  Utilization Management  [facility].

## 2024-06-04 NOTE — PROGRESS NOTES
Patient has been assessed for Home Oxygen needs. Oxygen readings:    *Pulse oximetry (SpO2) = 94% on room air at rest while awake.    *SpO2 improved to 94% on 0 liters/minute at rest.    *SpO2 = 87% on room air during activity/with exercise.    *SpO2 improved to 93% on 1 liters/minute during activity/with exercise.

## 2024-06-04 NOTE — PROVIDER NOTIFICATION
MD notification    Notified person: MD    Notified person name: Kee Ledesma    Notified date/time: 1815 on 6/3/2024    Notification interaction: vocera    Purpose of notification: patient has been running high blood sugars with Q4 blood sugar checks; no insulin orders.     Orders received: pending, nightshift nurse aware

## 2024-06-04 NOTE — DISCHARGE SUMMARY
[facility]    Discharge Summary  Hospitalist    Date of Admission:  6/3/2024  Date of Discharge:  6/4/2024  Discharging Provider: Bear Carson MD, MD    Discharge Diagnoses   Severe copd  Copd exacerbation     History of Present Illness   Tammie Zeng is a 68 year old female with a past medical history of COPD, depression, hepatic steatosis, ongoing tobacco use presents to hospital with shortness of breath.  Patient reports that for the last 2 to 3 days she has been having worsening shortness of breath with a nonproductive cough.  She reports associated chest tightness worse when she is lying down or on her left side.  She was trying to get her nebulizer overnight when she noticed that her pulse oximeter was reading oxygen saturations in the 70s.  She felt so short of breath she could not get to her nebulizer so she called EMS.  EMS administered albuterol and Solu-Medrol and brought the patient to the hospital.  The patient denies any fevers.  She continues to smoke approximately half pack per day.             Hospital Course   Tammie Zeng was admitted on 6/3/2024.  The following problems were addressed during her hospitalization:    Active Problems:    COPD exacerbation (H)  Tammie Zeng is a 68 year old female with a past medical history of COPD, ongoing tobacco use, depression, hepatic steatosis presents to the hospital for an acute COPD exacerbation.        Acute COPD exacerbation  Acute hypoxic hypercapnic respiratory failure  Ongoing tobacco use  Patient presenting with 2-day history of worsening shortness of breath, cough, chest tightness.  Due to worsening symptoms EMS were called overnight.  Patient was found to have diffuse wheezing and treated with steroids, nebulizers and brought to the hospital.  The patient was placed on BiPAP in the emergency room and reports significant improvement in her symptoms.  Chest x-ray negative for any acute infiltrates or lung  pathology.  Viral swab negative for COVID-19, influenza A, B, RSV. Lactic acid is elevated, but I suspect this is from her respiratory distress and increased work of breathing rather than an infection. Repeat vbg improving.   -pt rapidly improved. Weaned off bipap am of admission and satting well on 3L NC. Weaned off oxygen that night.   Pt initiated on  high dose steroids   Sob very near baseline at time of discharge. Pt up walking halls with staff and PT. No therapy needs  Pt satting at times 87% with walking, otherwise in 88-91 % range.   Nl sats RA rest  Pt has been off her pulmicort  She was prescribed prn ativan for dyspnea to use for dyspnea induced severe anxiety. Uses rarely, uses at bedtime when takes because it causes some sedation  No falls  No signs infection. No clear indication for abx  Anxiety seems to be playing a role in her dyspnea. This was discussed in detail and encouraged discussion of this with pulmonologist and pulm rehab  Cont with pulmonary rehab   Discharge on prednisone burst and 1L NC oxygen with activity. No smoking with oxygen running discussed with pt and family     mild hyperglycemia  HbA1C 5.7%  Prediabetes range.   Follow up with pcp  Have been on steroids during the last 3 months which as probably influenced this result      Chronic medical conditions -Resume pta meds as needed once med rec is complete     Mdd  Hepatic steatosis     DVT ppx: lovenox sc  Expected length of stay greater than 2 days  Code Status: DNR/I         Oxygen Documentation  I certify that this patient, Tammie Zeng has been under my care (or a nurse practitioner or physican's assistant working with me). This is the face-to-face encounter for oxygen medical necessity.      At the time of this encounter, I have reviewed the qualifying testing and have determined that supplemental oxygen is reasonable and necessary and is expected to improve the patient's condition in a home setting.         Patient has  continued oxygen desaturation due to COPD J44.9.    If portability is ordered, is the patient mobile within the home? yes    Was this visit performed as a telehealth visit: No        Bear Carson MD, MD    Significant Results and Procedures   See hospital course     Pending Results   These results will be followed up by hospitalist  Unresulted Labs Ordered in the Past 30 Days of this Admission       Date and Time Order Name Status Description    6/3/2024  3:19 AM Blood Culture Peripheral Blood Preliminary     6/3/2024  3:19 AM Blood Culture Peripheral Blood Preliminary             Code Status   DNR / DNI       Primary Care Physician   Mikel Navarro    Physical Exam   Temp: 98.6  F (37  C) Temp src: Oral BP: 100/68 Pulse: 95   Resp: 18 SpO2: 95 % (after walking 235+' with 1L) O2 Device: Nasal cannula Oxygen Delivery: 1 LPM  Vitals:    06/03/24 0317   Weight: 51.5 kg (113 lb 8.6 oz)     Vital Signs with Ranges  Temp:  [98.2  F (36.8  C)-98.9  F (37.2  C)] 98.6  F (37  C)  Pulse:  [] 95  Resp:  [18] 18  BP: (100-115)/(51-68) 100/68  SpO2:  [88 %-95 %] 95 %  I/O last 3 completed shifts:  In: 360 [P.O.:360]  Out: -     Constitutional: nad  Respiratory: distant BS bilaterally. No crackles. Breathing easily  Slight exp wheezing  Cardiovascular: RRR no r/g/m  GI: soft, nt, nd  Skin: no rash or cyanossi  Musculoskeletal: no focal jt swelling or redness  Neurologic: nl speech and mentation  Neuropsychiatric: nl affect.     Discharge Disposition   Discharged to home  Condition at discharge: Good    Consultations This Hospital Stay   RESPIRATORY CARE IP CONSULT  PHYSICAL THERAPY ADULT IP CONSULT  CARE MANAGEMENT / SOCIAL WORK IP CONSULT  SMOKING CESSATION PROGRAM IP CONSULT    Time Spent on this Encounter   I, Bear Carson MD, personally saw the patient today and spent greater than 30 minutes discharging this patient.    Discharge Orders      Follow-up and recommended labs and tests     Appointment with   Jud (Pulmonary) 9/3 at 2 pm at the [facility] [place]     Reason for your hospital stay    Copd exacerbation- I think anxiety around breathing, possibly virus, being off copd inhaler regimen contributing factors.     Activity    Your activity upon discharge: activity as tolerated     Patient care order    1. NO smoking while wearing the oxygen. Oxygen is extremely flammable and could give you severe potentially life threatening burns.   2. Discuss smoking cessation with primary care doctor including nicotine replacement and/or medications that can decrease craving for smoking/nicotine. Also several sessions with a clinical psychologist regarding smoking cessation can help   3. I recommend taking your albuterol nebs scheduled three times per day for next 1-2 days then transition back to as needed.   4. Restart taking the Pulmicort Nebs twice daily for now. Review with  your lung doctor     Follow-up and recommended labs and tests     1. Follow up with [facility] as scheduled  2. Follow up with primary care doctor as scheduled   3. Follow up with pulmonary rehab     Patient care order    - check your oxygen saturations daily on RA while walking to see if you are able to wean off oxygen. Goal is to be consistently greater than 88%     Patient care order    - your diabetes screening test is mildly abnormal at 5.7%. this indicated that over the last 2-3 months your blood sugars have been mildly elevated in the Pre diabetes range. Not in the diabetes range. This should be rechecked in 3-6 months. Discuss with your primary care doctor in clinic.     No CPR- Do NOT Intubate     Oxygen Adult/Peds    Oxygen Documentation  I certify that this patient, Tammie Zeng has been under my care (or a nurse practitioner or physican's assistant working with me). This is the face-to-face encounter for oxygen medical necessity.      At the time of this encounter, I have reviewed the qualifying testing and have  determined that supplemental oxygen is reasonable and necessary and is expected to improve the patient's condition in a home setting.         Patient has continued oxygen desaturation due to COPD J44.9.    If portability is ordered, is the patient mobile within the home? yes    Was this visit performed as a telehealth visit: No     Diet    Follow this diet upon discharge: Orders Placed This Encounter      Regular Diet Adult     Discharge Medications   Current Discharge Medication List        START taking these medications    Details   budesonide (PULMICORT) 0.5 MG/2ML neb solution Take 2 mLs (0.5 mg) by nebulization 2 times daily  Qty: 180 mL, Refills: 0    Associated Diagnoses: COPD exacerbation (H); Chronic obstructive pulmonary disease, unspecified COPD type (H)      predniSONE (DELTASONE) 20 MG tablet Take 2 tablets (40 mg) by mouth daily for 5 days  Qty: 10 tablet, Refills: 0    Associated Diagnoses: COPD exacerbation (H)           CONTINUE these medications which have CHANGED    Details   umeclidinium-vilanterol (ANORO ELLIPTA) 62.5-25 MCG/ACT oral inhaler Inhale 1 puff into the lungs daily  Qty: 180 each, Refills: 1    Comments: Future refills by PCP Dr. Mikel Navarro with phone number [phone].  Associated Diagnoses: COPD exacerbation (H)           CONTINUE these medications which have NOT CHANGED    Details   albuterol (PROAIR HFA/PROVENTIL HFA/VENTOLIN HFA) 108 (90 Base) MCG/ACT inhaler Inhale 2 puffs into the lungs every 4 hours as needed for wheezing or shortness of breath    Comments: Pharmacy may dispense brand covered by insurance (Proair, or proventil or ventolin or generic albuterol inhaler)  Associated Diagnoses: COPD exacerbation (H)      albuterol (PROVENTIL) (2.5 MG/3ML) 0.083% neb solution Take 1 vial (2.5 mg) by nebulization every 4 hours as needed for wheezing    Associated Diagnoses: COPD exacerbation (H)      aspirin (ASA) 325 MG EC tablet Take 325 mg by mouth every 6 hours as needed for  moderate pain      benzonatate (TESSALON) 100 MG capsule Take 100 mg by mouth 3 times daily as needed for cough      escitalopram (LEXAPRO) 5 MG tablet Take 1 tablet (5 mg) by mouth daily  Qty: 90 tablet, Refills: 2    Associated Diagnoses: Generalized anxiety disorder      guaiFENesin (MUCINEX) 600 MG 12 hr tablet Take 1,200 mg by mouth 2 times daily as needed for congestion      LORazepam (ATIVAN) 0.5 MG tablet Take 1 tablet (0.5 mg) by mouth 2 times daily as needed for anxiety  Qty: 30 tablet, Refills: 0    Associated Diagnoses: Generalized anxiety disorder      multivitamin w/minerals (THERA-VIT-M) tablet Take 1 tablet by mouth daily    Associated Diagnoses: Acute respiratory failure with hypoxia (H)           Allergies   Allergies   Allergen Reactions    Pcn [Penicillins]      convulsions     Data   Most Recent 3 CBC's:  Recent Labs   Lab Test 06/04/24  0441 06/03/24  0324 04/13/24  1135   WBC 13.3* 11.4* 16.0*   HGB 11.1* 12.7 12.6   MCV 86 90 87    322 312      Most Recent 3 BMP's:  Recent Labs   Lab Test 06/04/24  1229 06/04/24  0751 06/04/24  0441 06/03/24  1156 06/03/24  0324 04/16/24  0957 04/13/24  1135   NA  --   --  138  --  137  --  139   POTASSIUM  --   --  4.1  --  4.0  --  4.3   CHLORIDE  --   --  102  --  100  --  102   CO2  --   --  25  --  24  --  27   BUN  --   --  11.0  --  8.7  --  11.0   CR  --   --  0.58  --  0.69  --  0.57   ANIONGAP  --   --  11  --  13  --  10   MOLLY  --   --  9.1  --  8.5*  --  9.7   * 163* 147*   < > 222*   < > 163*    < > = values in this interval not displayed.     Most Recent 2 LFT's:  Recent Labs   Lab Test 06/03/24 0324 04/12/24  0711   AST 24 30   ALT 9 17   ALKPHOS 91 95   BILITOTAL 0.3 0.3     Most Recent INR's and Anticoagulation Dosing History:  Anticoagulation Dose History           No data to display              Most Recent 3 Troponin's:No lab results found.  Most Recent Cholesterol Panel:  Recent Labs   Lab Test 02/15/24  1110   CHOL 197    LDL 73      TRIG 86     Most Recent 6 Bacteria Isolates From Any Culture (See EPIC Reports for Culture Details):No lab results found.  Most Recent TSH, T4 and A1c Labs:  Recent Labs   Lab Test 06/04/24  0441 02/15/24  1110   TSH  --  2.04   A1C 5.7*  --      Results for orders placed or performed during the hospital encounter of 06/03/24   XR Chest Port 1 View    Narrative    EXAM: XR CHEST PORT 1 VIEW  LOCATION: [facility]  DATE: 6/3/2024    INDICATION: SOB  COMPARISON: 2/19/2024      Impression    IMPRESSION: Lungs are clear. No pleural effusion or pneumothorax. Normal heart size and pulmonary vascularity.

## 2024-06-04 NOTE — PROGRESS NOTES
PT: Patient ambulated 200' w/o AD on RA with decrease of O2 to 88% and increase of labored breathing. Ambuated 235+' on 1L and maintained 95% and less labored breathing. Patient and daughter feel that patient's quality of life and ability to ambulate a block or more would be greatly increased by having the option of portable O2 at home. No further PT indicated in acute care setting. Patient plans to resume OP pulmonary rehab.      06/04/24 1641   Appointment Info   Signing Clinician's Name / Credentials (PT) Nila Alcazar, PT   Living Environment   People in Home alone   Current Living Arrangements house   Home Accessibility stairs to enter home;stairs within home   Number of Stairs, Main Entrance 3   Stair Railings, Main Entrance railings safe and in good condition   Number of Stairs, Within Home, Primary greater than 10 stairs   Stair Railings, Within Home, Primary railings safe and in good condition   Transportation Anticipated family or friend will provide;car, drives self   Living Environment Comments Flight of stairs to basement where laundry is. Daughter does assist at times if patient has larger loads. Daughter lives 20 min. away from pt.   Self-Care   Usual Activity Tolerance good   Current Activity Tolerance moderate   Regular Exercise No   Equipment Currently Used at Home none   Fall history within last six months no   Activity/Exercise/Self-Care Comment Drives, gets own groceries, makes own meals, manages own ADL's. Daughter sometimes helps with larger loads of laundry. Daughter and pt. report that pt. can only walk about a block and then becomes very SOB; this limits her activity level. They both feel she needs O2 at home as needed.   General Information   Onset of Illness/Injury or Date of Surgery 06/03/24   Referring Physician Bear Carson MD   Patient/Family Therapy Goals Statement (PT) Both daughter and pt. feel pt. needs home O2 to use as needed, especially with out of home activity    Pertinent History of Current Problem (include personal factors and/or comorbidities that impact the POC) Tammie Zeng is a 68 year old female with a past medical history of COPD, depression, hepatic steatosis, ongoing tobacco use presents to hospital with shortness of breath.  Patient reports that for the last 2 to 3 days she has been having worsening shortness of breath with a nonproductive cough.  She reports associated chest tightness worse when she is lying down or on her left side.  She was trying to get her nebulizer overnight when she noticed that her pulse oximeter was reading oxygen saturations in the 70s.  She felt so short of breath she could not get to her nebulizer so she called EMS.  EMS administered albuterol and Solu-Medrol and brought the patient to the hospital.  The patient denies any fevers.  She continues to smoke approximately half pack per day.   Existing Precautions/Restrictions oxygen therapy device and L/min   General Observations Sitting on EOB. Daughter in room. On RA with O2 sats at 95%   Cognition   Affect/Mental Status (Cognition) WNL   Orientation Status (Cognition) oriented x 4   Follows Commands (Cognition) WNL   Pain Assessment   Patient Currently in Pain No   Integumentary/Edema   Integumentary/Edema no deficits were identifed   Posture    Posture Forward head position;Kyphosis   Range of Motion (ROM)   Range of Motion ROM is WFL   Strength (Manual Muscle Testing)   Strength (Manual Muscle Testing) strength is WFL   Bed Mobility   Comment, (Bed Mobility) I per pt. and daughter report with sit to and from supine   Transfers   Comment, (Transfers) I with STS   Gait/Stairs (Locomotion)   Comment, (Gait/Stairs) Ambulated 25' for eval; SBA   Balance   Balance Comments No gross LOB noted even with head turns   Sensory Examination   Sensory Perception patient reports no sensory changes   Coordination   Coordination no deficits were identified   Muscle Tone   Muscle Tone no  deficits were identified   Clinical Impression   Criteria for Skilled Therapeutic Intervention Yes, treatment indicated   PT Diagnosis (PT) Impaired functional endurance   Influenced by the following impairments SOB, decrease in O2 sats, increase in HR   Functional limitations due to impairments decreased functional endurance   Clinical Presentation (PT Evaluation Complexity) evolving   Clinical Presentation Rationale clinical judgement   Clinical Decision Making (Complexity) low complexity   Planned Therapy Interventions (PT) patient/family education;postural re-education;strengthening;progressive activity/exercise   Risk & Benefits of therapy have been explained evaluation/treatment results reviewed;care plan/treatment goals reviewed;risks/benefits reviewed;current/potential barriers reviewed;participants voiced agreement with care plan;participants included;patient;daughter   PT Total Evaluation Time   PT Eval, Low Complexity Minutes (35675) 15   Therapy Certification   Start of care date 06/04/24   Certification date from 06/04/24   Certification date to 07/03/24   Medical Diagnosis COPD exacerbation   Physical Therapy Goals   PT Frequency One time eval and treatment only   PT Predicted Duration/Target Date for Goal Attainment 06/04/24   PT: Transfers Independent;Sit to/from stand;Goal Met   PT: Gait Independent;150 feet;Goal Met   PT: Goal 1 O2 saturation monitoring will give picture if patient can currently manage on RA or what level of O2 is needed while mobilizing 200'; goal met.   Therapeutic Activity   Therapeutic Activities: dynamic activities to improve functional performance Minutes (44572) 15   Symptoms Noted During/After Treatment Fatigue;Shortness of breath;Significant change in vital signs   Treatment Detail/Skilled Intervention Nursing asked PT to monitor O2 vitals starting with pt. on RA. Patient in agreement. Daughter present during session. Patient ambulated 200' on RA with gradual decrease in  O2 sats from 95% to 88% by end of walk; very labored breathing. Sat and performed PLB exs with slow recovery. Added 1L per pt. request and for comfort. Increased to 94% withing 45 seconds. Kept on 1L for another walk of 235'. Able to maintain O2 sats for this slightly longer walk at 94-95% and was less labored in breathing by the end of it. Noticable to both patient and daughter who are both requesting that patient can have home O2 as needed for longer distances. No further PT indicated as patient functionally I; defer further O2 monitoring to ns.   PT Discharge Planning   PT Plan D/C PT   PT Discharge Recommendation (DC Rec) home with outpatient pulmonary rehab  (resume)   PT Rationale for DC Rec Patient physically functioning I'lly but becomes SOB with drop in O2 sats around 200' with significantly labored breathing. Recovered to 95% with sitting and adding 1L per request. Able to then walk 235+' on 1L with much less labored breathing and O2 sats at 95%. Patient feeling her quality of life is limited as she cannot walk greater than a block on RA but feels she is able to do that when on 1L. No further PT indicated. Recommend she return to her OP pulmonary rehab which she plans to   PT Brief overview of current status 200' amb. w/o AD on RA with decrease to 88% and increase of labored breathing. Ambuated 235+' on 1L and maintained 95% and less labored breathing.   Total Session Time   Timed Code Treatment Minutes 15   Total Session Time (sum of timed and untimed services) 30   [facility]  OUTPATIENT PHYSICAL THERAPY EVALUATION  PLAN OF TREATMENT FOR OUTPATIENT REHABILITATION  (COMPLETE FOR INITIAL CLAIMS ONLY)  Patient's Last Name, First Name, M.I.  YOB: 1955  Tammie Zeng                        Provider's Name  [facility] Medical Record No.  [id number]                             Onset Date:  06/03/24   Start of Care Date:   06/04/24   Type:     _X_PT   ___OT   ___SLP Medical Diagnosis:  COPD exacerbation              PT Diagnosis:  Impaired functional endurance Visits from SOC:  1     See note for plan of treatment, functional goals and certification details    I CERTIFY THE NEED FOR THESE SERVICES FURNISHED UNDER        THIS PLAN OF TREATMENT AND WHILE UNDER MY CARE     (Physician co-signature of this document indicates review and certification of the therapy plan).             Physical Therapy Discharge Summary    Reason for therapy discharge:    All goals and outcomes met, no further needs identified.    Progress towards therapy goal(s). See goals on Care Plan in Epic electronic health record for goal details.  Goals met    Therapy recommendation(s):    Continued therapy is recommended.  Rationale/Recommendations:  At OP pulmonary rehab where patient currently attends. No further skilled PT indicated in acute care setting. Nursing to continue to monitor and report on vitals.

## 2024-06-04 NOTE — PLAN OF CARE
Goal Outcome Evaluation:         Summary:  COPD exacerbation, tx from [facility] this shift   DATE & TIME: 6/3/24-6/4/24 8690-5072  Cognitive Concerns/ Orientation : A&O x4    BEHAVIOR & AGGRESSION TOOL COLOR: GREEN  CIWA SCORE: NA   ABNL VS/O2: VSS RA 1L with activity at times  MOBILITY: IND  PAIN MANAGMENT: Denies  DIET: Regular   BOWEL/BLADDER: Continent   ABNL LAB/BG: , 160 LA 1.7  DRAIN/DEVICES: Lft PIV SL-Pt eating and drinking good, IV fluids Discontinued.   TELEMETRY RHYTHM: NSR  SKIN: Intact-scattered bruises  TESTS/PROCEDURES: none  D/C DAY/GOALS/PLACE: PT consult placed, possible discharge today 6/4/24  OTHER IMPORTANT INFO: Pt has cough-prn mucinex ordered and given x1  On IV solumedrol Q12hrs

## 2024-06-04 NOTE — CONSULTS
Care Management Initial Consult    General Information  Assessment completed with: Patient, Children,    Type of CM/SW Visit: Initial Assessment    Primary Care Provider verified and updated as needed: Yes   Readmission within the last 30 days: no previous admission in last 30 days      Reason for Consult: discharge planning  Advance Care Planning: Advance Care Planning Reviewed: no concerns identified          Communication Assessment  Patient's communication style: spoken language (English or Bilingual)             Cognitive  Cognitive/Neuro/Behavioral: WDL                      Living Environment:   People in home: alone     Current living Arrangements: house      Able to return to prior arrangements: yes       Family/Social Support:  Care provided by: self  Provides care for: no one  Marital Status:   Children          Description of Support System: Supportive    Support Assessment: Adequate family and caregiver support    Current Resources:   Patient receiving home care services: No     Community Resources: None  Equipment currently used at home: none  Supplies currently used at home: Nebulizer tubing    Employment/Financial:  Employment Status: retired        Financial Concerns: none   Referral to Financial Worker: No       Does the patient's insurance plan have a 3 day qualifying hospital stay waiver?  No    Lifestyle & Psychosocial Needs:  Social Determinants of Health     Food Insecurity: Low Risk  (2/15/2024)    Food Insecurity     Within the past 12 months, did you worry that your food would run out before you got money to buy more?: No     Within the past 12 months, did the food you bought just not last and you didn t have money to get more?: No   Depression: Not at risk (5/2/2024)    PHQ-2     PHQ-2 Score: 1   Housing Stability: Low Risk  (2/15/2024)    Housing Stability     Do you have housing? : Yes     Are you worried about losing your housing?: No   Tobacco Use: High Risk (5/2/2024)    Patient  History     Smoking Tobacco Use: Every Day     Smokeless Tobacco Use: Never     Passive Exposure: Not on file   Financial Resource Strain: Low Risk  (2/15/2024)    Financial Resource Strain     Within the past 12 months, have you or your family members you live with been unable to get utilities (heat, electricity) when it was really needed?: No   Alcohol Use: Not on file   Transportation Needs: Low Risk  (2/15/2024)    Transportation Needs     Within the past 12 months, has lack of transportation kept you from medical appointments, getting your medicines, non-medical meetings or appointments, work, or from getting things that you need?: No   Physical Activity: Insufficiently Active (2/15/2024)    Exercise Vital Sign     Days of Exercise per Week: 1 day     Minutes of Exercise per Session: 20 min   Interpersonal Safety: Low Risk  (2/27/2024)    Interpersonal Safety     Do you feel physically and emotionally safe where you currently live?: Yes     Within the past 12 months, have you been hit, slapped, kicked or otherwise physically hurt by someone?: No     Within the past 12 months, have you been humiliated or emotionally abused in other ways by your partner or ex-partner?: No   Stress: No Stress Concern Present (2/15/2024)    [place] [place] [employer] - Occupational Stress Questionnaire     Feeling of Stress : Only a little   Social Connections: Unknown (2/15/2024)    Social Connection and Isolation Panel [NHANES]     Frequency of Communication with Friends and Family: Not on file     Frequency of Social Gatherings with Friends and Family: Twice a week     Attends Scientologist Services: Not on file     Active Member of Clubs or Organizations: Not on file     Attends Club or Organization Meetings: Not on file     Marital Status: Not on file   Health Literacy: Not on file       Functional Status:  Prior to admission patient needed assistance:   Dependent ADLs:: Independent  Dependent IADLs::  Independent  Assesssment of Functional Status: At functional baseline    Mental Health Status:  Mental Health Status: No Current Concerns       Chemical Dependency Status:  Chemical Dependency Status: No Current Concerns             Values/Beliefs:  Spiritual, Cultural Beliefs, Mandaen Practices, Values that affect care: no               Additional Information:  Met with patient and daughter in room to discuss discharge plans.  Patent states she lives in a house with all her needs met on 1 level.Patent states she is very active . She is independent with ADLs and IADLs. Patient states she continues to smoke and is in the process of trying to cut down. Daughter very involved in he mother's care.   Patient reports shortness of breath any activity and was hoping she could get oxygen at home with activity.Discussed that because she is a smoker it will be a safety issue. Patient stating she is aware of the dangers of lighting a cigarette with oxygen and she will refrain from smoking.  Discussed that her provider will make the decision for oxygen at home.  Observation status explained and patient verbalized understanding.   Follow up appointment with primary and with Pulmonary completed.   JANES    10 ED/Hospital Follow Up 12:15 PM  [facility]   Appointment with Dr Renner (Pulmonary) 9/3 at 2 pm at the [facility] Katty Logan RN  [phone]

## 2024-06-04 NOTE — PROVIDER NOTIFICATION
MD Notification    Notified Person: MD    Notified Person Name:DR SANTOYO     Notification Date/Time: 6/3/24 2213    Notification Interaction:CHAD    Purpose of Notification:pt eating and drinking good, can you please discontinue IV fluids? per [facility] nurse on transfer to floor they had been stopped earlier this evening.     Orders Received:    Comments:

## 2024-06-04 NOTE — PROGRESS NOTES
.Transfer in/out    Transfer to [facility] from [facility]  Report given to/received from Carisa CABRERA RN from Elizabeth HAMEED RN     Brief note about patient status upon transfer      Code status: DNR / DNI  Skin: Intact-scattered bruises   Fall Risk: No  Isolation: None  Patient belongings: Remain with pt-pink nike duffel bag   If patient is a 72 hour hold/Commitment are belongings removed from room and locked up? NA  Medication drips upon transfer: Inhaler, and Insulin pen   Sign and held orders released? NA

## 2024-06-04 NOTE — PLAN OF CARE
Goal Outcome Evaluation:      Plan of Care Reviewed With: patient    Overall Patient Progress: improvingOverall Patient Progress: improving    Discharge    Patient discharged to home via w/c with family (daughter)  Care plan note  Summary:  COPD exacerbation, tx from [facility] this shift   DATE & TIME: 6/4/24 4963-4487  Cognitive Concerns/ Orientation : A&O x4    BEHAVIOR & AGGRESSION TOOL COLOR: GREEN  CIWA SCORE: NA   ABNL VS/O2: VSS, 94% RA at rest, required 1L with activity at times  MOBILITY: IND, steady  PAIN MANAGMENT: Denies  DIET: Regular   BOWEL/BLADDER: Continent   ABNL LAB/BG: , 141, WBC 13.3, Hgb 11.1, A1c 5.7  DRAIN/DEVICES: Lft PIV SL  TELEMETRY RHYTHM: NSR  SKIN: Intact-scattered bruises  TESTS/PROCEDURES: none  D/C DAY/GOALS/PLACE: Discharged home today 6/4/24 at 1740   OTHER IMPORTANT INFO: Went through AVS and discharge home medication information, pt verbalized understanding. Denies questions and concerns at the time of discharge. Belongings packed and sent with the pt. Home oxygen got delivered and pt feels comfortable using it on her own.       Listed belongings gathered and given to patient (including from security/pharmacy). Yes  Care Plan and Patient education resolved: Yes  Prescriptions if needed, hard copies sent with patient  NA  Medication Bin checked and emptied on discharge Yes  SW/care coordinator/charge RN aware of discharge: Yes

## 2024-06-04 NOTE — PROVIDER NOTIFICATION
MD notification    Notified person: MD    Notified person name: Bear Carson    Notified date/time: 1628 on 6/3/2024    Notification interaction: vocera    Purpose of notification: no normal saline infusing at shift start. Patient is eating/drinking now. Order states normal saline continuous infusion at 75ml/hr.     Orders received: if patient is eating/drinking, okay to discontinue NS @75ml/hr

## 2024-06-05 ENCOUNTER — PATIENT OUTREACH (OUTPATIENT)
Dept: CARE COORDINATION | Facility: CLINIC | Age: 69
End: 2024-06-05
Payer: COMMERCIAL

## 2024-06-05 NOTE — PROGRESS NOTES
"  [facility] Care Resource Center: Transitions of Care Outreach  Chief Complaint   Patient presents with    Clinic Care Coordination - Post Hospital       Most Recent Admission Date: 6/3/2024   Most Recent Admission Diagnosis: COPD exacerbation (H) - J44.1     Most Recent Discharge Date: 6/4/2024   Most Recent Discharge Diagnosis: COPD exacerbation (H) - J44.1  Chronic obstructive pulmonary disease, unspecified COPD type (H) - J44.9     Transitions of Care Assessment    Discharge Assessment  How are you doing now that you are home?: \" I am doing better now\"  How are your symptoms? (Red Flag symptoms escalate to triage hotline per guidelines): Improved  Do you know how to contact your clinic care team if you have future questions or changes to your health status? : Yes  Does the patient have their discharge instructions? : Yes  Does the patient have questions regarding their discharge instructions? : No  Were you started on any new medications or were there changes to any of your previous medications? : Yes  Does the patient have all of their medications?: Yes  Do you have questions regarding any of your medications? : No  Do you have all of your needed medical supplies or equipment (DME)?  (i.e. oxygen tank, CPAP, cane, etc.): Yes    Post-op ([facility] CTA Only)  If the patient had a surgery or procedure, do they have any questions for a nurse?: No         [facility] Explained and offered Care Coordination support to eligible patients: Yes    Patient accepted? No    Follow up Plan     Discharge Follow-Up  Discharge follow up appointment scheduled in alignment with recommended follow up timeframe or Transitions of Risk Category? (Low = within 30 days; Moderate= within 14 days; High= within 7 days): Yes  Discharge Follow Up Appointment Date: 06/10/24  Discharge Follow Up Appointment Scheduled with?: Primary Care Provider    Future Appointments   Date Time Provider Department Center   6/10/2024 12:30 PM Mikel Navarro MD " [facility]   6/11/2024 10:00 AM [facility]   6/18/2024  3:30 PM [facility]   6/20/2024  2:00 PM [facility]   2/17/2025 10:30 AM [facility]       Outpatient Plan as outlined on AVS reviewed with patient.    For any urgent concerns, please contact our 24 hour nurse triage line: [phone] ([phone])       ANTONI Rojas

## 2024-06-07 DIAGNOSIS — J44.1 COPD EXACERBATION (H): ICD-10-CM

## 2024-06-07 RX ORDER — ALBUTEROL SULFATE 90 UG/1
2 AEROSOL, METERED RESPIRATORY (INHALATION) EVERY 4 HOURS PRN
Qty: 18 G | Refills: 0 | Status: SHIPPED | OUTPATIENT
Start: 2024-06-07 | End: 2024-06-24

## 2024-06-07 NOTE — TELEPHONE ENCOUNTER
Prescription approved per [facility] Refill Protocol.  Jackelyn Miller, RN  [facility] Triage Nurse

## 2024-06-08 LAB
BACTERIA BLD CULT: NO GROWTH
BACTERIA BLD CULT: NO GROWTH

## 2024-06-10 ENCOUNTER — OFFICE VISIT (OUTPATIENT)
Dept: FAMILY MEDICINE | Facility: CLINIC | Age: 69
End: 2024-06-10
Payer: COMMERCIAL

## 2024-06-10 VITALS
TEMPERATURE: 98.4 F | WEIGHT: 106 LBS | HEART RATE: 76 BPM | RESPIRATION RATE: 18 BRPM | SYSTOLIC BLOOD PRESSURE: 133 MMHG | HEIGHT: 63 IN | DIASTOLIC BLOOD PRESSURE: 84 MMHG | BODY MASS INDEX: 18.78 KG/M2 | OXYGEN SATURATION: 95 %

## 2024-06-10 DIAGNOSIS — J96.01 ACUTE RESPIRATORY FAILURE WITH HYPOXIA (H): ICD-10-CM

## 2024-06-10 DIAGNOSIS — J44.9 CHRONIC OBSTRUCTIVE PULMONARY DISEASE, UNSPECIFIED COPD TYPE (H): Primary | ICD-10-CM

## 2024-06-10 DIAGNOSIS — F41.1 GENERALIZED ANXIETY DISORDER: ICD-10-CM

## 2024-06-10 DIAGNOSIS — F17.200 TOBACCO DEPENDENCE SYNDROME: ICD-10-CM

## 2024-06-10 PROCEDURE — 99496 TRANSJ CARE MGMT HIGH F2F 7D: CPT | Performed by: INTERNAL MEDICINE

## 2024-06-10 RX ORDER — VARENICLINE TARTRATE 1 MG/1
1 TABLET, FILM COATED ORAL 2 TIMES DAILY
Qty: 30 TABLET | Refills: 4 | Status: ON HOLD | OUTPATIENT
Start: 2024-06-10 | End: 2024-07-13

## 2024-06-10 RX ORDER — VARENICLINE TARTRATE 0.5 (11)-1
KIT ORAL
Qty: 53 TABLET | Refills: 0 | Status: ON HOLD | OUTPATIENT
Start: 2024-06-10 | End: 2024-07-13

## 2024-06-10 RX ORDER — BUDESONIDE 1 MG/2ML
1 INHALANT ORAL
Qty: 200 ML | Refills: 1 | Status: SHIPPED | OUTPATIENT
Start: 2024-06-10

## 2024-06-10 ASSESSMENT — PAIN SCALES - GENERAL: PAINLEVEL: NO PAIN (0)

## 2024-06-10 NOTE — PROGRESS NOTES
Tia Akins is a 68 year old, presenting for the following health issues:    This is a 68-year-old who I am seeing for hospital follow-up.  She presents with her daughter.  The patient was hospitalized as noted from [place] 3 to June 4 for a severe COPD exacerbation.  She has a history of COPD with ongoing tobacco use who presented with shortness of breath.  It had been ongoing for the last 2 to 3 days prior to admission with a nonproductive cough and associated chest tightness.  She was checking her pulse oximeter and it was reading into the 70s.  She called EMS and was brought into the emergency room.  She was found to have acute hypoxic hypercapnic respiratory failure.  She had diffuse wheezing and was treated with steroids, nebs and brought to the hospital by EMS.  She was placed on BiPAP with significant improvement.  Chest x-ray was negative.  Swabs for COVID, influenza and RSV were all negative.  She improved rapidly and was weaned off BiPAP.  She was weaned off of oxygen.  She was initiated on high-dose steroids.  At the time of discharge her O2 sats were 87% with walking otherwise 88 to 91% with normal sats at rest.  She was given as needed Ativan to use for dyspnea induced anxiety.  It was felt anxiety was playing a role in her dyspnea.  She was to continue on pulmonary rehab and was discharged on a prednisone burst as well as 1 L of nasal cannula oxygen.  She really has not been using the oxygen.  Her sats have stayed fairly good.  She did find the prednisone pills helpful, she is now off of it.  Currently her regimen includes the Anoro Ellipta inhaler as well as the Pulmicort 0.5 twice a day.  She is using either albuterol nebs or inhalers about every 4 hours.    She notes that she is not quite back to baseline with some dyspnea as well as a cough and fatigue.  No chest pain or edema.  No abdominal pain or nausea.  She has an appointment with pulmonary but not until September.    As noted the  patient was hospitalized and discharged on April 20 again for acute hypoxic respiratory failure from COPD.  She is hospital hospitalized in August 2023 in February 2024 for this.  She does see pulmonary.    During her hospital stay in April a cardiac echo showed normal LV function with no wall motion abnormalities and no significant valve disease with normal RV size structure and function.    The patient has hepatic steatosis as well.  She has pulmonary nodules.    I last saw her in February post hospital stay.  At that visit I noted that trilogy was cost prohibitive and so I recommended a change to Pulmicort nebs twice a day and DuoNebs 3 times a day and as needed.    She is not entirely sure if she finds lorazepam helpful but takes it once a day in the morning.  She is not sure if the Lexapro has helped greatly.  She still smokes about half pack a day and requests medication for that.    Past Medical History:   Diagnosis Date    Chronic obstructive pulmonary disease, unspecified COPD type (H) 06/13/2017    has seen [facility], fev1 under 1.0, hospitalized twice in 2023, once in Feb 2024, once May 2024    Depressive disorder, not elsewhere classified 05/2006    following sudden death of her mother    Elevated LFTs 2023    hepatic steatosis 4/23 us    Generalized anxiety disorder     Hepatic steatosis 04/2023    on us done for elev lfts    Herpes simplex without mention of complication     MOUTH    Nephrolithiasis     TOBACCO ABUSE-CONTINUOUS      Past Surgical History:   Procedure Laterality Date    CHOLECYSTECTOMY, LAPOROSCOPIC  1980's    done at [facility]    TUBAL LIGATION  1998     Social History     Socioeconomic History    Marital status: Single     Spouse name: Not on file    Number of children: 1    Years of education: Not on file    Highest education level: Not on file   Occupational History    Occupation: retired, machinest for [employer]   Tobacco Use    Smoking status: Every Day     Current packs/day:  0.50     Average packs/day: 0.5 packs/day for 25.0 years (12.5 ttl pk-yrs)     Types: Cigarettes    Smokeless tobacco: Never    Tobacco comments:     states is cutting back - 1/2 pack a day    Vaping Use    Vaping status: Never Used   Substance and Sexual Activity    Alcohol use: Yes     Alcohol/week: 0.0 standard drinks of alcohol     Comment: 3 mixed drinks per week    Drug use: No    Sexual activity: Not Currently     Partners: Male     Birth control/protection: Surgical     Comment: tubal - S.O. - Jos   Other Topics Concern     Service No    Blood Transfusions No    Caffeine Concern No    Occupational Exposure No    Hobby Hazards No    Sleep Concern Yes     Comment: secondary to grief - mom  suddenly at the end of April    Stress Concern Yes    Weight Concern Yes    Special Diet Yes     Comment: trys to eat healthy    Back Care No    Exercise No    Bike Helmet No    Seat Belt Yes    Self-Exams No   Social History Narrative    Not on file     Social Determinants of Health     Financial Resource Strain: Low Risk  (2/15/2024)    Financial Resource Strain     Within the past 12 months, have you or your family members you live with been unable to get utilities (heat, electricity) when it was really needed?: No   Food Insecurity: Low Risk  (2/15/2024)    Food Insecurity     Within the past 12 months, did you worry that your food would run out before you got money to buy more?: No     Within the past 12 months, did the food you bought just not last and you didn t have money to get more?: No   Transportation Needs: Low Risk  (2/15/2024)    Transportation Needs     Within the past 12 months, has lack of transportation kept you from medical appointments, getting your medicines, non-medical meetings or appointments, work, or from getting things that you need?: No   Physical Activity: Insufficiently Active (2/15/2024)    Exercise Vital Sign     Days of Exercise per Week: 1 day     Minutes of Exercise per  Session: 20 min   Stress: No Stress Concern Present (2/15/2024)    [employer] [place] [employer] - Occupational Stress Questionnaire     Feeling of Stress : Only a little   Social Connections: Unknown (2/15/2024)    Social Connection and Isolation Panel [NHANES]     Frequency of Communication with Friends and Family: Not on file     Frequency of Social Gatherings with Friends and Family: Twice a week     Attends Yarsanism Services: Not on file     Active Member of Clubs or Organizations: Not on file     Attends Club or Organization Meetings: Not on file     Marital Status: Not on file   Interpersonal Safety: Low Risk  (2/27/2024)    Interpersonal Safety     Do you feel physically and emotionally safe where you currently live?: Yes     Within the past 12 months, have you been hit, slapped, kicked or otherwise physically hurt by someone?: No     Within the past 12 months, have you been humiliated or emotionally abused in other ways by your partner or ex-partner?: No   Housing Stability: Low Risk  (2/15/2024)    Housing Stability     Do you have housing? : Yes     Are you worried about losing your housing?: No     Current Outpatient Medications   Medication Sig Dispense Refill    albuterol (PROAIR HFA/PROVENTIL HFA/VENTOLIN HFA) 108 (90 Base) MCG/ACT inhaler Inhale 2 puffs into the lungs every 4 hours as needed for wheezing or shortness of breath 18 g 0    albuterol (PROVENTIL) (2.5 MG/3ML) 0.083% neb solution Take 1 vial (2.5 mg) by nebulization every 4 hours as needed for wheezing      budesonide (PULMICORT) 1 MG/2ML neb solution Take 2 mLs (1 mg) by nebulization two times daily 200 mL 1    escitalopram (LEXAPRO) 5 MG tablet Take 1 tablet (5 mg) by mouth daily 90 tablet 2    guaiFENesin (MUCINEX) 600 MG 12 hr tablet Take 1,200 mg by mouth 2 times daily as needed for congestion      LORazepam (ATIVAN) 0.5 MG tablet Take 1 tablet (0.5 mg) by mouth 2 times daily as needed for anxiety 30 tablet 0     "multivitamin w/minerals (THERA-VIT-M) tablet Take 1 tablet by mouth daily      umeclidinium-vilanterol (ANORO ELLIPTA) 62.5-25 MCG/ACT oral inhaler Inhale 1 puff into the lungs daily 180 each 1    varenicline (CHANTIX DIVYA) 0.5 MG X 11 & 1 MG X 42 tablet Take 0.5 mg tab daily for 3 days, THEN 0.5 mg tab twice daily for 4 days, THEN 1 mg twice daily. 53 tablet 0    varenicline (CHANTIX) 1 MG tablet Take 1 tablet (1 mg) by mouth 2 times daily 30 tablet 4     Allergies   Allergen Reactions    Pcn [Penicillins]      convulsions     FAMILY HISTORY NOTED AND REVIEWED    REVIEW OF SYSTEMS: above    PHYSICAL EXAM    /84 (BP Location: Left arm, Patient Position: Sitting, Cuff Size: Adult Regular)   Pulse 76   Temp 98.4  F (36.9  C)   Resp 18   Ht 1.6 m (5' 3\")   Wt 48.1 kg (106 lb)   LMP 01/24/2006   SpO2 95%   BMI 18.78 kg/m      Patient appears non toxic  Lungs decreased flow, otherwise clear, no crackles  Cardiovascular distant regular no murmur rub or gallop no JVP or edema    ASSESSMENT:  COPD exacerbation with acute hypoxic and hypercapnic respiratory failure, currently improved.  Her current regimen is Anoro Ellipta as well as Pulmicort nebs twice daily.  I will raise the dose of the nebs.  She will continue the nor Ellipta.  She will continue as needed medication.  She has pulmonary follow-up in September.  I emphasized that if she is worsening she needs to call right away.  She should never wait until the point where she is in distress to call EMS either.  In terms of the COPD I suspect the smoking is not helping.  We will try Chantix for that and hopefully she can tolerate it.  With respect to her anxiety and depression these appear stable.  I will change the medications and she can continue the lorazepam as needed but basically taking it once a day which is fine.  She will call me if she worsens but will follow-up with me in 6 weeks.    Mikel Navarro M.D.    Medications were reconciled and changes " "were made and a new list was given to the patient and her daughter today.        Hospital F/U (This morning felt heavy on the chest but overall doing better)    HPI        Hospital Follow-up Visit:    Hospital/Nursing Home/IP Rehab Facility: [facility]  Date of Admission: 06/03/24  Date of Discharge: 06/04/24  Reason(s) for Admission:   Severe copd  Copd exacerbation  Was the patient in the ICU or did the patient experience delirium during hospitalization?  No  Do you have any other stressors you would like to discuss with your provider? No    Problems taking medications regularly:  None  Medication changes since discharge: None  Problems adhering to non-medication therapy:  None    Summary of hospitalization:  [facility] reviewed  Diagnostic Tests/Treatments reviewed.  Follow up needed: none  Other Healthcare Providers Involved in Patient s Care:         Specialist appointment - September with pulmonary  Update since discharge: improved.         Plan of care communicated with patient and family                     Objective    /84 (BP Location: Left arm, Patient Position: Sitting, Cuff Size: Adult Regular)   Pulse 76   Temp 98.4  F (36.9  C)   Resp 18   Ht 1.6 m (5' 3\")   Wt 48.1 kg (106 lb)   LMP 01/24/2006   SpO2 95%   BMI 18.78 kg/m    Body mass index is 18.78 kg/m .  Physical Exam               Signed Electronically by: Mikel Navarro MD    "

## 2024-06-10 NOTE — PATIENT INSTRUCTIONS
For the copd you should be doing the following:    Pulmicort nebs 1mg twice daily  Umeclidinium-vilanterol inhaler once daily  Albuterol as needed, either the neb or inhaler is fine.  Take the lorazepam once daily  You need to let me know right away if your breathing worsens.  Stop smoking, use the Chantix.  See me in 6 weeks    Mikel Navarro M.D.

## 2024-06-11 ENCOUNTER — HOSPITAL ENCOUNTER (OUTPATIENT)
Dept: CARDIAC REHAB | Facility: CLINIC | Age: 69
Discharge: HOME | End: 2024-06-11
Attending: INTERNAL MEDICINE
Payer: COMMERCIAL

## 2024-06-11 PROCEDURE — 94625 PHY/QHP OP PULM RHB W/O MNTR: CPT

## 2024-06-22 DIAGNOSIS — J44.1 COPD EXACERBATION (H): ICD-10-CM

## 2024-06-24 RX ORDER — ALBUTEROL SULFATE 90 UG/1
AEROSOL, METERED RESPIRATORY (INHALATION)
Qty: 18 G | Refills: 5 | Status: SHIPPED | OUTPATIENT
Start: 2024-06-24 | End: 2024-09-26

## 2024-07-13 ENCOUNTER — HOSPITAL ENCOUNTER (INPATIENT)
Facility: CLINIC | Age: 69
LOS: 3 days | Discharge: HOME | DRG: 190 | End: 2024-07-16
Attending: EMERGENCY MEDICINE | Admitting: INTERNAL MEDICINE
Payer: COMMERCIAL

## 2024-07-13 ENCOUNTER — HOSPITAL ENCOUNTER (EMERGENCY)
Facility: CLINIC | Age: 69
Discharge: HOME | End: 2024-07-13

## 2024-07-13 ENCOUNTER — APPOINTMENT (OUTPATIENT)
Dept: GENERAL RADIOLOGY | Facility: CLINIC | Age: 69
DRG: 190 | End: 2024-07-13
Attending: EMERGENCY MEDICINE
Payer: COMMERCIAL

## 2024-07-13 DIAGNOSIS — J44.1 COPD EXACERBATION (H): ICD-10-CM

## 2024-07-13 DIAGNOSIS — D72.829 LEUKOCYTOSIS, UNSPECIFIED TYPE: ICD-10-CM

## 2024-07-13 DIAGNOSIS — J96.01 ACUTE HYPOXEMIC RESPIRATORY FAILURE (H): ICD-10-CM

## 2024-07-13 DIAGNOSIS — J44.1 COPD WITH ACUTE EXACERBATION (H): Primary | ICD-10-CM

## 2024-07-13 DIAGNOSIS — R79.89 ELEVATED LACTIC ACID LEVEL: ICD-10-CM

## 2024-07-13 LAB
ANION GAP SERPL CALCULATED.3IONS-SCNC: 12 MMOL/L (ref 7–15)
BASE EXCESS BLDV CALC-SCNC: -4 MMOL/L (ref -3–3)
BASE EXCESS BLDV CALC-SCNC: -9 MMOL/L (ref -3–3)
BUN SERPL-MCNC: 8.2 MG/DL (ref 8–23)
CALCIUM SERPL-MCNC: 8.8 MG/DL (ref 8.8–10.2)
CHLORIDE SERPL-SCNC: 101 MMOL/L (ref 98–107)
CREAT SERPL-MCNC: 0.66 MG/DL (ref 0.51–0.95)
D DIMER PPP FEU-MCNC: 0.31 UG/ML FEU (ref 0–0.5)
DEPRECATED HCO3 PLAS-SCNC: 25 MMOL/L (ref 22–29)
EGFRCR SERPLBLD CKD-EPI 2021: >90 ML/MIN/1.73M2
ERYTHROCYTE [DISTWIDTH] IN BLOOD BY AUTOMATED COUNT: 15.7 % (ref 10–15)
FLUAV RNA SPEC QL NAA+PROBE: NEGATIVE
FLUBV RNA RESP QL NAA+PROBE: NEGATIVE
GLUCOSE BLDC GLUCOMTR-MCNC: 133 MG/DL (ref 70–99)
GLUCOSE SERPL-MCNC: 266 MG/DL (ref 70–99)
HCO3 BLDV-SCNC: 23 MMOL/L (ref 21–28)
HCO3 BLDV-SCNC: 26 MMOL/L (ref 21–28)
HCT VFR BLD AUTO: 43.8 % (ref 35–47)
HGB BLD-MCNC: 13.1 G/DL (ref 11.7–15.7)
HOLD SPECIMEN: NORMAL
INR PPP: 1.14 (ref 0.85–1.15)
LACTATE BLD-SCNC: 2.4 MMOL/L
LACTATE BLD-SCNC: 5.6 MMOL/L
LACTATE SERPL-SCNC: 2.1 MMOL/L (ref 0.7–2)
MAGNESIUM SERPL-MCNC: 3.6 MG/DL (ref 1.7–2.3)
MCH RBC QN AUTO: 27.5 PG (ref 26.5–33)
MCHC RBC AUTO-ENTMCNC: 29.9 G/DL (ref 31.5–36.5)
MCV RBC AUTO: 92 FL (ref 78–100)
NRBC # BLD AUTO: 0 10E3/UL
NRBC BLD AUTO-RTO: 0 /100
PCO2 BLDV: 107 MM HG (ref 40–50)
PCO2 BLDV: 47 MM HG (ref 40–50)
PH BLDV: 6.99 [PH] (ref 7.32–7.43)
PH BLDV: 7.29 [PH] (ref 7.32–7.43)
PLATELET # BLD AUTO: 393 10E3/UL (ref 150–450)
PO2 BLDV: 38 MM HG (ref 25–47)
PO2 BLDV: 83 MM HG (ref 25–47)
POTASSIUM SERPL-SCNC: 4 MMOL/L (ref 3.4–5.3)
PROCALCITONIN SERPL IA-MCNC: 0.04 NG/ML
RBC # BLD AUTO: 4.77 10E6/UL (ref 3.8–5.2)
RSV RNA SPEC NAA+PROBE: NEGATIVE
SAO2 % BLDV: 65 % (ref 70–75)
SAO2 % BLDV: 87 % (ref 70–75)
SARS-COV-2 RNA RESP QL NAA+PROBE: NEGATIVE
SODIUM SERPL-SCNC: 138 MMOL/L (ref 135–145)
TROPONIN T SERPL HS-MCNC: 10 NG/L
WBC # BLD AUTO: 14.8 10E3/UL (ref 4–11)

## 2024-07-13 PROCEDURE — 96375 TX/PRO/DX INJ NEW DRUG ADDON: CPT

## 2024-07-13 PROCEDURE — 85027 COMPLETE CBC AUTOMATED: CPT | Performed by: EMERGENCY MEDICINE

## 2024-07-13 PROCEDURE — 94640 AIRWAY INHALATION TREATMENT: CPT

## 2024-07-13 PROCEDURE — 84484 ASSAY OF TROPONIN QUANT: CPT | Performed by: EMERGENCY MEDICINE

## 2024-07-13 PROCEDURE — 250N000013 HC RX MED GY IP 250 OP 250 PS 637: Performed by: HOSPITALIST

## 2024-07-13 PROCEDURE — 250N000011 HC RX IP 250 OP 636: Performed by: EMERGENCY MEDICINE

## 2024-07-13 PROCEDURE — 258N000003 HC RX IP 258 OP 636: Performed by: INTERNAL MEDICINE

## 2024-07-13 PROCEDURE — 85007 BL SMEAR W/DIFF WBC COUNT: CPT | Performed by: EMERGENCY MEDICINE

## 2024-07-13 PROCEDURE — 999N000157 HC STATISTIC RCP TIME EA 10 MIN

## 2024-07-13 PROCEDURE — 250N000009 HC RX 250: Performed by: INTERNAL MEDICINE

## 2024-07-13 PROCEDURE — 87637 SARSCOV2&INF A&B&RSV AMP PRB: CPT | Performed by: EMERGENCY MEDICINE

## 2024-07-13 PROCEDURE — 93005 ELECTROCARDIOGRAM TRACING: CPT

## 2024-07-13 PROCEDURE — 5A09357 ASSISTANCE WITH RESPIRATORY VENTILATION, LESS THAN 24 CONSECUTIVE HOURS, CONTINUOUS POSITIVE AIRWAY PRESSURE: ICD-10-PCS | Performed by: EMERGENCY MEDICINE

## 2024-07-13 PROCEDURE — 96365 THER/PROPH/DIAG IV INF INIT: CPT

## 2024-07-13 PROCEDURE — 80048 BASIC METABOLIC PNL TOTAL CA: CPT | Performed by: EMERGENCY MEDICINE

## 2024-07-13 PROCEDURE — 250N000013 HC RX MED GY IP 250 OP 250 PS 637: Performed by: INTERNAL MEDICINE

## 2024-07-13 PROCEDURE — 83605 ASSAY OF LACTIC ACID: CPT | Performed by: INTERNAL MEDICINE

## 2024-07-13 PROCEDURE — 250N000011 HC RX IP 250 OP 636: Performed by: HOSPITALIST

## 2024-07-13 PROCEDURE — 85379 FIBRIN DEGRADATION QUANT: CPT | Performed by: EMERGENCY MEDICINE

## 2024-07-13 PROCEDURE — 85610 PROTHROMBIN TIME: CPT | Performed by: EMERGENCY MEDICINE

## 2024-07-13 PROCEDURE — 99285 EMERGENCY DEPT VISIT HI MDM: CPT | Mod: 25

## 2024-07-13 PROCEDURE — 82803 BLOOD GASES ANY COMBINATION: CPT

## 2024-07-13 PROCEDURE — 87040 BLOOD CULTURE FOR BACTERIA: CPT | Performed by: EMERGENCY MEDICINE

## 2024-07-13 PROCEDURE — 250N000009 HC RX 250: Performed by: HOSPITALIST

## 2024-07-13 PROCEDURE — 36415 COLL VENOUS BLD VENIPUNCTURE: CPT | Performed by: INTERNAL MEDICINE

## 2024-07-13 PROCEDURE — 250N000009 HC RX 250: Performed by: EMERGENCY MEDICINE

## 2024-07-13 PROCEDURE — 71045 X-RAY EXAM CHEST 1 VIEW: CPT

## 2024-07-13 PROCEDURE — 120N000013 HC R&B IMCU

## 2024-07-13 PROCEDURE — 258N000003 HC RX IP 258 OP 636: Performed by: EMERGENCY MEDICINE

## 2024-07-13 PROCEDURE — 84145 PROCALCITONIN (PCT): CPT | Performed by: EMERGENCY MEDICINE

## 2024-07-13 PROCEDURE — 83735 ASSAY OF MAGNESIUM: CPT | Performed by: EMERGENCY MEDICINE

## 2024-07-13 PROCEDURE — 99223 1ST HOSP IP/OBS HIGH 75: CPT | Performed by: INTERNAL MEDICINE

## 2024-07-13 PROCEDURE — 99207 PR APP CREDIT; MD BILLING SHARED VISIT: CPT | Performed by: HOSPITALIST

## 2024-07-13 PROCEDURE — 36415 COLL VENOUS BLD VENIPUNCTURE: CPT | Performed by: EMERGENCY MEDICINE

## 2024-07-13 PROCEDURE — 94640 AIRWAY INHALATION TREATMENT: CPT | Mod: 76

## 2024-07-13 RX ORDER — CALCIUM CARBONATE 500 MG/1
1000 TABLET, CHEWABLE ORAL 4 TIMES DAILY PRN
Status: DISCONTINUED | OUTPATIENT
Start: 2024-07-13 | End: 2024-07-16 | Stop reason: HOSPADM

## 2024-07-13 RX ORDER — LIDOCAINE 40 MG/G
CREAM TOPICAL
Status: DISCONTINUED | OUTPATIENT
Start: 2024-07-13 | End: 2024-07-16 | Stop reason: HOSPADM

## 2024-07-13 RX ORDER — CARBOXYMETHYLCELLULOSE SODIUM 5 MG/ML
1 SOLUTION/ DROPS OPHTHALMIC
Status: DISCONTINUED | OUTPATIENT
Start: 2024-07-13 | End: 2024-07-13

## 2024-07-13 RX ORDER — IPRATROPIUM BROMIDE AND ALBUTEROL SULFATE 2.5; .5 MG/3ML; MG/3ML
3 SOLUTION RESPIRATORY (INHALATION) ONCE
Status: COMPLETED | OUTPATIENT
Start: 2024-07-13 | End: 2024-07-13

## 2024-07-13 RX ORDER — IPRATROPIUM BROMIDE AND ALBUTEROL SULFATE 2.5; .5 MG/3ML; MG/3ML
SOLUTION RESPIRATORY (INHALATION)
Status: DISCONTINUED
Start: 2024-07-13 | End: 2024-07-13 | Stop reason: HOSPADM

## 2024-07-13 RX ORDER — BUDESONIDE 0.5 MG/2ML
0.5 INHALANT ORAL 2 TIMES DAILY
Status: DISCONTINUED | OUTPATIENT
Start: 2024-07-13 | End: 2024-07-16 | Stop reason: HOSPADM

## 2024-07-13 RX ORDER — SODIUM CHLORIDE 9 MG/ML
INJECTION, SOLUTION INTRAVENOUS CONTINUOUS
Status: ACTIVE | OUTPATIENT
Start: 2024-07-13 | End: 2024-07-13

## 2024-07-13 RX ORDER — LORAZEPAM 0.5 MG/1
0.5 TABLET ORAL 2 TIMES DAILY PRN
Status: DISCONTINUED | OUTPATIENT
Start: 2024-07-13 | End: 2024-07-16 | Stop reason: HOSPADM

## 2024-07-13 RX ORDER — ACETAMINOPHEN 650 MG/1
650 SUPPOSITORY RECTAL EVERY 4 HOURS PRN
Status: DISCONTINUED | OUTPATIENT
Start: 2024-07-13 | End: 2024-07-16 | Stop reason: HOSPADM

## 2024-07-13 RX ORDER — AZITHROMYCIN 500 MG/1
500 INJECTION, POWDER, LYOPHILIZED, FOR SOLUTION INTRAVENOUS ONCE
Status: COMPLETED | OUTPATIENT
Start: 2024-07-13 | End: 2024-07-13

## 2024-07-13 RX ORDER — FAMOTIDINE 20 MG/1
20 TABLET, FILM COATED ORAL 2 TIMES DAILY
Status: DISCONTINUED | OUTPATIENT
Start: 2024-07-13 | End: 2024-07-16 | Stop reason: HOSPADM

## 2024-07-13 RX ORDER — CARBOXYMETHYLCELLULOSE SODIUM 5 MG/ML
1 SOLUTION/ DROPS OPHTHALMIC
Status: DISCONTINUED | OUTPATIENT
Start: 2024-07-13 | End: 2024-07-16 | Stop reason: HOSPADM

## 2024-07-13 RX ORDER — CEFTRIAXONE 2 G/1
2 INJECTION, POWDER, FOR SOLUTION INTRAMUSCULAR; INTRAVENOUS EVERY 24 HOURS
Status: DISCONTINUED | OUTPATIENT
Start: 2024-07-14 | End: 2024-07-14

## 2024-07-13 RX ORDER — PROCHLORPERAZINE 25 MG
12.5 SUPPOSITORY, RECTAL RECTAL EVERY 12 HOURS PRN
Status: DISCONTINUED | OUTPATIENT
Start: 2024-07-13 | End: 2024-07-16 | Stop reason: HOSPADM

## 2024-07-13 RX ORDER — ONDANSETRON 2 MG/ML
4 INJECTION INTRAMUSCULAR; INTRAVENOUS EVERY 6 HOURS PRN
Status: DISCONTINUED | OUTPATIENT
Start: 2024-07-13 | End: 2024-07-16 | Stop reason: HOSPADM

## 2024-07-13 RX ORDER — ESCITALOPRAM OXALATE 5 MG/1
5 TABLET ORAL DAILY
Status: DISCONTINUED | OUTPATIENT
Start: 2024-07-13 | End: 2024-07-16 | Stop reason: HOSPADM

## 2024-07-13 RX ORDER — AMOXICILLIN 250 MG
1 CAPSULE ORAL 2 TIMES DAILY PRN
Status: DISCONTINUED | OUTPATIENT
Start: 2024-07-13 | End: 2024-07-16 | Stop reason: HOSPADM

## 2024-07-13 RX ORDER — PREDNISONE 20 MG/1
40 TABLET ORAL DAILY
Status: DISCONTINUED | OUTPATIENT
Start: 2024-07-13 | End: 2024-07-13

## 2024-07-13 RX ORDER — IPRATROPIUM BROMIDE AND ALBUTEROL SULFATE 2.5; .5 MG/3ML; MG/3ML
3 SOLUTION RESPIRATORY (INHALATION)
Status: DISCONTINUED | OUTPATIENT
Start: 2024-07-13 | End: 2024-07-16 | Stop reason: HOSPADM

## 2024-07-13 RX ORDER — SODIUM CHLORIDE 9 MG/ML
INJECTION, SOLUTION INTRAVENOUS CONTINUOUS
Status: DISCONTINUED | OUTPATIENT
Start: 2024-07-13 | End: 2024-07-13

## 2024-07-13 RX ORDER — LIDOCAINE 40 MG/G
CREAM TOPICAL
Status: DISCONTINUED | OUTPATIENT
Start: 2024-07-13 | End: 2024-07-14

## 2024-07-13 RX ORDER — MULTIPLE VITAMINS W/ MINERALS TAB 9MG-400MCG
1 TAB ORAL DAILY
Status: DISCONTINUED | OUTPATIENT
Start: 2024-07-13 | End: 2024-07-16 | Stop reason: HOSPADM

## 2024-07-13 RX ORDER — ONDANSETRON 2 MG/ML
4 INJECTION INTRAMUSCULAR; INTRAVENOUS ONCE
Status: COMPLETED | OUTPATIENT
Start: 2024-07-13 | End: 2024-07-13

## 2024-07-13 RX ORDER — ACETAMINOPHEN 325 MG/1
650 TABLET ORAL EVERY 4 HOURS PRN
Status: DISCONTINUED | OUTPATIENT
Start: 2024-07-13 | End: 2024-07-16 | Stop reason: HOSPADM

## 2024-07-13 RX ORDER — ALBUTEROL SULFATE 0.83 MG/ML
2.5 SOLUTION RESPIRATORY (INHALATION)
Status: DISCONTINUED | OUTPATIENT
Start: 2024-07-13 | End: 2024-07-16 | Stop reason: HOSPADM

## 2024-07-13 RX ORDER — METHYLPREDNISOLONE SODIUM SUCCINATE 125 MG/2ML
60 INJECTION, POWDER, LYOPHILIZED, FOR SOLUTION INTRAMUSCULAR; INTRAVENOUS EVERY 8 HOURS
Status: DISCONTINUED | OUTPATIENT
Start: 2024-07-13 | End: 2024-07-14

## 2024-07-13 RX ORDER — AZITHROMYCIN 500 MG/1
500 INJECTION, POWDER, LYOPHILIZED, FOR SOLUTION INTRAVENOUS EVERY 24 HOURS
Status: DISCONTINUED | OUTPATIENT
Start: 2024-07-14 | End: 2024-07-14

## 2024-07-13 RX ORDER — GUAIFENESIN 600 MG/1
1200 TABLET, EXTENDED RELEASE ORAL 2 TIMES DAILY PRN
Status: DISCONTINUED | OUTPATIENT
Start: 2024-07-13 | End: 2024-07-16 | Stop reason: HOSPADM

## 2024-07-13 RX ORDER — ONDANSETRON 2 MG/ML
INJECTION INTRAMUSCULAR; INTRAVENOUS
Status: COMPLETED
Start: 2024-07-13 | End: 2024-07-13

## 2024-07-13 RX ORDER — PROCHLORPERAZINE MALEATE 5 MG
5 TABLET ORAL EVERY 6 HOURS PRN
Status: DISCONTINUED | OUTPATIENT
Start: 2024-07-13 | End: 2024-07-16 | Stop reason: HOSPADM

## 2024-07-13 RX ORDER — ONDANSETRON 4 MG/1
4 TABLET, ORALLY DISINTEGRATING ORAL EVERY 6 HOURS PRN
Status: DISCONTINUED | OUTPATIENT
Start: 2024-07-13 | End: 2024-07-16 | Stop reason: HOSPADM

## 2024-07-13 RX ORDER — METHYLPREDNISOLONE SODIUM SUCCINATE 125 MG/2ML
125 INJECTION, POWDER, LYOPHILIZED, FOR SOLUTION INTRAMUSCULAR; INTRAVENOUS ONCE
Status: COMPLETED | OUTPATIENT
Start: 2024-07-13 | End: 2024-07-13

## 2024-07-13 RX ORDER — AMOXICILLIN 250 MG
2 CAPSULE ORAL 2 TIMES DAILY PRN
Status: DISCONTINUED | OUTPATIENT
Start: 2024-07-13 | End: 2024-07-16 | Stop reason: HOSPADM

## 2024-07-13 RX ORDER — CEFTRIAXONE 2 G/1
2 INJECTION, POWDER, FOR SOLUTION INTRAMUSCULAR; INTRAVENOUS ONCE
Status: COMPLETED | OUTPATIENT
Start: 2024-07-13 | End: 2024-07-13

## 2024-07-13 RX ORDER — ALBUTEROL SULFATE 0.83 MG/ML
5 SOLUTION RESPIRATORY (INHALATION) ONCE
Status: COMPLETED | OUTPATIENT
Start: 2024-07-13 | End: 2024-07-13

## 2024-07-13 RX ADMIN — FAMOTIDINE 20 MG: 20 TABLET, FILM COATED ORAL at 20:20

## 2024-07-13 RX ADMIN — SODIUM CHLORIDE: 9 INJECTION, SOLUTION INTRAVENOUS at 07:50

## 2024-07-13 RX ADMIN — ESCITALOPRAM OXALATE 5 MG: 5 TABLET, FILM COATED ORAL at 09:35

## 2024-07-13 RX ADMIN — METHYLPREDNISOLONE SODIUM SUCCINATE 62.5 MG: 125 INJECTION, POWDER, FOR SOLUTION INTRAMUSCULAR; INTRAVENOUS at 11:50

## 2024-07-13 RX ADMIN — AZITHROMYCIN MONOHYDRATE 500 MG: 500 INJECTION, POWDER, LYOPHILIZED, FOR SOLUTION INTRAVENOUS at 06:07

## 2024-07-13 RX ADMIN — ONDANSETRON 4 MG: 2 INJECTION INTRAMUSCULAR; INTRAVENOUS at 04:54

## 2024-07-13 RX ADMIN — SODIUM CHLORIDE: 9 INJECTION, SOLUTION INTRAVENOUS at 05:04

## 2024-07-13 RX ADMIN — LORAZEPAM 0.5 MG: 0.5 TABLET ORAL at 20:20

## 2024-07-13 RX ADMIN — CEFTRIAXONE SODIUM 2 G: 2 INJECTION, POWDER, FOR SOLUTION INTRAMUSCULAR; INTRAVENOUS at 05:20

## 2024-07-13 RX ADMIN — IPRATROPIUM BROMIDE AND ALBUTEROL SULFATE 3 ML: .5; 3 SOLUTION RESPIRATORY (INHALATION) at 17:44

## 2024-07-13 RX ADMIN — IPRATROPIUM BROMIDE AND ALBUTEROL SULFATE 3 ML: .5; 3 SOLUTION RESPIRATORY (INHALATION) at 04:36

## 2024-07-13 RX ADMIN — BUDESONIDE 0.5 MG: 0.5 INHALANT RESPIRATORY (INHALATION) at 23:52

## 2024-07-13 RX ADMIN — ALBUTEROL SULFATE 5 MG: 2.5 SOLUTION RESPIRATORY (INHALATION) at 06:06

## 2024-07-13 RX ADMIN — IPRATROPIUM BROMIDE AND ALBUTEROL SULFATE 3 ML: .5; 3 SOLUTION RESPIRATORY (INHALATION) at 23:52

## 2024-07-13 RX ADMIN — METHYLPREDNISOLONE SODIUM SUCCINATE 125 MG: 125 INJECTION, POWDER, FOR SOLUTION INTRAMUSCULAR; INTRAVENOUS at 04:36

## 2024-07-13 RX ADMIN — SODIUM CHLORIDE 1470 ML: 9 INJECTION, SOLUTION INTRAVENOUS at 05:03

## 2024-07-13 RX ADMIN — METHYLPREDNISOLONE SODIUM SUCCINATE 62.5 MG: 125 INJECTION, POWDER, FOR SOLUTION INTRAMUSCULAR; INTRAVENOUS at 20:20

## 2024-07-13 ASSESSMENT — ACTIVITIES OF DAILY LIVING (ADL)
ADLS_ACUITY_SCORE: 43
ADLS_ACUITY_SCORE: 37
ADLS_ACUITY_SCORE: 37
ADLS_ACUITY_SCORE: 34
ADLS_ACUITY_SCORE: 34
ADLS_ACUITY_SCORE: 37
ADLS_ACUITY_SCORE: 43
ADLS_ACUITY_SCORE: 43
ADLS_ACUITY_SCORE: 37
ADLS_ACUITY_SCORE: 43
ADLS_ACUITY_SCORE: 43
ADLS_ACUITY_SCORE: 35
ADLS_ACUITY_SCORE: 34
ADLS_ACUITY_SCORE: 43
ADLS_ACUITY_SCORE: 43
ADLS_ACUITY_SCORE: 34
ADLS_ACUITY_SCORE: 43
ADLS_ACUITY_SCORE: 43
ADLS_ACUITY_SCORE: 34
ADLS_ACUITY_SCORE: 35
ADLS_ACUITY_SCORE: 37

## 2024-07-13 ASSESSMENT — COLUMBIA-SUICIDE SEVERITY RATING SCALE - C-SSRS
1. IN THE PAST MONTH, HAVE YOU WISHED YOU WERE DEAD OR WISHED YOU COULD GO TO SLEEP AND NOT WAKE UP?: NO
6. HAVE YOU EVER DONE ANYTHING, STARTED TO DO ANYTHING, OR PREPARED TO DO ANYTHING TO END YOUR LIFE?: NO
2. HAVE YOU ACTUALLY HAD ANY THOUGHTS OF KILLING YOURSELF IN THE PAST MONTH?: NO

## 2024-07-13 NOTE — ED NOTES
Medication obtained from hospitals under this pt, was an error.  It was charted under the correct pt, MRN: [id number].    Frances Perez RN,.......................................... 7/13/2024   5:12 AM

## 2024-07-13 NOTE — PROGRESS NOTES
Hospitalist brief update note:     Patient was evaluated this morning shortly after she arrived to the floor.  Off BiPAP, on 3 LPM NC O2.  Reports dyspnea has significantly improved.  Cough with clearish sputum.  Was nauseous but has resolved, no vomiting.  Denies fever but reports feeling hot and cold.  Still smokes but very infrequently.    Admission H&P reviewed, continue steroid, IV for at least another day, antibiotic, nebs, supplemental O2, BiPAP as needed.  Patient uses supplemental oxygen intermittently at home.  Home medication reconciliation is pending per pharmacy.    Care plan discussed with patient and nursing staff.  Heber Villanueva MD  Hospitalist

## 2024-07-13 NOTE — ED PROVIDER NOTES
Emergency Department Note      History of Present Illness     Chief Complaint   Respiratory Distress      HPI   Tammie Zeng is a 68 year old female with a history with COPD who presents to the ED via EMS for evaluation of respiratory distress. Per EMS, the patient pressed her pendent around 0400 for assistance.  The patient lives alone by herself.  She could not answer the door.  EMS had a force their way in through the front door.  When found at her home, she was gasping for air in her bed.  Paramedics noted that she was blue and her pulse oximetry was 27%.  In field, EMS started bagging due to severely lowered oxygen sats.  En route, EMS gave 2mg magnesium, 3 doses of 0.5 epi, 0.5 Tubertaline, and one round of narcan.  The Narcan did not make a difference.  States a BP was not able to be obtained. Heart rate was around 110s.  Upon arrival to the emergency department, the paramedics note that her color is much improved and she is now talking with one-word answers.  The patient denies any chest or abdominal pain, taking any pain medications, using recreational drugs, or taking prescription anticoagulates.  She denies taking any narcotics such as oxycodone, Percocet, Norco, etc.    Independent Historian   Paramedics provide a significant amount of the above history.    Review of External Notes   I reviewed the discharge summary dated 06/04/24 regarding Dr. Carson.      Past Medical History     Medical History and Problem List   Past Medical History:   Diagnosis Date    Chronic obstructive pulmonary disease, unspecified COPD type (H) 06/13/2017    Depressive disorder, not elsewhere classified 05/2006    Elevated LFTs 2023    Generalized anxiety disorder     Hepatic steatosis 04/2023    Herpes simplex without mention of complication     Nephrolithiasis     TOBACCO ABUSE-CONTINUOUS        Medications   albuterol (PROVENTIL) (2.5 MG/3ML) 0.083% neb solution  budesonide (PULMICORT) 1 MG/2ML neb  solution  escitalopram (LEXAPRO) 5 MG tablet  guaiFENesin (MUCINEX) 600 MG 12 hr tablet  LORazepam (ATIVAN) 0.5 MG tablet  multivitamin w/minerals (THERA-VIT-M) tablet  umeclidinium-vilanterol (ANORO ELLIPTA) 62.5-25 MCG/ACT oral inhaler  varenicline (CHANTIX DIVYA) 0.5 MG X 11 & 1 MG X 42 tablet  varenicline (CHANTIX) 1 MG tablet  VENTOLIN  (90 Base) MCG/ACT inhaler        Surgical History   Past Surgical History:   Procedure Laterality Date    CHOLECYSTECTOMY, LAPOROSCOPIC  1980's    done at [facility]    TUBAL LIGATION  1998       Physical Exam     Patient Vitals for the past 24 hrs:   BP Temp Temp src Pulse Resp SpO2 Weight   07/13/24 0600 101/41 -- -- 85 23 99 % --   07/13/24 0545 108/62 -- -- 84 22 100 % --   07/13/24 0530 111/66 -- -- 85 26 99 % --   07/13/24 0515 115/58 -- -- 89 29 99 % --   07/13/24 0500 127/85 -- -- 101 (!) 31 98 % --   07/13/24 0445 (!) 156/84 (!) 96.4  F (35.8  C) Temporal 109 27 98 % 49 kg (108 lb 0.4 oz)     Physical Exam  General: Laying in bed, significant respiratory distress, prolonged expiratory phase, tachypnea  HENT:  No obvious trauma to head  Right Ear:  External ear normal.   Left Ear:  External ear normal.   Nose:  Nose normal.   Eyes:  Conjunctivae and EOM are normal. Pupils are equal, round, and reactive.   Neck: Normal range of motion. Neck supple. No tracheal deviation present.   CV:  Normal heart sounds. No murmur heard.  Pulm/Chest: Extremely minimal air exchange bilateral.  Prolonged expiratory phase, tachypnea  Abd: Soft. No distension. There is no tenderness. There is no rigidity, no rebound and no guarding.   M/S: Normal range of motion.  No peripheral edema or calf pain.  Neuro: Awake and alert.   Skin: Skin is warm and dry. No rash noted. Not diaphoretic.   Psych: Normal mood and affect. Behavior is normal.      Diagnostics     Lab Results   Labs Ordered and Resulted from Time of ED Arrival to Time of ED Departure   BASIC METABOLIC PANEL - Abnormal        Result Value    Sodium 138      Potassium 4.0      Chloride 101      Carbon Dioxide (CO2) 25      Anion Gap 12      Urea Nitrogen 8.2      Creatinine 0.66      GFR Estimate >90      Calcium 8.8      Glucose 266 (*)    MAGNESIUM - Abnormal    Magnesium 3.6 (*)    CBC WITH PLATELETS AND DIFFERENTIAL - Abnormal    WBC Count 14.8 (*)     RBC Count 4.77      Hemoglobin 13.1      Hematocrit 43.8      MCV 92      MCH 27.5      MCHC 29.9 (*)     RDW 15.7 (*)     Platelet Count 393      NRBCs per 100 WBC 0      Absolute NRBCs 0.0     ISTAT GASES LACTATE VENOUS POCT - Abnormal    Lactic Acid POCT 5.6 (*)     Bicarbonate Venous POCT 26      O2 Sat, Venous POCT 87 (*)     pCO2 Venous POCT 107 (*)     pH Venous POCT 6.99 (*)     pO2 Venous POCT 83 (*)     Base Excess/Deficit (+/-) POCT -9.0 (*)    MANUAL DIFFERENTIAL - Abnormal    % Neutrophils 41      % Lymphocytes 55      % Monocytes 3      % Eosinophils 1      % Basophils 0      Absolute Neutrophils 6.1      Absolute Lymphocytes 8.1 (*)     Absolute Monocytes 0.4      Absolute Eosinophils 0.1      Absolute Basophils 0.0      RBC Morphology Confirmed RBC Indices      Platelet Assessment        Value: Automated Count Confirmed. Platelet morphology is normal.    Reactive Lymphocytes Present (*)    ISTAT GASES LACTATE VENOUS POCT - Abnormal    Lactic Acid POCT 2.4 (*)     Bicarbonate Venous POCT 23      O2 Sat, Venous POCT 65 (*)     pCO2 Venous POCT 47      pH Venous POCT 7.29 (*)     pO2 Venous POCT 38      Base Excess/Deficit (+/-) POCT -4.0 (*)    D DIMER QUANTITATIVE - Normal    D-Dimer Quantitative 0.31     INR - Normal    INR 1.14     TROPONIN T, HIGH SENSITIVITY - Normal    Troponin T, High Sensitivity 10     PROCALCITONIN - Normal    Procalcitonin 0.04     INFLUENZA A/B, RSV, & SARS-COV2 PCR - Normal    Influenza A PCR Negative      Influenza B PCR Negative      RSV PCR Negative      SARS CoV2 PCR Negative     BLOOD GAS VENOUS   BLOOD GAS ARTERIAL   LACTIC ACID WHOLE  BLOOD   BLOOD CULTURE   BLOOD CULTURE       Imaging   XR Chest Port 1 View   Final Result   IMPRESSION:    1.  Hyperinflation versus deep inspiration changes. Clinical correlation for air trapping. Heart is normal in size. Lungs are clear. No pleural effusion or pneumothorax. Blunting of the left costophrenic angle which may be due to fluid or thickened    pleura.           Independent Interpretation   None    ED Course      Medications Administered   Medications   No lozenges or gum should be given while patient on BIPAP/AVAPS/AVAPS AE (has no administration in time range)   carboxymethylcellulose PF (REFRESH PLUS) 0.5 % ophthalmic solution 1 drop (has no administration in time range)   HOLD: All Oral Medications (has no administration in time range)   sodium chloride 0.9 % infusion ( Intravenous $New Bag 7/13/24 0504)   azithromycin (ZITHROMAX) 500 mg vial to attach to  mL bag (500 mg Intravenous $New Bag 7/13/24 0607)   ipratropium - albuterol 0.5 mg/2.5 mg/3 mL (DUONEB) neb solution 3 mL (3 mLs Nebulization $Given 7/13/24 0436)   methylPREDNISolone sodium succinate (solu-MEDROL) injection 125 mg (125 mg Intravenous $Given 7/13/24 0436)   ondansetron (ZOFRAN) injection 4 mg (4 mg Intravenous $Given 7/13/24 0454)   sodium chloride 0.9% BOLUS 1,470 mL (1,470 mLs Intravenous $New Bag 7/13/24 0503)   cefTRIAXone (ROCEPHIN) 2 g vial to attach to  ml bag for ADULTS or NS 50 ml bag for PEDS (0 g Intravenous Stopped 7/13/24 0601)   albuterol (PROVENTIL) neb solution 5 mg (5 mg Nebulization $Given 7/13/24 0606)       Discussion of Management   Admitting Hospitalist, Dr. Church    ED Course   ED Course as of 07/13/24 0639   Sat Jul 13, 2024 0426 I obtained history and examined the patient as noted above.    0441 Patient was placed on BiPAP.   0453 I rechecked and updated the patient.    0527 I rechecked and updated the patient.    0542 I rechecked and updated the patient.    0554 I rechecked the patient.     0600 I spoke to Dr. Church from the hospitalist service regarding admission.         Optional/Additional Documentation  None    Medical Decision Making / Diagnosis     CMS Diagnoses:   The patient has signs of septic shock as evidenced by:  1. Presence of Sepsis, AND  2. Lactic Acidosis with value greater than or equal to 4    Time septic shock diagnosis confirmed = 0442  07/13/24   as this was the time when Lactate was resulted and the level was greater than or equal to 4    3 Hour Septic Shock Bundle Completion:  1. Initial Lactic Acid Result:   Recent Labs   Lab Test 07/13/24  0548 07/13/24  0438 06/03/24  1022   LACT 2.4* 5.6* 1.7     2. Blood Cultures before Antibiotics: Yes  3. Broad Spectrum Antibiotics Administered:  yes ceftriaxone 2 g started at 0504       Anti-infectives (From admission through now)      Start     Dose/Rate Route Frequency Ordered Stop    07/13/24 0600  azithromycin (ZITHROMAX) 500 mg vial to attach to  mL bag         500 mg  over 1 Hours Intravenous ONCE 07/13/24 0453      07/13/24 0500  cefTRIAXone (ROCEPHIN) 2 g vial to attach to  ml bag for ADULTS or NS 50 ml bag for PEDS         2 g  over 30 Minutes Intravenous ONCE 07/13/24 0453 07/13/24 0601            4. IF 30 mL/kg bolus criteria met based on:  -Lactate > 4  OR  -Initial Hypotension:  Definition:  2 low BP readings (SBP <90, MAP <65, or decrease > 40 from baseline due to infection) within 3 hrs of each other during the time period of 6 hrs before and 3 hrs  after time zero  THEN: Fluid volume administered in ED:  Full 30 mL/kg bolus given (see amount below).    BMI Readings from Last 1 Encounters:   07/13/24 19.14 kg/m      30 mL/kg fluids based on weight: 1,470 mL  30 mL/kg fluids based on IBW (must be >= 60 inches tall): 1,570 mL    Septic Shock reassessment:  1. Repeat Lactic Acid Level: 2.3  2. MAP>65 after initial IVF bolus, will continue to monitor fluid status and vital signs    MIPS       None    MDM    Tammie Zeng is a very pleasant 68 year old female who presents with concern of acute respiratory failure.  The patient lives at home by herself.  She pressed her call light.  EMS responded and left themselves in the house.  The patient was unresponsive, had a pulse oximetry of 27% and was blue.  She is clearly in respiratory distress with a prolonged expiratory phase.  She was provided Narcan that did not make a difference.  She was then provided epinephrine, magnesium, terbutaline and BVM assistance.  Upon arrival to the emergency department she had shown some improvement.  VBG showed a pH of 6.9 and lactate of 5.6.  pCO2 was markedly elevated as well.  She is immediately placed on BiPAP, IV fluids and antibiotics were ordered and initiated out of concern for possible septic shock as well in addition to respiratory failure from COPD exacerbation.  She showed dramatic improvement.  She is found to have a leukocytosis, but chest x-ray did not show any definitive infiltrate.  Procalcitonin is negative.  She is afebrile.  The leukocytosis may be reactive.  D-dimer is negative; therefore, doubt pulmonary embolism.  Repeat VBG after 700 mL of fluid and being on BiPAP for 1 hour showed significant improvement with a pH of 7 3 and pCO2 much improved.  Patient was also much more awake, alert and talkative.  [facility] continuing to be on BiPAP.  The patient denies any chest pain or abdominal pain.  No indication for any additional imaging.  I spoke to the hospitalist as above who has agreed to admit the patient.    >>>Critical Care time:  Total critical care time exclusive of procedures was 35 minutes for this patient.<<<    Disposition   The patient was admitted to the hospital.     Diagnosis     ICD-10-CM    1. Acute hypoxemic respiratory failure (H)  J96.01       2. Leukocytosis, unspecified type  D72.829       3. Elevated lactic acid level  R79.89     Suspect from  respiratory failure and less likely septic shock      4. COPD exacerbation (H)  J44.1            Discharge Medications   New Prescriptions    No medications on file         Scribe Disclosure:  I, Alyssa Moralez, am serving as a scribe at 4:42 AM on 7/13/2024 to document services personally performed by Jackson Wilson DO based on my observations and the provider's statements to me.        Jackson Wilson,   07/13/24 0634       Jackson Wilson DO  07/13/24 0639

## 2024-07-13 NOTE — H&P
[facility]    History and Physical - Hospitalist Service       Date of Admission:  7/13/2024    Assessment & Plan      Tammie Zeng is a 68 year old female admitted on 7/13/2024. She presents with SOB, hypoxia    Note: limited hx on admission as pt on BiPAP    COPD   Acute hypoxic and hypercapnic respiratory failure  Lactic acidosis  [Needs rec- albuterol prn, pulmicort BID, anoro ellipta]  Just hospitalized 6/2024 with COPD exacerbation. Pressed alert button. When EMS arrived she was blue, sats 27% by oximeter. Given nebs/ epi. In ED placed on BiPAP with rapid improvement. Chest still tight in ED. In ED temp 96.4, tachy. Lactic 2.4. VBG 6.9/107/83/6->7.29/47/38/23. WBC 14.8. CXR hyperinflated, otherwise no acute findings. Lactic 5.6->2.4. troponin negative. Procal normal.   - IMC  - BiPAP, wean as able  - blood cultures pending  - ceftriaxone 2g IV q24/ azithro 500 mg IV q24 given severity  - prednisone 40 mg daily  - q4 duonebs  - q2 albuterol nebs prn  - RCAT    Tobacco use disorder- encourage cessation  Anxiety- resume escitalopram 5 mg with rec  Hepatic steatosis- follow up as outpatient          Diet: NPO for Medical/Clinical Reasons Except for: Meds    DVT Prophylaxis: Pneumatic Compression Devices  Royal Catheter: Not present  Lines: None     Cardiac Monitoring: None  Code Status:  DNR/DNI    Clinically Significant Risk Factors Present on Admission                    # Non-Invasive mechanical ventilation: current O2 Device: BiPAP/CPAP  # Acute hypoxic respiratory failure: continue supplemental O2 as needed                # Financial/Environmental Concerns:           Disposition Plan     Medically Ready for Discharge: Anticipated in 2-4 Days           Bill Church MD  Hospitalist Service  [facility]  Securely message with [employer] (more info)  Text page via [employer] Paging/[employer]      ______________________________________________________________________    Chief Complaint   Shortness of breath    History is obtained from the patient, electronic health record, and emergency department physician    History of Present Illness   Tammie Zeng is a 68 year old female who presents with shortness of breath.  Note history is very limited as she is currently on BiPAP and is difficult to communicate.  She does state that this came on all of a sudden at about 4 AM.  He states she has tightness in her chest with breathing.  Denies abdominal pain.  She does feel better than prior to coming in.  She apparently had pushed her life alert button and when EMS arrived they could not get into her home.  They had to break and they found her below with O2 sats of 26%.  She was minimally responsive.  They gave her epi and terbutaline.      Past Medical History    Past Medical History:   Diagnosis Date    Chronic obstructive pulmonary disease, unspecified COPD type (H) 06/13/2017    has seen [facility], fev1 under 1.0, hospitalized twice in 2023, once in Feb 2024, once May 2024    Depressive disorder, not elsewhere classified 05/2006    following sudden death of her mother    Elevated LFTs 2023    hepatic steatosis 4/23     Generalized anxiety disorder     Hepatic steatosis 04/2023    on us done for elev lfts    Herpes simplex without mention of complication     MOUTH    Nephrolithiasis     TOBACCO ABUSE-CONTINUOUS        Past Surgical History   Past Surgical History:   Procedure Laterality Date    CHOLECYSTECTOMY, LAPOROSCOPIC  1980's    done at [facility]    TUBAL LIGATION  1998       Prior to Admission Medications   Prior to Admission Medications   Prescriptions Last Dose Informant Patient Reported? Taking?   LORazepam (ATIVAN) 0.5 MG tablet   No No   Sig: Take 1 tablet (0.5 mg) by mouth 2 times daily as needed for anxiety   VENTOLIN  (90 Base) MCG/ACT inhaler   No No   Sig: INHALE 2 PUFFS INTO THE  LUNGS EVERY 4 HOURS AS NEEDED FOR WHEEZING OR SHORTNESS OF BREATH   albuterol (PROVENTIL) (2.5 MG/3ML) 0.083% neb solution   No No   Sig: Take 1 vial (2.5 mg) by nebulization every 4 hours as needed for wheezing   budesonide (PULMICORT) 1 MG/2ML neb solution   No No   Sig: Take 2 mLs (1 mg) by nebulization two times daily   escitalopram (LEXAPRO) 5 MG tablet   No No   Sig: Take 1 tablet (5 mg) by mouth daily   guaiFENesin (MUCINEX) 600 MG 12 hr tablet  Self Yes No   Sig: Take 1,200 mg by mouth 2 times daily as needed for congestion   multivitamin w/minerals (THERA-VIT-M) tablet   No No   Sig: Take 1 tablet by mouth daily   umeclidinium-vilanterol (ANORO ELLIPTA) 62.5-25 MCG/ACT oral inhaler   No No   Sig: Inhale 1 puff into the lungs daily   varenicline (CHANTIX DIVYA) 0.5 MG X 11 & 1 MG X 42 tablet   No No   Sig: Take 0.5 mg tab daily for 3 days, THEN 0.5 mg tab twice daily for 4 days, THEN 1 mg twice daily.   varenicline (CHANTIX) 1 MG tablet   No No   Sig: Take 1 tablet (1 mg) by mouth 2 times daily      Facility-Administered Medications: None        Review of Systems    The 10 point Review of Systems is negative other than noted in the HPI or here.     Social History   I have reviewed this patient's social history and updated it with pertinent information if needed.  Social History     Tobacco Use    Smoking status: Every Day     Current packs/day: 0.50     Average packs/day: 0.5 packs/day for 25.0 years (12.5 ttl pk-yrs)     Types: Cigarettes    Smokeless tobacco: Never    Tobacco comments:     states is cutting back - 1/2 pack a day    Vaping Use    Vaping status: Never Used   Substance Use Topics    Alcohol use: Yes     Alcohol/week: 0.0 standard drinks of alcohol     Comment: 3 mixed drinks per week    Drug use: No        Physical Exam   Vital Signs: Temp: (!) 96.4  F (35.8  C) Temp src: Temporal BP: 127/85 Pulse: 101   Resp: (!) 31 SpO2: 98 % O2 Device: BiPAP/CPAP    Weight: 108 lbs .41 oz    General  Appearance: Alert, on bipap, very pleasant  Respiratory: distant breath sounds anteriorly, difficult exam  Cardiovascular: RRR. No edeema  GI: soft, nt/nd  Skin: no rashes or lesions grossly   Other: CN grossly intact, LANTIGUA     Medical Decision Making       70 MINUTES SPENT BY ME on the date of service doing chart review, history, exam, documentation & further activities per the note.      Data   ------------------------- PAST 24 HR DATA REVIEWED -----------------------------------------------    I have personally reviewed the following data over the past 24 hrs:    14.8 (H)  \   13.1   / 393     138 101 8.2 /  266 (H)   4.0 25 0.66 \     Trop: 10 BNP: N/A     Procal: 0.04 CRP: N/A Lactic Acid: 2.4 (H)       INR:  1.14 PTT:  N/A   D-dimer:  0.31 Fibrinogen:  N/A       Imaging results reviewed over the past 24 hrs:   Recent Results (from the past 24 hour(s))   XR Chest Port 1 View    Narrative    EXAM: XR CHEST PORT 1 VIEW  LOCATION: [facility]  DATE: 7/13/2024    INDICATION: dyspnea  COMPARISON: 6/3/2024      Impression    IMPRESSION:   1.  Hyperinflation versus deep inspiration changes. Clinical correlation for air trapping. Heart is normal in size. Lungs are clear. No pleural effusion or pneumothorax. Blunting of the left costophrenic angle which may be due to fluid or thickened   pleura.

## 2024-07-13 NOTE — ED TRIAGE NOTES
[facility]  ED Arrival Note    Pt BIBA from home. Reportedly pt pressed her medical alert button d/t respiratory distress. On arrival EMS found pt in bed, arms flailing, gasping for air and at 27% RA.  EMS initiated BVM and gave 2 G Magnesium; 3 of epinephrine IM; 0.5 terbutaline;0.5 MG Narcan.  EMS unable to obtain BP en route. Tachycardic 110's bpm.    Pt arrived to stab room BVM still in progress.  Responsive to verbal commands but still SOB, pinpoint pupils and clammy.  Duo neb was administered and BIPAP initiated.      Visitors during triage: None      Directed to: Stabilization room #2

## 2024-07-13 NOTE — PHARMACY-ADMISSION MEDICATION HISTORY
Pharmacy Intern Admission Medication History    Admission medication history is complete. The information provided in this note is only as accurate as the sources available at the time of the update.    Information Source(s): Patient and CareEverywhere/SureScripts via in-person    Pertinent Information:   - pt reports no longer taking Chantix    Changes made to PTA medication list:  Added: none  Deleted: Mucinex, multivitamin, Chantix  Changed: none    Allergies reviewed with patient and updates made in EHR: yes    Medication History Completed By: Steffanie Mccarty 7/13/2024 10:45 AM    PTA Med List   Medication Sig Last Dose    albuterol (PROVENTIL) (2.5 MG/3ML) 0.083% neb solution Take 1 vial (2.5 mg) by nebulization every 4 hours as needed for wheezing PRN    budesonide (PULMICORT) 1 MG/2ML neb solution Take 2 mLs (1 mg) by nebulization two times daily 7/12/2024    escitalopram (LEXAPRO) 5 MG tablet Take 1 tablet (5 mg) by mouth daily 7/12/2024    LORazepam (ATIVAN) 0.5 MG tablet Take 1 tablet (0.5 mg) by mouth 2 times daily as needed for anxiety PRN    umeclidinium-vilanterol (ANORO ELLIPTA) 62.5-25 MCG/ACT oral inhaler Inhale 1 puff into the lungs daily 7/12/2024    VENTOLIN  (90 Base) MCG/ACT inhaler INHALE 2 PUFFS INTO THE LUNGS EVERY 4 HOURS AS NEEDED FOR WHEEZING OR SHORTNESS OF BREATH PRN

## 2024-07-13 NOTE — ED NOTES
Bed: ST02  Expected date:   Expected time:   Means of arrival:   Comments:  Hems 422 65 F apneic 95% being bagged mag given iv started eta [id number]

## 2024-07-13 NOTE — PLAN OF CARE
AOx4. PIV SL x2. VSS on 2L NC. Tele: SR. Regular diet. Denies pain. Skin: blanchable redness to coccyx with red spots on her bottom, no breakdown of skin. Voiding without issue in BR. No BM today, BS+. Continue to monitor and follow POC.

## 2024-07-13 NOTE — ED NOTES
Hand off to stab room RN.    Frances Perez RN,.......................................... 7/13/2024   6:15 AM

## 2024-07-14 LAB
ANION GAP SERPL CALCULATED.3IONS-SCNC: 6 MMOL/L (ref 7–15)
BUN SERPL-MCNC: 9.5 MG/DL (ref 8–23)
CALCIUM SERPL-MCNC: 9.2 MG/DL (ref 8.8–10.2)
CHLORIDE SERPL-SCNC: 102 MMOL/L (ref 98–107)
CREAT SERPL-MCNC: 0.61 MG/DL (ref 0.51–0.95)
DEPRECATED HCO3 PLAS-SCNC: 29 MMOL/L (ref 22–29)
EGFRCR SERPLBLD CKD-EPI 2021: >90 ML/MIN/1.73M2
GLUCOSE SERPL-MCNC: 144 MG/DL (ref 70–99)
POTASSIUM SERPL-SCNC: 4.5 MMOL/L (ref 3.4–5.3)
SODIUM SERPL-SCNC: 137 MMOL/L (ref 135–145)

## 2024-07-14 PROCEDURE — 94640 AIRWAY INHALATION TREATMENT: CPT

## 2024-07-14 PROCEDURE — 250N000013 HC RX MED GY IP 250 OP 250 PS 637: Performed by: INTERNAL MEDICINE

## 2024-07-14 PROCEDURE — 250N000013 HC RX MED GY IP 250 OP 250 PS 637: Performed by: HOSPITALIST

## 2024-07-14 PROCEDURE — 999N000157 HC STATISTIC RCP TIME EA 10 MIN

## 2024-07-14 PROCEDURE — 94640 AIRWAY INHALATION TREATMENT: CPT | Mod: 76

## 2024-07-14 PROCEDURE — 250N000011 HC RX IP 250 OP 636: Performed by: HOSPITALIST

## 2024-07-14 PROCEDURE — 80048 BASIC METABOLIC PNL TOTAL CA: CPT | Performed by: HOSPITALIST

## 2024-07-14 PROCEDURE — 250N000009 HC RX 250: Performed by: HOSPITALIST

## 2024-07-14 PROCEDURE — 250N000009 HC RX 250: Performed by: INTERNAL MEDICINE

## 2024-07-14 PROCEDURE — 250N000011 HC RX IP 250 OP 636: Performed by: INTERNAL MEDICINE

## 2024-07-14 PROCEDURE — 99232 SBSQ HOSP IP/OBS MODERATE 35: CPT | Performed by: HOSPITALIST

## 2024-07-14 PROCEDURE — 94642 AEROSOL INHALATION TREATMENT: CPT

## 2024-07-14 PROCEDURE — 120N000001 HC R&B MED SURG/OB

## 2024-07-14 PROCEDURE — 36415 COLL VENOUS BLD VENIPUNCTURE: CPT | Performed by: HOSPITALIST

## 2024-07-14 RX ORDER — PREDNISONE 20 MG/1
40 TABLET ORAL DAILY
Status: DISCONTINUED | OUTPATIENT
Start: 2024-07-15 | End: 2024-07-16 | Stop reason: HOSPADM

## 2024-07-14 RX ORDER — AZITHROMYCIN 250 MG/1
250 TABLET, FILM COATED ORAL DAILY
Status: DISCONTINUED | OUTPATIENT
Start: 2024-07-15 | End: 2024-07-16 | Stop reason: HOSPADM

## 2024-07-14 RX ORDER — METHYLPREDNISOLONE SODIUM SUCCINATE 125 MG/2ML
60 INJECTION, POWDER, LYOPHILIZED, FOR SOLUTION INTRAMUSCULAR; INTRAVENOUS EVERY 12 HOURS
Status: COMPLETED | OUTPATIENT
Start: 2024-07-14 | End: 2024-07-14

## 2024-07-14 RX ADMIN — BUDESONIDE 0.5 MG: 0.5 INHALANT RESPIRATORY (INHALATION) at 07:24

## 2024-07-14 RX ADMIN — METHYLPREDNISOLONE SODIUM SUCCINATE 62.5 MG: 125 INJECTION, POWDER, FOR SOLUTION INTRAMUSCULAR; INTRAVENOUS at 04:05

## 2024-07-14 RX ADMIN — BUDESONIDE 0.5 MG: 0.5 INHALANT RESPIRATORY (INHALATION) at 20:33

## 2024-07-14 RX ADMIN — AZITHROMYCIN MONOHYDRATE 500 MG: 500 INJECTION, POWDER, LYOPHILIZED, FOR SOLUTION INTRAVENOUS at 06:43

## 2024-07-14 RX ADMIN — METHYLPREDNISOLONE SODIUM SUCCINATE 62.5 MG: 125 INJECTION, POWDER, FOR SOLUTION INTRAMUSCULAR; INTRAVENOUS at 16:19

## 2024-07-14 RX ADMIN — IPRATROPIUM BROMIDE AND ALBUTEROL SULFATE 3 ML: .5; 3 SOLUTION RESPIRATORY (INHALATION) at 15:05

## 2024-07-14 RX ADMIN — IPRATROPIUM BROMIDE AND ALBUTEROL SULFATE 3 ML: .5; 3 SOLUTION RESPIRATORY (INHALATION) at 07:25

## 2024-07-14 RX ADMIN — SENNOSIDES AND DOCUSATE SODIUM 2 TABLET: 50; 8.6 TABLET ORAL at 18:46

## 2024-07-14 RX ADMIN — ACETAMINOPHEN 650 MG: 325 TABLET, FILM COATED ORAL at 16:19

## 2024-07-14 RX ADMIN — IPRATROPIUM BROMIDE AND ALBUTEROL SULFATE 3 ML: .5; 3 SOLUTION RESPIRATORY (INHALATION) at 20:32

## 2024-07-14 RX ADMIN — CEFTRIAXONE SODIUM 2 G: 2 INJECTION, POWDER, FOR SOLUTION INTRAMUSCULAR; INTRAVENOUS at 06:01

## 2024-07-14 RX ADMIN — ESCITALOPRAM OXALATE 5 MG: 5 TABLET, FILM COATED ORAL at 07:49

## 2024-07-14 RX ADMIN — IPRATROPIUM BROMIDE AND ALBUTEROL SULFATE 3 ML: .5; 3 SOLUTION RESPIRATORY (INHALATION) at 04:44

## 2024-07-14 RX ADMIN — FAMOTIDINE 20 MG: 20 TABLET, FILM COATED ORAL at 21:11

## 2024-07-14 RX ADMIN — GUAIFENESIN 1200 MG: 600 TABLET, EXTENDED RELEASE ORAL at 02:54

## 2024-07-14 ASSESSMENT — ACTIVITIES OF DAILY LIVING (ADL)
ADLS_ACUITY_SCORE: 43
ADLS_ACUITY_SCORE: 39
ADLS_ACUITY_SCORE: 43
ADLS_ACUITY_SCORE: 43
ADLS_ACUITY_SCORE: 39
ADLS_ACUITY_SCORE: 43
ADLS_ACUITY_SCORE: 39
ADLS_ACUITY_SCORE: 43
ADLS_ACUITY_SCORE: 39
ADLS_ACUITY_SCORE: 43
ADLS_ACUITY_SCORE: 43
ADLS_ACUITY_SCORE: 39
ADLS_ACUITY_SCORE: 43

## 2024-07-14 NOTE — PLAN OF CARE
AOx4. PIV SL. VSS on RA. Pt desats with activity, needs oxygen when walking outside of the room for longer distances. Regular diet. Denies pain. Skin: blanchable redness to coccyx with red spots on her bottom, no breakdown of skin. Voiding without issue in BR. No BM today, BS+. Continue to monitor and follow POC

## 2024-07-14 NOTE — PROGRESS NOTES
[facility]    Medicine Progress Note - Hospitalist Service    Date of Admission:  7/13/2024    Assessment & Plan     Tammie Zeng is a 68 year old female admitted on 7/13/2024. She presents with SOB, hypoxia.    Acute exacerbation of COPD   Acute hypoxic and hypercapnic respiratory failure  Lactic acidosis  [Needs rec- albuterol prn, pulmicort BID, anoro ellipta]  Just hospitalized 6/2024 with COPD exacerbation. Pressed alert button. When EMS arrived she was blue, sats 27% by oximeter. Given nebs/ epi. In ED placed on BiPAP with rapid improvement. Chest still tight in ED. In ED temp 96.4, tachy. Lactic 2.4. VBG 6.9/107/83/6->7.29/47/38/23. WBC 14.8. CXR hyperinflated, otherwise no acute findings. Lactic 5.6->2.4. troponin negative. Procal normal.   -Weaned off BiPAP shortly after initial use in ER and continues to do well, weaned off supplemental oxygen.  - blood cultures pending  -Initiated on ceftriaxone 2g IV q24/ azithro 500 mg IV q24 given severity, discontinue ceftriaxone, completed azithromycin.  -IV Solu-Medrol on admission, transition to prednisone  - q4 duonebs  - q2 albuterol nebs prn  - RCAT  -Needs exercise oximetry prior to discharge.  Patient does have O2 at home, uses intermittently.  -PT eval     Tobacco use disorder- encourage cessation  Anxiety- resume escitalopram 5 mg with rec  Hepatic steatosis- follow up as outpatient          Diet: Combination Diet Regular Diet Adult    DVT Prophylaxis: Pneumatic Compression Devices  Royal Catheter: Not present  Lines: None     Cardiac Monitoring: None  Code Status: No CPR- Do NOT Intubate      Clinically Significant Risk Factors                                   # Financial/Environmental Concerns:           Disposition Plan     Medically Ready for Discharge: Anticipated Tomorrow             Heber Villanueva MD  Hospitalist Service  [facility]  Securely message with [employer] (more info)  Text page via  [facility] Paging/Directory   ______________________________________________________________________    Interval History     Discussed with RN and patient was seen this morning.   Remains off BiPAP after initial use in ER.  Was on 4 LPM O2 this morning with oxygen saturations in 99.  I decreased it to 1 LPM and still remained in mid 90s.  Subsequently weaned off.  Patient feels dyspnea has significantly improved.  Still short of breath and desaturates with minimal activity.  Some cough but no purulent sputum.  Denies chest pain.     Physical Exam   Vital Signs: Temp: 98.2  F (36.8  C) Temp src: Oral BP: 96/58 Pulse: 98   Resp: 18 SpO2: 100 % O2 Device: Nasal cannula Oxygen Delivery: 2 LPM  Weight: 107 lbs 11.2 oz    General: AAOx3, appears comfortable.  HEENT: PERRLA EOMI. Mucosa moist.   Lungs: Bilateral equal air entry. Clear to auscultation, normal work of breathing.   CVS: S1S2 regular, no tachycardia or murmur.   Abdomen: Soft, NT, ND. BS heard.  MSK: No edema or deformities.  Neuro: AAOX3. CN 2-12 normal. Strength symmetrical.  Skin: No rash.       Medical Decision Making       40 MINUTES SPENT BY ME on the date of service doing chart review, history, exam, documentation & further activities per the note.      Data     I have personally reviewed the following data over the past 24 hrs:    N/A  \   N/A   / N/A     137 102 9.5 /  144 (H)   4.5 29 0.61 \       Imaging results reviewed over the past 24 hrs:   No results found for this or any previous visit (from the past 24 hour(s)).

## 2024-07-14 NOTE — CONSULTS
"BRIEF NUTRITION ASSESSMENT      REASON FOR ASSESSMENT:  Tammie Zeng is a 68 year old female seen by Registered Dietitian for Admission Nutrition Risk Screen:  Have you recently lost weight without trying? \"2-13#\"  Have you been eating poorly because of a decreased appetite? \"YES\"    CURRENT DIET AND INTAKE:  Diet:  Regular              Chart reviewed  Visited with pt this afternoon  Pt had just finished lunch - hamburger roseann, potatoes, green beans - saving her 2 cookies  Tells me that she typically only eats 1 meal/day and has some snacks - \"this is just my normal meal pattern\"  Meals consist of sandwiches, frozen meals  Loves sweets  Drinks liquids during the day  Does not consume any nutrition supplements    ANTHROPOMETRICS:  Height: 5'3\"  Weight:(7/14) 48.9 kg /  107 lbs 11.2 oz  Body mass index is 19.08 kg/m .   Weight Status: Normal BMI  IBW:  52.3 kg  %IBW: 93%  Weight History: Pt states her usual wt on her home scale if ~103#.  She didn't like that she weighs more here  Wt Readings from Last 10 Encounters:   07/14/24 48.9 kg (107 lb 11.2 oz)   06/10/24 48.1 kg (106 lb)   06/03/24 51.5 kg (113 lb 8.6 oz)   05/02/24 47.6 kg (105 lb)   04/23/24 49 kg (108 lb)   04/22/24 49 kg (108 lb)   04/17/24 50.6 kg (111 lb 8.8 oz)   02/27/24 48.1 kg (106 lb)   02/19/24 45.5 kg (100 lb 5 oz)   02/15/24 49.9 kg (110 lb)         LABS:  Labs noted    MALNUTRITION:  Patient does not meet two of the criteria necessary for diagnosing malnutrition.       NUTRITION INTERVENTION:  Nutrition Diagnosis:  No nutrition diagnosis at this time.    Implementation:  Nutrition Education ---> Reviewed diet order and meal ordering process.  Encouraged pt to order small, frequent meals/snacks as desired.    FOLLOW UP/MONITORING:   Will re-evaluate in 7 - 10 days, or sooner, if re-consulted.        "

## 2024-07-14 NOTE — PLAN OF CARE
Goal Outcome Evaluation:      Plan of Care Reviewed With: patient    Overall Patient Progress: improvingOverall Patient Progress: improving    Outcome Evaluation: Pt remained stable during this shift.    [id number]    Orientations: A/Ox4, intermittent anxious. Managed w/ PRN ativan  Vitals/Pain: Soft BP, other VSS and on 2L NC. Denied pain.  Tele: SR  Lines/Drains: Placed new IV on L FA.  Skin/Wounds: Dry skin  GI/: No BM during this shift and voids w/o difficulties  Ambulation/Assist: SBA/GB  Plan: Maintain pt's safety

## 2024-07-14 NOTE — PROGRESS NOTES
Patient has been assessed for Home Oxygen needs. Oxygen readings:    *Pulse oximetry (SpO2) = 93% on room air at rest while awake.    *SpO2 improved to 93% on 0liters/minute at rest.    *SpO2 = 85% on room air during activity/with exercise.    *SpO2 improved to 92% on 2liters/minute during activity/with exercise.

## 2024-07-15 ENCOUNTER — APPOINTMENT (OUTPATIENT)
Dept: PHYSICAL THERAPY | Facility: CLINIC | Age: 69
DRG: 190 | End: 2024-07-15
Attending: HOSPITALIST
Payer: COMMERCIAL

## 2024-07-15 LAB
BASOPHILS # BLD MANUAL: 0 10E3/UL (ref 0–0.2)
BASOPHILS NFR BLD MANUAL: 0 %
EOSINOPHIL # BLD MANUAL: 0.1 10E3/UL (ref 0–0.7)
EOSINOPHIL NFR BLD MANUAL: 1 %
LYMPHOCYTES # BLD MANUAL: 8.1 10E3/UL (ref 0.8–5.3)
LYMPHOCYTES NFR BLD MANUAL: 55 %
MONOCYTES # BLD MANUAL: 0.4 10E3/UL (ref 0–1.3)
MONOCYTES NFR BLD MANUAL: 3 %
NEUTROPHILS # BLD MANUAL: 6.1 10E3/UL (ref 1.6–8.3)
NEUTROPHILS NFR BLD MANUAL: 41 %
PATH REV: ABNORMAL
PLAT MORPH BLD: ABNORMAL
RBC MORPH BLD: ABNORMAL
VARIANT LYMPHS BLD QL SMEAR: PRESENT

## 2024-07-15 PROCEDURE — 94640 AIRWAY INHALATION TREATMENT: CPT | Mod: 76

## 2024-07-15 PROCEDURE — 94640 AIRWAY INHALATION TREATMENT: CPT

## 2024-07-15 PROCEDURE — 250N000013 HC RX MED GY IP 250 OP 250 PS 637: Performed by: INTERNAL MEDICINE

## 2024-07-15 PROCEDURE — 97116 GAIT TRAINING THERAPY: CPT | Mod: GP

## 2024-07-15 PROCEDURE — 250N000012 HC RX MED GY IP 250 OP 636 PS 637: Performed by: HOSPITALIST

## 2024-07-15 PROCEDURE — 999N000157 HC STATISTIC RCP TIME EA 10 MIN

## 2024-07-15 PROCEDURE — 97530 THERAPEUTIC ACTIVITIES: CPT | Mod: GP

## 2024-07-15 PROCEDURE — 250N000013 HC RX MED GY IP 250 OP 250 PS 637: Performed by: HOSPITALIST

## 2024-07-15 PROCEDURE — 97161 PT EVAL LOW COMPLEX 20 MIN: CPT | Mod: GP

## 2024-07-15 PROCEDURE — 250N000009 HC RX 250: Performed by: HOSPITALIST

## 2024-07-15 PROCEDURE — 99232 SBSQ HOSP IP/OBS MODERATE 35: CPT | Performed by: HOSPITALIST

## 2024-07-15 PROCEDURE — 250N000009 HC RX 250: Performed by: INTERNAL MEDICINE

## 2024-07-15 PROCEDURE — 120N000001 HC R&B MED SURG/OB

## 2024-07-15 RX ADMIN — BUDESONIDE 0.5 MG: 0.5 INHALANT RESPIRATORY (INHALATION) at 07:39

## 2024-07-15 RX ADMIN — AZITHROMYCIN DIHYDRATE 250 MG: 250 TABLET ORAL at 08:22

## 2024-07-15 RX ADMIN — IPRATROPIUM BROMIDE AND ALBUTEROL SULFATE 3 ML: .5; 3 SOLUTION RESPIRATORY (INHALATION) at 11:26

## 2024-07-15 RX ADMIN — IPRATROPIUM BROMIDE AND ALBUTEROL SULFATE 3 ML: .5; 3 SOLUTION RESPIRATORY (INHALATION) at 19:22

## 2024-07-15 RX ADMIN — BUDESONIDE 0.5 MG: 0.5 INHALANT RESPIRATORY (INHALATION) at 19:22

## 2024-07-15 RX ADMIN — ESCITALOPRAM OXALATE 5 MG: 5 TABLET, FILM COATED ORAL at 08:21

## 2024-07-15 RX ADMIN — FAMOTIDINE 20 MG: 20 TABLET, FILM COATED ORAL at 20:48

## 2024-07-15 RX ADMIN — SENNOSIDES AND DOCUSATE SODIUM 2 TABLET: 50; 8.6 TABLET ORAL at 14:50

## 2024-07-15 RX ADMIN — Medication 1 TABLET: at 08:21

## 2024-07-15 RX ADMIN — FAMOTIDINE 20 MG: 20 TABLET, FILM COATED ORAL at 08:23

## 2024-07-15 RX ADMIN — IPRATROPIUM BROMIDE AND ALBUTEROL SULFATE 3 ML: .5; 3 SOLUTION RESPIRATORY (INHALATION) at 15:46

## 2024-07-15 RX ADMIN — IPRATROPIUM BROMIDE AND ALBUTEROL SULFATE 3 ML: .5; 3 SOLUTION RESPIRATORY (INHALATION) at 00:06

## 2024-07-15 RX ADMIN — IPRATROPIUM BROMIDE AND ALBUTEROL SULFATE 3 ML: .5; 3 SOLUTION RESPIRATORY (INHALATION) at 07:39

## 2024-07-15 RX ADMIN — PREDNISONE 40 MG: 20 TABLET ORAL at 08:21

## 2024-07-15 ASSESSMENT — ACTIVITIES OF DAILY LIVING (ADL)
ADLS_ACUITY_SCORE: 44
ADLS_ACUITY_SCORE: 43
ADLS_ACUITY_SCORE: 43
ADLS_ACUITY_SCORE: 44
ADLS_ACUITY_SCORE: 44
ADLS_ACUITY_SCORE: 43
ADLS_ACUITY_SCORE: 43
ADLS_ACUITY_SCORE: 39
ADLS_ACUITY_SCORE: 43
ADLS_ACUITY_SCORE: 39
ADLS_ACUITY_SCORE: 39
ADLS_ACUITY_SCORE: 43
ADLS_ACUITY_SCORE: 44

## 2024-07-15 NOTE — PROGRESS NOTES
[facility]    Medicine Progress Note - Hospitalist Service    Date of Admission:  7/13/2024    Assessment & Plan     Tammie Zeng is a 68 year old female admitted on 7/13/2024. She presents with SOB, hypoxia.    Acute exacerbation of COPD   Acute hypoxic and hypercapnic respiratory failure  Lactic acidosis  [Needs rec- albuterol prn, pulmicort BID, anoro ellipta]  Just hospitalized 6/2024 with COPD exacerbation. Pressed alert button. When EMS arrived she was blue, sats 27% by oximeter. Given nebs/ epi. In ED placed on BiPAP with rapid improvement. Chest still tight in ED. In ED temp 96.4, tachy. Lactic 2.4. VBG 6.9/107/83/6->7.29/47/38/23. WBC 14.8. CXR hyperinflated, otherwise no acute findings. Lactic 5.6->2.4. troponin negative. Procal normal.     -Weaned off BiPAP shortly after initial use in ER and continues to do well, weaned off supplemental oxygen.  - blood cultures NGTD  -Initiated on ceftriaxone 2g IV q24/ azithro 500 mg IV q24 given severity, ceftriaxone discontinued ceftriaxone, complete 5 days of azithromycin.  -IV Solu-Medrol on admission, transitioned to prednisone taper at discharge  -Continue with q4 duonebs.  PTA Breo Ellipta changed to Pulmicort neb.  - q2 albuterol nebs prn  - RCAT  -Exercise oximetry completed, patient needs supplemental O2 with activity.  Patient is mobile and needs portable oxygen arranged prior to discharge.  Also she was not using oxygen at night and is wondering if she needs it.  Will get overnight pulse oximetry on room air tonight    -PT eval pending     Tobacco use disorder- encourage cessation  Anxiety- resume escitalopram 5 mg with rec  Hepatic steatosis- follow up as outpatient          Diet: Combination Diet Regular Diet Adult    DVT Prophylaxis: Pneumatic Compression Devices  Royal Catheter: Not present  Lines: None     Cardiac Monitoring: None  Code Status: No CPR- Do NOT Intubate      Clinically Significant Risk Factors                                    # Financial/Environmental Concerns:           Disposition Plan     Medically Ready for Discharge: Anticipated Tomorrow discharge tomorrow pending overnight oximetry.  Social work/care coordinator consult for discharge planning and to assist with arranging portable oxygen at discharge..             Heber Villanueva MD  Hospitalist Service  [facility]  Securely message with [employer] (more info)  Text page via [employer] Paging/Directory   ______________________________________________________________________    Interval History     Evaluated patient this morning.  She got up and walked to the bathroom without supplemental O2 and became extremely short of breath, reports SpO2 was down to 74.  Supplemental O2 restarted, patient also received DuoNeb and feels like she is recovering from dyspnea now.    Discussed with patient at length.  She does have oxygen at home but not a portable one and so her activity is much restricted.  She does go to [employer] but feels very short of breath with minimal activity.  She reports her breathing is worse in the early morning and she gets stressed every morning.    She does not wear supplemental O2 at night.  Denies chest pain.  Denies dizziness.  Feels tired.    Physical Exam   Vital Signs: Temp: 98.3  F (36.8  C) Temp src: Oral BP: 126/78 Pulse: 75   Resp: 22 SpO2: 95 % O2 Device: Nasal cannula Oxygen Delivery: 2 LPM  Weight: 107 lbs 11.2 oz    General: AAOx3, appears comfortable.  HEENT: PERRLA EOMI. Mucosa moist.   Lungs: Bilateral equal air entry.  Very diminished but no overt crackles or wheezing, initially noted to have slightly increased work of breathing and was speaking in short sentences but this settled down after 5-7-minute and appears more comfortable and with normal work of breathing toward the end of my visit.   CVS: S1S2 regular, no tachycardia or murmur.   Abdomen: Soft, NT, ND. BS heard.  MSK: No edema or deformities.  Neuro:  AAOX3. CN 2-12 normal. Strength symmetrical.  Skin: No rash.       Medical Decision Making       45 MINUTES SPENT BY ME on the date of service doing chart review, history, exam, documentation & further activities per the note.      Data         Imaging results reviewed over the past 24 hrs:   No results found for this or any previous visit (from the past 24 hour(s)).

## 2024-07-15 NOTE — PROGRESS NOTES
07/15/24 0902   Appointment Info   Signing Clinician's Name / Credentials (PT) Meryl Escalona, PT, DPT   Living Environment   People in Home alone   Current Living Arrangements house   Home Accessibility stairs to enter home;stairs within home   Number of Stairs, Main Entrance 3   Stair Railings, Main Entrance railings safe and in good condition   Number of Stairs, Within Home, Primary greater than 10 stairs   Stair Railings, Within Home, Primary railings safe and in good condition   Transportation Anticipated car, drives self   Living Environment Comments Pt has basement laundry, otherwise can stay on first floor, has rails on all stairs.   Self-Care   Usual Activity Tolerance good   Current Activity Tolerance moderate   Regular Exercise No   Equipment Currently Used at Home none   Fall history within last six months no   Activity/Exercise/Self-Care Comment Pt reports she is not motivated to do much exercise at home, does ambulate without assistive device but does also have walker at home, also has shower chair and hand held shower. Pt did participate in outpatient pulmonary rehab this spring but did not find it helpful. Pt has a daughter who lives locally that assists as needed.   General Information   Onset of Illness/Injury or Date of Surgery 07/13/24   Referring Physician Heber Villanueva MD   Patient/Family Therapy Goals Statement (PT) To go home   Pertinent History of Current Problem (include personal factors and/or comorbidities that impact the POC) Pt is 68 year old female adm on 7/13/24 with SOB and hypoxia due to acute exacerbation of COPD, was placed on BiPAP on admission but able to be weaned off fairly quickly. PMH includes tobacco use, COPD   Existing Precautions/Restrictions oxygen therapy device and L/min  (2 LPM via nasal cannula)   Cognition   Affect/Mental Status (Cognition) WFL   Orientation Status (Cognition) oriented x 4   Follows Commands (Cognition) WFL   Pain Assessment   Patient  Currently in Pain No   Integumentary/Edema   Integumentary/Edema no deficits were identifed   Posture    Posture Forward head position;Protracted shoulders   Range of Motion (ROM)   Range of Motion ROM is WFL   Strength (Manual Muscle Testing)   Strength (Manual Muscle Testing) strength is WFL   Bed Mobility   Bed Mobility no deficits identified   Transfers   Transfers no deficits identified   Gait/Stairs (Locomotion)   [place] Level (Gait) supervision   Comment, (Gait/Stairs) Pt holds onto monitor cart for ambulation   Balance   Balance Comments No LOB during session   Clinical Impression   Criteria for Skilled Therapeutic Intervention Yes, treatment indicated   PT Diagnosis (PT) Impaired mobility   Influenced by the following impairments Decreased activity tolerance   Functional limitations due to impairments Decreased ability to participate in daily tasks   Clinical Presentation (PT Evaluation Complexity) stable   Clinical Presentation Rationale Current presentation, [facility]   Clinical Decision Making (Complexity) low complexity   Planned Therapy Interventions (PT) progressive activity/exercise;risk factor education;home program guidelines   Risk & Benefits of therapy have been explained evaluation/treatment results reviewed;care plan/treatment goals reviewed;risks/benefits reviewed;current/potential barriers reviewed;participants voiced agreement with care plan;participants included;patient   PT Total Evaluation Time   PT Eval, Low Complexity Minutes (29565) 10   Physical Therapy Goals   PT Frequency One time eval and treatment only   PT Predicted Duration/Target Date for Goal Attainment 07/15/24   PT Goals Bed Mobility;Transfers;Gait;Stairs   PT: Bed Mobility Independent;Supine to/from sit;Rolling;Goal Met   PT: Transfers Independent;Sit to/from stand;Bed to/from chair;Goal Met   PT: Gait Modified independent;Greater than 200 feet;Assistive device;Goal Met   PT: Stairs Modified independent;5 stairs;Rail on  both sides;Goal Met   Interventions   Interventions Quick Adds Gait Training;Therapeutic Activity   Therapeutic Activity   Therapeutic Activities: dynamic activities to improve functional performance Minutes (25357) 15   Symptoms Noted During/After Treatment None   Treatment Detail/Skilled Intervention Pt OOB in bathroom on arrival, agreeable to participate. Pt is independent in room, manages O2 cord without difficulty. Pt reports she tried walking to the bathroom earlier without O2 and sats dropped to 70's. Pt kept on 2 LPM throughout session. Did discuss discharge planning and recommendations for outpatient pulmonary rehab. Pt reports she participated in this before and did not find it helpful, not interested in referral for outpatient pulmonary rehab at this time. Provided education on energy conservation and safely increasing activity at home, pt states understanding.   Gait Training   Gait Training Minutes (50884) 10   Symptoms Noted During/After Treatment (Gait Training) none   Treatment Detail/Skilled Intervention Pt ambulates 250' in [place] with UE support on monitor cart. Did discuss using walker for longer distance ambulation after discharge, pt states understanding. On 2 LPM pt's O2 sats remained in 90's and pt denies feeling SOB with ambulation on level surface, did get SOB after performing 5 stairs x2 reps, was able to continue ambulating after brief standing rest.   PT Discharge Planning   PT Plan Discharge from inpatient PT   PT Discharge Recommendation (DC Rec) home with assist;home with outpatient pulmonary rehab   PT Rationale for DC Rec Pt can complete mobility necessary for discharge to home but does demonstrate decreased activity tolerance. Anticipate pt may need assistance with more taxing household tasks such as laundry, shopping, cleaning. Pt reports her daughter would be able to assist as needed. Recommend use of walker for longer distances and pt did well on supplemental O2 during session. Pt  may benefit from outpatient pulmonary rehab but she is not interested at this time.   PT Brief overview of current status SBA to mod I   Total Session Time   Timed Code Treatment Minutes 25   Total Session Time (sum of timed and untimed services) 35

## 2024-07-15 NOTE — PLAN OF CARE
Goal Outcome Evaluation:      Plan of Care Reviewed With: patient      Orientations: A/O x 4  Vitals/Pain: vitals stable overnight.  Tele: Patient's tele has been discontinued during day shift yesterday.  Lines/Drains: PIV flushing well.  Skin/Wounds: Skin is intact.  GI/: Patient is voiding well.  Labs: Abnormal/Trends, Electrolyte Replacement- Electrolyte check with morning labs.  Ambulation/Assist: Patient is independent to use the restroom.  Sleep Quality: Slept on and off during the night.  Plan: Possible discharge today with maybe a small portable oxygen tank.

## 2024-07-15 NOTE — PROGRESS NOTES
[id number]  Orientations: A&Ox4. Calm and cooperative. Anxious about oxygen.  Vitals/Pain: VSS on RA- 2 L requested for comfort and SOB. SOB with activity. Denies pain.   Tele: n/a  Lines/Drains: L PIV, SL  Skin/Wounds: skin intact  GI/: continent. Tolerating diet. No N/V,  Labs: Abnormal/Trends, Electrolyte Replacement- na  Ambulation/Assist: SBA, independent in room  Plan: Overnight O2 testing. Transitioned to PO meds. Continue plan of care.

## 2024-07-16 VITALS
DIASTOLIC BLOOD PRESSURE: 98 MMHG | HEART RATE: 72 BPM | WEIGHT: 107.7 LBS | RESPIRATION RATE: 18 BRPM | SYSTOLIC BLOOD PRESSURE: 126 MMHG | TEMPERATURE: 98.6 F | BODY MASS INDEX: 19.08 KG/M2 | OXYGEN SATURATION: 94 %

## 2024-07-16 PROCEDURE — 999N000157 HC STATISTIC RCP TIME EA 10 MIN

## 2024-07-16 PROCEDURE — 250N000012 HC RX MED GY IP 250 OP 636 PS 637: Performed by: HOSPITALIST

## 2024-07-16 PROCEDURE — 250N000009 HC RX 250: Performed by: HOSPITALIST

## 2024-07-16 PROCEDURE — 250N000013 HC RX MED GY IP 250 OP 250 PS 637: Performed by: HOSPITALIST

## 2024-07-16 PROCEDURE — 250N000013 HC RX MED GY IP 250 OP 250 PS 637: Performed by: INTERNAL MEDICINE

## 2024-07-16 PROCEDURE — 99239 HOSP IP/OBS DSCHRG MGMT >30: CPT | Performed by: INTERNAL MEDICINE

## 2024-07-16 PROCEDURE — 94640 AIRWAY INHALATION TREATMENT: CPT

## 2024-07-16 PROCEDURE — 94762 N-INVAS EAR/PLS OXIMTRY CONT: CPT

## 2024-07-16 PROCEDURE — 250N000009 HC RX 250: Performed by: INTERNAL MEDICINE

## 2024-07-16 RX ORDER — PREDNISONE 20 MG/1
TABLET ORAL
Qty: 18 TABLET | Refills: 0 | Status: SHIPPED | OUTPATIENT
Start: 2024-07-16 | End: 2024-07-31

## 2024-07-16 RX ORDER — ACETAMINOPHEN 325 MG/1
650 TABLET ORAL EVERY 4 HOURS PRN
COMMUNITY
Start: 2024-07-16

## 2024-07-16 RX ORDER — AZITHROMYCIN 250 MG/1
250 TABLET, FILM COATED ORAL DAILY
Qty: 1 TABLET | Refills: 0 | Status: SHIPPED | OUTPATIENT
Start: 2024-07-16 | End: 2024-07-23

## 2024-07-16 RX ORDER — GUAIFENESIN 600 MG/1
1200 TABLET, EXTENDED RELEASE ORAL 2 TIMES DAILY PRN
Qty: 30 TABLET | Refills: 0 | Status: SHIPPED | OUTPATIENT
Start: 2024-07-16

## 2024-07-16 RX ORDER — GUAIFENESIN 600 MG/1
1200 TABLET, EXTENDED RELEASE ORAL 2 TIMES DAILY PRN
Qty: 30 TABLET | Refills: 0 | Status: SHIPPED | OUTPATIENT
Start: 2024-07-16 | End: 2024-07-16

## 2024-07-16 RX ADMIN — BUDESONIDE 0.5 MG: 0.5 INHALANT RESPIRATORY (INHALATION) at 11:19

## 2024-07-16 RX ADMIN — AZITHROMYCIN DIHYDRATE 250 MG: 250 TABLET ORAL at 08:46

## 2024-07-16 RX ADMIN — ESCITALOPRAM OXALATE 5 MG: 5 TABLET, FILM COATED ORAL at 08:46

## 2024-07-16 RX ADMIN — IPRATROPIUM BROMIDE AND ALBUTEROL SULFATE 3 ML: .5; 3 SOLUTION RESPIRATORY (INHALATION) at 11:19

## 2024-07-16 RX ADMIN — LORAZEPAM 0.5 MG: 0.5 TABLET ORAL at 04:37

## 2024-07-16 RX ADMIN — FAMOTIDINE 20 MG: 20 TABLET, FILM COATED ORAL at 08:46

## 2024-07-16 RX ADMIN — Medication 1 TABLET: at 08:46

## 2024-07-16 RX ADMIN — PREDNISONE 40 MG: 20 TABLET ORAL at 08:46

## 2024-07-16 ASSESSMENT — ACTIVITIES OF DAILY LIVING (ADL)
ADLS_ACUITY_SCORE: 39
ADLS_ACUITY_SCORE: 36
ADLS_ACUITY_SCORE: 36
ADLS_ACUITY_SCORE: 39

## 2024-07-16 NOTE — DISCHARGE SUMMARY
[facility]    Discharge Summary  Hospitalist    Date of Admission:  7/13/2024  Date of Discharge:  7/16/2024 11:36 AM  Provider:  Sushila Thompson DO    Discharge Diagnoses    Acute COPD exacerbation   Acute hypoxic and hypercapnic respiratory failure   Tobacco use disorder    Anxiety d/o    Other medical issues:  Past Medical History:   Diagnosis Date    Chronic obstructive pulmonary disease, unspecified COPD type (H) 06/13/2017    has seen [facility], fev1 under 1.0, hospitalized twice in 2023, once in Feb 2024, once May 2024    Depressive disorder, not elsewhere classified 05/2006    following sudden death of her mother    Elevated LFTs 2023    hepatic steatosis 4/23 us    Generalized anxiety disorder     Hepatic steatosis 04/2023    on us done for elev lfts    Herpes simplex without mention of complication     MOUTH    Nephrolithiasis     TOBACCO ABUSE-CONTINUOUS          History of Present Illness   Tammie Zeng is an 68 year old female who presented with SOB and acute hypoxic respiratory failure via EMS from her home.  Please see the admission history and physical for full details.    Hospital Course   Tammie Zeng was admitted on 7/13/2024.  The following problems were addressed during her hospitalization:     Acute COPD exacerbation         Acute hypoxic respiratory failure         Ongoing tobacco use    69 yo female with PMH of COPD and ongoing tobacco use presents from home with SOB.  EMS reports that she appeared cyanotic and their first recorded oxygen saturation was 27%.  CXR without infiltrates; COVID/RSV/Influenza negative.  Full viral respiratory panel not completed prior to her discharge.     She was placed on BIPAP support in the ED, IV solumedrol and IV abx.  She clinically improved, was able to be weaned to supplemental oxygen via nasal cannula and then to room air.      She had a home oxygen evaluation and qualified for supplemental oxygen with activity  "only.  She was not consistently hypoxic with overnight testing (although she admits to not really being able to sleep).      She was discharged on a po prednisone taper and azithromycin.  She has a f/up with her primary pulmonologist in early September, 2024.  She will see her PCP in the next 1-2 wks for hospital f/up while still on the po prednisone taper.      She is concerned about \"bad air\" in her home.  Spoke with daughter present who has placed humidifiers in the home among other efforts.      Smoking cessation strongly encouraged    2.  Anxiety d/o    No changes were made in her PTA medications during her hospital stay of at the time of discharge    Significant Results and Procedures   none    Pending Results   Unresulted Labs Ordered in the Past 30 Days of this Admission       Date and Time Order Name Status Description    7/13/2024  4:34 AM Blood Culture Peripheral Blood Preliminary     7/13/2024  4:34 AM Blood Culture Peripheral Blood Preliminary             Code Status   DNR / DNI       Primary Care Physician   Mikel Navarro    Physical Exam   Temp: 98.6  F (37  C) Temp src: Oral BP: (!) 126/98 Pulse: 72   Resp: 18 SpO2: 94 % O2 Device: None (Room air) Oxygen Delivery: 2 LPM  Vitals:    07/13/24 0445 07/14/24 0629   Weight: 49 kg (108 lb 0.4 oz) 48.9 kg (107 lb 11.2 oz)     Vital Signs with Ranges  Temp:  [98  F (36.7  C)-98.6  F (37  C)] 98.6  F (37  C)  Pulse:  [67-94] 72  Resp:  [16-18] 18  BP: (108-154)/(56-98) 126/98  SpO2:  [92 %-95 %] 94 %  I/O last 3 completed shifts:  In: 760 [P.O.:760]  Out: -     GEN:  Alert, oriented x 3, appears comfortable, speech is slower but appropriate    Thin-appearing female  HEENT:  Normocephalic/atraumatic, PERRL, no scleral icterus, no nasal discharge  CV:  Regular rate and rhythm, no clear murmur to ausc.  S1 + S2 noted  LUNGS:  fairly good air exchange ant/post bilaterally to ausc exam this am.  Scattered end-expiratory wheezes ant/post.   No significant rales or " rhonchi auscultated bilaterally.  No costal retractions bilaterally.  Symmetric chest rise on inhalation noted.  EXT:  No significant pretibial edema or cyanosis bilaterally.   SKIN:  Dry to touch, no rashes or jaundice noted.  NEURO:  No tremors at rest    Discharge Disposition   Discharged to home    Consultations This Hospital Stay   PHYSICAL THERAPY ADULT IP CONSULT  CARE MANAGEMENT / SOCIAL WORK IP CONSULT    Time Spent on this Encounter   ISushila DO, personally saw the patient today and spent greater than 30 minutes discharging this patient.    Discharge Orders      Primary Care - Care Coordination Referral      Reason for your hospital stay    You were admitted for COPD exacerbation     Follow-up and recommended labs and tests     F/up with PCP within 5-7 days for hospital f/up  F/up PULM med, as scheduled, 9/3/24     Activity    Your activity upon discharge: activity as tolerated     Oxygen Adult/Peds    Oxygen Documentation  I certify that this patient, Tammie Zeng has been under my care (or a nurse practitioner or physican's assistant working with me). This is the face-to-face encounter for oxygen medical necessity.      At the time of this encounter, I have reviewed the qualifying testing and have determined that supplemental oxygen is reasonable and necessary and is expected to improve the patient's condition in a home setting.         Patient has continued oxygen desaturation due to COPD J44.9.    If portability is ordered, is the patient mobile within the home? yes    Was this visit performed as a telehealth visit: No     Diet    Follow this diet upon discharge:     Regular diet     Discharge Medications   Current Discharge Medication List        START taking these medications    Details   acetaminophen (TYLENOL) 325 MG tablet Take 2 tablets (650 mg) by mouth every 4 hours as needed for mild pain or other (and adjunct with moderate or severe pain or per patient request)     Associated Diagnoses: COPD exacerbation (H)      azithromycin (ZITHROMAX) 250 MG tablet Take 1 tablet (250 mg) by mouth daily  Qty: 1 tablet, Refills: 0    Associated Diagnoses: COPD exacerbation (H)      predniSONE (DELTASONE) 20 MG tablet Take 2 tablets (40 mg) by mouth daily for 3 days, THEN 1.5 tablets (30 mg) daily for 4 days, THEN 1 tablet (20 mg) daily for 4 days, THEN 0.5 tablets (10 mg) daily for 4 days.  Qty: 18 tablet, Refills: 0    Associated Diagnoses: COPD exacerbation (H)           CONTINUE these medications which have CHANGED    Details   guaiFENesin (MUCINEX) 600 MG 12 hr tablet Take 2 tablets (1,200 mg) by mouth 2 times daily as needed for congestion  Qty: 30 tablet, Refills: 0    Associated Diagnoses: COPD exacerbation (H)           CONTINUE these medications which have NOT CHANGED    Details   albuterol (PROVENTIL) (2.5 MG/3ML) 0.083% neb solution Take 1 vial (2.5 mg) by nebulization every 4 hours as needed for wheezing    Associated Diagnoses: COPD exacerbation (H)      budesonide (PULMICORT) 1 MG/2ML neb solution Take 2 mLs (1 mg) by nebulization two times daily  Qty: 200 mL, Refills: 1    Associated Diagnoses: Chronic obstructive pulmonary disease, unspecified COPD type (H)      escitalopram (LEXAPRO) 5 MG tablet Take 1 tablet (5 mg) by mouth daily  Qty: 90 tablet, Refills: 2    Associated Diagnoses: Generalized anxiety disorder      LORazepam (ATIVAN) 0.5 MG tablet Take 1 tablet (0.5 mg) by mouth 2 times daily as needed for anxiety  Qty: 30 tablet, Refills: 0    Associated Diagnoses: Generalized anxiety disorder      umeclidinium-vilanterol (ANORO ELLIPTA) 62.5-25 MCG/ACT oral inhaler Inhale 1 puff into the lungs daily  Qty: 180 each, Refills: 1    Comments: Future refills by PCP Dr. Mikel Navarro with phone number [phone].  Associated Diagnoses: COPD exacerbation (H)      VENTOLIN  (90 Base) MCG/ACT inhaler INHALE 2 PUFFS INTO THE LUNGS EVERY 4 HOURS AS NEEDED FOR WHEEZING OR  "SHORTNESS OF BREATH  Qty: 18 g, Refills: 5    Comments: Pharmacy may dispense brand covered by insurance (Proair, or proventil or ventolin or generic albuterol inhaler)  Associated Diagnoses: COPD exacerbation (H)           STOP taking these medications       multivitamin w/minerals (THERA-VIT-M) tablet Comments:   Reason for Stopping:             Allergies   Allergies   Allergen Reactions    Pcn [Penicillins]      convulsions     Data   Recent Labs   Lab 07/13/24  0548 07/13/24  0438   PHV 7.29* 6.99*   PO2V 38 83*   PCO2V 47 107*   HCO3V 23 26     Recent Labs   Lab 07/13/24  0437   WBC 14.8*   HGB 13.1   HCT 43.8   MCV 92        Recent Labs   Lab 07/14/24  0545 07/13/24  0750 07/13/24  0437     --  138   POTASSIUM 4.5  --  4.0   CHLORIDE 102  --  101   CO2 29  --  25   ANIONGAP 6*  --  12   * 133* 266*   BUN 9.5  --  8.2   CR 0.61  --  0.66   GFRESTIMATED >90  --  >90   MOLLY 9.2  --  8.8     No results for input(s): \"TSH\" in the last 168 hours.  No results for input(s): \"TROPONIN\", \"TROPI\", \"TROPR\", \"TROPONINIS\" in the last 168 hours.    Invalid input(s): \"TROPT\", \"TROP\", \"TROPONINIES\", \"TNIH\"  No results for input(s): \"COLOR\", \"APPEARANCE\", \"URINEGLC\", \"URINEBILI\", \"URINEKETONE\", \"SG\", \"UBLD\", \"URINEPH\", \"PROTEIN\", \"UROBILINOGEN\", \"NITRITE\", \"LEUKEST\", \"RBCU\", \"WBCU\" in the last 168 hours.  Results for orders placed or performed during the hospital encounter of 07/13/24   XR Chest Port 1 View    Narrative    EXAM: XR CHEST PORT 1 VIEW  LOCATION: [facility]  DATE: 7/13/2024    INDICATION: dyspnea  COMPARISON: 6/3/2024      Impression    IMPRESSION:   1.  Hyperinflation versus deep inspiration changes. Clinical correlation for air trapping. Heart is normal in size. Lungs are clear. No pleural effusion or pneumothorax. Blunting of the left costophrenic angle which may be due to fluid or thickened   pleura.           "

## 2024-07-16 NOTE — PLAN OF CARE
Discharge time/date: 7/16/24 at 1125  Walked or Wheelchair: Wheelchair  PIV removed: Yes  Reviewed AVS with patient: Yes  Medication due times added to AVS in EPIC: Yes  Verbalized understanding of discharge with teachback: Yes  Medications retrieved from pharmacy: Yes  Supplies sent home: Yes  Belongings from security with patient: NA

## 2024-07-17 ENCOUNTER — PATIENT OUTREACH (OUTPATIENT)
Dept: CARE COORDINATION | Facility: CLINIC | Age: 69
End: 2024-07-17
Payer: COMMERCIAL

## 2024-07-17 NOTE — PROGRESS NOTES
Clinic Care Coordination Contact  Transitions of Care Outreach  Chief Complaint   Patient presents with    Clinic Care Coordination - Post Hospital       Most Recent Admission Date: 7/13/2024   Most Recent Admission Diagnosis: COPD exacerbation (H) - J44.1  Elevated lactic acid level - R79.89  Acute hypoxemic respiratory failure (H) - J96.01  Leukocytosis, unspecified type - D72.829     Most Recent Discharge Date: 7/16/2024   Most Recent Discharge Diagnosis: Acute hypoxemic respiratory failure (H) - J96.01  Leukocytosis, unspecified type - D72.829  Elevated lactic acid level - R79.89  COPD exacerbation (H) - J44.1  COPD with acute exacerbation (H) - J44.1     Transitions of Care Assessment    Discharge Assessment  How are you doing now that you are home?: Doing well.  How are your symptoms? (Red Flag symptoms escalate to triage hotline per guidelines): Improved  Do you know how to contact your clinic care team if you have future questions or changes to your health status? : Yes  Does the patient have their discharge instructions? : Yes  Does the patient have questions regarding their discharge instructions? : No  Were you started on any new medications or were there changes to any of your previous medications? : Yes  Does the patient have all of their medications?: Yes  Do you have questions regarding any of your medications? : No  Do you have all of your needed medical supplies or equipment (DME)?  (i.e. oxygen tank, CPAP, cane, etc.): Yes         Post-op (Clinicians Only)  Did the patient have surgery or a procedure: No  Fever: No  Chills: No  Eating & Drinking: eating and drinking without complaints/concerns  PO Intake: regular diet  Additional Symptoms: decreased appetite  Bowel Function: constipation  Date of last BM: 07/16/24  Urinary Status: voiding without complaint/concerns    Care Management   None    Follow up Plan     Discharge Follow-Up  Discharge follow up appointment scheduled in alignment with  recommended follow up timeframe or Transitions of Risk Category? (Low = within 30 days; Moderate= within 14 days; High= within 7 days): Yes  Discharge Follow Up Appointment Date: 07/23/24  Discharge Follow Up Appointment Scheduled with?: Primary Care Provider    Future Appointments   Date Time Provider Department Center   7/23/2024 11:00 AM Parris Grimes PA-C [facility]    9/12/2024  1:00 PM [facility]   2/17/2025 10:30 AM Mikel Navarro MD [facility]       Outpatient Plan as outlined on AVS reviewed with patient.    For any urgent concerns, please contact our 24 hour nurse triage line: [phone] ([phone])       Daphney Love RN Clinic Care Coordinator  [facility] Clinics: [place], [place] (on-site Wednesdays), [facility] (on-site Thursdays) & [facility].  [email or web address]  Phone: [phone]

## 2024-07-17 NOTE — PROGRESS NOTES
Clinic Care Coordination Contact  [facility]/Voicemail    Clinical Data: Care Coordinator Outreach    Outreach Documentation Number of Outreach Attempt   7/17/2024   9:55 AM 1       Left message on patient's voicemail with call back information and requested return call.    Plan: Care Coordinator will try to reach patient again in 1-2 business days.    Daphney Love RN Clinic Care Coordinator  [facility] Clinics: [place], [place] (on-site Wednesdays), [facility] (on-site Thursdays) & [facility].  [email or web address]  Phone: [phone]

## 2024-07-18 LAB
BACTERIA BLD CULT: NO GROWTH
BACTERIA BLD CULT: NO GROWTH

## 2024-07-23 ENCOUNTER — OFFICE VISIT (OUTPATIENT)
Dept: FAMILY MEDICINE | Facility: CLINIC | Age: 69
End: 2024-07-23
Payer: COMMERCIAL

## 2024-07-23 VITALS
TEMPERATURE: 98.6 F | OXYGEN SATURATION: 95 % | HEART RATE: 79 BPM | RESPIRATION RATE: 16 BRPM | HEIGHT: 63 IN | DIASTOLIC BLOOD PRESSURE: 82 MMHG | BODY MASS INDEX: 18.43 KG/M2 | WEIGHT: 104 LBS | SYSTOLIC BLOOD PRESSURE: 121 MMHG

## 2024-07-23 DIAGNOSIS — J96.01 ACUTE RESPIRATORY FAILURE WITH HYPOXIA (H): ICD-10-CM

## 2024-07-23 DIAGNOSIS — J44.1 COPD EXACERBATION (H): Primary | ICD-10-CM

## 2024-07-23 PROCEDURE — 99495 TRANSJ CARE MGMT MOD F2F 14D: CPT | Performed by: PHYSICIAN ASSISTANT

## 2024-07-23 RX ORDER — PREDNISONE 10 MG/1
10 TABLET ORAL DAILY
Qty: 30 TABLET | Refills: 0 | Status: SHIPPED | OUTPATIENT
Start: 2024-07-23

## 2024-07-23 ASSESSMENT — PAIN SCALES - GENERAL: PAINLEVEL: NO PAIN (0)

## 2024-07-23 NOTE — PROGRESS NOTES
"      Tia Akins is a 68 year old, presenting for the following health issues:  Hospital F/U    Discharge summary dated 7/16/24:  Hospital Course  Tammie Zeng was admitted on 7/13/2024.  The following problems were addressed during her hospitalization:      Acute COPD exacerbation         Acute hypoxic respiratory failure         Ongoing tobacco use     67 yo female with PMH of COPD and ongoing tobacco use presents from home with SOB.  EMS reports that she appeared cyanotic and their first recorded oxygen saturation was 27%.  CXR without infiltrates; COVID/RSV/Influenza negative.  Full viral respiratory panel not completed prior to her discharge.      She was placed on BIPAP support in the ED, IV solumedrol and IV abx.  She clinically improved, was able to be weaned to supplemental oxygen via nasal cannula and then to room air.       She had a home oxygen evaluation and qualified for supplemental oxygen with activity only.  She was not consistently hypoxic with overnight testing (although she admits to not really being able to sleep).       She was discharged on a po prednisone taper and azithromycin.  She has a f/up with her primary pulmonologist in early September, 2024.  She will see her PCP in the next 1-2 wks for hospital f/up while still on the po prednisone taper.       She is concerned about \"bad air\" in her home.  Spoke with daughter present who has placed humidifiers in the home among other efforts.       Smoking cessation strongly encouraged       HPI     Pt was hospitalized with another COPD exacerbation (4th this year)  She started to use a dehumidifier and air purifier in her home which is helping  She hasn't smoked for 10 days  Fearful of taking Chantix that was prescribed by PCP  She is more anxious at night and does take lorazepam prn  She avoids taking lorazepam during the day since it make her feel foggy  She has completed course of azithromycin but still on the prednisone " taper  She notes trouble splitting her prednisone dose to follow the taper so went from 40mg every day to 20mg every day.  Not using home oxygen currently  Sats running 93-97% on RA in the past few days  She is trying to get more portable oxygen for when she goes shopping  Told by the oxygen company that there is a 5-6 month waiting list for the equipment she is asking for  She is scheduled to see her pulmonologist in Sept       Hospital Follow-up Visit:    Hospital/Nursing Home/IP Rehab Facility: [facility]  Date of Admission: 07/13/24  Date of Discharge: 07/16/24  Reason(s) for Admission:   Acute COPD exacerbation   Acute hypoxic and hypercapnic respiratory failure   Tobacco use disorder    Anxiety d/o  Was the patient in the ICU or did the patient experience delirium during hospitalization?  No  Do you have any other stressors you would like to discuss with your provider? No    Problems taking medications regularly:  None  Medication changes since discharge: None  Problems adhering to non-medication therapy:      Summary of hospitalization:  [facility] discharge summary reviewed  Diagnostic Tests/Treatments reviewed.  Follow up needed: none  Other Healthcare Providers Involved in Patient s Care:         Specialist appointment - pulm  Update since discharge: improved.         Plan of care communicated with patient and family     Past Medical History:   Diagnosis Date    Chronic obstructive pulmonary disease, unspecified COPD type (H) 06/13/2017    has seen [facility], fev1 under 1.0, hospitalized twice in 2023, once in Feb 2024, once May 2024    Depressive disorder, not elsewhere classified 05/2006    following sudden death of her mother    Elevated LFTs 2023    hepatic steatosis 4/23 us    Generalized anxiety disorder     Hepatic steatosis 04/2023    on us done for elev lfts    Herpes simplex without mention of complication     MOUTH    Nephrolithiasis     TOBACCO  ABUSE-CONTINUOUS      Past Surgical History:   Procedure Laterality Date    CHOLECYSTECTOMY, LAPOROSCOPIC  1980's    done at [facility]    TUBAL LIGATION  1998     Social History     Tobacco Use    Smoking status: Former     Current packs/day: 0.50     Average packs/day: 0.5 packs/day for 25.0 years (12.5 ttl pk-yrs)     Types: Cigarettes    Smokeless tobacco: Never    Tobacco comments:     states is cutting back - 1/2 pack a day      Quit 07/13/24   Substance Use Topics    Alcohol use: Yes     Alcohol/week: 0.0 standard drinks of alcohol     Comment: 3 mixed drinks per week     Current Outpatient Medications   Medication Sig Dispense Refill    albuterol (PROVENTIL) (2.5 MG/3ML) 0.083% neb solution Take 1 vial (2.5 mg) by nebulization every 4 hours as needed for wheezing      budesonide (PULMICORT) 1 MG/2ML neb solution Take 2 mLs (1 mg) by nebulization two times daily 200 mL 1    escitalopram (LEXAPRO) 5 MG tablet Take 1 tablet (5 mg) by mouth daily 90 tablet 2    guaiFENesin (MUCINEX) 600 MG 12 hr tablet Take 2 tablets (1,200 mg) by mouth 2 times daily as needed for congestion 30 tablet 0    LORazepam (ATIVAN) 0.5 MG tablet Take 1 tablet (0.5 mg) by mouth 2 times daily as needed for anxiety 30 tablet 0    predniSONE (DELTASONE) 10 MG tablet Take 1 tablet (10 mg) by mouth daily 30 tablet 0    predniSONE (DELTASONE) 20 MG tablet Take 2 tablets (40 mg) by mouth daily for 3 days, THEN 1.5 tablets (30 mg) daily for 4 days, THEN 1 tablet (20 mg) daily for 4 days, THEN 0.5 tablets (10 mg) daily for 4 days. 18 tablet 0    umeclidinium-vilanterol (ANORO ELLIPTA) 62.5-25 MCG/ACT oral inhaler Inhale 1 puff into the lungs daily 180 each 1    VENTOLIN  (90 Base) MCG/ACT inhaler INHALE 2 PUFFS INTO THE LUNGS EVERY 4 HOURS AS NEEDED FOR WHEEZING OR SHORTNESS OF BREATH 18 g 5    acetaminophen (TYLENOL) 325 MG tablet Take 2 tablets (650 mg) by mouth every 4 hours as needed for mild pain or other (and adjunct with  "moderate or severe pain or per patient request)       Allergies   Allergen Reactions    Pcn [Penicillins]      convulsions       PHYSICAL EXAM:    /82 (BP Location: Left arm, Patient Position: Sitting, Cuff Size: Adult Small)   Pulse 79   Temp 98.6  F (37  C)   Resp 16   Ht 1.6 m (5' 3\")   Wt 47.2 kg (104 lb)   LMP 01/24/2006   SpO2 95%   BMI 18.42 kg/m      Patient appears non toxic  Lungs: distant breath sounds  Heart: RRR  Extr: no edema    Assessment and Plan:     (J44.1) COPD exacerbation (H)  (primary encounter diagnosis)  Comment: she is having trouble breaking her 20mg pred tabs in half for her taper so prescribed some 10mg pills. Cont taper as prescribed.  It is not clear what she is using exactly for nebs during the day so pt and dtr given med sheet and they will compare at home.  Plan: predniSONE (DELTASONE) 10 MG tablet            (J96.01) Acute respiratory failure with hypoxia (H)  Comment:   Plan: pt hasn't smoked since her hospitalization and sats >90% on RA. She doesn't want to try Chantix due to fear of SE.      Parris Grimes PA-C        "

## 2024-09-26 DIAGNOSIS — J44.1 COPD EXACERBATION (H): ICD-10-CM

## 2024-09-26 RX ORDER — ALBUTEROL SULFATE 90 UG/1
AEROSOL, METERED RESPIRATORY (INHALATION)
Qty: 18 G | Refills: 2 | Status: SHIPPED | OUTPATIENT
Start: 2024-09-26

## 2024-10-24 ENCOUNTER — OFFICE VISIT (OUTPATIENT)
Dept: FAMILY MEDICINE | Facility: CLINIC | Age: 69
End: 2024-10-24
Payer: COMMERCIAL

## 2024-10-24 VITALS
OXYGEN SATURATION: 95 % | HEART RATE: 112 BPM | SYSTOLIC BLOOD PRESSURE: 113 MMHG | TEMPERATURE: 96.9 F | RESPIRATION RATE: 18 BRPM | BODY MASS INDEX: 20.02 KG/M2 | DIASTOLIC BLOOD PRESSURE: 71 MMHG | WEIGHT: 113 LBS | HEIGHT: 63 IN

## 2024-10-24 DIAGNOSIS — J44.1 COPD EXACERBATION (H): ICD-10-CM

## 2024-10-24 DIAGNOSIS — Z09 HOSPITAL DISCHARGE FOLLOW-UP: Primary | ICD-10-CM

## 2024-10-24 DIAGNOSIS — J96.02 ACUTE RESPIRATORY FAILURE WITH HYPERCAPNIA (H): ICD-10-CM

## 2024-10-24 DIAGNOSIS — F10.90 ALCOHOL USE DISORDER: ICD-10-CM

## 2024-10-24 PROCEDURE — 99495 TRANSJ CARE MGMT MOD F2F 14D: CPT | Performed by: PHYSICIAN ASSISTANT

## 2024-10-24 RX ORDER — PHENOL 1.4 %
10 AEROSOL, SPRAY (ML) MUCOUS MEMBRANE
Status: ON HOLD | COMMUNITY
Start: 2024-10-24 | End: 2024-11-12

## 2024-10-24 RX ORDER — AZITHROMYCIN 250 MG/1
250 TABLET, FILM COATED ORAL DAILY
COMMUNITY
Start: 2024-10-24

## 2024-10-24 ASSESSMENT — PAIN SCALES - GENERAL: PAINLEVEL_OUTOF10: NO PAIN (0)

## 2024-10-24 NOTE — PROGRESS NOTES
Subjective   Mable is a 68 year old, presenting for the following health issues:  Hospital F/U  BRIEF SUMMARY OF HOSPITALIZATION OF THE HOSPITAL COURSE BY PROBLEM 10/16/24:  Please refer to the H&P and ICU notes for the details of this presentation, Mable Zeng is a 68 y.o. female with PMH of copd, alcohol use disorder, MDD/anxiety, hepatic steatosis, admitted on 10/15/2024 for copd exacerbation. She was initially sent to MICU while requiring BiPAP but quickly recovered and was then on room air.      Acute Hypercapnic Respiratory Failure, most likely secondary to COPD exacerbation, improving  Elevated Lactate, suspect most likely secondary to albuterol nebs  Leukocytosis, improving  Patient with increased SOB over day preceding admission. Tachypnic with respiratory acidosis and hypercapnia on admission. Exam notable for severely reduced breath sounds bilaterally with minimal wheezes. CXR with hyperinflation and no infiltrates, lower suspicion for PNA. COVID and Flu negative. Mild leukocytosis during admission, suspected to be stress demargination from steroids. Treated with Duonebs, Terbulatine, Magnesium, Solumedrol, Azithromycin, and started on BiPAP in ED, weaned off to supplemental O2 by nasal cannula shortly after arrival to the MICU. Since weaned to room air and maintaining oxygen saturations at rest as well as with exertion, although remains with significant tachypnea and tachycardia with exertion. She had severe airflow obstruction on spirometry per her recent pulmonary visit.   - Continuous pulse oximetry  - Azithromycin 500mg x3 days, followed by 250mg daily for ppx as recommended by outpatient providers (recently prescribed     this by her pulmonologist)   - Albuterol-ipratropium q4h while awake  - Continue prednisone 40 mg x 4 days     Alcohol- Use Disorder  Per chart review, patient has hx of AUD. ETOH on admission 0.05. No signs or sxs of EtOH withdrawal throughout hospitalization thus  far.   - [facility] protocol              - thiamine and folate     HPI     Pt feels back to baseline post hosp  Finished oral steroids and using Anoro and nebs but not budesonide  Pt states she never did take lexapro  She hasn't been using budesonide  The pulmonologist added azithromycin in Sept, but she was scared of the potential SE so never did start it.  She did agree to start this now post hospital.  Pulm ordered outpt CT  She has declined starting pulm rehab and didn't find it helpful in the past.  She hasn't been smoking since July except for a cheating a puff off a friend rarely  Continues to have 2 drinks per day        Hospital Follow-up Visit:    Hospital/Nursing Home/IP Rehab Facility: [facility] [place]   Date of Admission: 10/15/2024  Date of Discharge: 10/16/2024  Reason(s) for Admission: COPD exacerbation (CMS/[facility])   Was the patient in the ICU or did the patient experience delirium during hospitalization?  No  Do you have any other stressors you would like to discuss with your provider? No    Problems taking medications regularly:  noncompliant with meds   Medication changes since discharge: see list which is updated  Problems adhering to non-medication therapy:  none    Summary of hospitalization:  See outside records, reviewed and scanned  Diagnostic Tests/Treatments reviewed.  Follow up needed: none  Other Healthcare Providers Involved in Patient s Care:         Specialist appointment - pulm  Update since discharge: improved.         Plan of care communicated with patient     Past Medical History:   Diagnosis Date    Chronic obstructive pulmonary disease, unspecified COPD type (H) 06/13/2017    has seen [facility], fev1 under 1.0, hospitalized twice in 2023, once in Feb 2024, once May 2024    Depressive disorder, not elsewhere classified 05/2006    following sudden death of her mother    Elevated LFTs 2023    hepatic steatosis 4/23 us    Generalized anxiety disorder     Hepatic steatosis 04/2023    on us done for elev  lfts    Herpes simplex without mention of complication     MOUTH    Nephrolithiasis     TOBACCO ABUSE-CONTINUOUS      Past Surgical History:   Procedure Laterality Date    CHOLECYSTECTOMY, LAPOROSCOPIC  1980's    done at [facility]    TUBAL LIGATION  1998     Social History     Tobacco Use    Smoking status: Former     Current packs/day: 0.50     Average packs/day: 0.5 packs/day for 25.0 years (12.5 ttl pk-yrs)     Types: Cigarettes    Smokeless tobacco: Never    Tobacco comments:     states is cutting back - 1/2 pack a day      Quit 07/13/24   Substance Use Topics    Alcohol use: Yes     Alcohol/week: 0.0 standard drinks of alcohol     Comment: 3 mixed drinks per week     Current Outpatient Medications   Medication Sig Dispense Refill    acetaminophen (TYLENOL) 325 MG tablet Take 2 tablets (650 mg) by mouth every 4 hours as needed for mild pain or other (and adjunct with moderate or severe pain or per patient request)      albuterol (PROVENTIL) (2.5 MG/3ML) 0.083% neb solution Take 1 vial (2.5 mg) by nebulization every 4 hours as needed for wheezing      albuterol (VENTOLIN HFA) 108 (90 Base) MCG/ACT inhaler INHALE 2 PUFFS INTO THE LUNGS EVERY 4 HOURS AS NEEDED FOR WHEEZING OR SHORTNESS OF BREATH 18 g 2    azithromycin (ZITHROMAX) 250 MG tablet Take 1 tablet (250 mg) by mouth daily.      budesonide (PULMICORT) 1 MG/2ML neb solution Take 2 mLs (1 mg) by nebulization two times daily 200 mL 1    guaiFENesin (MUCINEX) 600 MG 12 hr tablet Take 2 tablets (1,200 mg) by mouth 2 times daily as needed for congestion 30 tablet 0    LORazepam (ATIVAN) 0.5 MG tablet Take 1 tablet (0.5 mg) by mouth 2 times daily as needed for anxiety 30 tablet 0    Melatonin 10 MG TABS tablet Take 1 tablet (10 mg) by mouth nightly as needed for sleep.      umeclidinium-vilanterol (ANORO ELLIPTA) 62.5-25 MCG/ACT oral inhaler Inhale 1 puff into the lungs daily 180 each 1     Allergies   Allergen Reactions    Pcn [Penicillins]      convulsions  "    FAMILY HISTORY NOTED AND REVIEWED      PHYSICAL EXAM:    /71 (BP Location: Right arm, Patient Position: Sitting, Cuff Size: Adult Regular)   Pulse 112   Temp 96.9  F (36.1  C) (Temporal)   Resp 18   Ht 1.6 m (5' 3\")   Wt 51.3 kg (113 lb)   LMP 01/24/2006   SpO2 95%   BMI 20.02 kg/m      Patient appears non toxic  Lungs: faint expiratory wheeze, no crackles or rhonchi  Heart: slightly tachycardic, reg  Extr: no edema  Psych: normal affect and mood    Assessment and Plan:     (Z09) Hospital discharge follow-up  (primary encounter diagnosis)  Comment:   Plan: pt feels she is back to baseline    (J96.02) Acute respiratory failure with hypercapnia (H)  Comment:   Plan: resolved, not requiring oxygen    (J44.1) COPD exacerbation (H)  Comment: pt states she stopped smoking in July  Plan: agreed to start the daily azithromycin prescribed by pulm in Sept given this exacerbation. Compliant with anoro and nebs, but not using budesonide.    (F10.90) Alcohol use disorder  Comment:   Plan: no plans to quit      Parris Grimes PA-C    "

## 2024-11-11 ENCOUNTER — HOSPITAL ENCOUNTER (OUTPATIENT)
Facility: CLINIC | Age: 69
Setting detail: OBSERVATION
Discharge: HOME | End: 2024-11-12
Attending: EMERGENCY MEDICINE | Admitting: STUDENT IN AN ORGANIZED HEALTH CARE EDUCATION/TRAINING PROGRAM
Payer: COMMERCIAL

## 2024-11-11 ENCOUNTER — APPOINTMENT (OUTPATIENT)
Dept: GENERAL RADIOLOGY | Facility: CLINIC | Age: 69
End: 2024-11-11
Attending: EMERGENCY MEDICINE
Payer: COMMERCIAL

## 2024-11-11 DIAGNOSIS — J44.1 COPD EXACERBATION (H): ICD-10-CM

## 2024-11-11 DIAGNOSIS — J96.22 ACUTE AND CHRONIC RESPIRATORY FAILURE WITH HYPERCAPNIA (H): ICD-10-CM

## 2024-11-11 DIAGNOSIS — R79.89 ELEVATED LACTIC ACID LEVEL: ICD-10-CM

## 2024-11-11 DIAGNOSIS — J44.1 COPD WITH ACUTE EXACERBATION (H): Primary | ICD-10-CM

## 2024-11-11 LAB
ANION GAP SERPL CALCULATED.3IONS-SCNC: 14 MMOL/L (ref 7–15)
ATRIAL RATE - MUSE: 108 BPM
BASE EXCESS BLDV CALC-SCNC: -3 MMOL/L (ref -3–3)
BASE EXCESS BLDV CALC-SCNC: -3.6 MMOL/L (ref -3–3)
BASOPHILS # BLD AUTO: 0.1 10E3/UL (ref 0–0.2)
BASOPHILS NFR BLD AUTO: 1 %
BUN SERPL-MCNC: 7.6 MG/DL (ref 8–23)
CALCIUM SERPL-MCNC: 9.4 MG/DL (ref 8.8–10.4)
CHLORIDE SERPL-SCNC: 102 MMOL/L (ref 98–107)
CREAT SERPL-MCNC: 0.69 MG/DL (ref 0.51–0.95)
DIASTOLIC BLOOD PRESSURE - MUSE: NORMAL MMHG
EGFRCR SERPLBLD CKD-EPI 2021: >90 ML/MIN/1.73M2
EOSINOPHIL # BLD AUTO: 0.3 10E3/UL (ref 0–0.7)
EOSINOPHIL NFR BLD AUTO: 3 %
ERYTHROCYTE [DISTWIDTH] IN BLOOD BY AUTOMATED COUNT: 14.7 % (ref 10–15)
FLUAV RNA SPEC QL NAA+PROBE: NEGATIVE
FLUBV RNA RESP QL NAA+PROBE: NEGATIVE
GLUCOSE SERPL-MCNC: 113 MG/DL (ref 70–99)
HCO3 BLDV-SCNC: 23 MMOL/L (ref 21–28)
HCO3 BLDV-SCNC: 24 MMOL/L (ref 21–28)
HCO3 SERPL-SCNC: 22 MMOL/L (ref 22–29)
HCT VFR BLD AUTO: 37.1 % (ref 35–47)
HGB BLD-MCNC: 11.4 G/DL (ref 11.7–15.7)
HOLD SPECIMEN: NORMAL
HOLD SPECIMEN: NORMAL
IMM GRANULOCYTES # BLD: 0 10E3/UL
IMM GRANULOCYTES NFR BLD: 0 %
INTERPRETATION ECG - MUSE: NORMAL
LACTATE BLD-SCNC: 3.4 MMOL/L
LYMPHOCYTES # BLD AUTO: 5.2 10E3/UL (ref 0.8–5.3)
LYMPHOCYTES NFR BLD AUTO: 59 %
MCH RBC QN AUTO: 25.7 PG (ref 26.5–33)
MCHC RBC AUTO-ENTMCNC: 30.7 G/DL (ref 31.5–36.5)
MCV RBC AUTO: 84 FL (ref 78–100)
MONOCYTES # BLD AUTO: 0.8 10E3/UL (ref 0–1.3)
MONOCYTES NFR BLD AUTO: 9 %
NEUTROPHILS # BLD AUTO: 2.5 10E3/UL (ref 1.6–8.3)
NEUTROPHILS NFR BLD AUTO: 28 %
NRBC # BLD AUTO: 0 10E3/UL
NRBC BLD AUTO-RTO: 0 /100
NT-PROBNP SERPL-MCNC: 51 PG/ML (ref 0–900)
O2/TOTAL GAS SETTING VFR VENT: 30 %
OXYHGB MFR BLDV: 90 % (ref 70–75)
P AXIS - MUSE: 81 DEGREES
PCO2 BLDV: 45 MM HG (ref 40–50)
PCO2 BLDV: 52 MM HG (ref 40–50)
PH BLDV: 7.28 [PH] (ref 7.32–7.43)
PH BLDV: 7.31 [PH] (ref 7.32–7.43)
PLAT MORPH BLD: NORMAL
PLATELET # BLD AUTO: 392 10E3/UL (ref 150–450)
PO2 BLDV: 49 MM HG (ref 25–47)
PO2 BLDV: 68 MM HG (ref 25–47)
POTASSIUM SERPL-SCNC: 3.5 MMOL/L (ref 3.4–5.3)
PR INTERVAL - MUSE: 164 MS
QRS DURATION - MUSE: 82 MS
QT - MUSE: 356 MS
QTC - MUSE: 477 MS
R AXIS - MUSE: 66 DEGREES
RBC # BLD AUTO: 4.43 10E6/UL (ref 3.8–5.2)
RBC MORPH BLD: NORMAL
RSV RNA SPEC NAA+PROBE: NEGATIVE
SAO2 % BLDV: 78 % (ref 70–75)
SAO2 % BLDV: 91.5 % (ref 70–75)
SARS-COV-2 RNA RESP QL NAA+PROBE: NEGATIVE
SODIUM SERPL-SCNC: 138 MMOL/L (ref 135–145)
SYSTOLIC BLOOD PRESSURE - MUSE: NORMAL MMHG
T AXIS - MUSE: 83 DEGREES
TROPONIN T SERPL HS-MCNC: 9 NG/L
VENTRICULAR RATE- MUSE: 108 BPM
WBC # BLD AUTO: 8.9 10E3/UL (ref 4–11)

## 2024-11-11 PROCEDURE — 87040 BLOOD CULTURE FOR BACTERIA: CPT | Performed by: EMERGENCY MEDICINE

## 2024-11-11 PROCEDURE — 71045 X-RAY EXAM CHEST 1 VIEW: CPT

## 2024-11-11 PROCEDURE — 84484 ASSAY OF TROPONIN QUANT: CPT | Performed by: EMERGENCY MEDICINE

## 2024-11-11 PROCEDURE — 83880 ASSAY OF NATRIURETIC PEPTIDE: CPT | Performed by: EMERGENCY MEDICINE

## 2024-11-11 PROCEDURE — 96365 THER/PROPH/DIAG IV INF INIT: CPT

## 2024-11-11 PROCEDURE — 85025 COMPLETE CBC W/AUTO DIFF WBC: CPT | Performed by: EMERGENCY MEDICINE

## 2024-11-11 PROCEDURE — 82803 BLOOD GASES ANY COMBINATION: CPT

## 2024-11-11 PROCEDURE — 250N000009 HC RX 250: Performed by: EMERGENCY MEDICINE

## 2024-11-11 PROCEDURE — 96366 THER/PROPH/DIAG IV INF ADDON: CPT

## 2024-11-11 PROCEDURE — 36415 COLL VENOUS BLD VENIPUNCTURE: CPT | Performed by: EMERGENCY MEDICINE

## 2024-11-11 PROCEDURE — 82435 ASSAY OF BLOOD CHLORIDE: CPT | Performed by: EMERGENCY MEDICINE

## 2024-11-11 PROCEDURE — 82805 BLOOD GASES W/O2 SATURATION: CPT | Performed by: EMERGENCY MEDICINE

## 2024-11-11 PROCEDURE — 99223 1ST HOSP IP/OBS HIGH 75: CPT | Performed by: INTERNAL MEDICINE

## 2024-11-11 PROCEDURE — 120N000001 HC R&B MED SURG/OB

## 2024-11-11 PROCEDURE — 258N000003 HC RX IP 258 OP 636: Performed by: EMERGENCY MEDICINE

## 2024-11-11 PROCEDURE — 87637 SARSCOV2&INF A&B&RSV AMP PRB: CPT | Performed by: EMERGENCY MEDICINE

## 2024-11-11 PROCEDURE — 80048 BASIC METABOLIC PNL TOTAL CA: CPT | Performed by: EMERGENCY MEDICINE

## 2024-11-11 PROCEDURE — 93005 ELECTROCARDIOGRAM TRACING: CPT

## 2024-11-11 PROCEDURE — 250N000011 HC RX IP 250 OP 636: Performed by: EMERGENCY MEDICINE

## 2024-11-11 PROCEDURE — 99285 EMERGENCY DEPT VISIT HI MDM: CPT | Mod: 25

## 2024-11-11 PROCEDURE — 999N000157 HC STATISTIC RCP TIME EA 10 MIN

## 2024-11-11 PROCEDURE — 83605 ASSAY OF LACTIC ACID: CPT

## 2024-11-11 PROCEDURE — 94640 AIRWAY INHALATION TREATMENT: CPT

## 2024-11-11 PROCEDURE — 94660 CPAP INITIATION&MGMT: CPT

## 2024-11-11 RX ORDER — AZITHROMYCIN 500 MG/1
500 INJECTION, POWDER, LYOPHILIZED, FOR SOLUTION INTRAVENOUS ONCE
Status: COMPLETED | OUTPATIENT
Start: 2024-11-11 | End: 2024-11-11

## 2024-11-11 RX ORDER — ALBUTEROL SULFATE 0.83 MG/ML
5 SOLUTION RESPIRATORY (INHALATION) ONCE
Status: COMPLETED | OUTPATIENT
Start: 2024-11-11 | End: 2024-11-11

## 2024-11-11 RX ADMIN — SODIUM CHLORIDE 1000 ML: 9 INJECTION, SOLUTION INTRAVENOUS at 22:24

## 2024-11-11 RX ADMIN — ALBUTEROL SULFATE 5 MG: 2.5 SOLUTION RESPIRATORY (INHALATION) at 21:51

## 2024-11-11 RX ADMIN — AZITHROMYCIN MONOHYDRATE 500 MG: 500 INJECTION, POWDER, LYOPHILIZED, FOR SOLUTION INTRAVENOUS at 22:19

## 2024-11-11 ASSESSMENT — ACTIVITIES OF DAILY LIVING (ADL)
ADLS_ACUITY_SCORE: 0
ADLS_ACUITY_SCORE: 0

## 2024-11-11 ASSESSMENT — COLUMBIA-SUICIDE SEVERITY RATING SCALE - C-SSRS
2. HAVE YOU ACTUALLY HAD ANY THOUGHTS OF KILLING YOURSELF IN THE PAST MONTH?: NO
1. IN THE PAST MONTH, HAVE YOU WISHED YOU WERE DEAD OR WISHED YOU COULD GO TO SLEEP AND NOT WAKE UP?: NO
6. HAVE YOU EVER DONE ANYTHING, STARTED TO DO ANYTHING, OR PREPARED TO DO ANYTHING TO END YOUR LIFE?: NO

## 2024-11-12 ENCOUNTER — APPOINTMENT (OUTPATIENT)
Dept: PHYSICAL THERAPY | Facility: CLINIC | Age: 69
End: 2024-11-12
Attending: INTERNAL MEDICINE
Payer: COMMERCIAL

## 2024-11-12 VITALS
OXYGEN SATURATION: 98 % | HEART RATE: 91 BPM | DIASTOLIC BLOOD PRESSURE: 66 MMHG | TEMPERATURE: 99.5 F | SYSTOLIC BLOOD PRESSURE: 112 MMHG | RESPIRATION RATE: 20 BRPM | WEIGHT: 126.32 LBS | BODY MASS INDEX: 22.38 KG/M2

## 2024-11-12 LAB
ANION GAP SERPL CALCULATED.3IONS-SCNC: 13 MMOL/L (ref 7–15)
BASE EXCESS BLDV CALC-SCNC: -0.9 MMOL/L (ref -3–3)
BUN SERPL-MCNC: 8.9 MG/DL (ref 8–23)
CALCIUM SERPL-MCNC: 9 MG/DL (ref 8.8–10.4)
CHLORIDE SERPL-SCNC: 105 MMOL/L (ref 98–107)
CREAT SERPL-MCNC: 0.57 MG/DL (ref 0.51–0.95)
EGFRCR SERPLBLD CKD-EPI 2021: >90 ML/MIN/1.73M2
GLUCOSE SERPL-MCNC: 182 MG/DL (ref 70–99)
HCO3 BLDV-SCNC: 24 MMOL/L (ref 21–28)
HCO3 SERPL-SCNC: 21 MMOL/L (ref 22–29)
LACTATE SERPL-SCNC: 1.6 MMOL/L (ref 0.7–2)
LACTATE SERPL-SCNC: 2.5 MMOL/L (ref 0.7–2)
LACTATE SERPL-SCNC: 2.9 MMOL/L (ref 0.7–2)
O2/TOTAL GAS SETTING VFR VENT: 0 %
OXYHGB MFR BLDV: 76 % (ref 70–75)
PCO2 BLDV: 41 MM HG (ref 40–50)
PH BLDV: 7.38 [PH] (ref 7.32–7.43)
PO2 BLDV: 42 MM HG (ref 25–47)
POTASSIUM SERPL-SCNC: 4.8 MMOL/L (ref 3.4–5.3)
SAO2 % BLDV: 77 % (ref 70–75)
SODIUM SERPL-SCNC: 139 MMOL/L (ref 135–145)
TROPONIN T SERPL HS-MCNC: 9 NG/L

## 2024-11-12 PROCEDURE — 999N000157 HC STATISTIC RCP TIME EA 10 MIN

## 2024-11-12 PROCEDURE — 83605 ASSAY OF LACTIC ACID: CPT

## 2024-11-12 PROCEDURE — 258N000003 HC RX IP 258 OP 636: Performed by: STUDENT IN AN ORGANIZED HEALTH CARE EDUCATION/TRAINING PROGRAM

## 2024-11-12 PROCEDURE — 97530 THERAPEUTIC ACTIVITIES: CPT | Mod: GP

## 2024-11-12 PROCEDURE — 36415 COLL VENOUS BLD VENIPUNCTURE: CPT

## 2024-11-12 PROCEDURE — 96372 THER/PROPH/DIAG INJ SC/IM: CPT | Performed by: INTERNAL MEDICINE

## 2024-11-12 PROCEDURE — 94640 AIRWAY INHALATION TREATMENT: CPT

## 2024-11-12 PROCEDURE — 250N000012 HC RX MED GY IP 250 OP 636 PS 637: Performed by: INTERNAL MEDICINE

## 2024-11-12 PROCEDURE — 36415 COLL VENOUS BLD VENIPUNCTURE: CPT | Performed by: STUDENT IN AN ORGANIZED HEALTH CARE EDUCATION/TRAINING PROGRAM

## 2024-11-12 PROCEDURE — 82805 BLOOD GASES W/O2 SATURATION: CPT | Performed by: STUDENT IN AN ORGANIZED HEALTH CARE EDUCATION/TRAINING PROGRAM

## 2024-11-12 PROCEDURE — 83605 ASSAY OF LACTIC ACID: CPT | Performed by: STUDENT IN AN ORGANIZED HEALTH CARE EDUCATION/TRAINING PROGRAM

## 2024-11-12 PROCEDURE — G0378 HOSPITAL OBSERVATION PER HR: HCPCS

## 2024-11-12 PROCEDURE — 99238 HOSP IP/OBS DSCHRG MGMT 30/<: CPT | Performed by: STUDENT IN AN ORGANIZED HEALTH CARE EDUCATION/TRAINING PROGRAM

## 2024-11-12 PROCEDURE — 250N000011 HC RX IP 250 OP 636: Performed by: INTERNAL MEDICINE

## 2024-11-12 PROCEDURE — 250N000009 HC RX 250: Performed by: INTERNAL MEDICINE

## 2024-11-12 PROCEDURE — 97161 PT EVAL LOW COMPLEX 20 MIN: CPT | Mod: GP

## 2024-11-12 PROCEDURE — 80048 BASIC METABOLIC PNL TOTAL CA: CPT | Performed by: STUDENT IN AN ORGANIZED HEALTH CARE EDUCATION/TRAINING PROGRAM

## 2024-11-12 PROCEDURE — 94640 AIRWAY INHALATION TREATMENT: CPT | Mod: 76

## 2024-11-12 RX ORDER — ENOXAPARIN SODIUM 100 MG/ML
40 INJECTION SUBCUTANEOUS EVERY 24 HOURS
Status: DISCONTINUED | OUTPATIENT
Start: 2024-11-12 | End: 2024-11-12 | Stop reason: HOSPADM

## 2024-11-12 RX ORDER — HYDRALAZINE HYDROCHLORIDE 20 MG/ML
10 INJECTION INTRAMUSCULAR; INTRAVENOUS EVERY 4 HOURS PRN
Status: DISCONTINUED | OUTPATIENT
Start: 2024-11-12 | End: 2024-11-12 | Stop reason: HOSPADM

## 2024-11-12 RX ORDER — IPRATROPIUM BROMIDE AND ALBUTEROL SULFATE 2.5; .5 MG/3ML; MG/3ML
3 SOLUTION RESPIRATORY (INHALATION) 4 TIMES DAILY
Status: DISCONTINUED | OUTPATIENT
Start: 2024-11-12 | End: 2024-11-12 | Stop reason: HOSPADM

## 2024-11-12 RX ORDER — ALBUTEROL SULFATE 90 UG/1
2 INHALANT RESPIRATORY (INHALATION) EVERY 4 HOURS PRN
Qty: 18 G | Refills: 1 | Status: SHIPPED | OUTPATIENT
Start: 2024-11-12

## 2024-11-12 RX ORDER — ASPIRIN 325 MG
325 TABLET, DELAYED RELEASE (ENTERIC COATED) ORAL EVERY 6 HOURS PRN
Status: ON HOLD | COMMUNITY
End: 2024-11-12

## 2024-11-12 RX ORDER — ONDANSETRON 2 MG/ML
4 INJECTION INTRAMUSCULAR; INTRAVENOUS EVERY 6 HOURS PRN
Status: DISCONTINUED | OUTPATIENT
Start: 2024-11-12 | End: 2024-11-12 | Stop reason: HOSPADM

## 2024-11-12 RX ORDER — AMOXICILLIN 250 MG
1 CAPSULE ORAL 2 TIMES DAILY PRN
Status: DISCONTINUED | OUTPATIENT
Start: 2024-11-12 | End: 2024-11-12 | Stop reason: HOSPADM

## 2024-11-12 RX ORDER — PROCHLORPERAZINE 25 MG
12.5 SUPPOSITORY, RECTAL RECTAL EVERY 12 HOURS PRN
Status: DISCONTINUED | OUTPATIENT
Start: 2024-11-12 | End: 2024-11-12 | Stop reason: HOSPADM

## 2024-11-12 RX ORDER — LIDOCAINE 40 MG/G
CREAM TOPICAL
Status: DISCONTINUED | OUTPATIENT
Start: 2024-11-12 | End: 2024-11-12 | Stop reason: HOSPADM

## 2024-11-12 RX ORDER — ONDANSETRON 4 MG/1
4 TABLET, ORALLY DISINTEGRATING ORAL EVERY 6 HOURS PRN
Status: DISCONTINUED | OUTPATIENT
Start: 2024-11-12 | End: 2024-11-12 | Stop reason: HOSPADM

## 2024-11-12 RX ORDER — ALBUTEROL SULFATE 0.83 MG/ML
2.5 SOLUTION RESPIRATORY (INHALATION)
Status: DISCONTINUED | OUTPATIENT
Start: 2024-11-12 | End: 2024-11-12 | Stop reason: HOSPADM

## 2024-11-12 RX ORDER — PREDNISONE 20 MG/1
40 TABLET ORAL DAILY
Status: DISCONTINUED | OUTPATIENT
Start: 2024-11-12 | End: 2024-11-12 | Stop reason: HOSPADM

## 2024-11-12 RX ORDER — HYDRALAZINE HYDROCHLORIDE 10 MG/1
10 TABLET, FILM COATED ORAL EVERY 4 HOURS PRN
Status: DISCONTINUED | OUTPATIENT
Start: 2024-11-12 | End: 2024-11-12 | Stop reason: HOSPADM

## 2024-11-12 RX ORDER — AMOXICILLIN 250 MG
2 CAPSULE ORAL 2 TIMES DAILY PRN
Status: DISCONTINUED | OUTPATIENT
Start: 2024-11-12 | End: 2024-11-12 | Stop reason: HOSPADM

## 2024-11-12 RX ORDER — ACETAMINOPHEN 325 MG/1
650 TABLET ORAL EVERY 4 HOURS PRN
Status: DISCONTINUED | OUTPATIENT
Start: 2024-11-12 | End: 2024-11-12 | Stop reason: HOSPADM

## 2024-11-12 RX ORDER — ACETAMINOPHEN 650 MG/1
650 SUPPOSITORY RECTAL EVERY 4 HOURS PRN
Status: DISCONTINUED | OUTPATIENT
Start: 2024-11-12 | End: 2024-11-12 | Stop reason: HOSPADM

## 2024-11-12 RX ORDER — PREDNISONE 20 MG/1
40 TABLET ORAL DAILY
Qty: 8 TABLET | Refills: 0 | Status: SHIPPED | OUTPATIENT
Start: 2024-11-13 | End: 2024-11-19

## 2024-11-12 RX ORDER — POLYETHYLENE GLYCOL 3350 17 G/17G
17 POWDER, FOR SOLUTION ORAL 2 TIMES DAILY PRN
Status: DISCONTINUED | OUTPATIENT
Start: 2024-11-12 | End: 2024-11-12 | Stop reason: HOSPADM

## 2024-11-12 RX ORDER — PROCHLORPERAZINE MALEATE 5 MG/1
5 TABLET ORAL EVERY 6 HOURS PRN
Status: DISCONTINUED | OUTPATIENT
Start: 2024-11-12 | End: 2024-11-12 | Stop reason: HOSPADM

## 2024-11-12 RX ADMIN — ENOXAPARIN SODIUM 40 MG: 40 INJECTION SUBCUTANEOUS at 08:43

## 2024-11-12 RX ADMIN — IPRATROPIUM BROMIDE AND ALBUTEROL SULFATE 3 ML: .5; 3 SOLUTION RESPIRATORY (INHALATION) at 08:14

## 2024-11-12 RX ADMIN — SODIUM CHLORIDE, POTASSIUM CHLORIDE, SODIUM LACTATE AND CALCIUM CHLORIDE 1000 ML: 600; 310; 30; 20 INJECTION, SOLUTION INTRAVENOUS at 08:43

## 2024-11-12 RX ADMIN — PREDNISONE 40 MG: 20 TABLET ORAL at 08:43

## 2024-11-12 RX ADMIN — IPRATROPIUM BROMIDE AND ALBUTEROL SULFATE 3 ML: .5; 3 SOLUTION RESPIRATORY (INHALATION) at 11:30

## 2024-11-12 ASSESSMENT — ACTIVITIES OF DAILY LIVING (ADL)
ADLS_ACUITY_SCORE: 0
DEPENDENT_IADLS:: INDEPENDENT
ADLS_ACUITY_SCORE: 0

## 2024-11-12 NOTE — PROGRESS NOTES
RECEIVING UNIT ED HANDOFF REVIEW    ED Nurse Handoff Report was reviewed by: Srinath Willis RN on November 12, 2024 at 5:35 AM

## 2024-11-12 NOTE — DISCHARGE SUMMARY
[facility]  Hospitalist Discharge Summary      Date of Admission:  11/11/2024  Date of Discharge:  11/12/2024  2:59 PM  Discharging Provider: Preston Concepcion MD  Discharge Service: Hospitalist Service    Discharge Diagnoses   COPD exacerbation  Acute hypoxic respiratory failure  Lactic acid elevation   Alcohol use disorder  Hx of hepatic steatosis    Clinically Significant Risk Factors          Follow-ups Needed After Discharge   Follow-up Appointments       Follow-up and recommended labs and tests       - Follow up with primary care provider, Mikel Navarro, within 7 days for hospital follow- up.  No follow up labs or test are needed.    - Followup with your pulmonologist to discuss any COPD management adjustments that may need to be made.                Unresulted Labs Ordered in the Past 30 Days of this Admission       Date and Time Order Name Status Description    11/11/2024 10:16 PM Blood Culture Peripheral Blood Preliminary         These results will be followed up by hospitalist discharge pool    Discharge Disposition   Discharged to home  Condition at discharge: Stable    Hospital Course   Tammie Zeng is a 69 year old female with past medical history significant for severe COPD who presents with shortness of breath. Admitted on 11/11/2024.     COPD exacerbation, resolved  Acute hypoxic respiratory failure, resolved  *Presented with dyspnea, hypoxia on home oximeter (88%)  *CXR with emphysema, no acute infiltrates  *COVID19/Influenza/RSV PCR negative  *VBG 7.28/52/-/- (iStat) -> 7.31/45/-/-. Metabolic acidosis resolved at time of discharge.  *Prescribed oxygen for use with activity, but does not use it   *Previously not using budesonide nebulizers, but has recently resumed  *Follows with pulmonology through [facility], hx of very severe obstruction on spirometry 9/3/24  *Required BiPAP in ED, quickly removed and transitioned to nasal cannula. At time of discharge, patient is satting  >92% on room air and also satted well while walking with PT this morning (PT recommends patient does not have any ongoing PT needs either).  *Rapidly improved with COPD treatments, denies chest pain, leg swelling; low suspicion for PE.   - Discharge on PTA COPD regimen + prednisone 40mg x5 day total duration; already on azithromycin as part of her PTA regimen    Lactic acid elevation, resolved  Lactic acid 3.4->2.9->2.5->1.6. Does not appear septic. Likely secondary to nebulizers and decreased PO intake prior to admission.     Alcohol overuse  Hx of hepatic steatosis  Reports drinking 3 vodka drinks nightly. Denies hx of withdrawal symptoms.  - Discussed recommended alcohol limits and encouraged cutting back. Declines resources.    Hx of tobacco use disorder  Quit 7/2024  - Congratulated on cessation    Consultations This Hospital Stay   RESPIRATORY CARE IP CONSULT  PHYSICAL THERAPY ADULT IP CONSULT  CARE MANAGEMENT / SOCIAL WORK IP CONSULT    Code Status   No CPR- Do NOT Intubate    Time Spent on this Encounter   I, Preston Concepcion MD, personally saw the patient today and spent less than or equal to 30 minutes discharging this patient.       Preston Concepcion MD  [place] [id number] [facility]  [address] [place] [address], [address]  [facility] [address]  Phone: [phone]  ______________________________________________________________________    Physical Exam   Vital Signs: Temp: 99.5  F (37.5  C) Temp src: Oral BP: 112/66 Pulse: 91   Resp: 20 SpO2: 98 % O2 Device: Nasal cannula with humidification Oxygen Delivery: 2 LPM  Weight: 126 lbs 5.18 oz  Constitutional: NAD  Respiratory: Diminished air movement throughout, worse at the bases. No wheezes heard.  Cardiovascular: RRR, no m/r/g. No peripheral edema.    GI: Soft, non-tender, non-distended. No rebound tenderness or guarding.    Skin: Warm, dry   Neurologic: Alert. Responding to questions appropriately. Following commands.    Psychiatric: Normal affect,  appropriate       Primary Care Physician   Mikel Navarro    Discharge Orders      Primary Care - Care Coordination Referral      Reason for your hospital stay    COPD exacerbation     Follow-up and recommended labs and tests     - Follow up with primary care provider, Mikel Navarro, within 7 days for hospital follow- up.  No follow up labs or test are needed.    - Followup with your pulmonologist to discuss any COPD management adjustments that may need to be made.     Activity    Your activity upon discharge: activity as tolerated     Diet    Follow this diet upon discharge: Current Diet:Orders Placed This Encounter      Combination Diet Regular Diet Adult       Significant Results and Procedures   Most Recent 3 CBC's:  Recent Labs   Lab Test 11/11/24 2157 07/13/24  0437 06/04/24  0441   WBC 8.9 14.8* 13.3*   HGB 11.4* 13.1 11.1*   MCV 84 92 86    393 293     Most Recent 3 BMP's:  Recent Labs   Lab Test 11/12/24  0749 11/11/24 2157 07/14/24  0545    138 137   POTASSIUM 4.8 3.5 4.5   CHLORIDE 105 102 102   CO2 21* 22 29   BUN 8.9 7.6* 9.5   CR 0.57 0.69 0.61   ANIONGAP 13 14 6*   MOLLY 9.0 9.4 9.2   * 113* 144*   ,   Results for orders placed or performed during the hospital encounter of 11/11/24   XR Chest Port 1 View    Narrative    EXAM: XR CHEST PORT 1 VIEW  LOCATION: [facility]  DATE: 11/11/2024    INDICATION: resp distress  COMPARISON: Chest x-ray from 07/13/2024. Chest CT from 01/04/2024.      Impression    IMPRESSION:   *  Emphysema, but clear lungs.  *  No pleural effusion or pneumothorax. Minimal biapical scarring is a chronic finding.  *  Normal heart size.       Discharge Medications   Discharge Medication List as of 11/12/2024  1:57 PM        START taking these medications    Details   predniSONE (DELTASONE) 20 MG tablet Take 2 tablets (40 mg) by mouth daily., Disp-8 tablet, R-0, E-Prescribe           CONTINUE these medications which have NOT CHANGED    Details    albuterol (PROVENTIL) (2.5 MG/3ML) 0.083% neb solution Take 1 vial (2.5 mg) by nebulization every 4 hours as needed for wheezing, Transitional      albuterol (VENTOLIN HFA) 108 (90 Base) MCG/ACT inhaler Inhale 2 puffs into the lungs every 4 hours as needed for shortness of breath, wheezing or cough., Disp-18 g, R-1, E-PrescribePharmacy may dispense brand covered by insurance (Proair, or proventil or ventolin or generic albuterol inhaler)      azithromycin (ZITHROMAX) 250 MG tablet Take 1 tablet (250 mg) by mouth daily., Historical      budesonide (PULMICORT) 1 MG/2ML neb solution Take 2 mLs (1 mg) by nebulization two times daily, Disp-200 mL, R-1, E-Prescribe      LORazepam (ATIVAN) 0.5 MG tablet Take 1 tablet (0.5 mg) by mouth 2 times daily as needed for anxiety, Disp-30 tablet, R-0, E-Prescribe      melatonin 5 MG tablet Take 10 mg by mouth at bedtime., Historical      umeclidinium-vilanterol (ANORO ELLIPTA) 62.5-25 MCG/ACT oral inhaler Inhale 1 puff into the lungs daily, Disp-180 each, R-1, E-PrescribeFuture refills by PCP Dr. Mikel Navarro with phone number [phone].           STOP taking these medications       aspirin (ASA) 325 MG EC tablet Comments:   Reason for Stopping:             Allergies   Allergies   Allergen Reactions    Pcn [Penicillins]      convulsions

## 2024-11-12 NOTE — ED PROVIDER NOTES
Emergency Department Note      History of Present Illness     Chief Complaint   Respiratory Distress      HPI   Tammie Zeng is a 69 year old female with history of COPD, alcohol use disorder who presents to the ER for evaluation of shortness of breath.  Patient apparently developed acute onset of shortness of breath this evening.  SpO2 was 88% on room air and she was tachypneic when first responders arrived.  She was given 2 DuoNebs, subcu terbutaline, 125 mg of IV Solu-Medrol.  2 g of magnesium sulfate and 500 cc of saline  were initiated by paramedics but not completed.  Patient denies fever or cough or chest pain or leg swelling or history of CHF.      Review of External Notes   I reviewed patient's discharge summary from 10/16/2024 when patient was admitted with hypercapnic respiratory failure thought secondary to COPD exacerbation. It was also documented that she has h/o etoh use disorder.     Past Medical History     Medical History and Problem List   Past Medical History:   Diagnosis Date    Chronic obstructive pulmonary disease, unspecified COPD type (H) 06/13/2017    Depressive disorder, not elsewhere classified 05/2006    Elevated LFTs 2023    Generalized anxiety disorder     Hepatic steatosis 04/2023    Herpes simplex without mention of complication     Nephrolithiasis     TOBACCO ABUSE-CONTINUOUS        Medications   acetaminophen (TYLENOL) 325 MG tablet  albuterol (PROVENTIL) (2.5 MG/3ML) 0.083% neb solution  albuterol (VENTOLIN HFA) 108 (90 Base) MCG/ACT inhaler  azithromycin (ZITHROMAX) 250 MG tablet  budesonide (PULMICORT) 1 MG/2ML neb solution  LORazepam (ATIVAN) 0.5 MG tablet  Melatonin 10 MG TABS tablet  umeclidinium-vilanterol (ANORO ELLIPTA) 62.5-25 MCG/ACT oral inhaler        Surgical History   Past Surgical History:   Procedure Laterality Date    CHOLECYSTECTOMY, LAPOROSCOPIC  1980's    done at [facility]    TUBAL LIGATION  1998       Physical Exam     Patient Vitals for the past  24 hrs:   BP Temp Temp src Pulse Resp SpO2 Weight   11/12/24 0000 -- -- -- 96 15 95 % --   11/11/24 2308 -- -- -- 100 20 100 % --   11/11/24 2300 100/62 -- -- 99 24 100 % --   11/11/24 2245 110/56 -- -- 103 27 100 % --   11/11/24 2230 106/78 -- -- 114 19 100 % --   11/11/24 2215 (!) 111/95 -- -- 106 14 100 % --   11/11/24 2205 -- -- -- 109 22 100 % --   11/11/24 2200 122/75 -- -- 107 13 100 % --   11/11/24 2158 -- 97.3  F (36.3  C) Temporal 120 15 100 % 57.3 kg (126 lb 5.2 oz)     Physical Exam  VS: Reviewed per above  HENT: Mucous membranes moist  EYES: sclera anicteric  CV: Rate as noted,  regular rhythm.   RESP: tachypnea on CPAP, decreased expiratory breath sounds BL  GI: no tenderness/rebound/guarding, not distended.  NEURO: Alert, moving all extremities  MSK: No deformity of the extremities  SKIN: Warm and dry    Diagnostics     Lab Results   Labs Ordered and Resulted from Time of ED Arrival to Time of ED Departure   BASIC METABOLIC PANEL - Abnormal       Result Value    Sodium 138      Potassium 3.5      Chloride 102      Carbon Dioxide (CO2) 22      Anion Gap 14      Urea Nitrogen 7.6 (*)     Creatinine 0.69      GFR Estimate >90      Calcium 9.4      Glucose 113 (*)    BLOOD GAS VENOUS - Abnormal    pH Venous 7.31 (*)     pCO2 Venous 45      pO2 Venous 68 (*)     Bicarbonate Venous 23      Base Excess/Deficit Venous -3.6 (*)     FIO2 30      Oxyhemoglobin Venous 90 (*)     O2 Sat, Venous 91.5 (*)    CBC WITH PLATELETS AND DIFFERENTIAL - Abnormal    WBC Count 8.9      RBC Count 4.43      Hemoglobin 11.4 (*)     Hematocrit 37.1      MCV 84      MCH 25.7 (*)     MCHC 30.7 (*)     RDW 14.7      Platelet Count 392      % Neutrophils 28      % Lymphocytes 59      % Monocytes 9      % Eosinophils 3      % Basophils 1      % Immature Granulocytes 0      NRBCs per 100 WBC 0      Absolute Neutrophils 2.5      Absolute Lymphocytes 5.2      Absolute Monocytes 0.8      Absolute Eosinophils 0.3      Absolute Basophils  0.1      Absolute Immature Granulocytes 0.0      Absolute NRBCs 0.0     ISTAT GASES LACTATE VENOUS POCT - Abnormal    Lactic Acid POCT 3.4 (*)     Bicarbonate Venous POCT 24      O2 Sat, Venous POCT 78 (*)     pCO2 Venous POCT 52 (*)     pH Venous POCT 7.28 (*)     pO2 Venous POCT 49 (*)     Base Excess/Deficit (+/-) POCT -3.0     TROPONIN T, HIGH SENSITIVITY - Normal    Troponin T, High Sensitivity 9     NT PROBNP INPATIENT - Normal    N terminal Pro BNP Inpatient 51     INFLUENZA A/B, RSV, & SARS-COV2 PCR - Normal    Influenza A PCR Negative      Influenza B PCR Negative      RSV PCR Negative      SARS CoV2 PCR Negative     RBC AND PLATELET MORPHOLOGY    RBC Morphology Confirmed RBC Indices      Platelet Assessment        Value: Automated Count Confirmed. Platelet morphology is normal.   TROPONIN T, HIGH SENSITIVITY   LACTIC ACID WHOLE BLOOD   BLOOD CULTURE       Imaging   XR Chest Port 1 View   Final Result   IMPRESSION:    *  Emphysema, but clear lungs.   *  No pleural effusion or pneumothorax. Minimal biapical scarring is a chronic finding.   *  Normal heart size.          EKG     ECG results from 11/11/24   EKG 12-lead, tracing only     Value    Systolic Blood Pressure     Diastolic Blood Pressure     Ventricular Rate 108    Atrial Rate 108    PA Interval 164    QRS Duration 82        QTc 477    P Axis 81    R AXIS 66    T Axis 83    Interpretation ECG      Sinus tachycardia  Cannot rule out Anterior infarct , age undetermined  Abnormal ECG  When compared with ECG of 03-Jun-2024 03:22,  No significant change, wavy baseline on both ecgs limits comparison  Anant Salazar MD             ED Course      Medications Administered   Medications   albuterol (PROVENTIL) neb solution 5 mg (5 mg Nebulization $Given 11/11/24 2151)   azithromycin (ZITHROMAX) 500 mg vial to attach to  mL bag (0 mg Intravenous Stopped 11/11/24 2352)   sodium chloride 0.9% BOLUS 1,000 mL (0 mLs Intravenous Stopped 11/11/24 2352)        Procedures   Procedures       ED Course   ED Course as of 11/12/24 0012   Mon Nov 11, 2024   7107 I spoke with Dr Leonard, admitting hospitalist         Medical Decision Making / Diagnosis     CMS Diagnoses: Lactic acid elevated due to resp distress and neb treatments. At this time there are no signs of sepsis or septic shock and None        MDM   Tammie Zeng is a 69 year old female who presents to the ER for evaluation of shortness of breath.  On arrival vital signs are reassuring aside from mild tachypnea on CPAP circuit.  She was given medications for presumed emphysema/COPD flare per paramedics including subcutaneous terbutaline, IV Solu-Medrol, 2 DuoNebs, IV magnesium sulfate.  Portable chest x-ray here does not show signs of pneumonia or pneumothorax or pulmonary edema.  On my exam she does have persistent decreased expiratory breath sounds.  She was transitioned to BiPAP circuit and given further albuterol neb treatments. Suspect COPD/emphysema as  of her current sx rather than other process such as PE.  Labs do reveal seemingly acute on chronic hypercarbic respiratory failure.  Patient's respiratory effort seem to improve on BiPAP circuit.  She was admitted to the hospitalist service for further cares.    Critical care time 30 minutes    Disposition   The patient was admitted to the hospital.     Diagnosis     ICD-10-CM    1. COPD exacerbation (H)  J44.1       2. Elevated lactic acid level  R79.89       3. Acute and chronic respiratory failure with hypercapnia (H)  J96.22            Discharge Medications   New Prescriptions    No medications on file          Anant Salazar MD  11/12/24 0016

## 2024-11-12 NOTE — PLAN OF CARE
11/12/24 1114   Appointment Info   Signing Clinician's Name / Credentials (PT) Meera Alonso DPT   Living Environment   People in Home alone   Current Living Arrangements house   Home Accessibility stairs to enter home;stairs within home   Transportation Anticipated car, drives self   Living Environment Comments 3 [address]; 15 steps to basement to do laundry but does not access frequently. Has a cat   Self-Care   Usual Activity Tolerance good   Current Activity Tolerance moderate   Fall history within last six months no   Activity/Exercise/Self-Care Comment pt IND at baseline, not on O2 at baseline   General Information   Onset of Illness/Injury or Date of Surgery 11/11/24   Referring Physician Maxwell Leonard MD   Pertinent History of Current Problem (include personal factors and/or comorbidities that impact the POC) Tammie OTILIA Zeng is a 69 year old female with past medical history significant for severe COPD who presents with shortness of breath. Admitted on 11/11/2024.   Cognition   Affect/Mental Status (Cognition) WNL   Pain Assessment   Patient Currently in Pain No   Posture    Posture Forward head position   Range of Motion (ROM)   ROM Comment BLE WFL   Strength (Manual Muscle Testing)   Strength Comments Pt demonstrates BLE weakness w/ OOB mobility   Bed Mobility   Comment, (Bed Mobility) IND   Transfers   Comment, (Transfers) STS CGA no AD   Gait/Stairs (Locomotion)   Comment, (Gait/Stairs) 5' IV pole, SBA, no LOB   Balance   Balance Comments good dynamic balance w/ use of IV pole   Clinical Impression   Criteria for Skilled Therapeutic Intervention Yes, treatment indicated   PT Diagnosis (PT) impaired IND w/ ambulation from baseline, impaired activity tolerance   Influenced by the following impairments functional weakness, impaired high level dynamic balance, impaired activity tolerance   Functional limitations due to impairments see above   Clinical Presentation (PT Evaluation Complexity) stable    Clinical Presentation Rationale clinical judgement   Clinical Decision Making (Complexity) low complexity   Risk & Benefits of therapy have been explained evaluation/treatment results reviewed;care plan/treatment goals reviewed;risks/benefits reviewed;patient   PT Total Evaluation Time   PT Eval, Low Complexity Minutes (53065) 10   Physical Therapy Goals   PT Frequency 2x/week   PT Predicted Duration/Target Date for Goal Attainment 11/14/24   PT Goals Transfers;Gait;Stairs;Aerobic Activity   PT: Transfers Independent;Sit to/from stand   PT: Gait Independent;100 feet   PT: Stairs Modified independent;Greater than 10 stairs;Rail on left   PT: Perform aerobic activity with stable cardiovascular response 5 minutes;continuous activity;ambulation   Interventions   Interventions Quick Adds Therapeutic Activity   Therapeutic Activity   Therapeutic Activities: dynamic activities to improve functional performance Minutes (12832) 12   Symptoms Noted During/After Treatment Fatigue   Treatment Detail/Skilled Intervention Pt supine in bed upon arrival. Pt IND with bed mobility. STS CGA progressing to IND no AD. Does well navigating small space in rm managing IV pole. Pt engaged in functional hallway ambulation to improve functional activity tolerance as pt reporting inc fatigue since admission.  RA O2 stable. Pt ambulated 450' in hallway managed IV pole. Tolerated well. End of session pt left supine in bed needs in reach   PT Discharge Planning   PT Plan likely DC after next session, trial stairs, gait no UE support.   PT Discharge Recommendation (DC Rec) home   PT Rationale for DC Rec Pt currently SBA with use of IV pole for support during session. Overall pt near baseline mobility. Recommend DC to home when medically stable. No PT needs at DC. Pt educated on ambulating hallway 3x/day during IP stay.   PT Brief overview of current status Goals of therapy will be to address safe mobility and make recommendations for discharge  to next level of care. Patient and RN will continue to follow all falls risk precautions as documented by RN staff while hospitalized. SBA   Physical Therapy Time and Intention   Timed Code Treatment Minutes 12   Total Session Time (sum of timed and untimed services) 22

## 2024-11-12 NOTE — PHARMACY-ADMISSION MEDICATION HISTORY
Pharmacist Admission Medication History    Admission medication history is complete. The information provided in this note is only as accurate as the sources available at the time of the update.    Information Source(s): Patient and CareEverywhere/SureScripts via in-person    Pertinent Information: patient a reliable historian - see below for discrepancies with fill history.   Lexapro filled 8/28/24 #90ds - patient confirms not taking, did not add to PTA med list.     Changes made to PTA medication list:  Added: aspirin   Deleted: tylenol PRN  Changed: melatonin updated tablet strength per patient    Allergies reviewed with patient and updates made in EHR: yes    Medication History Completed By: Antonia Villela [facility] 11/12/2024 11:56 AM    PTA Med List   Medication Sig Note Last Dose/Taking    albuterol (PROVENTIL) (2.5 MG/3ML) 0.083% neb solution Take 1 vial (2.5 mg) by nebulization every 4 hours as needed for wheezing 11/12/2024: Patient takes regularly in AM with budesonide neb 11/11/2024 Morning    albuterol (VENTOLIN HFA) 108 (90 Base) MCG/ACT inhaler Inhale 2 puffs into the lungs every 4 hours as needed for shortness of breath, wheezing or cough.  Unknown    aspirin (ASA) 325 MG EC tablet Take 325 mg by mouth every 6 hours as needed for moderate pain.  Past Week    azithromycin (ZITHROMAX) 250 MG tablet Take 1 tablet (250 mg) by mouth daily. 11/12/2024: Last filled 9/4/24 #30ds, patient states still taking 11/11/2024 Morning    budesonide (PULMICORT) 1 MG/2ML neb solution Take 2 mLs (1 mg) by nebulization two times daily  11/11/2024    LORazepam (ATIVAN) 0.5 MG tablet Take 1 tablet (0.5 mg) by mouth 2 times daily as needed for anxiety 11/12/2024: Rarely takes Unknown    melatonin 5 MG tablet Take 10 mg by mouth at bedtime. 11/12/2024: Took 15 mg 11/11/24, baseline is 10 mg dose 11/11/2024 Bedtime    umeclidinium-vilanterol (ANORO ELLIPTA) 62.5-25 MCG/ACT oral inhaler Inhale 1 puff into the lungs daily   11/11/2024 Morning

## 2024-11-12 NOTE — PLAN OF CARE
Discharge Note    Patient discharged to home via taxi accompanied by no family/friend .  IV: Discontinued  Prescriptions filled and given to patient/family.   Belongings reviewed and sent with patient.   Home medications returned to patient: Yes  Equipment sent with: patient.   patient verbalizes understanding of discharge instructions. AVS given to patient.

## 2024-11-12 NOTE — ED NOTES
[facility]  ED Nurse Handoff Report    ED Chief complaint: Respiratory Distress      ED Diagnosis:   Final diagnoses:   COPD exacerbation (H)   Elevated lactic acid level   Acute and chronic respiratory failure with hypercapnia (H)       Code Status: To be addressed by admitting provider    Allergies:   Allergies   Allergen Reactions    Pcn [Penicillins]      convulsions       Patient Story: Pt BIBA from home, after awaking with SOB. Hx COPD. No oxygen home use baseline. Per EMS, 88% on room air. Non-rebreather PTA improved WOB. CPAP PTA. Duneb x2, solumedrol, magnesium given PTA. 20G PIV bilateral AC placed PTA. 83% on room air in ED.   Focused Assessment:  Pt on 2LNC, weaned off BIPAP. Lungs clear. Pt ambulating with SBA, oxygen up to 4L NC while ambulating. 1-2L NC while at rest.      Treatments and/or interventions provided: Pt received IV fluids, antibiotics, duonebs, Labs, EKG, IV.  Patient's response to treatments and/or interventions: Pt weaned off BIPAP    To be done/followed up on inpatient unit:  Monitor oxygenation needs, monitor VSS.    Does this patient have any cognitive concerns?:  None    Activity level - Baseline/Home:  Independent  Activity Level - Current:   Stand with Assist, short of breath with activity.    Patient's Preferred language: English   Needed?: No    Isolation: None  Infection: Not Applicable  Patient tested for COVID 19 prior to admission: YES, negative  Bariatric?: No    Vital Signs:   Vitals:    11/11/24 2215 11/11/24 2230 11/11/24 2245 11/11/24 2308   BP: (!) 111/95 106/78 110/56    Pulse: 106 114 103 100   Resp: 14 19 27 20   Temp:       TempSrc:       SpO2: 100% 100% 100% 100%   Weight:           Cardiac Rhythm:     Was the PSS-3 completed:   Yes  What interventions are required if any?               Family Comments: None  OBS brochure/video discussed/provided to patient/family: N/A              Name of person given brochure if not patient:                Relationship to patient:     For the majority of the shift this patient's behavior was Green.   Behavioral interventions performed were None    ED NURSE PHONE NUMBER: [phone]     \

## 2024-11-12 NOTE — ED NOTES
Bed: [facility]  Expected date: 11/11/24  Expected time: 9:40 PM  Means of arrival: Ambulance  Comments:  HEMS [phone] COPD issues; CPAP

## 2024-11-12 NOTE — ED NOTES
Pt ambulated to BR. Pt increased to 4 L during walk and recovered back to 1 L in bed. Pt settled and OK.

## 2024-11-12 NOTE — PLAN OF CARE
Physical Therapy Discharge Summary    Reason for therapy discharge:    Discharged to home.    Progress towards therapy goal(s). [facility] for goal details.  Goals partially met.  Barriers to achieving goals:   discharge from facility.    Therapy recommendation(s):    No further therapy is recommended.

## 2024-11-12 NOTE — H&P
[facility]    History and Physical - Hospitalist Service       Date of Admission:  11/11/2024    Assessment & Plan   Tammie Zeng is a 69 year old female with past medical history significant for severe COPD who presents with shortness of breath. Admitted on 11/11/2024.     COPD exacerbation  Acute hypoxic respiratory failure   *Presents with dyspnea, hypoxia on home oximeter (88%)  *CXR with emphysema, no acute infiltrates  *COVID19/Influenza/RSV PCR negative  *VBG 7.28/52/-/- (iStat) -> 7.31/45/-/-  *Prescribed oxygen for use with activity, but does not use it   *Previously not using budesonide nebulizers, but has recently resumed  *Follows with pulmonology through [facility], hx of very severe obstruction on spirometry 9/3/24  *Required BiPAP in ED, since removed and breathing stable on 1-2L oxygen  *Rapidly improved with COPD treatments, denies chest pain, leg swelling; low suspicion for PE.   - Scheduled Duo-Nebs  - Albuterol nebulizer PRN  - Prednisone 40 mg daily  - Continuous pulse oximetry   - PT consulted   - Resume prior to admission azithromycin when dose confirmed by pharmacy    Lactic acid elevation  Lactic acid 3.4->2.9. Does not appear septic. Likely secondary to nebulizers.   - Repeat if clinical change, otherwise will not trend    Alcohol overuse  Hx of hepatic steatosis  Reports drinking 3 vodka drinks nightly. Denies hx of withdrawal symptoms.  - Monitor clinically for withdrawal   - Discussed recommended alcohol limits and encouraged cutting back. Declines resources.    Hx of tobacco use disorder  Quit 7/2024  - Congratulated on cessation     Diet: Regular diet   DVT Prophylaxis: Enoxaparin (Lovenox) SQ  Royal Catheter: Not present  Code Status: DNR/DNI - Discussed with patient     Disposition Plan   Admit to inpatient. Anticipate greater than or equal to 2 midnights prior to discharge.      Medically Ready for Discharge: Anticipated in 2-4 Days       Entered: Maxwell SIMON  MD Horacio 11/11/2024, 10:41 PM     The patient's care was discussed with the ED provider, patient        Clinically Significant Risk Factors Present on Admission                                 # Financial/Environmental Concerns:                Maxwell Leonard MD  [facility]    ______________________________________________________________________    Chief Complaint   Shortness of breath     History of Present Illness   Tammie Zeng is a 69 year old female who presents with the above chief complaint.    History is obtained from the patient, discussion with ED provider and review of medical record. The patient reports she went to bed early around 6 PM, she awoke around 9 PM feeling more acutely short of breath. She tried a nebulizer without improvement, checked her oxygen on a home oximeter and noted it was at 88%. She is prescribed oxygen for use with activity, but she reports she does not use it. She called EMS who placed her on CPAP, gave her Duo-neb x2, IV methylprednisolone and IV magnesium and brought her to the ED for further evaluation. She denies any chest pain. She denies cough. She denies fevers or chills. She denies any recent calf pain or swelling.     In the Emergency Department, the patient was seen by Dr. Salazar, with whom I discussed the patient's presenting symptoms and emergency department course.  Initial vital signs were a temperature of 97.3F, , /75, RR 15, SpO2 100% on BiPAP. Pertinent work-up as noted in A&P. The patient received 1L normal saline, albuterol nebulizer, IV azithromycin and Hospitalists were contacted for admission to the hospital.     Past Medical History    I have reviewed this patient's medical history and updated it with pertinent information if needed.   Past Medical History:   Diagnosis Date    Chronic obstructive pulmonary disease, unspecified COPD type (H) 06/13/2017    has seen [facility], fev1 under 1.0, hospitalized twice  in 2023, once in Feb 2024, once May 2024    Depressive disorder, not elsewhere classified 05/2006    following sudden death of her mother    Elevated LFTs 2023    hepatic steatosis 4/23 us    Generalized anxiety disorder     Hepatic steatosis 04/2023    on us done for elev lfts    Herpes simplex without mention of complication     MOUTH    Nephrolithiasis     TOBACCO ABUSE-CONTINUOUS        Past Surgical History   I have reviewed this patient's surgical history and updated it with pertinent information if needed.  Past Surgical History:   Procedure Laterality Date    CHOLECYSTECTOMY, LAPOROSCOPIC  1980's    done at [facility]    TUBAL LIGATION  1998         Social History   I have reviewed this patient's social history and updated it with pertinent information if needed.  Social History     Tobacco Use    Smoking status: Former     Current packs/day: 0.50     Average packs/day: 0.5 packs/day for 25.0 years (12.5 ttl pk-yrs)     Types: Cigarettes    Smokeless tobacco: Never    Tobacco comments:     states is cutting back - 1/2 pack a day      Quit 07/13/24   Vaping Use    Vaping status: Never Used   Substance Use Topics    Alcohol use: Yes     Alcohol/week: 0.0 standard drinks of alcohol     Comment: 3 mixed drinks per week    Drug use: No          Family History   I have reviewed this patient's family history and updated it with pertinent information if needed.   Family History   Problem Relation Age of Onset    Asthma Daughter     Diabetes Paternal Grandmother     Cancer Maternal Aunt         skin    Allergies Daughter     Depression Mother     Eye Disorder Mother         cataracts and glacoma    Gastrointestinal Disease Maternal Grandmother         gallbladder    Gastrointestinal Disease Daughter         ulcerative cholitis    Osteoporosis Mother     Respiratory Mother         asthmatic bronchitis        Prior to Admission Medications  - Pending pharmacy reconciliation   Prior to Admission Medications    Prescriptions Last Dose Informant Patient Reported? Taking?   LORazepam (ATIVAN) 0.5 MG tablet  Self No No   Sig: Take 1 tablet (0.5 mg) by mouth 2 times daily as needed for anxiety   Melatonin 10 MG TABS tablet   Yes No   Sig: Take 1 tablet (10 mg) by mouth nightly as needed for sleep.   acetaminophen (TYLENOL) 325 MG tablet   No No   Sig: Take 2 tablets (650 mg) by mouth every 4 hours as needed for mild pain or other (and adjunct with moderate or severe pain or per patient request)   albuterol (PROVENTIL) (2.5 MG/3ML) 0.083% neb solution  Self No No   Sig: Take 1 vial (2.5 mg) by nebulization every 4 hours as needed for wheezing   albuterol (VENTOLIN HFA) 108 (90 Base) MCG/ACT inhaler   No No   Sig: INHALE 2 PUFFS INTO THE LUNGS EVERY 4 HOURS AS NEEDED FOR WHEEZING OR SHORTNESS OF BREATH   azithromycin (ZITHROMAX) 250 MG tablet   Yes No   Sig: Take 1 tablet (250 mg) by mouth daily.   budesonide (PULMICORT) 1 MG/2ML neb solution  Self No No   Sig: Take 2 mLs (1 mg) by nebulization two times daily   umeclidinium-vilanterol (ANORO ELLIPTA) 62.5-25 MCG/ACT oral inhaler  Self No No   Sig: Inhale 1 puff into the lungs daily      Facility-Administered Medications: None     Allergies   Allergies   Allergen Reactions    Pcn [Penicillins]      convulsions       Physical Exam   Vital Signs: Temp: 97.3  F (36.3  C) Temp src: Temporal BP: 106/78 Pulse: 114   Resp: 19 SpO2: 100 % O2 Device: BiPAP/CPAP    Weight: 126 lbs 5.18 oz    Constitutional: NAD  Respiratory: Diminished air movement throughout, worse at the bases  Cardiovascular: RRR, no m/r/g. No peripheral edema.    GI: Soft, non-tender, non-distended. No rebound tenderness or guarding.    Skin: Warm, dry   Neurologic: Alert. Responding to questions appropriately. Following commands.    Psychiatric: Normal affect, appropriate      Data   Data reviewed today: I reviewed all medications, new labs and imaging results over the last 24 hours. I personally reviewed the EKG  tracing showing sinus tachycardia .    Recent Labs   Lab 11/11/24  2157   WBC 8.9   HGB 11.4*   MCV 84          No results found for this or any previous visit (from the past 24 hours).

## 2024-11-12 NOTE — PLAN OF CARE
Goal Outcome Evaluation:      Plan of Care Reviewed With: patient          Outcome Evaluation: Discharge home

## 2024-11-12 NOTE — ED TRIAGE NOTES
Pt BIBA from home, after awaking with SOB. Hx COPD. No oxygen home use baseline. Per EMS, 88% on room air. Non-rebreather PTA improved WOB. CPAP PTA. Duneb x2, solumedrol, magnesium given PTA. 20G PIV bilateral AC placed PTA. 83% on room air in ED.

## 2024-11-12 NOTE — PLAN OF CARE
Orientation: A&Ox4, frustrated   Aggression Stop Light: Green  Activity: A1 GB+W  Diet/BS Checks: Regular  Tele:  N/a  IV Access/Drains: PIV SL   Pain Management: Denies  Abnormal VS/Results: VSS on 2L NC  Bowel/Bladder: Continent   Skin/Wounds: Rash to sacrum - VIRA, refused skin assessment to perineum and legs   Consults: PT  D/C Disposition: Pending clinical improvement   Other Info:   -On K protocol    -Inhaler sent to pharmacy

## 2024-11-12 NOTE — PROGRESS NOTES
RECEIVING UNIT ED HANDOFF REVIEW    ED Nurse Handoff Report was reviewed by: Geetha Valdes RN on November 12, 2024 at 1:46 AM

## 2024-11-13 ENCOUNTER — PATIENT OUTREACH (OUTPATIENT)
Dept: CARE COORDINATION | Facility: CLINIC | Age: 69
End: 2024-11-13
Payer: COMMERCIAL

## 2024-11-13 NOTE — LETTER
[facility] CARE COORDINATION  [address]  [place] [address]    November 14, 2024    Tammie Zeng  [address]  [place] [address]      Dear Mable,    I am a clinic care coordinator who works with Mikel Navarro MD with the [facility]. I recently tried to call to follow up on your hospital stay and was unable to reach you.     Below is a description of clinic care coordination:    The clinic care coordination team is made up of a registered nurse, , financial resource worker and community health worker who understand the health care system. The goal of clinic care coordination is to help you manage your health and improve access to the health care system. Our team works alongside your provider to assist you in determining your health and social needs. We can help you obtain health care and community resources, providing you with necessary information and education. We can work with you through any barriers and develop a care plan that helps coordinate and strengthen the communication between you and your care team.  Our services are voluntary and are offered without charge to you personally.    Please feel free to contact me with any questions or concerns regarding care coordination and what we can offer.      We are focused on providing you with the highest-quality healthcare experience possible.    Sincerely,     Daphney Love, RN Clinic Care Coordinator  [facility]: [place], [place] (on-site Wednesdays), [facility] (on-site Thursdays) & [facility].  [email or web address]  Phone: [phone]

## 2024-11-13 NOTE — PROGRESS NOTES
Clinic Care Coordination Contact  [facility]/Voicemail    Clinical Data: Care Coordinator Outreach    Outreach Documentation Number of Outreach Attempt   11/13/2024   3:01 PM 1       Left message on patient's voicemail with call back information and requested return call.      Plan: Care Coordinator will try to reach patient again in 1-2 business days.    Daphney Love RN Clinic Care Coordinator  [facility] Clinics: [place], [place] (on-site Wednesdays), [facility] (on-site Thursdays) & [facility].  [email or web address]  Phone: [phone]

## 2024-11-14 NOTE — PROGRESS NOTES
Clinic Care Coordination Contact  [facility]/Voicemail    Clinical Data: Care Coordinator Outreach    Outreach Documentation Number of Outreach Attempt   11/13/2024   3:01 PM 1   11/14/2024  11:13 AM 2       Left message on patient's voicemail with call back information and requested return call.      Plan: Care Coordinator will send unable to contact letter with care coordinator contact information via [employer]. Care Coordinator will do no further outreaches at this time.    Daphney Love, RN Clinic Care Coordinator  [facility] Clinics: [place], [place] (on-site Wednesdays), [facility] (on-site Thursdays) & [facility].  [email or web address]  Phone: [phone]

## 2024-11-17 LAB — BACTERIA BLD CULT: NO GROWTH

## 2024-11-17 NOTE — PLAN OF CARE
11/12/24 1114   Appointment Info   Signing Clinician's Name / Credentials (PT) Meera Alonso DPT   Quick Adds   Quick Adds Certification   Living Environment   People in Home alone   Current Living Arrangements house   Home Accessibility stairs to enter home;stairs within home   Transportation Anticipated car, drives self   Living Environment Comments 3 [address]; 15 steps to basement to do laundry but does not access frequently. Has a cat   Self-Care   Usual Activity Tolerance good   Current Activity Tolerance moderate   Fall history within last six months no   Activity/Exercise/Self-Care Comment pt IND at baseline, not on O2 at baseline   General Information   Onset of Illness/Injury or Date of Surgery 11/11/24   Referring Physician Maxwell Leonard MD   Pertinent History of Current Problem (include personal factors and/or comorbidities that impact the POC) Tammie Zeng is a 69 year old female with past medical history significant for severe COPD who presents with shortness of breath. Admitted on 11/11/2024.   Cognition   Affect/Mental Status (Cognition) WNL   Pain Assessment   Patient Currently in Pain No   Posture    Posture Forward head position   Range of Motion (ROM)   ROM Comment BLE WFL   Strength (Manual Muscle Testing)   Strength Comments Pt demonstrates BLE weakness w/ OOB mobility   Bed Mobility   Comment, (Bed Mobility) IND   Transfers   Comment, (Transfers) STS CGA no AD   Gait/Stairs (Locomotion)   Comment, (Gait/Stairs) 5' IV pole, SBA, no LOB   Balance   Balance Comments good dynamic balance w/ use of IV pole   Clinical Impression   Criteria for Skilled Therapeutic Intervention Yes, treatment indicated   PT Diagnosis (PT) impaired IND w/ ambulation from baseline, impaired activity tolerance   Influenced by the following impairments functional weakness, impaired high level dynamic balance, impaired activity tolerance   Functional limitations due to impairments see above   Clinical  Presentation (PT Evaluation Complexity) stable   Clinical Presentation Rationale clinical judgement   Clinical Decision Making (Complexity) low complexity   Risk & Benefits of therapy have been explained evaluation/treatment results reviewed;care plan/treatment goals reviewed;risks/benefits reviewed;patient   PT Total Evaluation Time   PT Eval, Low Complexity Minutes (56292) 10   Therapy Certification   Start of care date 11/12/24   Certification date from 11/12/24   Certification date to 11/12/24   Medical Diagnosis Shortness of breath see chart for details   Physical Therapy Goals   PT Frequency 2x/week   PT Predicted Duration/Target Date for Goal Attainment 11/14/24   PT Goals Transfers;Gait;Stairs;Aerobic Activity   PT: Transfers Independent;Sit to/from stand   PT: Gait Independent;100 feet   PT: Stairs Modified independent;Greater than 10 stairs;Rail on left   PT: Perform aerobic activity with stable cardiovascular response 5 minutes;continuous activity;ambulation   Interventions   Interventions Quick Adds Therapeutic Activity   Therapeutic Activity   Therapeutic Activities: dynamic activities to improve functional performance Minutes (53532) 12   Symptoms Noted During/After Treatment Fatigue   Treatment Detail/Skilled Intervention Pt supine in bed upon arrival. Pt IND with bed mobility. STS CGA progressing to IND no AD. Does well navigating small space in rm managing IV pole. Pt engaged in functional hallway ambulation to improve functional activity tolerance as pt reporting inc fatigue since admission.  RA O2 stable. Pt ambulated 450' in hallway managed IV pole. Tolerated well. End of session pt left supine in bed needs in reach   PT Discharge Planning   PT Plan likely DC after next session, trial stairs, gait no UE support.   PT Discharge Recommendation (DC Rec) home   PT Rationale for DC Rec Pt currently SBA with use of IV pole for support during session. Overall pt near baseline mobility. Recommend DC to  home when medically stable. [facility] Pt educated on ambulating hallway 3x/day during IP stay.   PT Brief overview of current status Goals of therapy will be to address safe mobility and make recommendations for discharge to next level of care. Patient and RN will continue to follow all falls risk precautions as documented by RN staff while hospitalized. SBA   Physical Therapy Time and Intention   Timed Code Treatment Minutes 12   Total Session Time (sum of timed and untimed services) 22     [facility]  OUTPATIENT PHYSICAL THERAPY EVALUATION  PLAN OF TREATMENT FOR OUTPATIENT REHABILITATION  (COMPLETE FOR INITIAL CLAIMS ONLY)  Patient's Last Name, First Name, M.I.  YOB: 1955  KarriMonicaTammie  J                        Provider's Name  [facility] Medical Record No.  [id number]                             Onset Date:  11/11/24   Start of Care Date:  (P) 11/12/24   Type:     _X_PT   ___OT   ___SLP Medical Diagnosis:  (P) Shortness of breath see chart for details              PT Diagnosis:  impaired IND w/ ambulation from baseline, impaired activity tolerance Visits from SOC:  1     See note for plan of treatment, functional goals and certification details    I CERTIFY THE NEED FOR THESE SERVICES FURNISHED UNDER        THIS PLAN OF TREATMENT AND WHILE UNDER MY CARE     (Physician co-signature of this document indicates review and certification of the therapy plan).

## 2024-11-19 ENCOUNTER — OFFICE VISIT (OUTPATIENT)
Dept: FAMILY MEDICINE | Facility: CLINIC | Age: 69
End: 2024-11-19
Payer: COMMERCIAL

## 2024-11-19 VITALS
DIASTOLIC BLOOD PRESSURE: 64 MMHG | SYSTOLIC BLOOD PRESSURE: 129 MMHG | HEIGHT: 63 IN | OXYGEN SATURATION: 96 % | WEIGHT: 115 LBS | RESPIRATION RATE: 16 BRPM | TEMPERATURE: 97.1 F | BODY MASS INDEX: 20.38 KG/M2 | HEART RATE: 79 BPM

## 2024-11-19 DIAGNOSIS — Z09 HOSPITAL DISCHARGE FOLLOW-UP: Primary | ICD-10-CM

## 2024-11-19 DIAGNOSIS — F10.90 ALCOHOL USE DISORDER: ICD-10-CM

## 2024-11-19 DIAGNOSIS — J44.1 COPD EXACERBATION (H): ICD-10-CM

## 2024-11-19 RX ORDER — AZITHROMYCIN 250 MG/1
250 TABLET, FILM COATED ORAL DAILY
Qty: 30 TABLET | Refills: 0 | Status: SHIPPED | OUTPATIENT
Start: 2024-11-19 | End: 2024-11-19

## 2024-11-19 ASSESSMENT — PAIN SCALES - GENERAL: PAINLEVEL_OUTOF10: NO PAIN (0)

## 2024-11-19 NOTE — PROGRESS NOTES
Tia Akins is a 69 year old, presenting for the following health issues:  Hospital F/U    Hospital Course  Tammie Zeng is a 69 year old female with past medical history significant for severe COPD who presents with shortness of breath. Admitted on 11/11/2024.      COPD exacerbation, resolved  Acute hypoxic respiratory failure, resolved  *Presented with dyspnea, hypoxia on home oximeter (88%)  *CXR with emphysema, no acute infiltrates  *COVID19/Influenza/RSV PCR negative  *VBG 7.28/52/-/- (iStat) -> 7.31/45/-/-. Metabolic acidosis resolved at time of discharge.  *Prescribed oxygen for use with activity, but does not use it   *Previously not using budesonide nebulizers, but has recently resumed  *Follows with pulmonology through [facility], hx of very severe obstruction on spirometry 9/3/24  *Required BiPAP in ED, quickly removed and transitioned to nasal cannula. At time of discharge, patient is satting >92% on room air and also satted well while walking with PT this morning (PT recommends patient does not have any ongoing PT needs either).  *Rapidly improved with COPD treatments, denies chest pain, leg swelling; low suspicion for PE.   - Discharge on PTA COPD regimen + prednisone 40mg x5 day total duration; already on azithromycin as part of her PTA regimen     Lactic acid elevation, resolved  Lactic acid 3.4->2.9->2.5->1.6. Does not appear septic. Likely secondary to nebulizers and decreased PO intake prior to admission.      Alcohol overuse  Hx of hepatic steatosis  Reports drinking 3 vodka drinks nightly. Denies hx of withdrawal symptoms.  - Discussed recommended alcohol limits and encouraged cutting back. Declines resources.     Hx of tobacco use disorder  Quit 7/2024  - Congratulated on cessation    HPI     She is feeling back to baseline today following hospitalization  Home oxygen levels in the mid 90s since getting home from the hospital  She does not use oxygen at home but has access if  needed  She has been using the budesonide nebs bid, Anoro, albuterol neb BID  She has been compliant with taking the prophylactic azithromycin prescribed by pulmonology  She quit smoking in July with a few times of cheating (bumming off of a friend)  She does vape nicotine with a Loon device    Hospital Follow-up Visit:    Hospital/Nursing Home/IP Rehab Facility: [facility]  Date of Admission: 11/11/2024  Date of Discharge: 11/12/2024  Reason(s) for Admission:     COPD exacerbation  Acute hypoxic respiratory failure  Lactic acid elevation   Alcohol use disorder  Hx of hepatic steatosis    Was the patient in the ICU or did the patient experience delirium during hospitalization?  No  Do you have any other stressors you would like to discuss with your provider? No    Problems taking medications regularly:  None  Medication changes since discharge: None  Problems adhering to non-medication therapy:  None    Summary of hospitalization:  [facility] discharge summary reviewed  Diagnostic Tests/Treatments reviewed.  Follow up needed: pulm  Other Healthcare Providers Involved in Patient s Care:         Specialist appointment - pulm  Update since discharge: improved.         Plan of care communicated with patient           Past Medical History:   Diagnosis Date    Chronic obstructive pulmonary disease, unspecified COPD type (H) 06/13/2017    has seen [facility], fev1 under 1.0, hospitalized twice in 2023, once in Feb 2024, once May 2024    Depressive disorder, not elsewhere classified 05/2006    following sudden death of her mother    Elevated LFTs 2023    hepatic steatosis 4/23     Generalized anxiety disorder     Hepatic steatosis 04/2023    on us done for elev lfts    Herpes simplex without mention of complication     MOUTH    Nephrolithiasis     TOBACCO ABUSE-CONTINUOUS      Past Surgical History:   Procedure Laterality Date    CHOLECYSTECTOMY, LAPOROSCOPIC  1980's    [facility]    TUBAL LIGATION  1998     Social History     Tobacco Use    Smoking status: Former     Current packs/day: 0.50     Average packs/day: 0.5 packs/day for 25.0 years (12.5 ttl pk-yrs)     Types: Cigarettes    Smokeless tobacco: Never    Tobacco comments:     states is cutting back - 1/2 pack a day      Quit 07/13/24   Substance Use Topics    Alcohol use: Yes     Alcohol/week: 0.0 standard drinks of alcohol     Comment: 3 mixed drinks per week     Current Outpatient Medications   Medication Sig Dispense Refill    albuterol (PROVENTIL) (2.5 MG/3ML) 0.083% neb solution Take 1 vial (2.5 mg) by nebulization every 4 hours as needed for wheezing      albuterol (VENTOLIN HFA) 108 (90 Base) MCG/ACT inhaler Inhale 2 puffs into the lungs every 4 hours as needed for shortness of breath, wheezing or cough. 18 g 1    budesonide (PULMICORT) 1 MG/2ML neb solution Take 2 mLs (1 mg) by nebulization two times daily 200 mL 1    LORazepam (ATIVAN) 0.5 MG tablet Take 1 tablet (0.5 mg) by mouth 2 times daily as needed for anxiety 30 tablet 0    melatonin 5 MG tablet Take 10 mg by mouth at bedtime.      predniSONE (DELTASONE) 20 MG tablet Take 2 tablets (40 mg) by mouth daily. 8 tablet 0    umeclidinium-vilanterol (ANORO ELLIPTA) 62.5-25 MCG/ACT oral inhaler Inhale 1 puff into the lungs daily 180 each 1     Allergies   Allergen Reactions    Pcn [Penicillins]      convulsions     FAMILY HISTORY NOTED AND REVIEWED    PHYSICAL EXAM:    /64 (BP Location: Right arm, Patient Position: Sitting, Cuff Size: Adult Regular)   Pulse 79   Temp 97.1  F (36.2  C) (Temporal)   Resp 16   Ht 1.6 m (5' 3\")   Wt 52.2 kg (115 lb)   LMP 01/24/2006   SpO2 96%   BMI 20.37 kg/m      Patient appears non toxic  Lungs: distant breath sounds without wheezing  Heart: RRR  Extr: no edema    Assessment and Plan:     (Z09) Hospital discharge follow-up  (primary encounter diagnosis)  Comment:   Plan: pt back to baseline and has completed " prednisone    (J44.1) COPD exacerbation (H)  Comment: congratulated on smoking cessation  Plan: pt is taking daily azithromycin (ZITHROMAX) 250 MG         Tablet. She ran out today but will refill rx from pulm            (F10.90) Alcohol use disorder  Comment: recd cessation or at least cutting down. She is having 3 per night and trying to not make her drinks as strong  Plan: not interested in quitting.      Parris Grimes PA-C

## 2024-11-29 NOTE — LETTER
[facility] INTERMEDIATE [facility]  [address] [facility] [address]  Phone: [phone]    December 1, 2024        Tammie Zeng  [address] [place] [id number]    To whom it may concern:    RE: Tammie Andersonalphonso Zeng has been admitted at [facility] on 11/29/2024, critically ill and undergoing medical care.     Please contact me for questions or concerns.      Sincerely,    Sohail Duff MD

## 2024-11-30 NOTE — PROVIDER NOTIFICATION
MD Notification    Notified Person: MD    Notified Person Name:Dr. Maria     Notification Date/Time:11/30/24 0629    Notification Interaction:vocera    Purpose of Notification:Pt was admitted with COPD, was on BIPAP, transitioned to oxymask. Complains of not able to breathe, oxygen  on 3 L oxymask. Could we have med to open help open airway? Plan to place back on BIPAP for WOB. Thank you    Orders Received:    Comments:

## 2024-11-30 NOTE — PHARMACY-ADMISSION MEDICATION HISTORY
Pharmacist Admission Medication History    Admission medication history is complete. The information provided in this note is only as accurate as the sources available at the time of the update.    Information Source(s): Patient and CareEverywhere/SureScripts via in-person    Pertinent Information: prophylactic azithromycin 250 mg recently filled on 11/21/24 for 30 tabs but patient reported she was not taking. Unclear why as this has historically been on her list. Did not inquire further as patient was on bipap at time of interview and seemed to have some respiratory distress while talking.  Patient also denied taking Lexapro (last filled 8/28/24 for 90 tabs).     Changes made to PTA medication list:  Added: mucinex  Deleted: None  Changed: None    Allergies reviewed with patient and updates made in EHR: no    Medication History Completed By: Tanika Cano [facility] 11/30/2024 3:43 PM    PTA Med List   Medication Sig Last Dose/Taking    albuterol (PROVENTIL) (2.5 MG/3ML) 0.083% neb solution Take 1 vial (2.5 mg) by nebulization every 4 hours as needed for wheezing Taking As Needed    albuterol (VENTOLIN HFA) 108 (90 Base) MCG/ACT inhaler Inhale 2 puffs into the lungs every 4 hours as needed for shortness of breath, wheezing or cough. Taking As Needed    budesonide (PULMICORT) 1 MG/2ML neb solution Take 2 mLs (1 mg) by nebulization two times daily Taking    guaiFENesin (MUCINEX) 600 MG 12 hr tablet Take 1,200 mg by mouth 2 times daily as needed for congestion. Taking As Needed    LORazepam (ATIVAN) 0.5 MG tablet Take 1 tablet (0.5 mg) by mouth 2 times daily as needed for anxiety Taking As Needed    melatonin 5 MG tablet Take 10 mg by mouth at bedtime. Taking    umeclidinium-vilanterol (ANORO ELLIPTA) 62.5-25 MCG/ACT oral inhaler Inhale 1 puff into the lungs daily Taking

## 2024-11-30 NOTE — CODE/RAPID RESPONSE
"[facility]    RRT Note  11/30/2024   Time Called: 1145    Code Status: No CPR- Do NOT Intubate    I was called to evaluate Tammie Zeng, who is a 69 year old female who was admitted on 11/29/2024 for severe anxiety, respiratory distress. PMH includes COPD, EtOH abuse, MDD/anxiety, hepatic steatosis .    Assessment & Plan     Acute on Chronic Hypercapnic Respiratory Failure 22 COPD Exacerbation  Severe Anxiety  Frequent Hospitalizations  Patient transported to CT for repeat chest CT.  Once in the room, lying flat, the patient had a severe anxiety attack, requiring rapid response team support.  Patient found to be tachypneic, rate 30s, tachycardic with rates in the 130s, and temporarily hypoxic in the high 80s while flat in scanner. Hypoxia resolved with position change/oxygen. On my initial exam, the patient has significant, diffuse expiratory wheezing, minimal air movement, tachypeneic 35-40.  She was transported back to room 306 for BiPAP support. On exam, she appeared to have air trapping, irregular respiratory pattern.  1 mg IV Ativan + Duoneb administered by rapid team, with improvement to anxiety and respiratory distress.  She was placed on BiPAP 35% FiO2, 12/6, rates improved slowly.  Patient notes chest heaviness which has been ongoing since admission.  VBG prior to CT shows pH 7.21, pCO2 64, bicarb 28.  This VBG was collected after approximately 2 hours on BiPAP.      *Last PFTs in 09/2024 showing \"very severe obstruction\".  Follows with Pulm outpatient.   OP Meds:  - albuterol 0.083% (2.5 mg/3 mL) neb solution Inhale 2.5 mg via a nebulizer every 4 hours if needed.   - budesonide (PULMiCORT) 1 mg/2 mL neb suspension Inhale 1 mg via a nebulizer two times daily.   - umeclidinium-vilanteroL (ANORO ELLIPTA) 62.5-25 mcg/actuation inhaler Inhale 1 Puff by mouth once daily.   - Ventolin HFA 90 mcg/actuation inhaler Inhale 1-2 Puffs by mouth every 4 hours while awake. " "    INTERVENTIONS  - 1 mg IV ativan x 1 for extreme anxiety  - BiPAP therapy, 12/6 Fi02 35%, will adjust as needed   - Repeat VBG 1245    - results showing stable gases, but no significant improvement.   - CT chest completed showing emphysema, mucous plugging    Updated Daughter - Sonia 1330  Discussed RRT events, answered questions regarding help at home vs senior living vs ? Daughter feeling stuck between allowing Mable to make her own decisions but feeling like she isn't doing well at home and is being hospitalized every 4-5 weeks. Pt is not interested in help at home or higher level of care at this time. We discussed the severity of Mable's disease, and Sonia verbalizes understanding. I shared my concerns she has a very low reserve and seems tormented on the BiPAP as she continuously tries to shake it off her face, despite anxiolysis.     Of note, there is some concern of non compliance. Pt has prescribed oxygen, but does not use it. There is documentation from her FP she has been known to stop her meds if she believes they do not work. Stopped smoking 1PPD in 09/2024. Continues to vape nicotine prior to admission in 11/2024.     Addendum 1815  Called to re evaluate Ms. Zeng for respiratory distress, refusal to wear BiPAP. Mable is continuously making comments \" I can't do it anymore\", moaning in discomfort and pain. Difficult to have a meaningful conversation with her due to the degree of distress. Trialed low dose Morphine for air hunger and obvious pain/discomfort.   - VBG STAT, pt has been BiPAP dependent all day  - Mag 1 gm IV over 30 min  - Duoneb x 3   - Morphine 1 mg IV x 1  - change albuterol to xopenex due to HR  - Called Daughter Sonia to come in, concern for end of life     Signed out to oncoming team in person at 1845. Recommend goals of care conversation, pt has been consistently worsening throughout the day despite multiple adjustments to her NIPPV and attempts at controlling her worsening anxiety - " VBG continues to show resp acidosis.     Goals of Care 1300  GOC have been discussed w/ patient who elects to be DNR/I, confirmed w/ daughter via phone.    At the conclusion of this RRT patient was hemodynamically stable and will remain on current unit.    Discussed with and defer further cares to Dr. Duff, Internal Medicine Hospitalist    Debora Baird, [facility]  Securely message with the Vocera Web Console (learn more here)  Text page via [facility] Paging/Directory      Physical Exam   Vital Signs with Ranges:  Temp:  [97.8  F (36.6  C)-98.5  F (36.9  C)] 97.8  F (36.6  C)  Pulse:  [] 124  Resp:  [16-47] 16  BP: ()/(61-87) 137/87  FiO2 (%):  [35 %] 35 %  SpO2:  [92 %-100 %] 92 %  I/O last 3 completed shifts:  In: -   Out: 300 [Urine:300]        Physical Exam  Vitals and nursing note reviewed.   Constitutional:       General: She is in acute distress.      Appearance: She is ill-appearing. She is not toxic-appearing or diaphoretic.      Comments: Barrel chested   Cardiovascular:      Rate and Rhythm: Tachycardia present.   Pulmonary:      Effort: Tachypnea, accessory muscle usage and respiratory distress present.      Breath sounds: Examination of the right-upper field reveals decreased breath sounds and wheezing. Examination of the left-upper field reveals decreased breath sounds and wheezing. Examination of the right-lower field reveals decreased breath sounds and wheezing. Examination of the left-lower field reveals decreased breath sounds and wheezing. Decreased breath sounds and wheezing present. No rhonchi or rales.   Abdominal:      General: Abdomen is flat.      Palpations: Abdomen is soft.      Tenderness: There is no abdominal tenderness.   Musculoskeletal:      Right lower leg: No edema.      Left lower leg: No edema.   Skin:     General: Skin is warm and dry.   Neurological:      General: No focal deficit present.      Mental Status: She is alert.  "Mental status is at baseline.   Psychiatric:         Mood and Affect: Mood is anxious.          Data     IMAGING: (X-ray/CT/MRI)   Recent Results (from the past 24 hours)   XR Chest Port 1 View    Narrative    EXAM: XR CHEST PORT 1 VIEW  LOCATION: [facility]  DATE: 11/29/2024    INDICATION: Shortness of breath  COMPARISON: None.      Impression    IMPRESSION: Hyperinflation. Aortic calcification. Chest otherwise negative. Lungs clear. Normal heart size and pulmonary vascularity.       CBC with Diff:  Recent Labs   Lab Test 11/29/24 2121 11/11/24 2157 07/13/24  0437   WBC 10.2   < > 14.8*   HGB 11.0*   < > 13.1   MCV 83   < > 92      < > 393   INR  --   --  1.14    < > = values in this interval not displayed.      No results found for: \"RETICABSCT\"  No results found for: \"RETP\"    Comprehensive Metabolic Panel:  Recent Labs   Lab 11/29/24 2121      POTASSIUM 4.4   CHLORIDE 98   CO2 24   ANIONGAP 14   *   BUN 6.4*   CR 0.64   GFRESTIMATED >90   MOLLY 9.1   PROTTOTAL 7.1   ALBUMIN 4.1   BILITOTAL 0.3   ALKPHOS 84   AST 22   ALT 9         Time Spent on this Encounter     Medical Decision Making             CRITICAL CARE TIME  I spent 48 minutes of critical care time on the unit/floor managing the care of Tammie Zeng. Upon evaluation, this patient had a high probability of imminent or life-threatening deterioration due to acute hypoxic and hypercapnic respiratory failure requiring NIPPV which required my direct attention, intervention, and personal management. 100% of my time was spent at the bedside counseling the patient and/or coordinating care regarding services listed in this note.    "

## 2024-11-30 NOTE — ED PROVIDER NOTES
Emergency Department Note      History of Present Illness     Chief Complaint   Shortness of Breath      HPI   Tammie Zeng is a 69 year old female who presents from home via EMS for evaluation of 2 days of shortness of breath.  Patient reports recent cough as well.  No chest pain or leg swelling.  She vapes but does not smoke cigarettes.  Paramedics were concerned about COPD flare and gave her 2 DuoNebs and 125 mg of IV Solu-Medrol.  She was placed on CPAP.  They performed a portable ultrasound that did not reveal B-lines reportedly.  Patient took 40 mg of prednisone today.  She denies other concerns at this time.  History somewhat limited due to respiratory distress and CPAP/BiPAP mask interfering with normal conversation.    Review of External Notes   I reviewed the discharge summary for 11/12/2024 after admission for COPD exacerbation.      Past Medical History     Medical History and Problem List   Past Medical History:   Diagnosis Date     Chronic obstructive pulmonary disease, unspecified COPD type (H) 06/13/2017     Depressive disorder, not elsewhere classified 05/2006     Elevated LFTs 2023     Generalized anxiety disorder      Hepatic steatosis 04/2023     Herpes simplex without mention of complication      Nephrolithiasis      TOBACCO ABUSE-CONTINUOUS      Medications   No current outpatient medications on file.    Surgical History   Past Surgical History:   Procedure Laterality Date     CHOLECYSTECTOMY, LAPOROSCOPIC  1980's    done at [facility]     TUBAL LIGATION  1998       Physical Exam     Patient Vitals for the past 24 hrs:   BP Temp Temp src Pulse Resp SpO2   11/30/24 0059 -- -- -- -- -- 99 %   11/30/24 0045 -- -- -- -- -- 95 %   11/30/24 0035 107/66 98.2  F (36.8  C) Oral -- 16 --   11/29/24 2330 108/64 -- -- 107 27 97 %   11/29/24 2300 117/73 -- -- 114 20 100 %   11/29/24 2224 -- 98.5  F (36.9  C) Temporal 109 28 100 %   11/29/24 2215 125/69 -- -- 106 28 100 %   11/29/24 2123 126/76 --  -- (!) 126 -- --   11/29/24 2120 (!) 143/84 -- -- (!) 122 (!) 47 95 %   11/29/24 2117 -- -- -- -- -- 100 %     Physical Exam  VS: Reviewed per above  HENT: Mucous membranes moist, normal phonation, no nuchal rigidity  EYES: sclera anicteric  CV: Rate as noted,  regular rhythm.   RESP: Tachypnea with CPAP mask in place.  Decreased breath sounds bilaterally.  GI: no focal tenderness/rebound/guarding, not distended.  NEURO: Alert, moving all extremities  MSK: No deformity of the extremities, no calf tenderness or lower extremity edema.  SKIN: Warm and dry      Diagnostics     Lab Results   Labs Ordered and Resulted from Time of ED Arrival to Time of ED Departure   COMPREHENSIVE METABOLIC PANEL - Abnormal       Result Value    Sodium 136      Potassium 4.4      Carbon Dioxide (CO2) 24      Anion Gap 14      Urea Nitrogen 6.4 (*)     Creatinine 0.64      GFR Estimate >90      Calcium 9.1      Chloride 98      Glucose 117 (*)     Alkaline Phosphatase 84      AST 22      ALT 9      Protein Total 7.1      Albumin 4.1      Bilirubin Total 0.3     KETONE BETA-HYDROXYBUTYRATE QUANTITATIVE, RAPID - Abnormal    Ketone (Beta-Hydroxybutyrate) Quantitative 0.49 (*)    CBC WITH PLATELETS AND DIFFERENTIAL - Abnormal    WBC Count 10.2      RBC Count 4.37      Hemoglobin 11.0 (*)     Hematocrit 36.2      MCV 83      MCH 25.2 (*)     MCHC 30.4 (*)     RDW 15.1 (*)     Platelet Count 340      % Neutrophils 69      % Lymphocytes 18      % Monocytes 12      % Eosinophils 0      % Basophils 1      % Immature Granulocytes 0      NRBCs per 100 WBC 0      Absolute Neutrophils 7.0      Absolute Lymphocytes 1.8      Absolute Monocytes 1.3      Absolute Eosinophils 0.0      Absolute Basophils 0.1      Absolute Immature Granulocytes 0.0      Absolute NRBCs 0.0     ISTAT GASES LACTATE VENOUS POCT - Abnormal    Lactic Acid POCT 1.5      Bicarbonate Venous POCT 27      O2 Sat, Venous POCT 35 (*)     pCO2 Venous POCT 52 (*)     pH Venous POCT 7.32       pO2 Venous POCT 23 (*)     Base Excess/Deficit (+/-) POCT 0.0     INFLUENZA A/B, RSV AND SARS-COV2 PCR - Normal    Influenza A PCR Negative      Influenza B PCR Negative      RSV PCR Negative      SARS CoV2 PCR Negative     TROPONIN T, HIGH SENSITIVITY - Normal    Troponin T, High Sensitivity 12     NT PROBNP INPATIENT - Normal    N terminal Pro BNP Inpatient 266     ETHYL ALCOHOL LEVEL - Normal    Alcohol ethyl <0.01     TROPONIN T, HIGH SENSITIVITY - Normal    Troponin T, High Sensitivity 10     BLOOD CULTURE     Imaging   XR Chest Port 1 View   Final Result   IMPRESSION: Hyperinflation. Aortic calcification. Chest otherwise negative. Lungs clear. Normal heart size and pulmonary vascularity.      Report per radiology.     EKG   ECG taken at 2122, ECG read at 2130  Sinus tachycardia   Biatrial enlargement  Cannot rule out anterior infarct, age undetermined    No significant change as compared to prior, dated 11/11/24.  Rate 118 bpm. IA interval 158 ms. QRS duration 78 ms. QT/QTc 320/448 ms. P-R-T axes 85 58 74.      ED Course      Medications Administered   Medications   sodium chloride (PF) 0.9% PF flush 3 mL (3 mLs Intracatheter $Given 11/30/24 0043)   sodium chloride (PF) 0.9% PF flush 3 mL (has no administration in time range)   senna-docusate (SENOKOT-S/PERICOLACE) 8.6-50 MG per tablet 1 tablet (has no administration in time range)     Or   senna-docusate (SENOKOT-S/PERICOLACE) 8.6-50 MG per tablet 2 tablet (has no administration in time range)   calcium carbonate (TUMS) chewable tablet 1,000 mg (has no administration in time range)   No lozenges or gum should be given while patient on BIPAP/AVAPS/AVAPS AE (has no administration in time range)   carboxymethylcellulose PF (REFRESH PLUS) 0.5 % ophthalmic solution 1 drop (has no administration in time range)   lidocaine 1 % 0.1-1 mL (has no administration in time range)   lidocaine (LMX4) cream (has no administration in time range)   acetaminophen (TYLENOL)  tablet 650 mg (has no administration in time range)     Or   acetaminophen (TYLENOL) Suppository 650 mg (has no administration in time range)   polyethylene glycol (MIRALAX) Packet 17 g (has no administration in time range)   ondansetron (ZOFRAN ODT) ODT tab 4 mg (has no administration in time range)     Or   ondansetron (ZOFRAN) injection 4 mg (has no administration in time range)   prochlorperazine (COMPAZINE) injection 5 mg (has no administration in time range)     Or   prochlorperazine (COMPAZINE) tablet 5 mg (has no administration in time range)   ipratropium - albuterol 0.5 mg/2.5 mg/3 mL (DUONEB) neb solution 3 mL (has no administration in time range)   levalbuterol (XOPENEX) neb solution 1.25 mg (has no administration in time range)   Patient may continue current oral medications (has no administration in time range)   azithromycin (ZITHROMAX) tablet 250 mg (has no administration in time range)   predniSONE (DELTASONE) tablet 40 mg (has no administration in time range)   azithromycin (ZITHROMAX) 500 mg vial to attach to  mL bag (0 mg Intravenous Stopped 11/29/24 2333)   levalbuterol (XOPENEX) neb solution 2.5 mg (2.5 mg Nebulization $Given 11/29/24 2229)   sodium chloride 0.9% BOLUS 1,000 mL (0 mLs Intravenous Stopped 11/29/24 2333)       Procedures   Procedures         Medical Decision Making / Diagnosis       MDM   Tammie Zeng is a 69 year old female who presents to the ER for evaluation of 2 days of cough and increased work of breathing in the setting of vape usage and COPD with recent admission for COPD flare.  On arrival she has regular tachycardia.  She has tachypnea and is on the CPAP circuit.  She has decreased breath sounds.  She was given 2 DuoNebs prehospital as well as 125 mg of IV Solu-Medrol.  She was given further nebulizer treatments here in the ER as well as IV magnesium.  She was transitioned to BiPAP circuit.  On reassessment, her air movement is improving and she is  subjectively feeling better but still requiring BiPAP for respiratory support.  Chest x-ray clear.  Initial blood gas shows evidence of chronic hypercarbia with appropriate compensation without acute derangement.  Influenza, RSV, COVID testing are negative.  No evidence of myocardial ischemia.  At this juncture clinical findings are consistent with COPD flare rather than other sinister pathology such as PE.  She will be admitted to the hospitalist team for further management.    Disposition   The patient was admitted to the hospital- Dr Maria     Diagnosis     ICD-10-CM    1. COPD exacerbation (H)  J44.1            Discharge Medications   Current Discharge Medication List             Anant Salazar MD  11/30/24 0142

## 2024-11-30 NOTE — PLAN OF CARE
"Goal Outcome Evaluation:  Neuro: A&Ox4, anxious and restless  Vitals/Pain: VSS on bipap, ex tachycardic. Denies pain.  Tele: ST  Diet: NPO ex meds  Lungs: dim with exp wheezes in upper lobes, Pulmonary toilet encouraged.  Lines/Drains: PIV S/L  Skin/Wounds: skin intact ex blanchable redness to coccyx  GI/: BM this AM, passing gas.Voids adequately.  Labs/Abnormal/Trends/Electrolyte Replacement: VBG done, CO2 trending up, pH trending down  Ambulation/Assist: SBA gb.  Plan: CT PE study, echo.    Pt extremely anxious and restless this AM, in tripod position stating she felt like she couldn't get air. RT notified and placed pt on bipap. VBGs done 2 hours after bipap started and worsening. Off bipap for CT, on oxymask 5L. RRT was called during CT for same issues along with brief desaturation. Pt met at bedside by house LADNA and ativan 1mg given with relief of symptoms. Pt tolerating bipap until about 1430 when she started getting anxious and difficulty breathing again. Paged house LADAN to update that she was getting anxious and ripping at bipap again, feeling like she \"wasn't getting air.\" RT at bedside to adjust bipap.       "

## 2024-11-30 NOTE — ED NOTES
Patient arrived on CPAP by EMS. Transitioned to BiPAP 12/6, 40%. SpO2 100%. Duoneb and Albuterol given via in-line nebulizer. Patient is here relatively frequently and well-known by our RT department.   RT to follow.    Nathan Gallo, RRT  11/29/24

## 2024-11-30 NOTE — ED TRIAGE NOTES
Pt present via EMS, per EMS pt is from home with increased SOB. RA low 90's. Pt placed on c pap by ems. Pt transferred to bi pap upon arrival with inline albuterol and duo neb      PMH of COPD     ems also gave 125 solumedrol     Pt also took x2 prednisone at home

## 2024-11-30 NOTE — ED NOTES
Bed: ST02  Expected date:   Expected time:   Means of arrival:   Comments:  448 69F Resp distress on CPAP

## 2024-11-30 NOTE — ED NOTES
"Patient stating she wants a \"break\" from Bipap. Patient maintaining oxygen saturations of % on 8L Oxymask. Hospitalist notified and aware that the patient will be transferred to the floor w/ Oxymask.   "

## 2024-11-30 NOTE — ED NOTES
[facility]  ED Nurse Handoff Report    ED Chief complaint: Shortness of Breath      ED Diagnosis:   Final diagnoses:   None       Code Status: Admitting MD to assess     Allergies:   Allergies   Allergen Reactions    Pcn [Penicillins]      convulsions       Patient Story:   Pt present via EMS, per EMS pt is from home with increased SOB. RA low 90's. Pt placed on c pap by ems. Pt transferred to bi pap upon arrival with inline albuterol and duo neb       PMH of COPD. ems also gave 125 solumedrol. Pt also took x2 prednisone at home               Focused Assessment:    Neuro: Alert, oriented x 4  Respiratory: Patient placed on Bipap upon arrival to ED. Tolerating well, VSS. Last O2 reading 100%.   Cardiology:  See muse   Gastrointestinal: soft, non tender, non distended   Genitourinary/Renal:  Denies symptoms   Musculoskeletal: moves all extremities   Skin: Intact skin   Lines: 20G Right AC    Labs Ordered and Resulted from Time of ED Arrival to Time of ED Departure   COMPREHENSIVE METABOLIC PANEL - Abnormal       Result Value    Sodium 136      Potassium 4.4      Carbon Dioxide (CO2) 24      Anion Gap 14      Urea Nitrogen 6.4 (*)     Creatinine 0.64      GFR Estimate >90      Calcium 9.1      Chloride 98      Glucose 117 (*)     Alkaline Phosphatase 84      AST 22      ALT 9      Protein Total 7.1      Albumin 4.1      Bilirubin Total 0.3     KETONE BETA-HYDROXYBUTYRATE QUANTITATIVE, RAPID - Abnormal    Ketone (Beta-Hydroxybutyrate) Quantitative 0.49 (*)    CBC WITH PLATELETS AND DIFFERENTIAL - Abnormal    WBC Count 10.2      RBC Count 4.37      Hemoglobin 11.0 (*)     Hematocrit 36.2      MCV 83      MCH 25.2 (*)     MCHC 30.4 (*)     RDW 15.1 (*)     Platelet Count 340      % Neutrophils 69      % Lymphocytes 18      % Monocytes 12      % Eosinophils 0      % Basophils 1      % Immature Granulocytes 0      NRBCs per 100 WBC 0      Absolute Neutrophils 7.0      Absolute Lymphocytes 1.8      Absolute  Monocytes 1.3      Absolute Eosinophils 0.0      Absolute Basophils 0.1      Absolute Immature Granulocytes 0.0      Absolute NRBCs 0.0     ISTAT GASES LACTATE VENOUS POCT - Abnormal    Lactic Acid POCT 1.5      Bicarbonate Venous POCT 27      O2 Sat, Venous POCT 35 (*)     pCO2 Venous POCT 52 (*)     pH Venous POCT 7.32      pO2 Venous POCT 23 (*)     Base Excess/Deficit (+/-) POCT 0.0     INFLUENZA A/B, RSV AND SARS-COV2 PCR - Normal    Influenza A PCR Negative      Influenza B PCR Negative      RSV PCR Negative      SARS CoV2 PCR Negative     TROPONIN T, HIGH SENSITIVITY - Normal    Troponin T, High Sensitivity 12     NT PROBNP INPATIENT - Normal    N terminal Pro BNP Inpatient 266     ETHYL ALCOHOL LEVEL - Normal    Alcohol ethyl <0.01     BLOOD CULTURE        XR Chest Port 1 View   Final Result   IMPRESSION: Hyperinflation. Aortic calcification. Chest otherwise negative. Lungs clear. Normal heart size and pulmonary vascularity.            Treatments and/or interventions provided:      Medications   azithromycin (ZITHROMAX) 500 mg vial to attach to  mL bag (500 mg Intravenous $New Bag 11/29/24 2155)   levalbuterol (XOPENEX) neb solution 2.5 mg (has no administration in time range)   sodium chloride 0.9% BOLUS 1,000 mL (1,000 mLs Intravenous $New Bag 11/29/24 2156)        Patient's response to treatments and/or interventions:   Resting comfortably    To be done/followed up on inpatient unit:    See any in-patient orders    Does this patient have any cognitive concerns?:  None    Activity level - Baseline/Home:    Unknown    Activity Level - Current:     Stand with assist to commode due to oxygenation levels.     Patient's Preferred language: English     Needed?: No    Isolation: None  Infection: Not Applicable  Patient tested for COVID 19 prior to admission: YES    Bariatric?: No    Vital Signs:   Vitals:    11/29/24 2117 11/29/24 2120 11/29/24 2123 11/29/24 2215   BP:  (!) 143/84 126/76 125/69    Pulse:  (!) 122 (!) 126 106   Resp:  (!) 47  28   SpO2: 100% 95%  100%       Cardiac Rhythm:     Was the PSS-3 completed:   Yes    Family Comments: No family at bedside.     For the majority of the shift this patient's behavior was Green.   Behavioral interventions performed were     ED NURSE PHONE NUMBER: [phone]

## 2024-11-30 NOTE — PROGRESS NOTES
RECEIVING UNIT ED HANDOFF REVIEW    ED Nurse Handoff Report was reviewed by: Breonna Scott RN on November 29, 2024 at 11:32 PM

## 2024-11-30 NOTE — PLAN OF CARE
A&OX4, 2 L oxymask overnight, STORY, BP stable, denies pain. Tele-SR. WOB in the morning, oxygen mid 90s, on 3-5 L oxymask  Plan: NPO overnight

## 2024-11-30 NOTE — H&P
[facility]    History and Physical - Hospitalist Service       Date of Admission:  11/29/2024     Assessment & Plan      Tammie Zeng is a 69 year old female with a history of COPD, depression and anxiety who is admitted on 11/29/2024 with COPD exacerbation.     Acute exacerbation of severe COPD  Acute hypoxic and hypercarbic respiratory failure  Prior to arrival was given 125 mg solumedrol and nebs by EMS, transitioned from CPAP to Bipap upon arrival to the ED. VBG shows normal pH at 7.32 with pCO2 of 52 and pO2 of 23. This is the 3rd admission for COPD exacerbation in the past several months, 10/15, 11/11 and 11/29.   -continue Bipap, wean as able to nasal canula O2  -scheduled duonebs and prn Xopenex nebs  -prednisone 40 mg daily starting Saturday, if still on Bipap with significant wheezing can continue IV for one more day  -continue scheduled PTA nebs/inhalers tomorrow morning  -continue daily azithromycin  -follow up with outpatient pulmonary as scheduled next week Wednesday 12/4  -RT consult to consider flutter valve or other recs    Anxiety  Depression  -continue PTA prn ativan    Tachycardia  Likely due to a combination of albuterol in nebs and respiratory distress.   -monitor on tele overnight    Alcohol use  Up to 3 drinks per night, no patient history of alcohol withdrawal.   -noted, monitor for any symptoms to suggest alcohol withdrawal       Diet:  NPO while on bipap, regular diet following  DVT Prophylaxis: Pneumatic Compression Devices  Code Status:  DNR/DNI, discussed with patient    Disposition: need to wean off Bipap and be at baseline respiratory status  Medically Ready for Discharge: Anticipated in 2-4 Days        Clinically Significant Risk Factors Present on Admission                                         Florian Wyman, DO  Hospitalist Service  [facility]  Securely message with [place] Emergency Registry (more info)  Text page via [employer]  Paging/Directory     ______________________________________________________________________    Chief Complaint   Shortness of breath    History is obtained from the patient and ED physician    History of Present Illness   Tammie Zeng is a 69 year old female who has a history of COPD and anxiety who presents to the ED with shortness of breath. Patient notes that for the past two days she has been more short of breath with wheezing that is progressive along with a cough that is occasionally productive of white/clear sputum but it feels like she has some sputum she still needs to cough up. She notes that the cold weather is what seemed to trigger this. She has not had any fevers, chest pain, known sick contacts and she tries to wear a mask in public. She has been diligent about taking her meds including daily anoro, pulmicort and azithromycin. She was seen by her pulmonologist at [facility] on 9/3 at which point she was started on Anoro and daily azithro. Follow up at 3 months was recommended and she has an appointment on Wednesday 12/4 at 3pm. Since that visit she has been hospitalized 3 times for COPD exacerbation including [facility] in October, [facility] 11/11 and now. During her last hospital stay in November she had quick improvement with nebs, steroids and bipap and was discharged on prednisone 40 mg x 5 days.     Today EMS was called to the patient's house and she was noted to be very dyspnic without moving much air. She received 125 mg solumedrol, duonebs and was placed on CPAP. On arrival to the ED she was converted to Bipap and given additional nebs with improvement in her respiratory distress. She is currently in Bipap and feels better but doesn't like the mask and is hoping to remove it ASAP. Of note, she is prescribed O2 with activity at home but rarely uses it.       Past Medical History    Past Medical History:   Diagnosis Date    Chronic obstructive pulmonary disease, unspecified COPD type (H) 06/13/2017     has seen [facility], fev1 under 1.0, hospitalized twice in 2023, once in Feb 2024, once May 2024    Depressive disorder, not elsewhere classified 05/2006    following sudden death of her mother    Elevated LFTs 2023    hepatic steatosis 4/23     Generalized anxiety disorder     Hepatic steatosis 04/2023    on us done for elev lfts    Herpes simplex without mention of complication     MOUTH    Nephrolithiasis     TOBACCO ABUSE-CONTINUOUS        Past Surgical History   Past Surgical History:   Procedure Laterality Date    CHOLECYSTECTOMY, LAPOROSCOPIC  1980's    done at [facility]    TUBAL LIGATION  1998       Prior to Admission Medications   Prior to Admission Medications   Prescriptions Last Dose Informant Patient Reported? Taking?   LORazepam (ATIVAN) 0.5 MG tablet  Self No No   Sig: Take 1 tablet (0.5 mg) by mouth 2 times daily as needed for anxiety   albuterol (PROVENTIL) (2.5 MG/3ML) 0.083% neb solution  Self No No   Sig: Take 1 vial (2.5 mg) by nebulization every 4 hours as needed for wheezing   albuterol (VENTOLIN HFA) 108 (90 Base) MCG/ACT inhaler  Self No No   Sig: Inhale 2 puffs into the lungs every 4 hours as needed for shortness of breath, wheezing or cough.   budesonide (PULMICORT) 1 MG/2ML neb solution  Self No No   Sig: Take 2 mLs (1 mg) by nebulization two times daily   melatonin 5 MG tablet  Self Yes No   Sig: Take 10 mg by mouth at bedtime.   umeclidinium-vilanterol (ANORO ELLIPTA) 62.5-25 MCG/ACT oral inhaler  Self No No   Sig: Inhale 1 puff into the lungs daily      Facility-Administered Medications: None        Review of Systems    The 10 point Review of Systems is negative other than noted in the HPI or here.      Physical Exam   Vital Signs: Temp: 98.5  F (36.9  C) Temp src: Temporal BP: 125/69 Pulse: 109   Resp: 28 SpO2: 100 % O2 Device: BiPAP/CPAP    Weight: 0 lbs 0 oz    Gen: lying in bed, Bipap in place  CV: mildly tachycardic, no m/r/g  Pulm: diffuse wheezing throughout  GI: +BS,  soft, NT/ND  Lymph: no edema    Medical Decision Making             Data     I have personally reviewed the following data over the past 24 hrs:    10.2  \   11.0 (L)   / 340     136 98 6.4 (L) /  117 (H)   4.4 24 0.64 \     ALT: 9 AST: 22 AP: 84 TBILI: 0.3   ALB: 4.1 TOT PROTEIN: 7.1 LIPASE: N/A     Trop: 12 BNP: 266     Procal: N/A CRP: N/A Lactic Acid: 1.5         Imaging results reviewed over the past 24 hrs:   Recent Results (from the past 24 hours)   XR Chest Port 1 View    Narrative    EXAM: XR CHEST PORT 1 VIEW  LOCATION: [facility]  DATE: 11/29/2024    INDICATION: Shortness of breath  COMPARISON: None.      Impression    IMPRESSION: Hyperinflation. Aortic calcification. Chest otherwise negative. Lungs clear. Normal heart size and pulmonary vascularity.

## 2024-12-01 NOTE — PROGRESS NOTES
RT called to bedside due to pt feeling SOB. Scheduled neb was given with no relief. Pt was placed on AVAPS setting.  SpO2 in the upper 90's. Liquicell and mepilex in place. Skin intact. Alarm volume set at 10. BS diminished expiratory wheezes.  Will cont to monitor.    /84 (BP Location: Right arm, Patient Position: Right side, Cuff Size: Adult Small)   Pulse 109   Temp 97.8  F (36.6  C) (Oral)   Resp 16   LMP 01/24/2006   SpO2 99%

## 2024-12-01 NOTE — PLAN OF CARE
Goal Outcome Evaluation:  [id number]  Neuro- x4 lethargic arousing to gentle touch and voice at start of shift. PRN ativan given x1 at 1653 for mild anxiety. Anxiety worsening additional x1 0.5 mg ativan given at 1747 shortly after pt sitting up in bed tripod increased wob with severe anxiety attempting to pull bipap off. RRT called see alt not - morphine x2, ativan, nebs, mg ordered.  Most Recent Vitals- .vs  Tele/Cardiac- -120's   Resp- BIPAP/AVAPs lungs wheezy and diminished   Activity- bedrest   Pain- denies   GI/- external catheter in place  Diet: NPO for Medical/Clinical Reasons Except for: Meds    Plan- House NP and daughter at bedside at end of shift discussing of on going plan of care

## 2024-12-01 NOTE — PLAN OF CARE
Date/Time: 11/30/24 1900-0730    **pt on comfort care**    Summary: 70 y/o F w/PMH of COPD, depression, and anxiety. Admitted on 11/29 for SOB.  Diagnosis: COPD exacerbation.  Orientation: disoriented x4  Behavior & Aggression: green  Activity: bedrest; q2 turn  Diet: NPO  Fall risk: yes  Isolation: N/A  Pain: pt receiving frequent morphine doses for SOB.  B&B: incontinent, no BM this shift.  VS/O2: VSS deferred at this time; utilizing 3 L oxymask at this time.  IV: R PIV, SL  Drains/Devices: purewick in place  Tele: deferred   Abnormal Labs: deferred  Skin: intact, redness to bottom  Consults/Procedures: N/A  D/C Date: TBD  Other: RRT called @ 2245 due to pt desatting down to 40's on BiPAP; pt placed on comfort care at this time and BiPAP removed per daughter request. Daughter and son-in-law would like to be notified if pt begins to decline so they can be present; home at this time to sleep and check on children.

## 2024-12-01 NOTE — PROGRESS NOTES
Transferred to [facility], report given to RN. Family notified as well.   Orientation: Obtunded, CHUCK  Activity: Ax2 w/ lift, T/R q2h  Diet/BS Checks: NPO, PRN suctioning oral secretions  IV Access/Drains: 1 PIV SL positional  Pain Management: appears comfortable, at times get restless. PRN Morphine solution given x2, and IV Ativan given, now scheduled q2hrs. Atropine drops also given x1. IV access not great Morphine oral solution preferred by family.   Abnormal VS/Results: deferred d/t comfort care.   Bowel/Bladder: Incontinent of B/B. Purewick in place very little output, no BM.   Skin/Wounds: Blanchable redness to sacrum.   D/C Disposition: pending  Other Info: daughter and son-in-law at the bedside. Oxymask 5L for comfort measure. Pt belongings taken by family for transfer.

## 2024-12-01 NOTE — PROVIDER NOTIFICATION
Notification     Notified Person: Matthew Mai     Notification Time: 0712     Notification Interaction: face to face      Purpose of Notification: Critical VBG results - PH 7.11 and CO2 90      Orders Received:

## 2024-12-01 NOTE — PROGRESS NOTES
"[facility] brief note:    Received bedside handoff from colleague, Debora Baird, APRN, CNP, house LADAN; please refer to Oli's initial RRT note with subsequent addendums.  At time of arrival, pt resting in bed, on Bipap therapy, AVAPS setting, pt remains in mild-moderate respiratory distress with noted upper airway obstruction requiring jaw thrust to allow for adequate air movement throughout lung fields.  Oli notes recently contacted pt's daughter, Sonia, to present back to pt's bedside as concerned pt's overall clinical status continues to decompensate despite aggressive cares including Bipap therapy, steroids, antibiotics, nebulizers, medication therapy for comfort.  Oli requested writer to follow up with Sonia once she arrives to further discuss pt's overall clinical status.  Sonia arrived shortly thereafter.  Reviewed pt's overall clinical status with Sonia.  Sonia shared that pt's overall health has been quite challenging since last July 2023.  Notes that winter months are challenging and that they had a break over this past summer.  Sonia shared that pt lives alone, and it has been challenging to get pt to seek further care for her overall health.  Sonia quite tearful throughout conversation and notes \"I just don't know what to do\".  Expressed to Sonia that pt has made it clear what she wants, taking the burden away from Sonia.  Shared with Sonia that pt confirmed DNR/DNI and had been expressing to Oli that she \"can't do it anymore\".  Also shared with Sonia that despite our best efforts, at some point, pt's body will make the decision.  Discussed with Sonia what comfort care looks like, that if she was not ready to make the decision to remove Bipap, could continue with comfort care to allow for medications to be liberalized to ensure pt's comfort.  Sonia notes she would like a couple hours to sit with her mom before making any decisions.  Of note, while present with Sonia, nursing " updated writer that pt's VBG results critical, pH 7.1, CO2 90.  Updated Sonia concerned that despite pt being on Bipap therapy throughout the majority of the day, pt's blood gases have acutely worsened, consistent with pt's overall clinical appearance.  Sonia expressed understanding.  Of note, offered for  or  to be contacted, Sonia declined.        Addendum: 2253: RRT called as pt's O2 sats dropped to the 40s-60s% despite being on Bipap therapy.  Nursing notes concern that pt appears to be dying.  Presented to pt's bedside.  At time of arrival, pt diaphoretic, mottled appearance, cyanotic on nose, in severe respiratory distress.  Sonia and her  present at pt's bedside.  Discussed with Sonia my concern that pt is dying.  Sonia expressed understanding and requests for pt to be made comfortable.  At this time, Sonia would like for Bipap to remain in place.      Interventions:  - Will transition pt to comfort measures only per pt's daughter, Sonia's, request  - Will order one time dose IV ativan 1 mg and 2 mg IV morphine now while awaiting for comfort care orders to be reconciled   - Updated by nursing that after a while, Sonia requested for Bipap to be removed    DULCE Cummings, CNP  House [facility]    Medical Decision Making       45 MINUTES SPENT BY ME on the date of service doing chart review, history, exam, documentation & further activities per the note.

## 2024-12-01 NOTE — PROGRESS NOTES
[facility]  Hospitalist Progress Note   12/01/2024          Assessment and Plan:       Tammie Zeng is a 69 year old female with a history of COPD, depression and anxiety admitted on 11/29/2024 for shortness of breath.    Admitted 11/11 to 11/12/2024 for acute exacerbation of obstructive airway disease.  Admitted 10/15 to 10/16/2024 for acute exacerbation of obstructive airway disease.    Summary   Patient was admitted with acute hypoxic hypercapnic respiratory failure to [facility] on 11/29.  Significant decompensation within few hours of admission.  At length discussed goals of care with patient, family opted for DNR/DNI.  Patient continues to have further deterioration, switch to comfort care status on 12/1 after discussing with patient's daughter Sonia, son-in-law by the bedside    Comfort focused care  Acute hypoxic and hypercarbic respiratory failure likely from COPD exacerbation, rule out other etiology  Acute exacerbation of severe COPD.  Mucous plugging on imaging  Tachycardia multifactorial.  Physical deconditioning from medical illness.  Presented with shortness of breath, cough. Prior to arrival was given 125 mg solumedrol and nebs by EMS, transitioned from CPAP to Bipap upon arrival to the ED. at the time of admission tachycardic, tachypneic, decreased bilateral breath sounds. PH 7.32, PCO2 OF 52.   Lactic acid 1.5.  WBC 10.2.  Creatinine 0.64.  Sodium 136, potassium 4.4.  Influenza A, B, RSV, COVID-19 PCR negative.  High-sensitivity troponin 12 >10, N-terminal proBNP 266.  EKG sinus tachycardia.  CT chest 11/30 - .  No evidence of pulmonary embolism to the segmental level. Moderate upper lobe predominant emphysema. Inflammatory bronchial wall thickening with scattered foci of mucus plugging within bilateral lower lobe and right middle lobe airways. No consolidations or pleural effusions.  -- As above patient was admitted to [facility], in severe respiratory distress, anxious.  Discussed with  patient, elected for DNR/DNI.  Significant sudden deterioration in clinical status, See RRT notes for details.  Housestaff has had goals of care conversation with patient's daughter -DNR/DNI.  -- 12/1 at the time of encounter patient not responsive, increased work of breathing.  Tachypneic, tachycardic.  At length discussed with patient's daughter Sonia by the bedside regarding clinical status, goals of care, now opting for comfort focused care.  -- Discontinue antibiotics, steroids, neb treatments.  IV/p.o. as needed morphine.  IV/p.o. as needed Ativan.  As needed atropine.  As needed Zofran.  Anticipating impending death, patient's daughter Sonia overwhelmed, will hold off GIP hospice over weekend, can consider in a.m.     Severe anxiety.  History of depression.  Since admission patient quite anxious, was receiving IV/p.o. as needed Ativan.  Now comfort focused care     Moderate alcohol use  Up to 3 drinks per night, no patient history of alcohol withdrawal.   Alcohol level less than 0.01.  Beta-hydroxybutyrate 0.49.    Now opting for comfort focused care.    Nicotine dependence.  Reports quit smoking cigarettes in July, vapes nicotine.  Comfort focused care     DVT Prophylaxis: SCDs.  Subcutaneous Lovenox  Code Status: No CPR- Do NOT Intubate  Disposition: Expected discharge-comfort focused care, impending death.     Medically Ready for Discharge: Anticipated in 2-4 Days    Discussed with patient's daughter, son-in-law by bedside, bedside RN and care team.  Patient critically ill, now opting for comfort focused care.  More than 70% of time spent in direct patient care, care coordination, patient counseling, and formalizing plan of care.      Sohail Duff MD         Interval History:        Patient lying in bed, unresponsive, increased work of breathing.    Tachypneic, tachycardic.    On oxime mask   Daughter by bedside tearful, overwhelmed  Unable to obtain review of systems.  Noted RRT's from 11/30          Physical Exam:        Physical Exam   Temp:  [97.8  F (36.6  C)-98.4  F (36.9  C)] 97.8  F (36.6  C)  Pulse:  [] 100  Resp:  [16-35] 20  BP: (102-166)/() 102/67  SpO2:  [92 %-99 %] 97 %    Intake/Output Summary (Last 24 hours) at 11/30/2024 1608  Last data filed at 11/30/2024 1100  Gross per 24 hour   Intake 0 ml   Output 300 ml   Net -300 ml       PHYSICAL EXAM  GENERAL: Patient with increased work of breathing.  Unresponsive.  HEENT: Oropharynx dry  HEART:   Tachycardia  LUNGS: Increased work of breathing.  EXTREMITIES: No pedal edema.   SKIN: Warm, dry.   PSYCHIATRY unresponsive.       Medications:        Current Facility-Administered Medications   Medication Dose Route Frequency Provider Last Rate Last Admin    [Held by provider] acetylcysteine (MUCOMYST) 20 % nebulizer solution 4 mL  4 mL Nebulization Q4H Debora Baird APRN CNP   4 mL at 11/30/24 2240    [Held by provider] ipratropium (ATROVENT) 0.02 % neb solution 0.5 mg  0.5 mg Nebulization 4x daily Debora Baird APRN CNP   0.5 mg at 11/30/24 1859    [Held by provider] levalbuterol (XOPENEX) neb solution 1.25 mg  1.25 mg Nebulization Q6H Debora Baird APRN CNP        methylPREDNISolone Na Suc (solu-MEDROL) injection 62.5 mg  62.5 mg Intravenous Q12H Sohail Duff MD   62.5 mg at 12/01/24 0916    sodium chloride (PF) 0.9% PF flush 3 mL  3 mL Intravenous Q8H Matthew Mai APRN CNP   3 mL at 12/01/24 0915     Current Facility-Administered Medications   Medication Dose Route Frequency Provider Last Rate Last Admin    acetaminophen (TYLENOL) tablet 650 mg  650 mg Oral Q4H PRN Florian Wyman DO        Or    acetaminophen (TYLENOL) Suppository 650 mg  650 mg Rectal Q4H PRN Florian Wyman DO        atropine 1 % ophthalmic solution 2 drop  2 drop Sublingual Q4H PRN Matthew Mai APRN CNP        [START ON 12/3/2024] bisacodyl (DULCOLAX) suppository 10 mg  10 mg Rectal Once PRN Matthew Mai APRN CNP        calcium  carbonate (TUMS) chewable tablet 1,000 mg  1,000 mg Oral 4x Daily PRN Florian Wyman DO        carboxymethylcellulose PF (REFRESH PLUS) 0.5 % ophthalmic solution 1 drop  1 drop Both Eyes Q1H PRN Florian Wyman DO        glycopyrrolate (ROBINUL) injection 0.2 mg  0.2 mg Intravenous Q4H PRN Matthew Mai APRN CNP        levalbuterol (XOPENEX) neb solution 1.25 mg  1.25 mg Nebulization Q2H PRN Jackson Maria MD   1.25 mg at 11/30/24 2240    lidocaine (LMX4) cream   Topical Q1H PRN Florian Wyman DO        lidocaine 1 % 0.1-1 mL  0.1-1 mL Other Q1H PRN Florian Wyman DO        LORazepam (ATIVAN) injection 1 mg  1 mg Intravenous Q3H PRN Matthew Mai APRN CNP   1 mg at 12/01/24 0211    Or    LORazepam (ATIVAN) tablet 1 mg  1 mg Sublingual Q3H PRN Matthew Mai APRN CNP        melatonin tablet 5 mg  5 mg Oral QPM PRN Sohail Duff MD        mineral oil-hydrophilic petrolatum (AQUAPHOR)   Topical Q1H PRN Matthew Mai APRN CNP        morphine (PF) injection 1 mg  1 mg Intravenous Q15 Min PRN Matthew Mai APRN CNP        morphine (PF) injection 2 mg  2 mg Intravenous Q15 Min PRN Matthew Mai APRN CNP   2 mg at 12/01/24 0628    morphine sulfate (ROXANOL) 20 mg/mL (HIGH CONC) soln 5 mg  5 mg Sublingual Q1H PRN Matthew Mai APRN CNP        Or    morphine sulfate (ROXANOL) 20 mg/mL (HIGH CONC) soln 10 mg  10 mg Sublingual Q1H PRN Matthew Mai APRN CNP   10 mg at 12/01/24 0541    naloxone (NARCAN) injection 0.1 mg  0.1 mg Intravenous Q2 Min PRN Matthew Mai APRN CNP        naloxone (NARCAN) injection 0.2 mg  0.2 mg Intravenous Q2 Min PRN Matthew Mai APRN CNP        No lozenges or gum should be given while patient on BIPAP/AVAPS/AVAPS AE   Does not apply Continuous PRN Sohail Duff MD        ondansetron (ZOFRAN ODT) ODT tab 4 mg  4 mg Oral Q6H PRN Florian Wyman DO        Or    ondansetron (ZOFRAN) injection 4 mg  4 mg Intravenous Q6H PRN Florian Wyman DO         Patient may continue current oral medications   Does not apply Continuous PRN Sohail Duff MD        polyethylene glycol (MIRALAX) Packet 17 g  17 g Oral BID PRN Florian Wyman DO        prochlorperazine (COMPAZINE) injection 5 mg  5 mg Intravenous Q6H PRN Florian Wyman DO        Or    prochlorperazine (COMPAZINE) tablet 5 mg  5 mg Oral Q6H PRN Florian Wyman DO        senna-docusate (SENOKOT-S/PERICOLACE) 8.6-50 MG per tablet 1 tablet  1 tablet Oral BID PRN Florian Wyman DO        Or    senna-docusate (SENOKOT-S/PERICOLACE) 8.6-50 MG per tablet 2 tablet  2 tablet Oral BID PRN Florian Wyman DO        sennosides (SENOKOT) tablet 1 tablet  1 tablet Oral BID PRN Matthew Mai, DULCE CNP        sodium chloride (PF) 0.9% PF flush 3 mL  3 mL Intracatheter q1 min prn Florian Wyman DO        sodium chloride (PF) 0.9% PF flush 3 mL  3 mL Intravenous Q1H PRN Matthew Mai, APRN CNP                Data:      All new lab and imaging data was reviewed.

## 2024-12-02 NOTE — PLAN OF CARE
Goal Outcome Evaluation:      Plan of Care Reviewed With: patient, family    Overall Patient Progress: decliningOverall Patient Progress: declining    Unable to assess orientation, obtunded and un-responsive. Appears comfortable. Tachypneic with mild secretions. PRN Atropine and morphine, along with scheduled Ativan. On 2L oxymask for comfort. NPO. T/R per family request. Incontinent of B&B, purewick in place, little output. Sacral blanchable redness. Daughter and Son-in-law @ bedside. Possible GIP consult tomorrow.

## 2024-12-02 NOTE — PROGRESS NOTES
House [facility] Death Pronouncement    Called by nursing staff to pronounce Tammie Zeng dead.    Physical Exam: Unresponsive to noxious stimuli, Spontaneous respirations absent, Breath sounds absent, Carotid pulse absent, Heart sounds absent, Pupillary light reflex absent, and Corneal blink reflex absent    Patient was pronounced dead at 5:15 AM, 2024.    Infectious disease present?: NO    Communicable disease present? (examples: HIV, chicken pox, TB, Ebola, CJD) :  NO    Multi-drug resistant organism present? (example: MRSA): NO    Please consider an autopsy if any of the following exist:  NO Unexpected or unexplained death during or following any dental, medical, or surgical diagnostic treatment procedures.   NO Death of mother at or up to seven days after delivery.     NO All  and pediatric deaths.     NO Death where the cause is sufficiently obscure to delay completion of the death certificate.   NO Deaths in which autopsy would confirm a suspected illness/condition that would affect surviving family members or recipients of transplanted organs.     The following deaths must be reported to the 's Office:  NO A death that may be due entirely or in part to any factors other than natural disease (recent surgery, recent trauma, suspected abuse/neglect).   NO A death that may be an accident, suicide, or homicide.     NO Any sudden, unexpected death in which there is no prior history of significant heart disease or any other condition associated with sudden death.   NO A death under suspicious, unusual, or unexpected circumstances.    NO Any death which is apparently due to natural causes but in which the  does not have a personal physician familiar with the patient s medical history, social, or environmental situation or the circumstances of the terminal event.   NO Any death apparently due to Sudden Infant Death Syndrome.     NO Deaths that occur during, in association with, or as  consequences of a diagnostic, therapeutic, or anesthetic procedure.   NO Any death in which a fracture of a major bone has occurred within the past (6) six months.   NO A death of persons not seen by their physician within 120 days of demise.     NO Any death in which the  was an inmate of a public institution or was in the custody of Law Enforcement personnel.   NO  All unexpected deaths of children   NO Solid organ donors   NO Unidentified bodies   YES Deaths of persons whose bodies are to be cremated or otherwise disposed of so that the bodies will later be unavailable for examination;   NO Deaths unattended by a physician outside of a licensed healthcare facility or licensed residential hospice program   NO Deaths occurring within 24 hours of arrival to a health care facility if death is unexpected.    NO Deaths associated with the decedent s employment.   NO Deaths attributed to acts of terrorism.   NO Any death in which there is uncertainty as to whether it is a medical examiner s care should be discussed with the medical investigator.      Death Certificate to be directed to Dr. Sohail Duff [facility]    Body disposition: Body released to the [facility].    DULCE Cummings, CNP  Hospitalist-House [facility]  Hospitalist Service  Securely message with [employer] (more info)  Text page via [facility] Paging/Directory

## 2024-12-05 LAB — BACTERIA BLD CULT: NO GROWTH

## 2024-12-05 NOTE — DISCHARGE SUMMARY
Discharge Summary  Hospitalist    Date of Admission:  11/29/2024  Date of Discharge:  12/2/2024  6:45 AM  Discharging Provider: Sohail Duff MD    Primary Care Physician   Mikel Navarro  Primary Care Provider Phone Number: [phone]  Primary Care Provider Fax Number: [phone]    PRINCIPAL DIAGNOSIS  <principal problem not specified>    Past Medical History:   Diagnosis Date    Chronic obstructive pulmonary disease, unspecified COPD type (H) 06/13/2017    has seen [facility], fev1 under 1.0, hospitalized twice in 2023, once in Feb 2024, once May 2024    Depressive disorder, not elsewhere classified 05/2006    following sudden death of her mother    Elevated LFTs 2023    hepatic steatosis 4/23 us    Generalized anxiety disorder     Hepatic steatosis 04/2023    on us done for elev lfts    Herpes simplex without mention of complication     MOUTH    Nephrolithiasis     TOBACCO ABUSE-CONTINUOUS        History of Present Illness   Tammie Zeng is an 69 year old female who presented with ***    Hospital Course   Tammie Zeng is a 69 year old female with a history of COPD, depression and anxiety admitted on 11/29/2024 for shortness of breath.     Admitted 11/11 to 11/12/2024 for acute exacerbation of obstructive airway disease.  Admitted 10/15 to 10/16/2024 for acute exacerbation of obstructive airway disease.     Summary   Patient was admitted with acute hypoxic hypercapnic respiratory failure to [facility] on 11/29.  Significant decompensation within few hours of admission.  At length discussed goals of care with patient, family opted for DNR/DNI.  Patient continues to have further deterioration, switch to comfort care status on 12/1 after discussing with patient's daughter Sonia, son-in-law by the bedside     Comfort focused care  Acute hypoxic and hypercarbic respiratory failure likely from COPD exacerbation, rule out other etiology  Acute exacerbation of severe COPD.  Mucous plugging on imaging  Tachycardia  multifactorial.  Physical deconditioning from medical illness.  Presented with shortness of breath, cough. Prior to arrival was given 125 mg solumedrol and nebs by EMS, transitioned from CPAP to Bipap upon arrival to the ED. at the time of admission tachycardic, tachypneic, decreased bilateral breath sounds. PH 7.32, PCO2 OF 52.   Lactic acid 1.5.  WBC 10.2.  Creatinine 0.64.  Sodium 136, potassium 4.4.  Influenza A, B, RSV, COVID-19 PCR negative.  High-sensitivity troponin 12 >10, N-terminal proBNP 266.  EKG sinus tachycardia.  CT chest 11/30 - .  No evidence of pulmonary embolism to the segmental level. Moderate upper lobe predominant emphysema. Inflammatory bronchial wall thickening with scattered foci of mucus plugging within bilateral lower lobe and right middle lobe airways. No consolidations or pleural effusions.  -- As above patient was admitted to [facility], in severe respiratory distress, anxious.  Discussed with patient, elected for DNR/DNI.  Significant sudden deterioration in clinical status, See RRT notes for details.  Housestaff has had goals of care conversation with patient's daughter -DNR/DNI.  -- 12/1 at the time of encounter patient not responsive, increased work of breathing.  Tachypneic, tachycardic.  At length discussed with patient's daughter Sonia by the bedside regarding clinical status, goals of care, now opting for comfort focused care.  -- Discontinue antibiotics, steroids, neb treatments.  IV/p.o. as needed morphine.  IV/p.o. as needed Ativan.  As needed atropine.  As needed Zofran.  Anticipating impending death, patient's daughter Sonia overwhelmed, will hold off [facility] hospice over weekend, can consider in a.m.     Severe anxiety.  History of depression.  Since admission patient quite anxious, was receiving IV/p.o. as needed Ativan.  Now comfort focused care     Moderate alcohol use  Up to 3 drinks per night, no patient history of alcohol withdrawal.   Alcohol level less than 0.01.   Beta-hydroxybutyrate 0.49.    Now opting for comfort focused care.     Nicotine dependence.  Reports quit smoking cigarettes in July, vapes nicotine.  Comfort focused care         Sohail Duff MD.

## 2024-12-18 RX ORDER — BUDESONIDE 1 MG/2ML
INHALANT ORAL
Qty: 180 ML | OUTPATIENT
Start: 2024-12-18